# Patient Record
Sex: MALE | Race: WHITE | NOT HISPANIC OR LATINO | Employment: OTHER | URBAN - METROPOLITAN AREA
[De-identification: names, ages, dates, MRNs, and addresses within clinical notes are randomized per-mention and may not be internally consistent; named-entity substitution may affect disease eponyms.]

---

## 2018-05-21 ENCOUNTER — OFFICE VISIT (OUTPATIENT)
Dept: CARDIOLOGY | Facility: CLINIC | Age: 38
End: 2018-05-21

## 2018-05-21 VITALS
DIASTOLIC BLOOD PRESSURE: 79 MMHG | WEIGHT: 287 LBS | SYSTOLIC BLOOD PRESSURE: 111 MMHG | HEART RATE: 90 BPM | HEIGHT: 68 IN | BODY MASS INDEX: 43.5 KG/M2

## 2018-05-21 DIAGNOSIS — R94.31 ABNORMAL EKG: ICD-10-CM

## 2018-05-21 DIAGNOSIS — I42.9 CARDIOMYOPATHY, UNSPECIFIED TYPE (HCC): Primary | ICD-10-CM

## 2018-05-21 PROCEDURE — 99204 OFFICE O/P NEW MOD 45 MIN: CPT | Performed by: INTERNAL MEDICINE

## 2018-05-21 PROCEDURE — 93000 ELECTROCARDIOGRAM COMPLETE: CPT | Performed by: INTERNAL MEDICINE

## 2018-05-21 RX ORDER — VORTIOXETINE 10 MG/1
1 TABLET, FILM COATED ORAL DAILY
Refills: 0 | COMMUNITY
Start: 2018-05-08 | End: 2018-06-19 | Stop reason: ALTCHOICE

## 2018-05-21 RX ORDER — PANTOPRAZOLE SODIUM 40 MG/1
40 TABLET, DELAYED RELEASE ORAL DAILY
Refills: 0 | COMMUNITY
Start: 2018-05-15 | End: 2019-03-21

## 2018-05-21 RX ORDER — TRAZODONE HYDROCHLORIDE 50 MG/1
50 TABLET ORAL NIGHTLY PRN
Refills: 0 | COMMUNITY
Start: 2018-05-08 | End: 2018-08-29 | Stop reason: ALTCHOICE

## 2018-05-21 RX ORDER — FUROSEMIDE 40 MG/1
40 TABLET ORAL 2 TIMES DAILY
Refills: 0 | COMMUNITY
Start: 2018-05-15 | End: 2020-08-12

## 2018-05-21 RX ORDER — METRONIDAZOLE 500 MG/1
500 TABLET ORAL 3 TIMES DAILY
Refills: 0 | COMMUNITY
Start: 2018-05-15 | End: 2019-03-21

## 2018-05-21 RX ORDER — LEVOFLOXACIN 750 MG/1
750 TABLET ORAL DAILY
Refills: 0 | COMMUNITY
Start: 2018-05-15 | End: 2019-03-21

## 2018-05-21 RX ORDER — GABAPENTIN 300 MG/1
300 CAPSULE ORAL 3 TIMES DAILY
Refills: 0 | COMMUNITY
Start: 2018-05-08 | End: 2019-03-21

## 2018-05-21 RX ORDER — CARVEDILOL 12.5 MG/1
12.5 TABLET ORAL 2 TIMES DAILY
Refills: 0 | COMMUNITY
Start: 2018-05-15 | End: 2019-03-07 | Stop reason: ALTCHOICE

## 2018-05-21 RX ORDER — LISINOPRIL 20 MG/1
20 TABLET ORAL DAILY
Refills: 0 | COMMUNITY
Start: 2018-05-15 | End: 2020-05-22 | Stop reason: ALTCHOICE

## 2018-05-21 RX ORDER — PRAVASTATIN SODIUM 20 MG
20 TABLET ORAL
Refills: 0 | COMMUNITY
Start: 2018-05-15 | End: 2021-08-11 | Stop reason: SDUPTHER

## 2018-05-21 NOTE — PROGRESS NOTES
" Subjective:        CC  H/o cardiomyopathy,      Brian Stewart is a 37 y.o. male who  Is here for the cardiac evaluation with a history of the cardiomyopathy diagnoses years ago still complains of shortness of breath on exertion    Brian Stewart has been complaining of the shortness of breath mild-to-moderate in intensity with mild-to-moderate usually relieved with rest associated with anxiety and fatigue  .    Past Medical History:   Diagnosis Date   • Arrhythmia    • Cardiomyopathy    • Diverticulitis    • Hyperlipidemia     reports that he has quit smoking. He does not have any smokeless tobacco history on file. He reports that he does not drink alcohol.  Family History   Problem Relation Age of Onset   • Heart disease Father         Review of Systems  Constitutional: No wt loss, fever   Gastrointestinal: No nausea , abdominal pain  Behavioral/Psych: No insomnia or anxiety   Cardiovascular ----positive for Shortness of breath. All other systems reviewed and are negative    Objective:       Physical Exam           Physical Exam  /79 (BP Location: Left arm, Patient Position: Sitting)   Pulse 90   Ht 172.7 cm (68\")   Wt 130 kg (287 lb)   BMI 43.64 kg/m²     General appearance: NAD, conversant   Eyes: anicteric sclerae, moist conjunctivae; no lid-lag; PERRLA   HENT: Atraumatic; oropharynx clear with moist mucous membranes and no mucosal ulcerations;  normal hard and soft palate   Neck: Trachea midline; FROM, supple, no thyromegaly or lymphadenopathy   Lungs: CTA, with normal respiratory effort and no intercostal retractions   CV: S1-S2 regular, no murmurs, no rub, no gallop   Abdomen: Soft, non-tender; no masses or HSM   Extremities: No peripheral edema or extremity lymphadenopathy  Skin: Normal temperature, turgor and texture; no rash, ulcers or subcutaneous nodules   Psych: Appropriate affect, alert and oriented to person, place and time           Cardiographics  @  ECG 12 Lead  Date/Time: 5/21/2018 " 12:49 PM  Performed by: BELLE NICHOLS  Authorized by: BELLE NICHOLS   Comparison: not compared with previous ECG   Previous ECG: no previous ECG available  Rhythm: sinus rhythm  ST Flattening: all  Clinical impression: non-specific ECG            Echocardiogram:     Imaging  Chest x-ray: not done     Lab Review   not applicable       Current Outpatient Prescriptions:   •  carvedilol (COREG) 12.5 MG tablet, Take 12.5 mg by mouth 2 (Two) Times a Day., Disp: , Rfl: 0  •  furosemide (LASIX) 40 MG tablet, Take 40 mg by mouth 2 (Two) Times a Day., Disp: , Rfl: 0  •  gabapentin (NEURONTIN) 300 MG capsule, Take 300 mg by mouth 3 (Three) Times a Day., Disp: , Rfl: 0  •  levoFLOXacin (LEVAQUIN) 750 MG tablet, Take 750 mg by mouth Daily., Disp: , Rfl: 0  •  lisinopril (PRINIVIL,ZESTRIL) 20 MG tablet, Take 20 mg by mouth Daily., Disp: , Rfl: 0  •  metroNIDAZOLE (FLAGYL) 500 MG tablet, Take 500 mg by mouth 3 (Three) Times a Day., Disp: , Rfl: 0  •  pantoprazole (PROTONIX) 40 MG EC tablet, Take 40 mg by mouth Daily., Disp: , Rfl: 0  •  pravastatin (PRAVACHOL) 20 MG tablet, Take 20 mg by mouth every night at bedtime., Disp: , Rfl: 0  •  traZODone (DESYREL) 50 MG tablet, Take 50 mg by mouth At Night As Needed., Disp: , Rfl: 0  •  TRINTELLIX 10 MG tablet, Take 1 tablet by mouth Daily., Disp: , Rfl: 0        Assessment:      1. Cardiomyopathy, unspecified type    2. Abnormal EKG        There is no problem list on file for this patient.        Plan:          1. Cardiomyopathy, unspecified type  Considering patient's medical condition as well as the risk factors, patient will require echocardiogram for further evaluation for the LV function, four-chamber evaluation, including the pressures, valvular function and  pericardial disease and pericardial effusion    - ECG 12 Lead  - Stress Test With Myocardial Perfusion One Day  - Adult Transthoracic Echo Complete W/ Cont if Necessary Per Protocol    2. Abnormal  EKG  Considering the patient's symptoms as well as clinical situation and  EKG findings, along with cardiac risk factors, ischemic workup is necessary to rule out ischemic cardiomyopathy, stress induced arrhythmias, and functional capacity for diagnosis as well as prognostic consideration    - Stress Test With Myocardial Perfusion One Day  - Adult Transthoracic Echo Complete W/ Cont if Necessary Per Protocol      Echo, stress cardiolyte    See us 2-4 wks    Counseling was given to Brian Stewart for the following topics:  risk factor reductions, prognosis and risks and benefits of treatment options .       SMOKING COUNSELING:    Counseling given: Not Answered      .  EMR Dragon/Transcription disclaimer:   Much of this encounter note is an electronic transcription/translation of spoken language to printed text. The electronic translation of spoken language may permit erroneous, or at times, nonsensical words or phrases to be inadvertently transcribed; Although I have reviewed the note for such errors, some may still exist.

## 2018-06-11 ENCOUNTER — HOSPITAL ENCOUNTER (OUTPATIENT)
Dept: CARDIOLOGY | Facility: HOSPITAL | Age: 38
Discharge: HOME | End: 2018-06-11
Attending: INTERNAL MEDICINE | Admitting: INTERNAL MEDICINE

## 2018-06-11 PROCEDURE — 93306 TTE W/DOPPLER COMPLETE: CPT | Performed by: INTERNAL MEDICINE

## 2018-06-11 PROCEDURE — 93306 TTE W/DOPPLER COMPLETE: CPT

## 2018-06-12 LAB
AORTIC DIMENSIONLESS INDEX: 0.8 (DI)
BH CV ECHO MEAS - ACS: 2.3 CM
BH CV ECHO MEAS - AO MAX PG (FULL): 1.4 MMHG
BH CV ECHO MEAS - AO MAX PG: 4.2 MMHG
BH CV ECHO MEAS - AO MEAN PG (FULL): 1 MMHG
BH CV ECHO MEAS - AO MEAN PG: 3 MMHG
BH CV ECHO MEAS - AO ROOT AREA (BSA CORRECTED): 1.3
BH CV ECHO MEAS - AO ROOT AREA: 7.5 CM^2
BH CV ECHO MEAS - AO ROOT DIAM: 3.1 CM
BH CV ECHO MEAS - AO V2 MAX: 102 CM/SEC
BH CV ECHO MEAS - AO V2 MEAN: 76.4 CM/SEC
BH CV ECHO MEAS - AO V2 VTI: 18.9 CM
BH CV ECHO MEAS - ASC AORTA: 2.9 CM
BH CV ECHO MEAS - AVA(I,A): 4.2 CM^2
BH CV ECHO MEAS - AVA(I,D): 4.2 CM^2
BH CV ECHO MEAS - AVA(V,A): 4.3 CM^2
BH CV ECHO MEAS - AVA(V,D): 4.3 CM^2
BH CV ECHO MEAS - BSA(HAYCOCK): 2.6 M^2
BH CV ECHO MEAS - BSA: 2.4 M^2
BH CV ECHO MEAS - BZI_BMI: 43.6 KILOGRAMS/M^2
BH CV ECHO MEAS - BZI_METRIC_HEIGHT: 172.7 CM
BH CV ECHO MEAS - BZI_METRIC_WEIGHT: 130.2 KG
BH CV ECHO MEAS - CONTRAST EF (2CH): 24.7 ML/M^2
BH CV ECHO MEAS - CONTRAST EF 4CH: 29 ML/M^2
BH CV ECHO MEAS - EDV(CUBED): 195.1 ML
BH CV ECHO MEAS - EDV(MOD-SP2): 73 ML
BH CV ECHO MEAS - EDV(MOD-SP4): 69 ML
BH CV ECHO MEAS - EDV(TEICH): 166.6 ML
BH CV ECHO MEAS - EF(CUBED): 39.7 %
BH CV ECHO MEAS - EF(MOD-BP): 45 %
BH CV ECHO MEAS - EF(MOD-SP2): 24.7 %
BH CV ECHO MEAS - EF(MOD-SP4): 29 %
BH CV ECHO MEAS - EF(TEICH): 32.3 %
BH CV ECHO MEAS - ESV(CUBED): 117.6 ML
BH CV ECHO MEAS - ESV(MOD-SP2): 55 ML
BH CV ECHO MEAS - ESV(MOD-SP4): 49 ML
BH CV ECHO MEAS - ESV(TEICH): 112.8 ML
BH CV ECHO MEAS - FS: 15.5 %
BH CV ECHO MEAS - IVS/LVPW: 1.1
BH CV ECHO MEAS - IVSD: 1.1 CM
BH CV ECHO MEAS - LAT PEAK E' VEL: 10 CM/SEC
BH CV ECHO MEAS - LV DIASTOLIC VOL/BSA (35-75): 29 ML/M^2
BH CV ECHO MEAS - LV MASS(C)D: 248.5 GRAMS
BH CV ECHO MEAS - LV MASS(C)DI: 104.3 GRAMS/M^2
BH CV ECHO MEAS - LV MAX PG: 2.7 MMHG
BH CV ECHO MEAS - LV MEAN PG: 2 MMHG
BH CV ECHO MEAS - LV SYSTOLIC VOL/BSA (12-30): 20.6 ML/M^2
BH CV ECHO MEAS - LV V1 MAX: 82.9 CM/SEC
BH CV ECHO MEAS - LV V1 MEAN: 61.1 CM/SEC
BH CV ECHO MEAS - LV V1 VTI: 15.1 CM
BH CV ECHO MEAS - LVIDD: 5.8 CM
BH CV ECHO MEAS - LVIDS: 4.9 CM
BH CV ECHO MEAS - LVLD AP2: 7.9 CM
BH CV ECHO MEAS - LVLD AP4: 7.1 CM
BH CV ECHO MEAS - LVLS AP2: 7.2 CM
BH CV ECHO MEAS - LVLS AP4: 6.1 CM
BH CV ECHO MEAS - LVOT AREA (M): 5.3 CM^2
BH CV ECHO MEAS - LVOT AREA: 5.3 CM^2
BH CV ECHO MEAS - LVOT DIAM: 2.6 CM
BH CV ECHO MEAS - LVPWD: 1 CM
BH CV ECHO MEAS - MED PEAK E' VEL: 5 CM/SEC
BH CV ECHO MEAS - MV A DUR: 0.14 SEC
BH CV ECHO MEAS - MV A MAX VEL: 61.6 CM/SEC
BH CV ECHO MEAS - MV DEC SLOPE: 283 CM/SEC^2
BH CV ECHO MEAS - MV DEC TIME: 0.15 SEC
BH CV ECHO MEAS - MV E MAX VEL: 43.7 CM/SEC
BH CV ECHO MEAS - MV E/A: 0.71
BH CV ECHO MEAS - MV MAX PG: 2.1 MMHG
BH CV ECHO MEAS - MV MEAN PG: 1 MMHG
BH CV ECHO MEAS - MV P1/2T MAX VEL: 58.4 CM/SEC
BH CV ECHO MEAS - MV P1/2T: 60.4 MSEC
BH CV ECHO MEAS - MV V2 MAX: 71.7 CM/SEC
BH CV ECHO MEAS - MV V2 MEAN: 40.7 CM/SEC
BH CV ECHO MEAS - MV V2 VTI: 14.6 CM
BH CV ECHO MEAS - MVA P1/2T LCG: 3.8 CM^2
BH CV ECHO MEAS - MVA(P1/2T): 3.6 CM^2
BH CV ECHO MEAS - MVA(VTI): 5.5 CM^2
BH CV ECHO MEAS - PA ACC TIME: 0.12 SEC
BH CV ECHO MEAS - PA MAX PG (FULL): 2.2 MMHG
BH CV ECHO MEAS - PA MAX PG: 4.2 MMHG
BH CV ECHO MEAS - PA PR(ACCEL): 25.5 MMHG
BH CV ECHO MEAS - PA V2 MAX: 102 CM/SEC
BH CV ECHO MEAS - PVA(V,A): 2.8 CM^2
BH CV ECHO MEAS - PVA(V,D): 2.8 CM^2
BH CV ECHO MEAS - QP/QS: 0.63
BH CV ECHO MEAS - RAP SYSTOLE: 8 MMHG
BH CV ECHO MEAS - RV MAX PG: 1.9 MMHG
BH CV ECHO MEAS - RV MEAN PG: 1 MMHG
BH CV ECHO MEAS - RV V1 MAX: 69.4 CM/SEC
BH CV ECHO MEAS - RV V1 MEAN: 44.6 CM/SEC
BH CV ECHO MEAS - RV V1 VTI: 12.1 CM
BH CV ECHO MEAS - RVOT AREA: 4.2 CM^2
BH CV ECHO MEAS - RVOT DIAM: 2.3 CM
BH CV ECHO MEAS - RVSP: 27.5 MMHG
BH CV ECHO MEAS - SI(AO): 59.9 ML/M^2
BH CV ECHO MEAS - SI(CUBED): 32.5 ML/M^2
BH CV ECHO MEAS - SI(LVOT): 33.6 ML/M^2
BH CV ECHO MEAS - SI(MOD-SP2): 7.6 ML/M^2
BH CV ECHO MEAS - SI(MOD-SP4): 8.4 ML/M^2
BH CV ECHO MEAS - SI(TEICH): 22.6 ML/M^2
BH CV ECHO MEAS - SV(AO): 142.7 ML
BH CV ECHO MEAS - SV(CUBED): 77.5 ML
BH CV ECHO MEAS - SV(LVOT): 80.2 ML
BH CV ECHO MEAS - SV(MOD-SP2): 18 ML
BH CV ECHO MEAS - SV(MOD-SP4): 20 ML
BH CV ECHO MEAS - SV(RVOT): 50.3 ML
BH CV ECHO MEAS - SV(TEICH): 53.7 ML
BH CV ECHO MEAS - TAPSE (>1.6): 1.8 CM2
BH CV ECHO MEAS - TR MAX VEL: 221 CM/SEC
BH CV ECHO MEASUREMENTS AVERAGE E/E' RATIO: 5.83
BH CV VAS BP RIGHT ARM: NORMAL MMHG
BH CV XLRA - RV BASE: 3.8 CM
BH CV XLRA - TDI S': 11 CM/SEC
LEFT ATRIUM VOLUME INDEX: 18.9 ML/M2
MAXIMAL PREDICTED HEART RATE: 183 BPM
STRESS TARGET HR: 156 BPM

## 2018-06-18 ENCOUNTER — APPOINTMENT (OUTPATIENT)
Dept: CARDIOLOGY | Facility: HOSPITAL | Age: 38
End: 2018-06-18
Attending: INTERNAL MEDICINE

## 2018-06-18 ENCOUNTER — HOSPITAL ENCOUNTER (OUTPATIENT)
Dept: CARDIOLOGY | Facility: HOSPITAL | Age: 38
End: 2018-06-18
Attending: INTERNAL MEDICINE

## 2018-06-19 ENCOUNTER — HOSPITAL ENCOUNTER (OUTPATIENT)
Dept: CARDIOLOGY | Facility: HOSPITAL | Age: 38
Discharge: HOME | End: 2018-06-19
Attending: INTERNAL MEDICINE

## 2018-06-19 VITALS
SYSTOLIC BLOOD PRESSURE: 100 MMHG | HEART RATE: 78 BPM | HEIGHT: 68 IN | DIASTOLIC BLOOD PRESSURE: 68 MMHG | BODY MASS INDEX: 40.47 KG/M2 | WEIGHT: 267 LBS | RESPIRATION RATE: 20 BRPM | OXYGEN SATURATION: 95 %

## 2018-06-19 PROCEDURE — 78452 HT MUSCLE IMAGE SPECT MULT: CPT | Performed by: INTERNAL MEDICINE

## 2018-06-19 PROCEDURE — 93016 CV STRESS TEST SUPVJ ONLY: CPT | Performed by: INTERNAL MEDICINE

## 2018-06-19 PROCEDURE — 25010000002 REGADENOSON 0.4 MG/5ML SOLUTION: Performed by: INTERNAL MEDICINE

## 2018-06-19 PROCEDURE — 93018 CV STRESS TEST I&R ONLY: CPT | Performed by: INTERNAL MEDICINE

## 2018-06-19 PROCEDURE — 78452 HT MUSCLE IMAGE SPECT MULT: CPT

## 2018-06-19 PROCEDURE — 0 TECHNETIUM SESTAMIBI: Performed by: INTERNAL MEDICINE

## 2018-06-19 PROCEDURE — A9500 TC99M SESTAMIBI: HCPCS | Performed by: INTERNAL MEDICINE

## 2018-06-19 PROCEDURE — 93017 CV STRESS TEST TRACING ONLY: CPT

## 2018-06-19 RX ORDER — DULOXETIN HYDROCHLORIDE 30 MG/1
30 CAPSULE, DELAYED RELEASE ORAL DAILY
COMMUNITY
End: 2019-03-21

## 2018-06-19 RX ADMIN — TECHNETIUM TC 99M SESTAMIBI 1 DOSE: 1 INJECTION INTRAVENOUS at 07:37

## 2018-06-19 RX ADMIN — REGADENOSON 0.4 MG: 0.08 INJECTION, SOLUTION INTRAVENOUS at 10:03

## 2018-06-19 RX ADMIN — TECHNETIUM TC 99M SESTAMIBI 1 DOSE: 1 INJECTION INTRAVENOUS at 10:03

## 2018-06-20 LAB
BH CV STRESS BP STAGE 1: NORMAL
BH CV STRESS BP STAGE 2: NORMAL
BH CV STRESS DURATION MIN STAGE 1: 3
BH CV STRESS DURATION MIN STAGE 2: 2
BH CV STRESS DURATION SEC STAGE 1: 0
BH CV STRESS DURATION SEC STAGE 2: 33
BH CV STRESS GRADE STAGE 1: 10
BH CV STRESS GRADE STAGE 2: 12
BH CV STRESS HR STAGE 1: 110
BH CV STRESS HR STAGE 2: 123
BH CV STRESS METS STAGE 1: 4.6
BH CV STRESS METS STAGE 2: 7
BH CV STRESS PROTOCOL 1: NORMAL
BH CV STRESS PROTOCOL 2 BP STAGE 1: NORMAL
BH CV STRESS PROTOCOL 2 COMMENTS STAGE 1: NORMAL
BH CV STRESS PROTOCOL 2 DOSE REGADENOSON STAGE 1: 0.4
BH CV STRESS PROTOCOL 2 DURATION MIN STAGE 1: 1
BH CV STRESS PROTOCOL 2 DURATION SEC STAGE 1: 32
BH CV STRESS PROTOCOL 2 HR STAGE 1: 123
BH CV STRESS PROTOCOL 2 STAGE 1: 1
BH CV STRESS PROTOCOL 2: NORMAL
BH CV STRESS RECOVERY BP: NORMAL MMHG
BH CV STRESS RECOVERY HR: 102 BPM
BH CV STRESS RECOVERY O2: 97 %
BH CV STRESS SPEED STAGE 1: 1.7
BH CV STRESS SPEED STAGE 2: 2.5
BH CV STRESS STAGE 1: 1
BH CV STRESS STAGE 2: 2
LV EF NUC BP: 50 %
MAXIMAL PREDICTED HEART RATE: 183 BPM
PERCENT MAX PREDICTED HR: 67.21 %
STRESS BASELINE BP: NORMAL MMHG
STRESS BASELINE HR: 85 BPM
STRESS O2 SAT REST: 95 %
STRESS PERCENT HR: 79 %
STRESS POST ESTIMATED WORKLOAD: 7 METS
STRESS POST EXERCISE DUR MIN: 7 MIN
STRESS POST EXERCISE DUR SEC: 22 SEC
STRESS POST PEAK BP: NORMAL MMHG
STRESS POST PEAK HR: 123 BPM
STRESS TARGET HR: 156 BPM

## 2018-08-29 ENCOUNTER — OFFICE VISIT (OUTPATIENT)
Dept: CARDIOLOGY | Facility: CLINIC | Age: 38
End: 2018-08-29

## 2018-08-29 VITALS
SYSTOLIC BLOOD PRESSURE: 118 MMHG | WEIGHT: 289 LBS | HEART RATE: 89 BPM | BODY MASS INDEX: 43.8 KG/M2 | HEIGHT: 68 IN | DIASTOLIC BLOOD PRESSURE: 83 MMHG

## 2018-08-29 DIAGNOSIS — E78.5 HYPERLIPIDEMIA, UNSPECIFIED HYPERLIPIDEMIA TYPE: Primary | ICD-10-CM

## 2018-08-29 DIAGNOSIS — I10 ESSENTIAL HYPERTENSION: ICD-10-CM

## 2018-08-29 DIAGNOSIS — I42.9 CARDIOMYOPATHY, UNSPECIFIED TYPE (HCC): ICD-10-CM

## 2018-08-29 PROCEDURE — 99212 OFFICE O/P EST SF 10 MIN: CPT | Performed by: INTERNAL MEDICINE

## 2018-08-29 RX ORDER — MELOXICAM 15 MG/1
15 TABLET ORAL DAILY
Refills: 3 | COMMUNITY
Start: 2018-07-30 | End: 2020-05-22

## 2018-09-11 PROBLEM — E78.5 HYPERLIPIDEMIA: Status: ACTIVE | Noted: 2018-09-11

## 2018-09-11 PROBLEM — I10 ESSENTIAL HYPERTENSION: Status: ACTIVE | Noted: 2018-09-11

## 2018-09-14 ENCOUNTER — OFFICE (OUTPATIENT)
Dept: URBAN - METROPOLITAN AREA CLINIC 64 | Facility: CLINIC | Age: 38
End: 2018-09-14

## 2018-09-14 VITALS
HEIGHT: 68 IN | HEART RATE: 100 BPM | WEIGHT: 276 LBS | DIASTOLIC BLOOD PRESSURE: 83 MMHG | SYSTOLIC BLOOD PRESSURE: 109 MMHG

## 2018-09-14 DIAGNOSIS — K62.89 OTHER SPECIFIED DISEASES OF ANUS AND RECTUM: ICD-10-CM

## 2018-09-14 DIAGNOSIS — K21.9 GASTRO-ESOPHAGEAL REFLUX DISEASE WITHOUT ESOPHAGITIS: ICD-10-CM

## 2018-09-14 DIAGNOSIS — K59.00 CONSTIPATION, UNSPECIFIED: ICD-10-CM

## 2018-09-14 PROCEDURE — 99202 OFFICE O/P NEW SF 15 MIN: CPT | Performed by: NURSE PRACTITIONER

## 2018-09-14 RX ORDER — DOCUSATE SODIUM 100 MG/1
CAPSULE ORAL
Qty: 30 | Refills: 11 | Status: ACTIVE
Start: 2018-09-14

## 2018-09-14 RX ORDER — PANTOPRAZOLE SODIUM 20 MG/1
20 TABLET, DELAYED RELEASE ORAL
Qty: 0 | Refills: 0 | Status: COMPLETED
End: 2018-09-14

## 2018-09-14 RX ORDER — POLYETHYLENE GLYCOL 3350 17 G/17G
17 POWDER, FOR SOLUTION ORAL
Qty: 1 | Refills: 11 | Status: ACTIVE
Start: 2018-09-14

## 2019-02-08 ENCOUNTER — TELEPHONE (OUTPATIENT)
Dept: CARDIOLOGY | Facility: CLINIC | Age: 39
End: 2019-02-08

## 2019-02-08 NOTE — TELEPHONE ENCOUNTER
Received message from front office staff.   Patient at chiropractor whom is requesting to use TENS unit on low back for low back injury. Patient stating he has [employer] defib wanting to know if it is ok to use.  No Epic record of patient with defibrillator. LVM for patient to return call

## 2019-03-07 ENCOUNTER — OFFICE VISIT (OUTPATIENT)
Dept: CARDIOLOGY | Facility: CLINIC | Age: 39
End: 2019-03-07

## 2019-03-07 ENCOUNTER — CLINICAL SUPPORT NO REQUIREMENTS (OUTPATIENT)
Dept: CARDIOLOGY | Facility: CLINIC | Age: 39
End: 2019-03-07

## 2019-03-07 VITALS
BODY MASS INDEX: 44.25 KG/M2 | HEART RATE: 77 BPM | DIASTOLIC BLOOD PRESSURE: 84 MMHG | WEIGHT: 292 LBS | HEIGHT: 68 IN | SYSTOLIC BLOOD PRESSURE: 124 MMHG

## 2019-03-07 DIAGNOSIS — E78.5 HYPERLIPIDEMIA, UNSPECIFIED HYPERLIPIDEMIA TYPE: ICD-10-CM

## 2019-03-07 DIAGNOSIS — R00.2 PALPITATIONS: ICD-10-CM

## 2019-03-07 DIAGNOSIS — I10 ESSENTIAL HYPERTENSION: ICD-10-CM

## 2019-03-07 DIAGNOSIS — I42.9 CARDIOMYOPATHY, UNSPECIFIED TYPE (HCC): Primary | ICD-10-CM

## 2019-03-07 DIAGNOSIS — Z95.810 AICD (AUTOMATIC CARDIOVERTER/DEFIBRILLATOR) PRESENT: Primary | ICD-10-CM

## 2019-03-07 PROCEDURE — 93289 INTERROG DEVICE EVAL HEART: CPT | Performed by: INTERNAL MEDICINE

## 2019-03-07 PROCEDURE — 99214 OFFICE O/P EST MOD 30 MIN: CPT | Performed by: INTERNAL MEDICINE

## 2019-03-07 RX ORDER — BISOPROLOL FUMARATE AND HYDROCHLOROTHIAZIDE 10; 6.25 MG/1; MG/1
1 TABLET ORAL DAILY
Qty: 30 TABLET | Refills: 6 | Status: SHIPPED | OUTPATIENT
Start: 2019-03-07 | End: 2019-08-29 | Stop reason: SINTOL

## 2019-03-07 RX ORDER — OXYCODONE HYDROCHLORIDE AND ACETAMINOPHEN 5; 325 MG/1; MG/1
1 TABLET ORAL
COMMUNITY
Start: 2019-02-18 | End: 2019-03-07

## 2019-03-07 NOTE — PROGRESS NOTES
" Subjective:        Brian Stewart is a 38 y.o. male who here for follow up    CC  Palpitation, lasting 10-30 min, went to er, sob  HPI  38-year-old male known history of the hyperlipidemia, benign essential arterial hypertension along with a history of the cardiomyopathy has been complaining of palpitations mild to moderate in intensity lasting 10-30 minutes went to the emergency room recently, palpitations associated with shortness of breath     Problem List Items Addressed This Visit        Cardiovascular and Mediastinum    Hyperlipidemia    Essential hypertension    Relevant Medications    bisoprolol-hydrochlorothiazide (ZIAC) 10-6.25 MG per tablet    Cardiomyopathy (CMS/HCC) - Primary    Relevant Orders    Ambulatory Referral to Cardiology    Palpitations        .    The following portions of the patient's history were reviewed and updated as appropriate: allergies, current medications, past family history, past medical history, past social history, past surgical history and problem list.    Past Medical History:   Diagnosis Date   • Arrhythmia    • Cardiomyopathy (CMS/HCC)    • Diverticulitis    • Hyperlipidemia      reports that he has quit smoking. His smokeless tobacco use includes snuff. He reports that he does not drink alcohol.   Family History   Problem Relation Age of Onset   • Heart disease Father    • Hypertension Mother        Review of Systems  Constitutional: No wt loss, fever, fatigue  Gastrointestinal: No nausea, abdominal pain  Behavioral/Psych: No insomnia or anxiety   Cardiovascular palpitations and shortness of breath  Objective:       Physical Exam  /84 (BP Location: Left arm, Patient Position: Sitting)   Pulse 77   Ht 172.7 cm (68\")   Wt 132 kg (292 lb)   BMI 44.40 kg/m²   General appearance: No acute changes   Neck: Trachea midline; NECK, supple, no thyromegaly or lymphadenopathy   Lungs: Normal size and shape, normal breath sounds, equal distribution of air, no rales and " rhonchi   CV: S1-S2 regular, no murmurs, no rub, no gallop   Abdomen: Soft, non-tender; no masses , no abnormal abdominal sounds   Extremities: No deformity , normal color , no peripheral edema   Skin: Normal temperature, turgor and texture; no rash, ulcers          Procedures      Echocardiogram:        Current Outpatient Medications:   •  carvedilol (COREG) 12.5 MG tablet, Take 12.5 mg by mouth 2 (Two) Times a Day., Disp: , Rfl: 0  •  DULoxetine (CYMBALTA) 30 MG capsule, Take 30 mg by mouth Daily., Disp: , Rfl:   •  furosemide (LASIX) 40 MG tablet, Take 40 mg by mouth 2 (Two) Times a Day., Disp: , Rfl: 0  •  gabapentin (NEURONTIN) 300 MG capsule, Take 300 mg by mouth 3 (Three) Times a Day., Disp: , Rfl: 0  •  levoFLOXacin (LEVAQUIN) 750 MG tablet, Take 750 mg by mouth Daily., Disp: , Rfl: 0  •  lisinopril (PRINIVIL,ZESTRIL) 20 MG tablet, Take 20 mg by mouth Daily., Disp: , Rfl: 0  •  LYRICA 100 MG capsule, Take 100 mg by mouth 2 (Two) Times a Day., Disp: , Rfl: 2  •  meloxicam (MOBIC) 15 MG tablet, Take 15 mg by mouth Daily., Disp: , Rfl: 3  •  metroNIDAZOLE (FLAGYL) 500 MG tablet, Take 500 mg by mouth 3 (Three) Times a Day., Disp: , Rfl: 0  •  pantoprazole (PROTONIX) 40 MG EC tablet, Take 40 mg by mouth Daily., Disp: , Rfl: 0  •  pravastatin (PRAVACHOL) 20 MG tablet, Take 20 mg by mouth every night at bedtime., Disp: , Rfl: 0   Assessment:        Patient Active Problem List   Diagnosis   • Hyperlipidemia   • Essential hypertension               Plan:            ICD-10-CM ICD-9-CM   1. Cardiomyopathy, unspecified type (CMS/HCC) I42.9 425.4   2. Essential hypertension I10 401.9   3. Hyperlipidemia, unspecified hyperlipidemia type E78.5 272.4   4. Palpitations R00.2 785.1     1. Cardiomyopathy, unspecified type (CMS/HCC)    - Ambulatory Referral to Cardiology    2. Essential hypertension  Blood pressure controlled    3. Hyperlipidemia, unspecified hyperlipidemia type  Counseling done    4. Palpitations  Change the  medications       Pacer shows non sustained svt    Refer to dr. granda to pull leads    Dc coreg    Start ziac 5/6.25 mg po daily    Pros and cons of this new medication / change medication has been explained to  the patient    Possible side effects has been explained    Associated need of the blood  Work has been explained    Need for the compliance of the medication has been explained      See in 2 month  COUNSELING:    Brian Chairez was given to patient for the following topics: diagnostic results, risk factor reductions, impressions, risks and benefits of treatment options and importance of treatment compliance .       SMOKING COUNSELING:    Ready to quit: Not Answered  Counseling given: Yes      Dictated using Dragon dictation

## 2019-03-20 PROBLEM — R00.2 PALPITATIONS: Status: ACTIVE | Noted: 2019-03-20

## 2019-03-20 PROBLEM — I42.9 CARDIOMYOPATHY (HCC): Status: ACTIVE | Noted: 2019-03-20

## 2019-03-21 ENCOUNTER — OFFICE VISIT (OUTPATIENT)
Dept: CARDIOLOGY | Facility: CLINIC | Age: 39
End: 2019-03-21

## 2019-03-21 VITALS
SYSTOLIC BLOOD PRESSURE: 122 MMHG | HEIGHT: 68 IN | BODY MASS INDEX: 43.74 KG/M2 | WEIGHT: 288.6 LBS | OXYGEN SATURATION: 98 % | DIASTOLIC BLOOD PRESSURE: 86 MMHG | HEART RATE: 76 BPM

## 2019-03-21 DIAGNOSIS — I42.9 CARDIOMYOPATHY, UNSPECIFIED TYPE (HCC): Primary | ICD-10-CM

## 2019-03-21 PROCEDURE — 99213 OFFICE O/P EST LOW 20 MIN: CPT | Performed by: INTERNAL MEDICINE

## 2019-03-21 PROCEDURE — 93000 ELECTROCARDIOGRAM COMPLETE: CPT | Performed by: INTERNAL MEDICINE

## 2019-03-21 NOTE — PROGRESS NOTES
Date of Office Visit: 2019  Encounter Provider: Rusty Mitchell MD  Place of Service: [facility]  Patient Name: Brian Stewart  :1980  Santana Pillai MD    Chief Complaint   Patient presents with   • Establish Care     icd lead problem      History of Present Illness  The patient is a 38-year-old white male with a history of a cardiomyopathy.  He is referred by Dr. Tobar for evaluation of his chronic lead problem.    The patient received an ICD back in  or .  Shortly afterward the patient began to receive inappropriate shocks from the device.  Eventually a new Riata lead was implanted with the old one capped and placed in the pocket.  He recently was seen in the [facility] system.  He was found to have a nonfunctioning ICD which was replaced.  A comment was made that the old Riata lead is present and capped.  There appears to be evidence of lead separation based on the evaluation.  No attempt to extract the old lead was made.  The patient had a new device implanted and he was discharged.    He is here today to discuss his old Riata lead and possible extraction.    Past Medical History:   Diagnosis Date   • Arrhythmia    • Cardiomyopathy (CMS/HCC)    • Diverticulitis    • Hyperlipidemia    • Palpitations          Past Surgical History:   Procedure Laterality Date   • CARDIAC CATHETERIZATION     • CARDIAC DEFIBRILLATOR PLACEMENT     • INSERT / REPLACE / REMOVE PACEMAKER      ICD   • KNEE ACL RECONSTRUCTION             Current Outpatient Medications:   •  bisoprolol-hydrochlorothiazide (ZIAC) 10-6.25 MG per tablet, Take 1 tablet by mouth Daily., Disp: 30 tablet, Rfl: 6  •  furosemide (LASIX) 40 MG tablet, Take 40 mg by mouth 2 (Two) Times a Day., Disp: , Rfl: 0  •  lisinopril (PRINIVIL,ZESTRIL) 20 MG tablet, Take 20 mg by mouth Daily., Disp: , Rfl: 0  •  LYRICA 100 MG capsule, Take 100 mg by mouth 2 (Two) Times a Day., Disp: , Rfl: 2  •   "meloxicam (MOBIC) 15 MG tablet, Take 15 mg by mouth Daily., Disp: , Rfl: 3  •  pravastatin (PRAVACHOL) 20 MG tablet, Take 20 mg by mouth every night at bedtime., Disp: , Rfl: 0      Social History     Socioeconomic History   • Marital status: Single     Spouse name: Not on file   • Number of children: Not on file   • Years of education: Not on file   • Highest education level: Not on file   Tobacco Use   • Smoking status: Former Smoker   • Smokeless tobacco: Current User     Types: Snuff   • Tobacco comment: no caffeine    Substance and Sexual Activity   • Alcohol use: No   • Sexual activity: Yes     Partners: Female         Review of Systems   Constitution: Negative.   HENT: Negative.    Eyes: Negative.    Cardiovascular: Negative.    Respiratory: Negative.    Endocrine: Negative.    Skin: Negative.    Musculoskeletal: Negative.    Gastrointestinal: Negative.    Neurological: Negative.    Psychiatric/Behavioral: Negative.        Procedures      ECG 12 Lead  Date/Time: 3/21/2019 1:10 PM  Performed by: Rusty Mitchell MD  Authorized by: Rusty Mitchell MD   Comparison: not compared with previous ECG   Rhythm: sinus rhythm  Rate: normal  Conduction: conduction normal  QRS axis: normal  Other findings: non-specific ST-T wave changes                Objective:    /86 (BP Location: Right arm, Patient Position: Sitting, Cuff Size: Large Adult)   Pulse 76   Ht 172.7 cm (68\")   Wt 131 kg (288 lb 9.6 oz)   SpO2 98%   BMI 43.88 kg/m²         Physical Exam   Constitutional: He is oriented to person, place, and time. He appears well-developed and well-nourished.   Obese   HENT:   Head: Normocephalic.   Eyes: Pupils are equal, round, and reactive to light.   Neck: Normal range of motion. No JVD present. Carotid bruit is not present. No thyromegaly present.   Cardiovascular: Normal rate, regular rhythm, S1 normal, S2 normal, normal heart sounds and intact distal pulses. Exam reveals no gallop and no friction " rub.   No murmur heard.  Pulmonary/Chest: Effort normal and breath sounds normal.   Abdominal: Soft. Bowel sounds are normal.   Musculoskeletal: He exhibits no edema.   Neurological: He is alert and oriented to person, place, and time.   Skin: Skin is warm, dry and intact. No erythema.   Psychiatric: He has a normal mood and affect.   Vitals reviewed.          Assessment:       Diagnosis Plan   1. Cardiomyopathy, unspecified type (CMS/HCC)       1.  Cardiomyopathy: Status post AICD implant.  Original Riata lead capped and placed in the pocket.  New ICD recently implanted.  Second Riata lead continues to function properly.    There does not appear to be any interference of the old Riata lead with a new Riata lead.  There is no clinical indication at this time to extract this lead.  The Riata leads in particular are problematic with extraction.  The lead already shows evidence of failure and extraction attempt might lead to further difficulties and incomplete removal.    Since the patient has not had any therapies delivered recently, has a normal functioning device and lead, that the only it is not interfering with his ICD function no attempt to extract this lead should be made at this time.  If and when a situation arises that would prompt extraction both Riata leads are to be removed and replaced.  I do not think that extreme measure is indicated at this time.       Plan:   I appreciate the opportunity to evaluate this patient in consultation.

## 2019-05-23 ENCOUNTER — OFFICE VISIT (OUTPATIENT)
Dept: CARDIOLOGY | Facility: CLINIC | Age: 39
End: 2019-05-23

## 2019-05-23 VITALS
HEIGHT: 68 IN | BODY MASS INDEX: 42.74 KG/M2 | SYSTOLIC BLOOD PRESSURE: 113 MMHG | DIASTOLIC BLOOD PRESSURE: 74 MMHG | WEIGHT: 282 LBS | HEART RATE: 82 BPM

## 2019-05-23 DIAGNOSIS — R00.2 PALPITATIONS: ICD-10-CM

## 2019-05-23 DIAGNOSIS — I42.8 OTHER CARDIOMYOPATHIES (HCC): ICD-10-CM

## 2019-05-23 DIAGNOSIS — I10 ESSENTIAL HYPERTENSION: ICD-10-CM

## 2019-05-23 DIAGNOSIS — E78.5 HYPERLIPIDEMIA, UNSPECIFIED HYPERLIPIDEMIA TYPE: ICD-10-CM

## 2019-05-23 DIAGNOSIS — I42.9 CARDIOMYOPATHY, UNSPECIFIED TYPE (HCC): Primary | ICD-10-CM

## 2019-05-23 PROCEDURE — 99213 OFFICE O/P EST LOW 20 MIN: CPT | Performed by: INTERNAL MEDICINE

## 2019-05-23 RX ORDER — HYDROCODONE BITARTRATE AND ACETAMINOPHEN 7.5; 325 MG/1; MG/1
1 TABLET ORAL
Refills: 0 | COMMUNITY
Start: 2019-04-30 | End: 2021-09-06

## 2019-05-24 ENCOUNTER — CLINICAL SUPPORT NO REQUIREMENTS (OUTPATIENT)
Dept: CARDIOLOGY | Facility: CLINIC | Age: 39
End: 2019-05-24

## 2019-05-24 DIAGNOSIS — Z95.810 AICD (AUTOMATIC CARDIOVERTER/DEFIBRILLATOR) PRESENT: Primary | ICD-10-CM

## 2019-05-24 PROCEDURE — 93296 REM INTERROG EVL PM/IDS: CPT | Performed by: INTERNAL MEDICINE

## 2019-05-24 PROCEDURE — 93295 DEV INTERROG REMOTE 1/2/MLT: CPT | Performed by: INTERNAL MEDICINE

## 2019-08-28 ENCOUNTER — HOSPITAL ENCOUNTER (OUTPATIENT)
Dept: CARDIOLOGY | Facility: HOSPITAL | Age: 39
Discharge: HOME | End: 2019-08-28
Admitting: INTERNAL MEDICINE

## 2019-08-28 VITALS — WEIGHT: 282 LBS | BODY MASS INDEX: 42.74 KG/M2 | HEIGHT: 68 IN

## 2019-08-28 DIAGNOSIS — I10 ESSENTIAL HYPERTENSION: ICD-10-CM

## 2019-08-28 DIAGNOSIS — I42.8 OTHER CARDIOMYOPATHIES (HCC): ICD-10-CM

## 2019-08-28 DIAGNOSIS — I42.9 CARDIOMYOPATHY, UNSPECIFIED TYPE (HCC): ICD-10-CM

## 2019-08-28 LAB
BH CV ECHO MEAS - ACS: 2 CM
BH CV ECHO MEAS - AO MAX PG (FULL): 3 MMHG
BH CV ECHO MEAS - AO MAX PG: 4.2 MMHG
BH CV ECHO MEAS - AO MEAN PG (FULL): 1 MMHG
BH CV ECHO MEAS - AO MEAN PG: 2 MMHG
BH CV ECHO MEAS - AO ROOT AREA (BSA CORRECTED): 1.5
BH CV ECHO MEAS - AO ROOT AREA: 9.6 CM^2
BH CV ECHO MEAS - AO ROOT DIAM: 3.5 CM
BH CV ECHO MEAS - AO V2 MAX: 102 CM/SEC
BH CV ECHO MEAS - AO V2 MEAN: 70.3 CM/SEC
BH CV ECHO MEAS - AO V2 VTI: 15.5 CM
BH CV ECHO MEAS - AVA(I,A): 2.6 CM^2
BH CV ECHO MEAS - AVA(I,D): 2.6 CM^2
BH CV ECHO MEAS - AVA(V,A): 2.2 CM^2
BH CV ECHO MEAS - AVA(V,D): 2.2 CM^2
BH CV ECHO MEAS - BSA(HAYCOCK): 2.5 M^2
BH CV ECHO MEAS - BSA: 2.4 M^2
BH CV ECHO MEAS - BZI_BMI: 42.9 KILOGRAMS/M^2
BH CV ECHO MEAS - BZI_METRIC_HEIGHT: 172.7 CM
BH CV ECHO MEAS - BZI_METRIC_WEIGHT: 127.9 KG
BH CV ECHO MEAS - EDV(CUBED): 85.2 ML
BH CV ECHO MEAS - EDV(MOD-SP2): 152 ML
BH CV ECHO MEAS - EDV(MOD-SP4): 187 ML
BH CV ECHO MEAS - EDV(TEICH): 87.7 ML
BH CV ECHO MEAS - EF(CUBED): 49.7 %
BH CV ECHO MEAS - EF(MOD-BP): 40 %
BH CV ECHO MEAS - EF(MOD-SP2): 39.5 %
BH CV ECHO MEAS - EF(MOD-SP4): 46 %
BH CV ECHO MEAS - EF(TEICH): 42 %
BH CV ECHO MEAS - ESV(CUBED): 42.9 ML
BH CV ECHO MEAS - ESV(MOD-SP2): 92 ML
BH CV ECHO MEAS - ESV(MOD-SP4): 101 ML
BH CV ECHO MEAS - ESV(TEICH): 50.9 ML
BH CV ECHO MEAS - FS: 20.5 %
BH CV ECHO MEAS - IVS/LVPW: 1.1
BH CV ECHO MEAS - IVSD: 1.3 CM
BH CV ECHO MEAS - LA DIMENSION: 3.8 CM
BH CV ECHO MEAS - LA/AO: 1.1
BH CV ECHO MEAS - LAT PEAK E' VEL: 11.9 CM/SEC
BH CV ECHO MEAS - LV DIASTOLIC VOL/BSA (35-75): 79.1 ML/M^2
BH CV ECHO MEAS - LV MASS(C)D: 203 GRAMS
BH CV ECHO MEAS - LV MASS(C)DI: 85.8 GRAMS/M^2
BH CV ECHO MEAS - LV MAX PG: 1.1 MMHG
BH CV ECHO MEAS - LV MEAN PG: 1 MMHG
BH CV ECHO MEAS - LV SYSTOLIC VOL/BSA (12-30): 42.7 ML/M^2
BH CV ECHO MEAS - LV V1 MAX: 53.2 CM/SEC
BH CV ECHO MEAS - LV V1 MEAN: 37.8 CM/SEC
BH CV ECHO MEAS - LV V1 VTI: 9.6 CM
BH CV ECHO MEAS - LVIDD: 4.4 CM
BH CV ECHO MEAS - LVIDS: 3.5 CM
BH CV ECHO MEAS - LVLD AP2: 8.7 CM
BH CV ECHO MEAS - LVLD AP4: 8.9 CM
BH CV ECHO MEAS - LVLS AP2: 7.4 CM
BH CV ECHO MEAS - LVLS AP4: 7.4 CM
BH CV ECHO MEAS - LVOT AREA (M): 4.2 CM^2
BH CV ECHO MEAS - LVOT AREA: 4.2 CM^2
BH CV ECHO MEAS - LVOT DIAM: 2.3 CM
BH CV ECHO MEAS - LVPWD: 1.2 CM
BH CV ECHO MEAS - MED PEAK E' VEL: 7.5 CM/SEC
BH CV ECHO MEAS - MV A DUR: 0.14 SEC
BH CV ECHO MEAS - MV A MAX VEL: 70.8 CM/SEC
BH CV ECHO MEAS - MV DEC SLOPE: 225 CM/SEC^2
BH CV ECHO MEAS - MV DEC TIME: 0.11 SEC
BH CV ECHO MEAS - MV E MAX VEL: 30.6 CM/SEC
BH CV ECHO MEAS - MV E/A: 0.43
BH CV ECHO MEAS - MV MAX PG: 2 MMHG
BH CV ECHO MEAS - MV MEAN PG: 1 MMHG
BH CV ECHO MEAS - MV P1/2T MAX VEL: 42.9 CM/SEC
BH CV ECHO MEAS - MV P1/2T: 55.8 MSEC
BH CV ECHO MEAS - MV V2 MAX: 70.6 CM/SEC
BH CV ECHO MEAS - MV V2 MEAN: 47.3 CM/SEC
BH CV ECHO MEAS - MV V2 VTI: 12.1 CM
BH CV ECHO MEAS - MVA P1/2T LCG: 5.1 CM^2
BH CV ECHO MEAS - MVA(P1/2T): 3.9 CM^2
BH CV ECHO MEAS - MVA(VTI): 3.3 CM^2
BH CV ECHO MEAS - PA ACC TIME: 0.11 SEC
BH CV ECHO MEAS - PA MAX PG (FULL): 0.67 MMHG
BH CV ECHO MEAS - PA MAX PG: 2.3 MMHG
BH CV ECHO MEAS - PA PR(ACCEL): 27.7 MMHG
BH CV ECHO MEAS - PA V2 MAX: 75.6 CM/SEC
BH CV ECHO MEAS - PULM A REVS DUR: 0.15 SEC
BH CV ECHO MEAS - PULM A REVS VEL: 25.3 CM/SEC
BH CV ECHO MEAS - PULM DIAS VEL: 31 CM/SEC
BH CV ECHO MEAS - PULM S/D: 1.6
BH CV ECHO MEAS - PULM SYS VEL: 49.1 CM/SEC
BH CV ECHO MEAS - PVA(V,A): 3.2 CM^2
BH CV ECHO MEAS - PVA(V,D): 3.2 CM^2
BH CV ECHO MEAS - QP/QS: 0.93
BH CV ECHO MEAS - RAP SYSTOLE: 3 MMHG
BH CV ECHO MEAS - RV MAX PG: 1.6 MMHG
BH CV ECHO MEAS - RV MEAN PG: 1 MMHG
BH CV ECHO MEAS - RV V1 MAX: 63.5 CM/SEC
BH CV ECHO MEAS - RV V1 MEAN: 43.6 CM/SEC
BH CV ECHO MEAS - RV V1 VTI: 9.7 CM
BH CV ECHO MEAS - RVOT AREA: 3.8 CM^2
BH CV ECHO MEAS - RVOT DIAM: 2.2 CM
BH CV ECHO MEAS - RVSP: 20.3 MMHG
BH CV ECHO MEAS - SI(AO): 63 ML/M^2
BH CV ECHO MEAS - SI(CUBED): 17.9 ML/M^2
BH CV ECHO MEAS - SI(LVOT): 16.8 ML/M^2
BH CV ECHO MEAS - SI(MOD-SP2): 25.4 ML/M^2
BH CV ECHO MEAS - SI(MOD-SP4): 36.4 ML/M^2
BH CV ECHO MEAS - SI(TEICH): 15.6 ML/M^2
BH CV ECHO MEAS - SV(AO): 149.1 ML
BH CV ECHO MEAS - SV(CUBED): 42.3 ML
BH CV ECHO MEAS - SV(LVOT): 39.8 ML
BH CV ECHO MEAS - SV(MOD-SP2): 60 ML
BH CV ECHO MEAS - SV(MOD-SP4): 86 ML
BH CV ECHO MEAS - SV(RVOT): 36.9 ML
BH CV ECHO MEAS - SV(TEICH): 36.8 ML
BH CV ECHO MEAS - TAPSE (>1.6): 1.9 CM
BH CV ECHO MEAS - TR MAX VEL: 208 CM/SEC
BH CV ECHO MEASUREMENTS AVERAGE E/E' RATIO: 3.15
BH CV XLRA - RV BASE: 3.6 CM
BH CV XLRA - RV LENGTH: 7.5 CM
BH CV XLRA - RV MID: 2.3 CM
BH CV XLRA - TDI S': 10.3 CM/SEC
LEFT ATRIUM VOLUME INDEX: 19 ML/M2
MAXIMAL PREDICTED HEART RATE: 182 BPM
STRESS TARGET HR: 155 BPM

## 2019-08-28 PROCEDURE — 93306 TTE W/DOPPLER COMPLETE: CPT | Performed by: INTERNAL MEDICINE

## 2019-08-28 PROCEDURE — 25010000002 PERFLUTREN (DEFINITY) 8.476 MG IN SODIUM CHLORIDE 0.9 % 10 ML INJECTION: Performed by: INTERNAL MEDICINE

## 2019-08-28 PROCEDURE — 93306 TTE W/DOPPLER COMPLETE: CPT

## 2019-08-28 RX ADMIN — SODIUM CHLORIDE 2 ML: 9 INJECTION INTRAMUSCULAR; INTRAVENOUS; SUBCUTANEOUS at 15:15

## 2019-08-29 ENCOUNTER — CLINICAL SUPPORT NO REQUIREMENTS (OUTPATIENT)
Dept: CARDIOLOGY | Facility: CLINIC | Age: 39
End: 2019-08-29

## 2019-08-29 ENCOUNTER — OFFICE VISIT (OUTPATIENT)
Dept: CARDIOLOGY | Facility: CLINIC | Age: 39
End: 2019-08-29

## 2019-08-29 VITALS
HEIGHT: 68 IN | DIASTOLIC BLOOD PRESSURE: 77 MMHG | BODY MASS INDEX: 41.52 KG/M2 | SYSTOLIC BLOOD PRESSURE: 112 MMHG | HEART RATE: 96 BPM | WEIGHT: 274 LBS

## 2019-08-29 DIAGNOSIS — R06.02 SHORTNESS OF BREATH: ICD-10-CM

## 2019-08-29 DIAGNOSIS — R00.2 PALPITATIONS: ICD-10-CM

## 2019-08-29 DIAGNOSIS — I42.9 CARDIOMYOPATHY, UNSPECIFIED TYPE (HCC): Primary | ICD-10-CM

## 2019-08-29 DIAGNOSIS — I10 ESSENTIAL HYPERTENSION: ICD-10-CM

## 2019-08-29 DIAGNOSIS — Z45.02 IMPLANTABLE DEFIBRILLATOR REPROGRAMMING/CHECK: Primary | ICD-10-CM

## 2019-08-29 DIAGNOSIS — E78.5 HYPERLIPIDEMIA, UNSPECIFIED HYPERLIPIDEMIA TYPE: ICD-10-CM

## 2019-08-29 PROCEDURE — 93000 ELECTROCARDIOGRAM COMPLETE: CPT | Performed by: INTERNAL MEDICINE

## 2019-08-29 PROCEDURE — 99213 OFFICE O/P EST LOW 20 MIN: CPT | Performed by: INTERNAL MEDICINE

## 2019-08-29 PROCEDURE — 93282 PRGRMG EVAL IMPLANTABLE DFB: CPT | Performed by: INTERNAL MEDICINE

## 2019-08-29 RX ORDER — OXYCODONE HYDROCHLORIDE 5 MG/1
TABLET ORAL
Refills: 0 | COMMUNITY
Start: 2019-08-22 | End: 2020-05-22

## 2019-08-29 RX ORDER — GABAPENTIN 300 MG/1
300 CAPSULE ORAL 3 TIMES DAILY
Refills: 0 | COMMUNITY
Start: 2019-08-22 | End: 2022-06-17

## 2019-09-11 PROBLEM — R06.02 SHORTNESS OF BREATH: Status: ACTIVE | Noted: 2019-09-11

## 2019-11-22 ENCOUNTER — CLINICAL SUPPORT NO REQUIREMENTS (OUTPATIENT)
Dept: CARDIOLOGY | Facility: CLINIC | Age: 39
End: 2019-11-22

## 2019-11-22 DIAGNOSIS — Z95.810 AICD (AUTOMATIC CARDIOVERTER/DEFIBRILLATOR) PRESENT: Primary | ICD-10-CM

## 2019-11-22 PROCEDURE — 93296 REM INTERROG EVL PM/IDS: CPT | Performed by: INTERNAL MEDICINE

## 2019-11-22 PROCEDURE — 93295 DEV INTERROG REMOTE 1/2/MLT: CPT | Performed by: INTERNAL MEDICINE

## 2019-11-25 PROBLEM — Z95.810 AICD (AUTOMATIC CARDIOVERTER/DEFIBRILLATOR) PRESENT: Status: ACTIVE | Noted: 2019-11-25

## 2019-12-17 ENCOUNTER — TELEPHONE (OUTPATIENT)
Dept: CARDIOLOGY | Facility: CLINIC | Age: 39
End: 2019-12-17

## 2019-12-17 NOTE — TELEPHONE ENCOUNTER
[facility]  [phone] Fax    Needing clearance for carpal tunnel    LOV 9/11/19   Stress 6/20/18  Echo 8/28/19    Pt will need testing prior         Pt has appt 1/3/20

## 2020-05-19 NOTE — PROGRESS NOTES
Subjective:        Brian Stewart is a 39 y.o. male who here for follow up    No chief complaint on file.    He is here today for chest pain    HPI     Brian Stewart is an 39 year old male, who is new to this provider.  He has a history of palpitations, pacemaker, hyperlipidemia, diverticulitis, cardiomyopathy with [employer] AICD, and arrhythmia.  His previous echo on 8/28/2019 revealed EF 40%, LV C is mildly dilated, LV diastolic dysfunction grade 1, and the pericardium is normal.  No evidence of pericardial effusion.  Stress test on 6/20/2018 indicated a normal myocardial perfusion study with no evidence of ischemia.     He states he has been having chest pain and shortness of breath. He denies palpitations, syncope, and near syncope.    The following portions of the patient's history were reviewed and updated as appropriate: allergies, current medications, past family history, past medical history, past social history, past surgical history and problem list.    Past Medical History:   Diagnosis Date   • Arrhythmia    • Cardiomyopathy (CMS/HCC)    • Diverticulitis    • Hyperlipidemia    • Palpitations          reports that he has quit smoking. His smokeless tobacco use includes snuff. He reports that he has current or past drug history. Drug: Marijuana. He reports that he does not drink alcohol.     Family History   Problem Relation Age of Onset   • Heart disease Father    • Hypertension Mother        ROS     Review of Systems  Constitutional: Negative for wt loss, fever, +fatigue  Gastrointestinal: Negative for nausea, abdominal pain  Behavioral/Psych: Negative for insomnia or anxiety  Cardiovascular: Denies palpitations, syncope, and near syncope. + chest pain and shortness of breath .      Objective:           Physical Exam   Constitutional: He is oriented to person, place, and time. He appears well-developed and well-nourished.   HENT:   Head: Normocephalic.   Right Ear: External ear normal.   Left  Ear: External ear normal.   Eyes: EOM are normal.   Neck: Normal range of motion. No JVD present.   Cardiovascular: Normal rate, regular rhythm, normal heart sounds and intact distal pulses. Exam reveals no gallop and no friction rub.   No murmur heard.  Pulmonary/Chest: Effort normal and breath sounds normal. No stridor. No respiratory distress. He has no rales.   Abdominal: Soft. Bowel sounds are normal. He exhibits no distension. There is no tenderness. There is no guarding.   Musculoskeletal: Normal range of motion. He exhibits no edema or tenderness.   Neurological: He is alert and oriented to person, place, and time. He has normal reflexes.   Skin: Skin is warm.   Psychiatric: He has a normal mood and affect. Judgment normal.   Nursing note and vitals reviewed.        ECG 12 Lead  Date/Time: 5/22/2020 1:08 PM  Performed by: Felicitas Gallegos APRN  Authorized by: Felicitas Gallegos APRN   Comparison: compared with previous ECG from 8/30/2019  Similar to previous ECG  Comparison to previous ECG: Nonspecific T waves    Rhythm: sinus rhythm           Interpretation Summary        · Findings consistent with an equivocal ECG stress test.  · Breast attenuation and diaphragmatic attenuation artifacts are present.  · Left ventricular ejection fraction is normal (Calculated EF = 50%).  · Myocardial perfusion imaging indicates a normal myocardial perfusion study with no evidence of ischemia.  · Impressions are consistent with a low risk study.  · Suboptimal study         Interpretation Summary     · The left ventricular cavity is mildly dilated.  · Left ventricular diastolic dysfunction (grade I) consistent with impaired relaxation.  · Calculated EF = 40%  · The pericardium is normal. There is no evidence of pericardial effusion.         Current Outpatient Medications:   •  furosemide (LASIX) 40 MG tablet, Take 40 mg by mouth 2 (Two) Times a Day., Disp: , Rfl: 0  •  gabapentin (NEURONTIN) 300 MG capsule, Take 300 mg  by mouth 2 (Two) Times a Day., Disp: , Rfl: 0  •  HYDROcodone-acetaminophen (NORCO) 7.5-325 MG per tablet, TAKE ONE TABLET BY MOUTH TWICE DAILY FOR 30 DAYS, Disp: , Rfl: 0  •  lisinopril (PRINIVIL,ZESTRIL) 20 MG tablet, Take 20 mg by mouth Daily., Disp: , Rfl: 0  •  LYRICA 100 MG capsule, Take 100 mg by mouth 2 (Two) Times a Day., Disp: , Rfl: 2  •  meloxicam (MOBIC) 15 MG tablet, Take 15 mg by mouth Daily., Disp: , Rfl: 3  •  oxyCODONE (ROXICODONE) 5 MG immediate release tablet, TAKE ONE TABLET BY MOUTH EVERY 6 HOURS FOR 30 DAYS AS needed, Disp: , Rfl: 0  •  pravastatin (PRAVACHOL) 20 MG tablet, Take 20 mg by mouth every night at bedtime., Disp: , Rfl: 0     Assessment:        Patient Active Problem List   Diagnosis   • Hyperlipidemia   • Essential hypertension   • Cardiomyopathy (CMS/HCC)   • Palpitations   • Shortness of breath    • AICD (automatic cardioverter/defibrillator) present        Plan:   1.  New problem of chest pain: Ischemic work-up as above.  He states he has chest pain anterior of his chest which comes and goes and sometimes radiates to his arm she had it was shortness of breath and fatigue.  ECG in office today showed normal sinus rhythm with nonspecific T wave wave.  We will do a stress test and echo.  He has been instructed to return to ER per EMS for any recurrent symptoms including, but not limited to: chest pain/pressure/tightness, weakness, Shortness of breath, dizziness, palpitations, near syncope or syncope.     It was explained to the patient that stress testing carries 85% specificity/sensitivity, and does not rule out future cardiac event.  Risks of the procedure were explained to the patient including shortness of breath, induction of myocardial infarction, and dizziness.  Patient is agreeable to proceeding with stress testing.     2. New problem of shortness of breath: States it comes and goes.  We will do an echo.    3. Cardiomyopathy: With [employer] AICD.  Last checked on  11/22/2019 WNL.  Previous EF 40%, see full echo as above.  Will restart his Coreg.  He will have an echo.    4.  Hypertension: He states his blood pressures been controlled on current medications.  He states about he had taken him off of Coreg, and then Ziac, and now lisinopril.  He is ready to be restarted on Coreg at this time.  Will need to monitor his blood pressure and let us know if he has any decreased blood pressures.     Educated patient on exercising for at least 30 minutes a day for 2 to 3 days a week. Importance of controlling hypertension and blood pressure checkup on the regular basis has been explained. Hypertension as a silent killer has been discussed. Risk reduction of the weight and regular exercises to control the hypertension has been explained.    5.  Hyperlipidemia: No noted lipid panel.  We will do CMP and lipid profile    Risk of the hyperlipidemia, importance of the treatment has been explained. Pros and cons of the statins has been explained. Regular blood workup as well as side effects including the liver failure, myelopathy death has been explained.    6.  STEPHON ?:  He has never been diagnosed with sleep apnea however he feels he has difficulty sleeping.  He is going to call his primary care physician to see if he can get a sleep apnea test.        No diagnosis found.    There are no diagnoses linked to this encounter.    COUNSELING:    Brian Chairez was given to patient for the following topics: diagnostic results, risk factor reductions, impressions, risks and benefits of treatment options and importance of treatment compliance .       SMOKING COUNSELING: Denies, he states he quit over a week ago.    Add Coreg 6.2, cmp, lipid profile, stress test, and echo. He will follow up for results.    Sincerely,   ANNIA Harmon  [facility]  05/22/20   1207    EMR Dragon/Transcription disclaimer:   Much of this encounter note is an electronic  transcription/translation of spoken language to printed text. The electronic translation of spoken language may permit erroneous, or at times, nonsensical words or phrases to be inadvertently transcribed; Although I have reviewed the note for such errors, some may still exist.

## 2020-05-22 ENCOUNTER — OFFICE VISIT (OUTPATIENT)
Dept: CARDIOLOGY | Facility: CLINIC | Age: 40
End: 2020-05-22

## 2020-05-22 VITALS
BODY MASS INDEX: 42.59 KG/M2 | DIASTOLIC BLOOD PRESSURE: 81 MMHG | HEART RATE: 96 BPM | WEIGHT: 281 LBS | SYSTOLIC BLOOD PRESSURE: 116 MMHG | HEIGHT: 68 IN

## 2020-05-22 DIAGNOSIS — R94.31 ABNORMAL EKG: ICD-10-CM

## 2020-05-22 DIAGNOSIS — E78.5 HYPERLIPIDEMIA, UNSPECIFIED HYPERLIPIDEMIA TYPE: ICD-10-CM

## 2020-05-22 DIAGNOSIS — R07.89 CHEST PAIN, ATYPICAL: Primary | ICD-10-CM

## 2020-05-22 DIAGNOSIS — R06.02 SOB (SHORTNESS OF BREATH): ICD-10-CM

## 2020-05-22 DIAGNOSIS — R06.02 SHORTNESS OF BREATH: ICD-10-CM

## 2020-05-22 DIAGNOSIS — I10 ESSENTIAL HYPERTENSION: ICD-10-CM

## 2020-05-22 DIAGNOSIS — I42.9 CARDIOMYOPATHY, UNSPECIFIED TYPE (HCC): ICD-10-CM

## 2020-05-22 PROCEDURE — 99214 OFFICE O/P EST MOD 30 MIN: CPT | Performed by: NURSE PRACTITIONER

## 2020-05-22 PROCEDURE — 93000 ELECTROCARDIOGRAM COMPLETE: CPT | Performed by: NURSE PRACTITIONER

## 2020-05-22 RX ORDER — CARVEDILOL 6.25 MG/1
TABLET ORAL
Qty: 180 TABLET | Refills: 1 | Status: SHIPPED | OUTPATIENT
Start: 2020-05-22 | End: 2020-06-09

## 2020-05-22 RX ORDER — NITROGLYCERIN 0.4 MG/1
TABLET SUBLINGUAL
Qty: 25 TABLET | Refills: 3 | Status: SHIPPED | OUTPATIENT
Start: 2020-05-22 | End: 2020-06-09

## 2020-05-22 RX ORDER — CARVEDILOL 6.25 MG/1
6.25 TABLET ORAL 2 TIMES DAILY
Qty: 60 TABLET | Refills: 3 | Status: SHIPPED | OUTPATIENT
Start: 2020-05-22 | End: 2020-05-22

## 2020-05-26 ENCOUNTER — APPOINTMENT (OUTPATIENT)
Dept: GENERAL RADIOLOGY | Facility: HOSPITAL | Age: 40
End: 2020-05-26

## 2020-05-26 ENCOUNTER — HOSPITAL ENCOUNTER (EMERGENCY)
Facility: HOSPITAL | Age: 40
Discharge: HOME | End: 2020-05-26
Attending: EMERGENCY MEDICINE | Admitting: EMERGENCY MEDICINE

## 2020-05-26 VITALS
HEIGHT: 68 IN | SYSTOLIC BLOOD PRESSURE: 117 MMHG | BODY MASS INDEX: 42.9 KG/M2 | OXYGEN SATURATION: 96 % | RESPIRATION RATE: 17 BRPM | TEMPERATURE: 97.9 F | HEART RATE: 93 BPM | WEIGHT: 283.07 LBS | DIASTOLIC BLOOD PRESSURE: 104 MMHG

## 2020-05-26 DIAGNOSIS — R06.00 DYSPNEA, UNSPECIFIED TYPE: Primary | ICD-10-CM

## 2020-05-26 DIAGNOSIS — I50.9 ACUTE ON CHRONIC CONGESTIVE HEART FAILURE, UNSPECIFIED HEART FAILURE TYPE (HCC): ICD-10-CM

## 2020-05-26 LAB
ANION GAP SERPL CALCULATED.3IONS-SCNC: 16 MMOL/L (ref 5–15)
BASOPHILS # BLD AUTO: 0.1 10*3/MM3 (ref 0–0.2)
BASOPHILS NFR BLD AUTO: 0.8 % (ref 0–1.5)
BUN BLD-MCNC: 14 MG/DL (ref 6–20)
BUN/CREAT SERPL: 12 (ref 7–25)
CALCIUM SPEC-SCNC: 9.2 MG/DL (ref 8.6–10.5)
CHLORIDE SERPL-SCNC: 103 MMOL/L (ref 98–107)
CO2 SERPL-SCNC: 25 MMOL/L (ref 22–29)
CREAT BLD-MCNC: 1.17 MG/DL (ref 0.76–1.27)
DEPRECATED RDW RBC AUTO: 41.1 FL (ref 37–54)
EOSINOPHIL # BLD AUTO: 0.2 10*3/MM3 (ref 0–0.4)
EOSINOPHIL NFR BLD AUTO: 2 % (ref 0.3–6.2)
ERYTHROCYTE [DISTWIDTH] IN BLOOD BY AUTOMATED COUNT: 13.1 % (ref 12.3–15.4)
GFR SERPL CREATININE-BSD FRML MDRD: 69 ML/MIN/1.73
GLUCOSE BLD-MCNC: 103 MG/DL (ref 65–99)
HCT VFR BLD AUTO: 46.9 % (ref 37.5–51)
HGB BLD-MCNC: 16.6 G/DL (ref 13–17.7)
LYMPHOCYTES # BLD AUTO: 2.1 10*3/MM3 (ref 0.7–3.1)
LYMPHOCYTES NFR BLD AUTO: 20.6 % (ref 19.6–45.3)
MCH RBC QN AUTO: 32.1 PG (ref 26.6–33)
MCHC RBC AUTO-ENTMCNC: 35.4 G/DL (ref 31.5–35.7)
MCV RBC AUTO: 90.8 FL (ref 79–97)
MONOCYTES # BLD AUTO: 0.9 10*3/MM3 (ref 0.1–0.9)
MONOCYTES NFR BLD AUTO: 8.5 % (ref 5–12)
NEUTROPHILS # BLD AUTO: 7.1 10*3/MM3 (ref 1.7–7)
NEUTROPHILS NFR BLD AUTO: 68.1 % (ref 42.7–76)
NRBC BLD AUTO-RTO: 0.1 /100 WBC (ref 0–0.2)
NT-PROBNP SERPL-MCNC: 2021 PG/ML (ref 5–450)
PLATELET # BLD AUTO: 247 10*3/MM3 (ref 140–450)
PMV BLD AUTO: 9 FL (ref 6–12)
POTASSIUM BLD-SCNC: 3.4 MMOL/L (ref 3.5–5.2)
RBC # BLD AUTO: 5.16 10*6/MM3 (ref 4.14–5.8)
SODIUM BLD-SCNC: 144 MMOL/L (ref 136–145)
TROPONIN T SERPL-MCNC: <0.01 NG/ML (ref 0–0.03)
WBC NRBC COR # BLD: 10.4 10*3/MM3 (ref 3.4–10.8)

## 2020-05-26 PROCEDURE — 80048 BASIC METABOLIC PNL TOTAL CA: CPT | Performed by: EMERGENCY MEDICINE

## 2020-05-26 PROCEDURE — 84484 ASSAY OF TROPONIN QUANT: CPT | Performed by: EMERGENCY MEDICINE

## 2020-05-26 PROCEDURE — 99283 EMERGENCY DEPT VISIT LOW MDM: CPT

## 2020-05-26 PROCEDURE — 93005 ELECTROCARDIOGRAM TRACING: CPT | Performed by: EMERGENCY MEDICINE

## 2020-05-26 PROCEDURE — 71045 X-RAY EXAM CHEST 1 VIEW: CPT

## 2020-05-26 PROCEDURE — 85025 COMPLETE CBC W/AUTO DIFF WBC: CPT | Performed by: EMERGENCY MEDICINE

## 2020-05-26 PROCEDURE — 83880 ASSAY OF NATRIURETIC PEPTIDE: CPT | Performed by: EMERGENCY MEDICINE

## 2020-05-26 RX ORDER — SODIUM CHLORIDE 0.9 % (FLUSH) 0.9 %
10 SYRINGE (ML) INJECTION AS NEEDED
Status: DISCONTINUED | OUTPATIENT
Start: 2020-05-26 | End: 2020-05-26 | Stop reason: HOSPADM

## 2020-06-01 ENCOUNTER — APPOINTMENT (OUTPATIENT)
Dept: CARDIOLOGY | Facility: HOSPITAL | Age: 40
End: 2020-06-01

## 2020-06-01 ENCOUNTER — APPOINTMENT (OUTPATIENT)
Dept: NUCLEAR MEDICINE | Facility: HOSPITAL | Age: 40
End: 2020-06-01

## 2020-06-03 ENCOUNTER — APPOINTMENT (OUTPATIENT)
Dept: CARDIOLOGY | Facility: HOSPITAL | Age: 40
End: 2020-06-03

## 2020-06-03 ENCOUNTER — HOSPITAL ENCOUNTER (OUTPATIENT)
Dept: CARDIOLOGY | Facility: HOSPITAL | Age: 40
Discharge: HOME | End: 2020-06-03
Admitting: NURSE PRACTITIONER

## 2020-06-03 VITALS
BODY MASS INDEX: 42.89 KG/M2 | WEIGHT: 283 LBS | HEART RATE: 85 BPM | SYSTOLIC BLOOD PRESSURE: 110 MMHG | HEIGHT: 68 IN | DIASTOLIC BLOOD PRESSURE: 83 MMHG

## 2020-06-03 PROCEDURE — 93306 TTE W/DOPPLER COMPLETE: CPT

## 2020-06-04 LAB
ASCENDING AORTA: 2.9 CM
BH CV ECHO MEAS - ACS: 2.1 CM
BH CV ECHO MEAS - AO MAX PG (FULL): 1.6 MMHG
BH CV ECHO MEAS - AO MAX PG: 3.8 MMHG
BH CV ECHO MEAS - AO MEAN PG (FULL): 1 MMHG
BH CV ECHO MEAS - AO MEAN PG: 2.4 MMHG
BH CV ECHO MEAS - AO ROOT AREA (BSA CORRECTED): 1.3
BH CV ECHO MEAS - AO ROOT AREA: 7 CM^2
BH CV ECHO MEAS - AO ROOT DIAM: 3 CM
BH CV ECHO MEAS - AO V2 MAX: 97.4 CM/SEC
BH CV ECHO MEAS - AO V2 MEAN: 74.3 CM/SEC
BH CV ECHO MEAS - AO V2 VTI: 15.8 CM
BH CV ECHO MEAS - ASC AORTA: 2.9 CM
BH CV ECHO MEAS - AVA(I,A): 3.4 CM^2
BH CV ECHO MEAS - AVA(I,D): 3.4 CM^2
BH CV ECHO MEAS - AVA(V,A): 3.1 CM^2
BH CV ECHO MEAS - AVA(V,D): 3.1 CM^2
BH CV ECHO MEAS - BSA(HAYCOCK): 2.5 M^2
BH CV ECHO MEAS - BSA: 2.4 M^2
BH CV ECHO MEAS - BZI_BMI: 43 KILOGRAMS/M^2
BH CV ECHO MEAS - BZI_METRIC_HEIGHT: 172.7 CM
BH CV ECHO MEAS - BZI_METRIC_WEIGHT: 128.4 KG
BH CV ECHO MEAS - CI(CUBED): 2.7 L/MIN/M^2
BH CV ECHO MEAS - CI(MOD-SP2): 1.1 L/MIN/M^2
BH CV ECHO MEAS - CI(MOD-SP4): 1.8 L/MIN/M^2
BH CV ECHO MEAS - CI(TEICH): 1.6 L/MIN/M^2
BH CV ECHO MEAS - CO(CUBED): 6.4 L/MIN
BH CV ECHO MEAS - CO(MOD-SP2): 2.5 L/MIN
BH CV ECHO MEAS - CO(MOD-SP4): 4.2 L/MIN
BH CV ECHO MEAS - CO(TEICH): 3.7 L/MIN
BH CV ECHO MEAS - EDV(CUBED): 352 ML
BH CV ECHO MEAS - EDV(MOD-SP2): 202.2 ML
BH CV ECHO MEAS - EDV(MOD-SP4): 247.3 ML
BH CV ECHO MEAS - EDV(TEICH): 260.4 ML
BH CV ECHO MEAS - EF(CUBED): 23.2 %
BH CV ECHO MEAS - EF(MOD-BP): 17 %
BH CV ECHO MEAS - EF(MOD-SP2): 16 %
BH CV ECHO MEAS - EF(MOD-SP4): 21.6 %
BH CV ECHO MEAS - EF(TEICH): 18.1 %
BH CV ECHO MEAS - ESV(CUBED): 270.2 ML
BH CV ECHO MEAS - ESV(MOD-SP2): 169.9 ML
BH CV ECHO MEAS - ESV(MOD-SP4): 194 ML
BH CV ECHO MEAS - ESV(TEICH): 213.3 ML
BH CV ECHO MEAS - FS: 8.4 %
BH CV ECHO MEAS - IVS/LVPW: 0.99
BH CV ECHO MEAS - IVSD: 1 CM
BH CV ECHO MEAS - LA DIMENSION(2D): 5.3 CM
BH CV ECHO MEAS - LA DIMENSION: 5.1 CM
BH CV ECHO MEAS - LA/AO: 1.7
BH CV ECHO MEAS - LAT PEAK E' VEL: 8 CM/SEC
BH CV ECHO MEAS - LV DIASTOLIC VOL/BSA (35-75): 104.4 ML/M^2
BH CV ECHO MEAS - LV IVRT: 0.08 SEC
BH CV ECHO MEAS - LV MASS(C)D: 339.8 GRAMS
BH CV ECHO MEAS - LV MASS(C)DI: 143.5 GRAMS/M^2
BH CV ECHO MEAS - LV MAX PG: 2.2 MMHG
BH CV ECHO MEAS - LV MEAN PG: 1.3 MMHG
BH CV ECHO MEAS - LV SYSTOLIC VOL/BSA (12-30): 81.9 ML/M^2
BH CV ECHO MEAS - LV V1 MAX: 74 CM/SEC
BH CV ECHO MEAS - LV V1 MEAN: 54.9 CM/SEC
BH CV ECHO MEAS - LV V1 VTI: 13.2 CM
BH CV ECHO MEAS - LVIDD: 7.1 CM
BH CV ECHO MEAS - LVIDS: 6.5 CM
BH CV ECHO MEAS - LVOT AREA: 4.1 CM^2
BH CV ECHO MEAS - LVOT DIAM: 2.3 CM
BH CV ECHO MEAS - LVPWD: 1 CM
BH CV ECHO MEAS - MED PEAK E' VEL: 6 CM/SEC
BH CV ECHO MEAS - MM HR: 78.5 BPM
BH CV ECHO MEAS - MM R-R INT: 0.76 SEC
BH CV ECHO MEAS - MR MAX PG: 93.2 MMHG
BH CV ECHO MEAS - MR MAX VEL: 482.8 CM/SEC
BH CV ECHO MEAS - MR MEAN PG: 61.2 MMHG
BH CV ECHO MEAS - MR MEAN VEL: 373 CM/SEC
BH CV ECHO MEAS - MR VTI: 148.9 CM
BH CV ECHO MEAS - MV A MAX VEL: 33.4 CM/SEC
BH CV ECHO MEAS - MV DEC SLOPE: 795.1 CM/SEC^2
BH CV ECHO MEAS - MV DEC TIME: 0.13 SEC
BH CV ECHO MEAS - MV E MAX VEL: 105.9 CM/SEC
BH CV ECHO MEAS - MV E/A: 3.2
BH CV ECHO MEAS - MV MAX PG: 1.8 MMHG
BH CV ECHO MEAS - MV MEAN PG: 0.81 MMHG
BH CV ECHO MEAS - MV P1/2T: 42 MSEC
BH CV ECHO MEAS - MV V2 MAX: 67.7 CM/SEC
BH CV ECHO MEAS - MV V2 MEAN: 43 CM/SEC
BH CV ECHO MEAS - MV V2 VTI: 12.3 CM
BH CV ECHO MEAS - MVA(P1/2T): 5.2 CM2
BH CV ECHO MEAS - MVA(VTI): 4.4 CM^2
BH CV ECHO MEAS - PA ACC SLOPE: 548 CM/SEC2
BH CV ECHO MEAS - PA ACC TIME: 0.1 SEC
BH CV ECHO MEAS - PA MAX PG (FULL): 0.13 MMHG
BH CV ECHO MEAS - PA MAX PG: 1.6 MMHG
BH CV ECHO MEAS - PA MEAN PG (FULL): 0.12 MMHG
BH CV ECHO MEAS - PA MEAN PG: 0.95 MMHG
BH CV ECHO MEAS - PA PR(ACCEL): 32.1 MMHG
BH CV ECHO MEAS - PA V2 MAX: 63.8 CM/SEC
BH CV ECHO MEAS - PA V2 MEAN: 46.5 CM/SEC
BH CV ECHO MEAS - PA V2 VTI: 8.9 CM
BH CV ECHO MEAS - PULM A REVS DUR: 0.06 SEC
BH CV ECHO MEAS - PULM A REVS VEL: 23.5 CM/SEC
BH CV ECHO MEAS - PULM DIAS VEL: 42.9 CM/SEC
BH CV ECHO MEAS - PULM S/D: 0.49
BH CV ECHO MEAS - PULM SYS VEL: 21.1 CM/SEC
BH CV ECHO MEAS - PVA(I,A): 5 CM^2
BH CV ECHO MEAS - PVA(I,D): 5 CM^2
BH CV ECHO MEAS - PVA(V,A): 4.3 CM^2
BH CV ECHO MEAS - PVA(V,D): 4.3 CM^2
BH CV ECHO MEAS - QP/QS: 0.83
BH CV ECHO MEAS - RAP SYSTOLE: 3 MMHG
BH CV ECHO MEAS - RV MAX PG: 1.5 MMHG
BH CV ECHO MEAS - RV MEAN PG: 0.82 MMHG
BH CV ECHO MEAS - RV V1 MAX: 61.3 CM/SEC
BH CV ECHO MEAS - RV V1 MEAN: 42.8 CM/SEC
BH CV ECHO MEAS - RV V1 VTI: 10.1 CM
BH CV ECHO MEAS - RVOT AREA: 4.4 CM^2
BH CV ECHO MEAS - RVOT DIAM: 2.4 CM
BH CV ECHO MEAS - RVSP: 34.6 MMHG
BH CV ECHO MEAS - SI(AO): 46.8 ML/M^2
BH CV ECHO MEAS - SI(CUBED): 34.5 ML/M^2
BH CV ECHO MEAS - SI(LVOT): 22.9 ML/M^2
BH CV ECHO MEAS - SI(MOD-SP2): 13.6 ML/M^2
BH CV ECHO MEAS - SI(MOD-SP4): 22.5 ML/M^2
BH CV ECHO MEAS - SI(TEICH): 19.9 ML/M^2
BH CV ECHO MEAS - SUP REN AO DIAM: 2.2 CM
BH CV ECHO MEAS - SV(AO): 110.8 ML
BH CV ECHO MEAS - SV(CUBED): 81.8 ML
BH CV ECHO MEAS - SV(LVOT): 54.2 ML
BH CV ECHO MEAS - SV(MOD-SP2): 32.3 ML
BH CV ECHO MEAS - SV(MOD-SP4): 53.3 ML
BH CV ECHO MEAS - SV(RVOT): 44.7 ML
BH CV ECHO MEAS - SV(TEICH): 47.1 ML
BH CV ECHO MEAS - TAPSE (>1.6): 1.2 CM2
BH CV ECHO MEAS - TR MAX PG: 32 MMHG
BH CV ECHO MEAS - TR MAX VEL: 281.2 CM/SEC
BH CV ECHO MEASUREMENTS AVERAGE E/E' RATIO: 15.13
BH CV XLRA - RV BASE: 4.6 CM
BH CV XLRA - RV MID: 3.2 CM
BH CV XLRA - TDI S': 7 CM/SEC
IVRT: 85 MSEC
LEFT ATRIUM VOLUME INDEX: 42 ML/M2
LEFT ATRIUM VOLUME: 99 CM3
LV EF 2D ECHO EST: 15 %
MAXIMAL PREDICTED HEART RATE: 181 BPM
MR PISA EROA: 0.1 CM2
MV REGURGITANT VOLUME: 16 CC
PISA ALIASING VEL: 0.32 M/S
PISA RADIUS: 0.5 CM
PV VALVE AREA: 4.3 CM2
STRESS TARGET HR: 154 BPM

## 2020-06-04 PROCEDURE — 93306 TTE W/DOPPLER COMPLETE: CPT | Performed by: INTERNAL MEDICINE

## 2020-06-05 ENCOUNTER — OFFICE VISIT (OUTPATIENT)
Dept: CARDIOLOGY | Facility: CLINIC | Age: 40
End: 2020-06-05

## 2020-06-05 VITALS
BODY MASS INDEX: 41.98 KG/M2 | DIASTOLIC BLOOD PRESSURE: 80 MMHG | HEART RATE: 79 BPM | HEIGHT: 68 IN | OXYGEN SATURATION: 96 % | SYSTOLIC BLOOD PRESSURE: 116 MMHG | WEIGHT: 277 LBS | RESPIRATION RATE: 18 BRPM

## 2020-06-05 DIAGNOSIS — I42.0 DILATED CARDIOMYOPATHY (HCC): ICD-10-CM

## 2020-06-05 DIAGNOSIS — I50.23 ACUTE ON CHRONIC SYSTOLIC CONGESTIVE HEART FAILURE (HCC): Primary | ICD-10-CM

## 2020-06-05 DIAGNOSIS — I34.0 NONRHEUMATIC MITRAL VALVE REGURGITATION: ICD-10-CM

## 2020-06-05 DIAGNOSIS — Z95.810 AICD (AUTOMATIC CARDIOVERTER/DEFIBRILLATOR) PRESENT: ICD-10-CM

## 2020-06-05 DIAGNOSIS — E78.2 MIXED HYPERLIPIDEMIA: ICD-10-CM

## 2020-06-05 PROCEDURE — 99204 OFFICE O/P NEW MOD 45 MIN: CPT | Performed by: INTERNAL MEDICINE

## 2020-06-05 RX ORDER — LOSARTAN POTASSIUM 25 MG/1
25 TABLET ORAL DAILY
Qty: 90 TABLET | Refills: 1 | Status: SHIPPED | OUTPATIENT
Start: 2020-06-05 | End: 2021-02-08

## 2020-06-05 RX ORDER — SPIRONOLACTONE 25 MG/1
TABLET ORAL
Qty: 45 TABLET | Refills: 1 | Status: SHIPPED | OUTPATIENT
Start: 2020-06-05 | End: 2020-06-09

## 2020-06-05 RX ORDER — SPIRONOLACTONE 25 MG/1
12.5 TABLET ORAL DAILY
Qty: 30 TABLET | Refills: 2 | Status: SHIPPED | OUTPATIENT
Start: 2020-06-05 | End: 2020-06-05

## 2020-06-05 RX ORDER — LOSARTAN POTASSIUM 25 MG/1
25 TABLET ORAL DAILY
Qty: 30 TABLET | Refills: 2 | Status: SHIPPED | OUTPATIENT
Start: 2020-06-05 | End: 2020-06-05

## 2020-06-05 RX ORDER — METOLAZONE 2.5 MG/1
2.5 TABLET ORAL DAILY
COMMUNITY
End: 2020-06-05

## 2020-06-05 RX ORDER — DOXYCYCLINE HYCLATE 100 MG/1
CAPSULE ORAL
COMMUNITY
Start: 2020-05-29 | End: 2020-06-09

## 2020-06-05 NOTE — H&P (VIEW-ONLY)
Cardiology Office Consultation      Encounter Date:  06/05/2020    Patient ID:   Brian Stewart is a 39 y.o. male.    Reason For Consultation:  Congestive heart failure  Cardiomyopathy    History of Present Illness:  Dear Santana Garcia MD    I had the pleasure of seeing Brian Stewart today. As you are well aware, this is a 39 y.o. male with past medical history that is significant for nonischemic cardiomyopathy severe cardiomyopathy requiring AICD with some improvement of LV systolic function was stable until last year now he is having worsening symptoms of shortness of breath dyspnea on exertion weight gain    Recent hospitalization for congestive heart failure symptoms with improvement of the symptoms after diuresis  Complaining of shortness of breath and dyspnea on exertion  Remittent chest discomfort  No dizziness or syncope  No ICD shocks    Interpretation Summary     · The left ventricular cavity is severely dilated.  · Estimated EF = 15%.  · Left ventricular systolic function is severely decreased.  · Right ventricular cavity is mildly dilated.  · Severe mitral valve regurgitation is present  · Mildly reduced right ventricular systolic function noted.  · Mild tricuspid valve regurgitation is present.  · Left atrial cavity size is moderate-to-severely dilated.  · The following left ventricular wall segments are hypokinetic: mid anterior, apical anterior, basal anterolateral, mid anterolateral, apical lateral, basal inferolateral, mid inferolateral, apical inferior, mid inferior, apical septal, basal inferoseptal, mid inferoseptal, apex hypokinetic, mid anteroseptal, basal anterior, basal inferior and basal inferoseptal.           Assessment & Plan    Impressions:  Acute on chronic systolic heart failure  Congestive heart failure NYHA class III  Cardiomyopathy most likely nonischemic  Severe mitral regurgitation nonrheumatic  Hypertension  Obesity  Obstructive sleep apnea  Status post ICD with  "nonsustained ventricular tachycardia  Noncompliance with medical therapy  Very cardiomyopathy with LV ejection fraction of 15%    Recommendations:  Recent echocardiogram findings reviewed with the patient  Recent medical records from [facility] including labs findings on the stress test reviewed and discussed with the patient  ICD evaluation today with nonsustained episodes of ventricular tachycardia otherwise normal functioning of the device with no ICD shocks  Continue Coreg 6.25 mg p.o. twice daily  Continue metolazone  Continue Lasix 40 mg p.o. twice daily  Add spironolactone 12.5 mg p.o. once a day  Add losartan 25 mg p.o. once a day  Cough with ACE inhibitor therapy in the past  Advised patient to work with primary care physician to arrange for a sleep study  Need for regular exercise and weight loss discussed with patient  Schedule for cardiac catheterization rule out obstructive coronary artery disease contributing to worsening LV systolic dysfunction  Check a repeat BMP in 2 weeks  Low-salt diet  Follow-up in office in 2 months    Objective:    Vitals:  Vitals:    06/05/20 0818   BP: 116/80   BP Location: Left arm   Pulse: 79   Resp: 18   SpO2: 96%   Weight: 126 kg (277 lb)   Height: 172.7 cm (68\")       Physical Exam:    General: Alert, cooperative, no distress, appears stated age  Head:  Normocephalic, atraumatic, mucous membranes moist  Eyes:  Conjunctiva/corneas clear, EOM's intact     Neck:  Supple,  no adenopathy;      Lungs: Clear to auscultation bilaterally, no wheezes rhonchi rales are noted  Chest wall: No tenderness  Heart::  Regular rate and rhythm, S1 and S2 normal, displaced LV apical impulse 3 x 6 pansystolic murmur  Abdomen: Soft, non-tender, nondistended bowel sounds active  Extremities: No cyanosis, clubbing, or edema  Pulses: 2+ and symmetric all extremities  Skin:  No rashes or lesions  Neuro/psych: A&O x3. CN II through XII are grossly intact with appropriate " affect        Allergies:  No Known Allergies    Medication Review:     Current Outpatient Medications:   •  carvedilol (COREG) 6.25 MG tablet, TAKE 1 TABLET BY MOUTH TWICE DAILY, Disp: 180 tablet, Rfl: 1  •  doxycycline (VIBRAMYCIN) 100 MG capsule, , Disp: , Rfl:   •  furosemide (LASIX) 40 MG tablet, Take 40 mg by mouth 2 (Two) Times a Day., Disp: , Rfl: 0  •  gabapentin (NEURONTIN) 300 MG capsule, Take 300 mg by mouth 3 (Three) Times a Day., Disp: , Rfl: 0  •  HYDROcodone-acetaminophen (NORCO) 7.5-325 MG per tablet, TAKE ONE TABLET BY MOUTH TWICE DAILY FOR 30 DAYS, Disp: , Rfl: 0  •  metOLazone (ZAROXOLYN) 2.5 MG tablet, Take 2.5 mg by mouth Daily., Disp: , Rfl:   •  nitroglycerin (NITROSTAT) 0.4 MG SL tablet, 1 under the tongue as needed for angina, may repeat q5mins for up three doses, Disp: 25 tablet, Rfl: 3  •  pravastatin (PRAVACHOL) 20 MG tablet, Take 20 mg by mouth every night at bedtime., Disp: , Rfl: 0    Family History:  Family History   Problem Relation Age of Onset   • Heart disease Father    • Hypertension Mother        Past Medical History:  Past Medical History:   Diagnosis Date   • Arrhythmia    • Cardiomyopathy (CMS/HCC)    • Diverticulitis    • Hyperlipidemia    • Palpitations        Past surgical History:  Past Surgical History:   Procedure Laterality Date   • CARDIAC CATHETERIZATION     • CARDIAC DEFIBRILLATOR PLACEMENT     • COLON RESECTION     • INSERT / REPLACE / REMOVE PACEMAKER      ICD   • KNEE ACL RECONSTRUCTION         Social History:  Social History     Socioeconomic History   • Marital status: Single     Spouse name: Not on file   • Number of children: Not on file   • Years of education: Not on file   • Highest education level: Not on file   Tobacco Use   • Smoking status: Former Smoker   • Smokeless tobacco: Former User     Types: Snuff   • Tobacco comment: QUIT SMOKING 5/20   Substance and Sexual Activity   • Alcohol use: No   • Drug use: Yes     Types: Marijuana   • Sexual activity:  Yes     Partners: Female       Review of Systems:  The following systems were reviewed as they relate to the cardiovascular system: Constitutional, Eyes, ENT, Cardiovascular, Respiratory, Gastrointestinal, Integumentary, Neurological, Psychiatric, Hematologic, Endocrine, Musculoskeletal, and Genitourinary. The pertinent cardiovascular findings are reported above with all other cardiovascular points within those systems being negative.    Diagnostic Study Review:     Current Electrocardiogram:  Procedures        NOTE: The following portions of the patient's history were reviewed and updated this visit as appropriate: allergies, current medications, past family history, past medical history, past social history, past surgical history and problem list.

## 2020-06-08 PROBLEM — E78.2 MIXED HYPERLIPIDEMIA: Status: ACTIVE | Noted: 2020-06-08

## 2020-06-08 PROBLEM — I42.0 DILATED CARDIOMYOPATHY: Status: ACTIVE | Noted: 2020-06-08

## 2020-06-09 RX ORDER — ASPIRIN 81 MG/1
81 TABLET, CHEWABLE ORAL DAILY
COMMUNITY

## 2020-06-09 RX ORDER — SPIRONOLACTONE 25 MG/1
12.5 TABLET ORAL DAILY
COMMUNITY
End: 2020-08-12

## 2020-06-09 RX ORDER — NITROGLYCERIN 0.4 MG/1
0.4 TABLET SUBLINGUAL
COMMUNITY
End: 2021-02-23 | Stop reason: SDUPTHER

## 2020-06-09 RX ORDER — CARVEDILOL 6.25 MG/1
6.25 TABLET ORAL 2 TIMES DAILY WITH MEALS
COMMUNITY
End: 2020-11-17 | Stop reason: SDUPTHER

## 2020-06-10 ENCOUNTER — LAB (OUTPATIENT)
Dept: LAB | Facility: HOSPITAL | Age: 40
End: 2020-06-10

## 2020-06-10 DIAGNOSIS — I42.0 DILATED CARDIOMYOPATHY (HCC): ICD-10-CM

## 2020-06-10 DIAGNOSIS — I50.23 ACUTE ON CHRONIC SYSTOLIC CONGESTIVE HEART FAILURE (HCC): ICD-10-CM

## 2020-06-10 DIAGNOSIS — I34.0 NONRHEUMATIC MITRAL VALVE REGURGITATION: ICD-10-CM

## 2020-06-10 DIAGNOSIS — E78.2 MIXED HYPERLIPIDEMIA: ICD-10-CM

## 2020-06-10 DIAGNOSIS — Z95.810 AICD (AUTOMATIC CARDIOVERTER/DEFIBRILLATOR) PRESENT: ICD-10-CM

## 2020-06-10 LAB
ANION GAP SERPL CALCULATED.3IONS-SCNC: 9.8 MMOL/L (ref 5–15)
BASOPHILS # BLD AUTO: 0.05 10*3/MM3 (ref 0–0.2)
BASOPHILS NFR BLD AUTO: 0.5 % (ref 0–1.5)
BUN BLD-MCNC: 19 MG/DL (ref 6–20)
BUN/CREAT SERPL: 19.2 (ref 7–25)
CALCIUM SPEC-SCNC: 9.8 MG/DL (ref 8.6–10.5)
CHLORIDE SERPL-SCNC: 99 MMOL/L (ref 98–107)
CO2 SERPL-SCNC: 31.2 MMOL/L (ref 22–29)
CREAT BLD-MCNC: 0.99 MG/DL (ref 0.76–1.27)
DEPRECATED RDW RBC AUTO: 41.3 FL (ref 37–54)
EOSINOPHIL # BLD AUTO: 0.2 10*3/MM3 (ref 0–0.4)
EOSINOPHIL NFR BLD AUTO: 1.9 % (ref 0.3–6.2)
ERYTHROCYTE [DISTWIDTH] IN BLOOD BY AUTOMATED COUNT: 12.7 % (ref 12.3–15.4)
GFR SERPL CREATININE-BSD FRML MDRD: 84 ML/MIN/1.73
GLUCOSE BLD-MCNC: 112 MG/DL (ref 65–99)
HCT VFR BLD AUTO: 48.1 % (ref 37.5–51)
HGB BLD-MCNC: 17 G/DL (ref 13–17.7)
IMM GRANULOCYTES # BLD AUTO: 0.09 10*3/MM3 (ref 0–0.05)
IMM GRANULOCYTES NFR BLD AUTO: 0.9 % (ref 0–0.5)
INR PPP: 0.98 (ref 0.9–1.1)
LYMPHOCYTES # BLD AUTO: 1.84 10*3/MM3 (ref 0.7–3.1)
LYMPHOCYTES NFR BLD AUTO: 17.8 % (ref 19.6–45.3)
MCH RBC QN AUTO: 31.7 PG (ref 26.6–33)
MCHC RBC AUTO-ENTMCNC: 35.3 G/DL (ref 31.5–35.7)
MCV RBC AUTO: 89.7 FL (ref 79–97)
MONOCYTES # BLD AUTO: 0.89 10*3/MM3 (ref 0.1–0.9)
MONOCYTES NFR BLD AUTO: 8.6 % (ref 5–12)
NEUTROPHILS # BLD AUTO: 7.24 10*3/MM3 (ref 1.7–7)
NEUTROPHILS NFR BLD AUTO: 70.3 % (ref 42.7–76)
NRBC BLD AUTO-RTO: 0 /100 WBC (ref 0–0.2)
PLATELET # BLD AUTO: 214 10*3/MM3 (ref 140–450)
PMV BLD AUTO: 11.3 FL (ref 6–12)
POTASSIUM BLD-SCNC: 3.3 MMOL/L (ref 3.5–5.2)
PROTHROMBIN TIME: 10.3 SECONDS (ref 9.6–11.7)
RBC # BLD AUTO: 5.36 10*6/MM3 (ref 4.14–5.8)
SODIUM BLD-SCNC: 140 MMOL/L (ref 136–145)
WBC NRBC COR # BLD: 10.31 10*3/MM3 (ref 3.4–10.8)

## 2020-06-10 PROCEDURE — 36415 COLL VENOUS BLD VENIPUNCTURE: CPT

## 2020-06-10 PROCEDURE — 85610 PROTHROMBIN TIME: CPT

## 2020-06-10 PROCEDURE — 80048 BASIC METABOLIC PNL TOTAL CA: CPT

## 2020-06-10 PROCEDURE — U0004 COV-19 TEST NON-CDC HGH THRU: HCPCS | Performed by: INTERNAL MEDICINE

## 2020-06-10 PROCEDURE — 85025 COMPLETE CBC W/AUTO DIFF WBC: CPT

## 2020-06-11 LAB
REF LAB TEST METHOD: NORMAL
SARS-COV-2 RNA RESP QL NAA+PROBE: NOT DETECTED

## 2020-06-12 ENCOUNTER — HOSPITAL ENCOUNTER (OUTPATIENT)
Facility: HOSPITAL | Age: 40
Setting detail: HOSPITAL OUTPATIENT SURGERY
Discharge: HOME | End: 2020-06-12
Attending: INTERNAL MEDICINE | Admitting: INTERNAL MEDICINE

## 2020-06-12 VITALS
BODY MASS INDEX: 43.6 KG/M2 | DIASTOLIC BLOOD PRESSURE: 97 MMHG | HEIGHT: 67 IN | HEART RATE: 99 BPM | SYSTOLIC BLOOD PRESSURE: 141 MMHG | RESPIRATION RATE: 14 BRPM | TEMPERATURE: 97.7 F | WEIGHT: 277.78 LBS | OXYGEN SATURATION: 98 %

## 2020-06-12 DIAGNOSIS — I50.23 ACUTE ON CHRONIC SYSTOLIC CONGESTIVE HEART FAILURE (HCC): ICD-10-CM

## 2020-06-12 DIAGNOSIS — Z95.810 AICD (AUTOMATIC CARDIOVERTER/DEFIBRILLATOR) PRESENT: ICD-10-CM

## 2020-06-12 DIAGNOSIS — E78.2 MIXED HYPERLIPIDEMIA: ICD-10-CM

## 2020-06-12 DIAGNOSIS — I34.0 NONRHEUMATIC MITRAL VALVE REGURGITATION: ICD-10-CM

## 2020-06-12 DIAGNOSIS — I42.0 DILATED CARDIOMYOPATHY (HCC): ICD-10-CM

## 2020-06-12 PROCEDURE — 93458 L HRT ARTERY/VENTRICLE ANGIO: CPT | Performed by: INTERNAL MEDICINE

## 2020-06-12 PROCEDURE — 25010000002 MIDAZOLAM PER 1 MG: Performed by: INTERNAL MEDICINE

## 2020-06-12 PROCEDURE — 0 IOPAMIDOL PER 1 ML: Performed by: INTERNAL MEDICINE

## 2020-06-12 PROCEDURE — 25010000002 FENTANYL CITRATE (PF) 100 MCG/2ML SOLUTION: Performed by: INTERNAL MEDICINE

## 2020-06-12 PROCEDURE — C1894 INTRO/SHEATH, NON-LASER: HCPCS | Performed by: INTERNAL MEDICINE

## 2020-06-12 PROCEDURE — C1887 CATHETER, GUIDING: HCPCS | Performed by: INTERNAL MEDICINE

## 2020-06-12 PROCEDURE — 99153 MOD SED SAME PHYS/QHP EA: CPT | Performed by: INTERNAL MEDICINE

## 2020-06-12 PROCEDURE — 99152 MOD SED SAME PHYS/QHP 5/>YRS: CPT | Performed by: INTERNAL MEDICINE

## 2020-06-12 PROCEDURE — 25010000002 HEPARIN (PORCINE) PER 1000 UNITS: Performed by: INTERNAL MEDICINE

## 2020-06-12 PROCEDURE — C1769 GUIDE WIRE: HCPCS | Performed by: INTERNAL MEDICINE

## 2020-06-12 RX ORDER — ONDANSETRON 4 MG/1
4 TABLET, FILM COATED ORAL EVERY 6 HOURS PRN
Status: DISCONTINUED | OUTPATIENT
Start: 2020-06-12 | End: 2020-06-12 | Stop reason: HOSPADM

## 2020-06-12 RX ORDER — FENTANYL CITRATE 50 UG/ML
INJECTION, SOLUTION INTRAMUSCULAR; INTRAVENOUS AS NEEDED
Status: DISCONTINUED | OUTPATIENT
Start: 2020-06-12 | End: 2020-06-12 | Stop reason: HOSPADM

## 2020-06-12 RX ORDER — NICARDIPINE HYDROCHLORIDE 2.5 MG/ML
INJECTION INTRAVENOUS AS NEEDED
Status: DISCONTINUED | OUTPATIENT
Start: 2020-06-12 | End: 2020-06-12 | Stop reason: HOSPADM

## 2020-06-12 RX ORDER — ACETAMINOPHEN 325 MG/1
650 TABLET ORAL EVERY 4 HOURS PRN
Status: DISCONTINUED | OUTPATIENT
Start: 2020-06-12 | End: 2020-06-12 | Stop reason: HOSPADM

## 2020-06-12 RX ORDER — DIPHENHYDRAMINE HCL 25 MG
25 TABLET ORAL EVERY 6 HOURS PRN
Status: DISCONTINUED | OUTPATIENT
Start: 2020-06-12 | End: 2020-06-12 | Stop reason: HOSPADM

## 2020-06-12 RX ORDER — MIDAZOLAM HYDROCHLORIDE 1 MG/ML
INJECTION INTRAMUSCULAR; INTRAVENOUS AS NEEDED
Status: DISCONTINUED | OUTPATIENT
Start: 2020-06-12 | End: 2020-06-12 | Stop reason: HOSPADM

## 2020-06-12 RX ORDER — LIDOCAINE HYDROCHLORIDE 20 MG/ML
INJECTION, SOLUTION INFILTRATION; PERINEURAL AS NEEDED
Status: DISCONTINUED | OUTPATIENT
Start: 2020-06-12 | End: 2020-06-12 | Stop reason: HOSPADM

## 2020-06-12 RX ORDER — NITROGLYCERIN 5 MG/ML
INJECTION, SOLUTION INTRAVENOUS AS NEEDED
Status: DISCONTINUED | OUTPATIENT
Start: 2020-06-12 | End: 2020-06-12 | Stop reason: HOSPADM

## 2020-06-12 RX ORDER — POTASSIUM CHLORIDE 20 MEQ/1
40 TABLET, EXTENDED RELEASE ORAL ONCE
Status: COMPLETED | OUTPATIENT
Start: 2020-06-12 | End: 2020-06-12

## 2020-06-12 RX ORDER — SODIUM CHLORIDE 9 MG/ML
30 INJECTION, SOLUTION INTRAVENOUS CONTINUOUS
Status: DISCONTINUED | OUTPATIENT
Start: 2020-06-12 | End: 2020-06-12 | Stop reason: HOSPADM

## 2020-06-12 RX ORDER — ONDANSETRON 2 MG/ML
4 INJECTION INTRAMUSCULAR; INTRAVENOUS EVERY 6 HOURS PRN
Status: DISCONTINUED | OUTPATIENT
Start: 2020-06-12 | End: 2020-06-12 | Stop reason: HOSPADM

## 2020-06-12 RX ORDER — HEPARIN SODIUM 1000 [USP'U]/ML
INJECTION, SOLUTION INTRAVENOUS; SUBCUTANEOUS AS NEEDED
Status: DISCONTINUED | OUTPATIENT
Start: 2020-06-12 | End: 2020-06-12 | Stop reason: HOSPADM

## 2020-06-12 RX ADMIN — POTASSIUM CHLORIDE 40 MEQ: 1500 TABLET, EXTENDED RELEASE ORAL at 10:17

## 2020-06-12 RX ADMIN — SODIUM CHLORIDE 30 ML/HR: 900 INJECTION, SOLUTION INTRAVENOUS at 10:25

## 2020-06-12 NOTE — DISCHARGE INSTR - ACTIVITY
Post Cath Instructions    Call Dr. Larsen’s office to schedule a follow up appointment in 2 weeks.  Specific Physician Instructions: ***    1) Drink plenty of fluids for the next 24 hours.  This helps to eliminate the dye used in your procedure through urination.  You may resume a normal diet; however, try to avoid foods that would cause gas or constipation.    2) Sedative medication given to you during your catheterization may decrease your judgement and reaction time for up to 24-48 hours.  Therefore:  a. DO NOT drive or operate hazardous machinery (48 hours)  b. DO NOT consume alcoholic beverages  c. DO NOT make any important/legal decisions  d. Have someone stay with you for at least 24 hours    3) To allow proper healing and prevent bleeding, the following activities are to be strictly avoided for the next 24-48 hours:  a. Excessive bending at wound site  b. Straining (anything that would tense up muscles around the affected puncture site)  c. Lifting objects greater than 5 pounds, pushing, or pulling for 5 days  i. For Arm Cases:  1. No flexing at the puncture site, such as hammering, golfing, bowling, or swinging any objects    4) Keep the puncture site clean and dry.  You may remove the dressing tomorrow and replace it with a band-aid for at least one additional day.  Gently clean the site with mild soap and water.  No scrubbing/rubbing and lightly pat the area dry.  Showers are acceptable; however, avoid submerging in water (tub baths, hot tubs, swimming pools, dishwater, etc…) for at least one week.  The site should be completely healed before resuming these activities to reduce the risk of infection.  Check the site often.  Watch for signs and symptoms of infection and notify your physician if any of the following occur:  a. Bleeding or an increase in swelling at the puncture site  b. Fever  c. Increased soreness around puncture site  d. Foul odor or significant drainage from the puncture site  e. Swelling,  redness, or warmth at the puncture site    **A bruise or small “pea sized” lump under the skin at the puncture site is not unusual.  This should disappear within 3-4 weeks.**  5) CONTACT YOUR PHYSICIAN OR CALL 911 IF YOU EXPERIENCE ANY OF THE FOLLOWING:  a. Increased angina (chest pain) or frequent sensations of pressure, burning, pain, or other discomfort in the chest, arm, jaws, or stomach  b. Lightheadedness, dizziness, faint feeling, sweating, or difficulty breathing  c. Odd sensation changes like numbness, tingling, coldness, or pain in the arm or leg in which the catheter was inserted  d. Limb in which the catheter was inserted becomes pale/bluish in color    IMPORTANT:  Although this occurs very rarely, if you should develop bright red or excessive bleeding, feel a “pop” inside at the insertion site, or notice a sudden increase in swelling larger than a walnut, you should call 911.  Hold continuous firm pressure to the access site until emergency personnel arrive.  It is best if someone else can do this for you.

## 2020-08-12 ENCOUNTER — OFFICE VISIT (OUTPATIENT)
Dept: CARDIOLOGY | Facility: CLINIC | Age: 40
End: 2020-08-12

## 2020-08-12 VITALS
HEIGHT: 67 IN | SYSTOLIC BLOOD PRESSURE: 137 MMHG | RESPIRATION RATE: 18 BRPM | DIASTOLIC BLOOD PRESSURE: 84 MMHG | OXYGEN SATURATION: 97 % | BODY MASS INDEX: 44.57 KG/M2 | WEIGHT: 284 LBS | HEART RATE: 83 BPM

## 2020-08-12 DIAGNOSIS — E78.2 MIXED HYPERLIPIDEMIA: ICD-10-CM

## 2020-08-12 DIAGNOSIS — R06.02 SOB (SHORTNESS OF BREATH): ICD-10-CM

## 2020-08-12 DIAGNOSIS — Z95.810 AICD (AUTOMATIC CARDIOVERTER/DEFIBRILLATOR) PRESENT: ICD-10-CM

## 2020-08-12 DIAGNOSIS — I42.0 DILATED CARDIOMYOPATHY (HCC): Primary | ICD-10-CM

## 2020-08-12 DIAGNOSIS — I50.23 ACUTE ON CHRONIC SYSTOLIC CONGESTIVE HEART FAILURE (HCC): ICD-10-CM

## 2020-08-12 DIAGNOSIS — I10 ESSENTIAL HYPERTENSION: ICD-10-CM

## 2020-08-12 DIAGNOSIS — I34.0 NONRHEUMATIC MITRAL VALVE REGURGITATION: ICD-10-CM

## 2020-08-12 PROCEDURE — 99214 OFFICE O/P EST MOD 30 MIN: CPT | Performed by: INTERNAL MEDICINE

## 2020-08-12 RX ORDER — SPIRONOLACTONE 25 MG/1
25 TABLET ORAL DAILY
Qty: 30 TABLET | Refills: 4 | Status: SHIPPED | OUTPATIENT
Start: 2020-08-12 | End: 2020-08-12

## 2020-08-12 RX ORDER — SPIRONOLACTONE 25 MG/1
25 TABLET ORAL DAILY
Qty: 90 TABLET | Refills: 2 | Status: SHIPPED | OUTPATIENT
Start: 2020-08-12 | End: 2021-06-24

## 2020-08-12 RX ORDER — FUROSEMIDE 40 MG/1
40 TABLET ORAL DAILY PRN
Refills: 0 | Status: SHIPPED | COMMUNITY
Start: 2020-08-12

## 2020-08-12 NOTE — PROGRESS NOTES
Cardiology Office Visit      Encounter Date:  08/12/2020    Patient ID:   Brian Stewart is a 39 y.o. male.    Reason For Consultation:  Congestive heart failure  Cardiomyopathy    History of Present Illness:  Dear Santana Garcia MD    I had the pleasure of seeing Brian Stewart today. As you are well aware, this is a 39 y.o. male with past medical history that is significant for nonischemic cardiomyopathy severe cardiomyopathy requiring AICD with some improvement of LV systolic function was stable until last year now he is having worsening symptoms of shortness of breath dyspnea on exertion weight gain    Recent hospitalization for congestive heart failure symptoms with improvement of the symptoms after diuresis  Complaining of shortness of breath and dyspnea on exertion  Remittent chest discomfort  No dizziness or syncope  No ICD shocks    Interpretation Summary     · The left ventricular cavity is severely dilated.  · Estimated EF = 15%.  · Left ventricular systolic function is severely decreased.  · Right ventricular cavity is mildly dilated.  · Severe mitral valve regurgitation is present  · Mildly reduced right ventricular systolic function noted.  · Mild tricuspid valve regurgitation is present.  · Left atrial cavity size is moderate-to-severely dilated.  · The following left ventricular wall segments are hypokinetic: mid anterior, apical anterior, basal anterolateral, mid anterolateral, apical lateral, basal inferolateral, mid inferolateral, apical inferior, mid inferior, apical septal, basal inferoseptal, mid inferoseptal, apex hypokinetic, mid anteroseptal, basal anterior, basal inferior and basal inferoseptal.     Impressions:  Normal coronaries  Severe cardiomyopathy  Severe LV dysfunction  Elevated left-sided filling pressures  Estimated LV ejection fraction of 15%      Assessment & Plan    Impressions:  Acute on chronic systolic heart failure  Congestive heart failure NYHA class  "III  Cardiomyopathy most likely nonischemic  Severe mitral regurgitation nonrheumatic  Hypertension  Obesity  Obstructive sleep apnea  Status post ICD with nonsustained ventricular tachycardia  Noncompliance with medical therapy  Very cardiomyopathy with LV ejection fraction of 15%    Recommendations:  Recent echocardiogram findings reviewed with the patient  Recent medical records from [facility] including labs findings on the stress test reviewed and discussed with the patient  ICD evaluation today with nonsustained episodes of ventricular tachycardia otherwise normal functioning of the device with no ICD shocks  Continue Coreg 6.25 mg p.o. twice daily  Decrease the dose of Lasix to 40 mg p.o. once a day  Increase spironolactone to 25 mg p.o. once a day  Continue losartan 25 mg p.o. once a day  Cough with ACE inhibitor therapy in the past  Advised patient to work with primary care physician to arrange for a sleep study  Need for regular exercise and weight loss discussed with patient  Check CBC CMP  Repeat echocardiogram to reassess the LV systolic function and also severity of the mitral regurgitation  Low-salt diet  Follow-up in office in 3 months  Objective:    Vitals:  Vitals:    08/12/20 0832   BP: 137/84   BP Location: Left arm   Pulse: 83   Resp: 18   SpO2: 97%   Weight: 129 kg (284 lb)   Height: 170.2 cm (67\")       Physical Exam:    General: Alert, cooperative, no distress, appears stated age  Head:  Normocephalic, atraumatic, mucous membranes moist  Eyes:  Conjunctiva/corneas clear, EOM's intact     Neck:  Supple,  no adenopathy;      Lungs: Clear to auscultation bilaterally, no wheezes rhonchi rales are noted  Chest wall: No tenderness  Heart::  Regular rate and rhythm, S1 and S2 normal, displaced LV apical impulse 3 x 6 pansystolic murmur left sternal border  Abdomen: Soft, non-tender, nondistended bowel sounds active  Extremities: No cyanosis, clubbing, or edema  Pulses: 2+ and symmetric " all extremities  Skin:  No rashes or lesions  Neuro/psych: A&O x3. CN II through XII are grossly intact with appropriate affect      Allergies:  No Known Allergies    Medication Review:     Current Outpatient Medications:   •  aspirin 81 MG chewable tablet, Chew 81 mg Daily., Disp: , Rfl:   •  carvedilol (COREG) 6.25 MG tablet, Take 6.25 mg by mouth 2 (Two) Times a Day With Meals., Disp: , Rfl:   •  furosemide (LASIX) 40 MG tablet, Take 40 mg by mouth 2 (Two) Times a Day., Disp: , Rfl: 0  •  gabapentin (NEURONTIN) 300 MG capsule, Take 300 mg by mouth 3 (Three) Times a Day., Disp: , Rfl: 0  •  HYDROcodone-acetaminophen (NORCO) 7.5-325 MG per tablet, 1 tablet 5 (Five) Times a Day., Disp: , Rfl: 0  •  losartan (COZAAR) 25 MG tablet, TAKE 1 TABLET BY MOUTH DAILY, Disp: 90 tablet, Rfl: 1  •  nitroglycerin (NITROSTAT) 0.4 MG SL tablet, Place 0.4 mg under the tongue Every 5 (Five) Minutes As Needed for Chest Pain. Take no more than 3 doses in 15 minutes., Disp: , Rfl:   •  pravastatin (PRAVACHOL) 20 MG tablet, Take 20 mg by mouth every night at bedtime., Disp: , Rfl: 0  •  spironolactone (ALDACTONE) 25 MG tablet, Take 12.5 mg by mouth Daily., Disp: , Rfl:     Family History:  Family History   Problem Relation Age of Onset   • Heart disease Father    • Hypertension Mother        Past Medical History:  Past Medical History:   Diagnosis Date   • Arrhythmia    • Cardiomyopathy (CMS/HCC)    • Diverticulitis    • Hyperlipidemia    • Palpitations        Past surgical History:  Past Surgical History:   Procedure Laterality Date   • CARDIAC CATHETERIZATION     • CARDIAC CATHETERIZATION N/A 6/12/2020    Procedure: Left Heart Cath;  Surgeon: Fernanda Jackson MD;  Location: [facility] CATH INVASIVE LOCATION;  Service: Cardiovascular;  Laterality: N/A;   • CARDIAC CATHETERIZATION N/A 6/12/2020    Procedure: Coronary angiography;  Surgeon: Fernanda Jackson MD;  Location: [facility] CATH INVASIVE LOCATION;  Service: Cardiovascular;   Laterality: N/A;   • CARDIAC DEFIBRILLATOR PLACEMENT     • COLON RESECTION     • INSERT / REPLACE / REMOVE PACEMAKER      ICD   • KNEE ACL RECONSTRUCTION         Social History:  Social History     Socioeconomic History   • Marital status: Single     Spouse name: Not on file   • Number of children: Not on file   • Years of education: Not on file   • Highest education level: Not on file   Tobacco Use   • Smoking status: Former Smoker   • Smokeless tobacco: Former User     Types: Snuff   • Tobacco comment: QUIT SMOKING 5/20   Substance and Sexual Activity   • Alcohol use: No   • Drug use: Yes     Types: Marijuana   • Sexual activity: Yes     Partners: Female       Review of Systems:  The following systems were reviewed as they relate to the cardiovascular system: Constitutional, Eyes, ENT, Cardiovascular, Respiratory, Gastrointestinal, Integumentary, Neurological, Psychiatric, Hematologic, Endocrine, Musculoskeletal, and Genitourinary. The pertinent cardiovascular findings are reported above with all other cardiovascular points within those systems being negative.    Diagnostic Study Review:     Current Electrocardiogram:  Procedures      NOTE: The following portions of the patient's history were reviewed and updated this visit as appropriate: allergies, current medications, past family history, past medical history, past social history, past surgical history and problem list.

## 2020-08-28 ENCOUNTER — APPOINTMENT (OUTPATIENT)
Dept: CARDIOLOGY | Facility: HOSPITAL | Age: 40
End: 2020-08-28

## 2020-09-04 ENCOUNTER — HOSPITAL ENCOUNTER (OUTPATIENT)
Dept: CARDIOLOGY | Facility: HOSPITAL | Age: 40
Discharge: HOME | End: 2020-09-04
Admitting: INTERNAL MEDICINE

## 2020-09-04 VITALS
BODY MASS INDEX: 44.57 KG/M2 | DIASTOLIC BLOOD PRESSURE: 85 MMHG | SYSTOLIC BLOOD PRESSURE: 115 MMHG | HEART RATE: 87 BPM | HEIGHT: 67 IN | WEIGHT: 284 LBS

## 2020-09-04 DIAGNOSIS — I42.0 DILATED CARDIOMYOPATHY (HCC): ICD-10-CM

## 2020-09-04 DIAGNOSIS — R06.02 SOB (SHORTNESS OF BREATH): ICD-10-CM

## 2020-09-04 PROCEDURE — 25010000002 SULFUR HEXAFLUORIDE MICROSPH 60.7-25 MG RECONSTITUTED SUSPENSION: Performed by: INTERNAL MEDICINE

## 2020-09-04 PROCEDURE — 93306 TTE W/DOPPLER COMPLETE: CPT

## 2020-09-04 RX ADMIN — SULFUR HEXAFLUORIDE 5 ML: KIT at 11:10

## 2020-09-05 LAB
BH CV ECHO MEAS - BSA(HAYCOCK): 2.5 M^2
BH CV ECHO MEAS - BSA: 2.3 M^2
BH CV ECHO MEAS - BZI_BMI: 44.5 KILOGRAMS/M^2
BH CV ECHO MEAS - BZI_METRIC_HEIGHT: 170.2 CM
BH CV ECHO MEAS - BZI_METRIC_WEIGHT: 128.8 KG
BH CV ECHO MEAS - EDV(CUBED): 339.4 ML
BH CV ECHO MEAS - EDV(MOD-SP2): 248.2 ML
BH CV ECHO MEAS - EDV(MOD-SP4): 275.9 ML
BH CV ECHO MEAS - EDV(TEICH): 253.4 ML
BH CV ECHO MEAS - EF(CUBED): 18.3 %
BH CV ECHO MEAS - EF(MOD-BP): 24 %
BH CV ECHO MEAS - EF(MOD-SP2): 29.5 %
BH CV ECHO MEAS - EF(MOD-SP4): 21.5 %
BH CV ECHO MEAS - EF(TEICH): 14.1 %
BH CV ECHO MEAS - ESV(CUBED): 277.2 ML
BH CV ECHO MEAS - ESV(MOD-SP2): 174.9 ML
BH CV ECHO MEAS - ESV(MOD-SP4): 216.6 ML
BH CV ECHO MEAS - ESV(TEICH): 217.6 ML
BH CV ECHO MEAS - FS: 6.5 %
BH CV ECHO MEAS - IVS/LVPW: 0.96
BH CV ECHO MEAS - IVSD: 0.95 CM
BH CV ECHO MEAS - LAT PEAK E' VEL: 6 CM/SEC
BH CV ECHO MEAS - LV DIASTOLIC VOL/BSA (35-75): 117.6 ML/M^2
BH CV ECHO MEAS - LV MASS(C)D: 308 GRAMS
BH CV ECHO MEAS - LV MASS(C)DI: 131.2 GRAMS/M^2
BH CV ECHO MEAS - LV SYSTOLIC VOL/BSA (12-30): 92.3 ML/M^2
BH CV ECHO MEAS - LVIDD: 7 CM
BH CV ECHO MEAS - LVIDS: 6.5 CM
BH CV ECHO MEAS - LVPWD: 0.99 CM
BH CV ECHO MEAS - MED PEAK E' VEL: 7 CM/SEC
BH CV ECHO MEAS - MR MAX VEL: 447 CM/SEC
BH CV ECHO MEAS - MR VTI: 151 CM
BH CV ECHO MEAS - MV A MAX VEL: 39.6 CM/SEC
BH CV ECHO MEAS - MV DEC SLOPE: 617 CM/SEC^2
BH CV ECHO MEAS - MV DEC TIME: 0.15 SEC
BH CV ECHO MEAS - MV E MAX VEL: 92.8 CM/SEC
BH CV ECHO MEAS - MV E/A: 2.3
BH CV ECHO MEAS - PULM A REVS DUR: 0.1 SEC
BH CV ECHO MEAS - PULM A REVS VEL: 31 CM/SEC
BH CV ECHO MEAS - PULM DIAS VEL: 33.9 CM/SEC
BH CV ECHO MEAS - PULM S/D: 1.2
BH CV ECHO MEAS - PULM SYS VEL: 40.8 CM/SEC
BH CV ECHO MEAS - RAP SYSTOLE: 3 MMHG
BH CV ECHO MEAS - RVSP: 35.7 MMHG
BH CV ECHO MEAS - SI(CUBED): 26.5 ML/M^2
BH CV ECHO MEAS - SI(MOD-SP2): 31.2 ML/M^2
BH CV ECHO MEAS - SI(MOD-SP4): 25.3 ML/M^2
BH CV ECHO MEAS - SI(TEICH): 15.3 ML/M^2
BH CV ECHO MEAS - SV(CUBED): 62.1 ML
BH CV ECHO MEAS - SV(MOD-SP2): 73.3 ML
BH CV ECHO MEAS - SV(MOD-SP4): 59.4 ML
BH CV ECHO MEAS - SV(TEICH): 35.8 ML
BH CV ECHO MEAS - TAPSE (>1.6): 1.9 CM2
BH CV ECHO MEAS - TR MAX PG: 33 MMHG
BH CV ECHO MEAS - TR MAX VEL: 285.8 CM/SEC
BH CV ECHO MEASUREMENTS AVERAGE E/E' RATIO: 14.28
LV EF 2D ECHO EST: 25 %
MR PISA EROA: 0.1 CM2
MV REGURGITANT VOLUME: 20 CC
PISA ALIASING VEL: 0.32 M/S
PISA RADIUS: 0.5 CM

## 2020-09-05 PROCEDURE — 93306 TTE W/DOPPLER COMPLETE: CPT | Performed by: INTERNAL MEDICINE

## 2020-10-03 ENCOUNTER — HOSPITAL ENCOUNTER (EMERGENCY)
Facility: HOSPITAL | Age: 40
Discharge: HOME | End: 2020-10-03
Admitting: EMERGENCY MEDICINE

## 2020-10-03 VITALS
HEIGHT: 67 IN | BODY MASS INDEX: 45.33 KG/M2 | WEIGHT: 288.8 LBS | HEART RATE: 89 BPM | TEMPERATURE: 97.7 F | DIASTOLIC BLOOD PRESSURE: 70 MMHG | SYSTOLIC BLOOD PRESSURE: 111 MMHG | OXYGEN SATURATION: 100 % | RESPIRATION RATE: 16 BRPM

## 2020-10-03 DIAGNOSIS — T14.8XXA PUNCTURE WOUND: Primary | ICD-10-CM

## 2020-10-03 PROCEDURE — 90471 IMMUNIZATION ADMIN: CPT | Performed by: NURSE PRACTITIONER

## 2020-10-03 PROCEDURE — 25010000002 TDAP 5-2.5-18.5 LF-MCG/0.5 SUSPENSION: Performed by: NURSE PRACTITIONER

## 2020-10-03 PROCEDURE — 99283 EMERGENCY DEPT VISIT LOW MDM: CPT

## 2020-10-03 PROCEDURE — 90715 TDAP VACCINE 7 YRS/> IM: CPT | Performed by: NURSE PRACTITIONER

## 2020-10-03 RX ORDER — AMOXICILLIN AND CLAVULANATE POTASSIUM 875; 125 MG/1; MG/1
1 TABLET, FILM COATED ORAL 2 TIMES DAILY
Qty: 14 TABLET | Refills: 0 | Status: SHIPPED | OUTPATIENT
Start: 2020-10-03 | End: 2020-10-10

## 2020-10-03 RX ORDER — GUAIFENESIN AND CODEINE PHOSPHATE 100; 10 MG/5ML; MG/5ML
10 SOLUTION ORAL ONCE
Status: COMPLETED | OUTPATIENT
Start: 2020-10-03 | End: 2020-10-03

## 2020-10-03 RX ORDER — BENZONATATE 100 MG/1
100 CAPSULE ORAL 3 TIMES DAILY PRN
Qty: 21 CAPSULE | Refills: 0 | Status: SHIPPED | OUTPATIENT
Start: 2020-10-03 | End: 2020-10-10

## 2020-10-03 RX ADMIN — GUAIFENESIN AND CODEINE PHOSPHATE 10 ML: 10; 100 LIQUID ORAL at 21:42

## 2020-10-03 RX ADMIN — TETANUS TOXOID, REDUCED DIPHTHERIA TOXOID AND ACELLULAR PERTUSSIS VACCINE, ADSORBED 0.5 ML: 5; 2.5; 8; 8; 2.5 SUSPENSION INTRAMUSCULAR at 21:42

## 2020-10-04 NOTE — DISCHARGE INSTRUCTIONS
Keep right fooT wound clean dry and covered and may apply bacitracin to wound 2 times a day after cleaning.  Please call Dr. Santana's office on Monday and REschedule an appointment for further evaluation and care of your persistent cough.  Fill and take your prescription for Augmentin and take as directed.

## 2020-10-04 NOTE — ED PROVIDER NOTES
Subjective   40-year-old morbidly obese  male presents the emergency room with right foot wound after stepping on a nail earlier today.  Patient states he has had a sore throat and a cough for the past 2 weeks and was supposed to see his primary care physician last week but he missed his appointment.  Patient denies fever, nausea, vomiting, shortness of breath, chest pain or diarrhea.  Patient denies dizziness or loss of consciousness as of late.  Patient requests something for his cough.  Patient states that his tetanus shot is not been updated within the last 5 years.  Patient denies any loss of mobility in his right foot and appears in no acute distress.  Onset: Earlier today stepped on a nail  Location: Bottom of right foot  Duration: Today  Character: Denies pain  Aggravating/Alleviating Factors: Walking/not walking  Radiation: None  Severity: Very mild              Review of Systems   Constitutional: Negative for activity change, appetite change, fatigue and fever.   HENT: Positive for congestion and sore throat.    Eyes: Negative.    Respiratory: Positive for cough.    Cardiovascular: Negative.  Negative for chest pain.   Gastrointestinal: Negative.    Endocrine: Negative.    Genitourinary: Negative for difficulty urinating.   Skin: Positive for wound.   Allergic/Immunologic: Negative.    Neurological: Negative for dizziness.   Hematological: Negative.    Psychiatric/Behavioral: Negative.        Past Medical History:   Diagnosis Date   • Arrhythmia    • Cardiomyopathy (CMS/HCC)    • Diverticulitis    • Hyperlipidemia    • Palpitations        No Known Allergies    Past Surgical History:   Procedure Laterality Date   • CARDIAC CATHETERIZATION     • CARDIAC CATHETERIZATION N/A 6/12/2020    Procedure: Left Heart Cath;  Surgeon: Fernanda Jackson MD;  Location: [facility] CATH INVASIVE LOCATION;  Service: Cardiovascular;  Laterality: N/A;   • CARDIAC CATHETERIZATION N/A 6/12/2020    Procedure: Coronary  angiography;  Surgeon: Fernanda Jackson MD;  Location: [facility] INVASIVE LOCATION;  Service: Cardiovascular;  Laterality: N/A;   • CARDIAC DEFIBRILLATOR PLACEMENT     • COLON RESECTION     • INSERT / REPLACE / REMOVE PACEMAKER      ICD   • KNEE ACL RECONSTRUCTION         Family History   Problem Relation Age of Onset   • Heart disease Father    • Hypertension Mother        Social History     Socioeconomic History   • Marital status: Single     Spouse name: Not on file   • Number of children: Not on file   • Years of education: Not on file   • Highest education level: Not on file   Tobacco Use   • Smoking status: Former Smoker   • Smokeless tobacco: Former User     Types: Snuff   • Tobacco comment: QUIT SMOKING 5/20   Substance and Sexual Activity   • Alcohol use: No   • Drug use: Yes     Types: Marijuana   • Sexual activity: Yes     Partners: Female           Objective   Physical Exam  Constitutional:       General: He is not in acute distress.     Appearance: Normal appearance. He is obese. He is not ill-appearing, toxic-appearing or diaphoretic.   HENT:      Head: Normocephalic and atraumatic.      Right Ear: Tympanic membrane normal.      Left Ear: Tympanic membrane normal.      Nose: Nose normal.      Mouth/Throat:      Mouth: Mucous membranes are moist.      Pharynx: Oropharynx is clear.   Eyes:      Extraocular Movements: Extraocular movements intact.      Conjunctiva/sclera: Conjunctivae normal.      Pupils: Pupils are equal, round, and reactive to light.   Neck:      Musculoskeletal: Normal range of motion and neck supple.   Cardiovascular:      Rate and Rhythm: Normal rate.      Pulses: Normal pulses.   Pulmonary:      Effort: Pulmonary effort is normal.      Breath sounds: Normal breath sounds.   Musculoskeletal:        Feet:    Skin:     General: Skin is warm.      Capillary Refill: Capillary refill takes less than 2 seconds.      Findings: Lesion present.   Neurological:      General: No focal  deficit present.      Mental Status: He is alert and oriented to person, place, and time.         Procedures           ED Course      During ER visit tetanus was updated.  Right foot puncture wound thoroughly cleaned, Bactroban applied and dressed with a sterile clean dressing.  Discharged home with prescription for Tessalon Perles per patient request for a 2-week long cough and Augmentin for presumed puncture wound to the bottom of right foot.  Encourage patient to make another appointment with his primary care physician and keep that appointment for Monday or Tuesday of next week.                                     MDM      Final diagnoses:   Puncture wound            Sandra Johnston, APRN  10/03/20 213

## 2020-11-17 ENCOUNTER — OFFICE VISIT (OUTPATIENT)
Dept: CARDIOLOGY | Facility: CLINIC | Age: 40
End: 2020-11-17

## 2020-11-17 VITALS
HEART RATE: 85 BPM | SYSTOLIC BLOOD PRESSURE: 118 MMHG | DIASTOLIC BLOOD PRESSURE: 78 MMHG | BODY MASS INDEX: 42.35 KG/M2 | OXYGEN SATURATION: 96 % | RESPIRATION RATE: 18 BRPM | HEIGHT: 67 IN | WEIGHT: 269.8 LBS

## 2020-11-17 DIAGNOSIS — I42.0 DILATED CARDIOMYOPATHY (HCC): ICD-10-CM

## 2020-11-17 DIAGNOSIS — I34.0 NONRHEUMATIC MITRAL VALVE REGURGITATION: ICD-10-CM

## 2020-11-17 DIAGNOSIS — Z95.810 AICD (AUTOMATIC CARDIOVERTER/DEFIBRILLATOR) PRESENT: ICD-10-CM

## 2020-11-17 DIAGNOSIS — R53.83 FATIGUE, UNSPECIFIED TYPE: ICD-10-CM

## 2020-11-17 DIAGNOSIS — E78.2 MIXED HYPERLIPIDEMIA: Primary | ICD-10-CM

## 2020-11-17 DIAGNOSIS — R94.31 ABNORMAL ELECTROCARDIOGRAM (ECG) (EKG): ICD-10-CM

## 2020-11-17 DIAGNOSIS — I10 ESSENTIAL HYPERTENSION: ICD-10-CM

## 2020-11-17 PROCEDURE — 99214 OFFICE O/P EST MOD 30 MIN: CPT | Performed by: INTERNAL MEDICINE

## 2020-11-17 PROCEDURE — 93000 ELECTROCARDIOGRAM COMPLETE: CPT | Performed by: INTERNAL MEDICINE

## 2020-11-17 RX ORDER — CARVEDILOL 12.5 MG/1
12.5 TABLET ORAL 2 TIMES DAILY WITH MEALS
Qty: 180 TABLET | Refills: 2 | Status: SHIPPED | OUTPATIENT
Start: 2020-11-17 | End: 2021-08-11 | Stop reason: SDUPTHER

## 2020-11-17 RX ORDER — CARVEDILOL 12.5 MG/1
12.5 TABLET ORAL 2 TIMES DAILY WITH MEALS
Qty: 60 TABLET | Refills: 2 | Status: SHIPPED | OUTPATIENT
Start: 2020-11-17 | End: 2020-11-17 | Stop reason: SDUPTHER

## 2020-11-17 NOTE — PROGRESS NOTES
Cardiology Office Visit      Encounter Date:  11/17/2020    Patient ID:   Brian Stewart is a 40 y.o. male.    Reason For Consultation:  Congestive heart failure  Cardiomyopathy    History of Present Illness:  Dear Santana Garcia MD    I had the pleasure of seeing Brian Stewart today. As you are well aware, this is a 40 y.o. male with past medical history that is significant for nonischemic cardiomyopathy severe cardiomyopathy requiring AICD with some improvement of LV systolic function was stable until last year now he is having worsening symptoms of shortness of breath dyspnea on exertion weight gain    Recent hospitalization for congestive heart failure symptoms with improvement of the symptoms after diuresis  Complaining of shortness of breath and dyspnea on exertion  Remittent chest discomfort  No dizziness or syncope  No ICD shocks  Compliant with medical therapy  Patient has obstructive sleep apnea but does not like to use CPAP secondary to intolerance  Wants to start exercise program but worried about his cardiac stability at this time    Interpretation Summary     · The left ventricular cavity is severely dilated.  · Estimated EF = 15%.  · Left ventricular systolic function is severely decreased.  · Right ventricular cavity is mildly dilated.  · Severe mitral valve regurgitation is present  · Mildly reduced right ventricular systolic function noted.  · Mild tricuspid valve regurgitation is present.  · Left atrial cavity size is moderate-to-severely dilated.  · The following left ventricular wall segments are hypokinetic: mid anterior, apical anterior, basal anterolateral, mid anterolateral, apical lateral, basal inferolateral, mid inferolateral, apical inferior, mid inferior, apical septal, basal inferoseptal, mid inferoseptal, apex hypokinetic, mid anteroseptal, basal anterior, basal inferior and basal inferoseptal.     Impressions:  Normal coronaries  Severe cardiomyopathy  Severe LV  "dysfunction  Elevated left-sided filling pressures  Estimated LV ejection fraction of 15%      Assessment & Plan    Impressions:  Acute on chronic systolic heart failure  Congestive heart failure NYHA class III  Cardiomyopathy most likely nonischemic  Severe mitral regurgitation nonrheumatic  Hypertension  Obesity  Obstructive sleep apnea  Status post ICD with nonsustained ventricular tachycardia  Noncompliance with medical therapy  Nonischemic cardiomyopathy with LV ejection fraction of 25%    Recommendations:  Recent echocardiogram findings reviewed with the patient  Recent medical records from [facility] including labs findings on the stress test reviewed and discussed with the patient  ICD evaluation today with nonsustained episodes of ventricular tachycardia otherwise normal functioning of increase Coreg to 12.5 mg p.o. twice daily  Continue Lasix to 40 mg p.o. once a day  Continue spironolactone to 25 mg p.o. once a day  Continue losartan 25 mg p.o. once a day  Cough with ACE inhibitor therapy in the past  Schedule patient for a plain treadmill stress to evaluate the functional status and start patient on a exercise program  Repeat echocardiogram 1 week before next office visit to assess the LV function and also assess the severity of the mitral regurgitation  Need for regular exercise and weight loss discussed with patient  Check CBC CMP lipids and TSH  Need for weight loss reviewed and discussed with the patient  Low-salt diet  Follow-up in office in 3 months    Objective:    Vitals:  Vitals:    11/17/20 0843   BP: 118/78   BP Location: Left arm   Pulse: 85   Resp: 18   SpO2: 96%   Weight: 122 kg (269 lb 12.8 oz)   Height: 170.2 cm (67\")       Physical Exam:    General: Alert, cooperative, no distress, appears stated age  Head:  Normocephalic, atraumatic, mucous membranes moist  Eyes:  Conjunctiva/corneas clear, EOM's intact     Neck:  Supple,  no adenopathy;      Lungs: Clear to auscultation " bilaterally, no wheezes rhonchi rales are noted  Chest wall: No tenderness  Heart::  Regular rate and rhythm, S1 and S2 normal, displaced LV apical impulse 2 x 6 pansystolic murmur left sternal border  Abdomen: Soft, non-tender, nondistended bowel sounds active  Extremities: No cyanosis, clubbing, or edema  Pulses: 2+ and symmetric all extremities  Skin:  No rashes or lesions  Neuro/psych: A&O x3. CN II through XII are grossly intact with appropriate affect      Allergies:  No Known Allergies    Medication Review:     Current Outpatient Medications:   •  aspirin 81 MG chewable tablet, Chew 81 mg Daily., Disp: , Rfl:   •  carvedilol (COREG) 6.25 MG tablet, Take 6.25 mg by mouth 2 (Two) Times a Day With Meals., Disp: , Rfl:   •  furosemide (LASIX) 40 MG tablet, Take 1 tablet by mouth Daily., Disp: , Rfl: 0  •  gabapentin (NEURONTIN) 300 MG capsule, Take 300 mg by mouth 3 (Three) Times a Day., Disp: , Rfl: 0  •  HYDROcodone-acetaminophen (NORCO) 7.5-325 MG per tablet, 1 tablet 5 (Five) Times a Day., Disp: , Rfl: 0  •  losartan (COZAAR) 25 MG tablet, TAKE 1 TABLET BY MOUTH DAILY, Disp: 90 tablet, Rfl: 1  •  nitroglycerin (NITROSTAT) 0.4 MG SL tablet, Place 0.4 mg under the tongue Every 5 (Five) Minutes As Needed for Chest Pain. Take no more than 3 doses in 15 minutes., Disp: , Rfl:   •  pravastatin (PRAVACHOL) 20 MG tablet, Take 20 mg by mouth every night at bedtime., Disp: , Rfl: 0  •  spironolactone (ALDACTONE) 25 MG tablet, TAKE 1 TABLET BY MOUTH DAILY, Disp: 90 tablet, Rfl: 2    Family History:  Family History   Problem Relation Age of Onset   • Heart disease Father    • Hypertension Mother        Past Medical History:  Past Medical History:   Diagnosis Date   • Arrhythmia    • Cardiomyopathy (CMS/HCC)    • Diverticulitis    • Hyperlipidemia    • Palpitations        Past surgical History:  Past Surgical History:   Procedure Laterality Date   • CARDIAC CATHETERIZATION     • CARDIAC CATHETERIZATION N/A 6/12/2020     Procedure: Left Heart Cath;  Surgeon: Fernanda Jackson MD;  Location: [facility] CATH INVASIVE LOCATION;  Service: Cardiovascular;  Laterality: N/A;   • CARDIAC CATHETERIZATION N/A 6/12/2020    Procedure: Coronary angiography;  Surgeon: Fernanda Jackson MD;  Location: [facility] CATH INVASIVE LOCATION;  Service: Cardiovascular;  Laterality: N/A;   • CARDIAC DEFIBRILLATOR PLACEMENT     • COLON RESECTION     • INSERT / REPLACE / REMOVE PACEMAKER      ICD   • KNEE ACL RECONSTRUCTION         Social History:  Social History     Socioeconomic History   • Marital status: Single     Spouse name: Not on file   • Number of children: Not on file   • Years of education: Not on file   • Highest education level: Not on file   Tobacco Use   • Smoking status: Former Smoker   • Smokeless tobacco: Former User     Types: Snuff   • Tobacco comment: QUIT SMOKING 5/20   Substance and Sexual Activity   • Alcohol use: No   • Drug use: Yes     Types: Marijuana   • Sexual activity: Yes     Partners: Female       Review of Systems:  The following systems were reviewed as they relate to the cardiovascular system: Constitutional, Eyes, ENT, Cardiovascular, Respiratory, Gastrointestinal, Integumentary, Neurological, Psychiatric, Hematologic, Endocrine, Musculoskeletal, and Genitourinary. The pertinent cardiovascular findings are reported above with all other cardiovascular points within those systems being negative.    Diagnostic Study Review:     Current Electrocardiogram:    ECG 12 Lead    Date/Time: 11/17/2020 8:49 AM  Performed by: Fernanda Jackson MD  Authorized by: Fernanda Jackson MD   Comparison: compared with previous ECG   Similar to previous ECG  Rhythm: sinus rhythm  Rate: normal  BPM: 85  Conduction: non-specific intraventricular conduction delay  QRS axis: normal  Other findings: non-specific ST-T wave changes    Clinical impression: abnormal EKG              NOTE: The following portions of the patient's history  were reviewed and updated this visit as appropriate: allergies, current medications, past family history, past medical history, past social history, past surgical history and problem list.

## 2021-02-08 RX ORDER — LOSARTAN POTASSIUM 25 MG/1
25 TABLET ORAL DAILY
Qty: 90 TABLET | Refills: 1 | Status: SHIPPED | OUTPATIENT
Start: 2021-02-08 | End: 2021-08-11 | Stop reason: SDUPTHER

## 2021-02-23 ENCOUNTER — OFFICE VISIT (OUTPATIENT)
Dept: CARDIOLOGY | Facility: CLINIC | Age: 41
End: 2021-02-23

## 2021-02-23 VITALS
OXYGEN SATURATION: 95 % | HEIGHT: 67 IN | HEART RATE: 79 BPM | BODY MASS INDEX: 38.61 KG/M2 | RESPIRATION RATE: 18 BRPM | DIASTOLIC BLOOD PRESSURE: 74 MMHG | WEIGHT: 246 LBS | SYSTOLIC BLOOD PRESSURE: 106 MMHG

## 2021-02-23 DIAGNOSIS — Z95.810 AICD (AUTOMATIC CARDIOVERTER/DEFIBRILLATOR) PRESENT: ICD-10-CM

## 2021-02-23 DIAGNOSIS — R94.31 ABNORMAL ELECTROCARDIOGRAM (ECG) (EKG): ICD-10-CM

## 2021-02-23 DIAGNOSIS — I10 ESSENTIAL HYPERTENSION: ICD-10-CM

## 2021-02-23 DIAGNOSIS — I34.0 NONRHEUMATIC MITRAL VALVE REGURGITATION: ICD-10-CM

## 2021-02-23 DIAGNOSIS — I42.0 DILATED CARDIOMYOPATHY (HCC): ICD-10-CM

## 2021-02-23 DIAGNOSIS — E78.2 MIXED HYPERLIPIDEMIA: Primary | ICD-10-CM

## 2021-02-23 PROCEDURE — 99214 OFFICE O/P EST MOD 30 MIN: CPT | Performed by: INTERNAL MEDICINE

## 2021-02-23 RX ORDER — NITROGLYCERIN 0.4 MG/1
0.4 TABLET SUBLINGUAL
Qty: 30 TABLET | Refills: 2 | Status: SHIPPED | OUTPATIENT
Start: 2021-02-23

## 2021-02-23 NOTE — PROGRESS NOTES
Cardiology Office Visit      Encounter Date:  02/23/2021    Patient ID:   Brian Stewart is a 40 y.o. male.      Reason For Consultation:  Congestive heart failure  Cardiomyopathy    History of Present Illness:  Dear Santana Garcia MD    I had the pleasure of seeing Brian Stewart today. As you are well aware, this is a 40 y.o. male with past medical history that is significant for nonischemic cardiomyopathy severe cardiomyopathy requiring AICD with some improvement of LV systolic function was stable until last year now he is having worsening symptoms of shortness of breath dyspnea on exertion weight gain    Recent hospitalization for congestive heart failure symptoms with improvement of the symptoms after diuresis  Complaining of shortness of breath and dyspnea on exertion  Remittent chest discomfort  No dizziness or syncope  No ICD shocks  Compliant with medical therapy  Patient has obstructive sleep apnea but does not like to use CPAP secondary to intolerance  Wants to start exercise program but worried about his cardiac stability at this time    Interpretation Summary     · The left ventricular cavity is severely dilated.  · Estimated EF = 15%.  · Left ventricular systolic function is severely decreased.  · Right ventricular cavity is mildly dilated.  · Severe mitral valve regurgitation is present  · Mildly reduced right ventricular systolic function noted.  · Mild tricuspid valve regurgitation is present.  · Left atrial cavity size is moderate-to-severely dilated.  · The following left ventricular wall segments are hypokinetic: mid anterior, apical anterior, basal anterolateral, mid anterolateral, apical lateral, basal inferolateral, mid inferolateral, apical inferior, mid inferior, apical septal, basal inferoseptal, mid inferoseptal, apex hypokinetic, mid anteroseptal, basal anterior, basal inferior and basal inferoseptal.     Impressions:  Normal coronaries  Severe cardiomyopathy  Severe LV  "dysfunction  Elevated left-sided filling pressures  Estimated LV ejection fraction of 15%      Assessment & Plan    Impressions:  Acute on chronic systolic heart failure  Congestive heart failure NYHA class III  Cardiomyopathy most likely nonischemic  Severe mitral regurgitation nonrheumatic  Hypertension  Obesity  Obstructive sleep apnea  Status post ICD with nonsustained ventricular tachycardia  Noncompliance with medical therapy  Nonischemic cardiomyopathy with LV ejection fraction of 25%    Recommendations:  Recent echocardiogram findings reviewed with the patient  Recent medical records from [facility] including labs findings on the stress test reviewed and discussed with the patient  ICD evaluation today with nonsustained episodes of ventricular tachycardia otherwise normal functioning of increase Coreg to 12.5 mg p.o. twice daily  Continue Lasix to 40 mg p.o. once a day/try to wean off and use the Lasix as needed  Continue spironolactone to 25 mg p.o. once a day  Continue losartan 25 mg p.o. once a day  Cough with ACE inhibitor therapy in the past  Need for echocardiogram and labs reviewed and discussed with the patient  Need for regular exercise and weight loss discussed with patient  Check CBC CMP lipids   Continue current medical management  Low-salt diet  Follow-up in office in 6 months      Objective:    Vitals:  Vitals:    02/23/21 0932   BP: 106/74   BP Location: Left arm   Pulse: 79   Resp: 18   SpO2: 95%   Weight: 112 kg (246 lb)   Height: 170.2 cm (67\")       Physical Exam:    General: Alert, cooperative, no distress, appears stated age  Head:  Normocephalic, atraumatic, mucous membranes moist  Eyes:  Conjunctiva/corneas clear, EOM's intact     Neck:  Supple,  no adenopathy;      Lungs: Clear to auscultation bilaterally, no wheezes rhonchi rales are noted  Chest wall: No tenderness  Heart::  Regular rate and rhythm, S1 and S2 normal, displaced LV apical impulse  Abdomen: Soft, " non-tender, nondistended bowel sounds active  Extremities: No cyanosis, clubbing, or edema  Pulses: 2+ and symmetric all extremities  Skin:  No rashes or lesions  Neuro/psych: A&O x3. CN II through XII are grossly intact with appropriate affect      Allergies:  No Known Allergies    Medication Review:     Current Outpatient Medications:   •  aspirin 81 MG chewable tablet, Chew 81 mg Daily., Disp: , Rfl:   •  carvedilol (COREG) 12.5 MG tablet, Take 1 tablet by mouth 2 (Two) Times a Day With Meals., Disp: 180 tablet, Rfl: 2  •  furosemide (LASIX) 40 MG tablet, Take 1 tablet by mouth Daily., Disp: , Rfl: 0  •  gabapentin (NEURONTIN) 300 MG capsule, Take 300 mg by mouth 3 (Three) Times a Day., Disp: , Rfl: 0  •  HYDROcodone-acetaminophen (NORCO) 7.5-325 MG per tablet, 1 tablet 5 (Five) Times a Day., Disp: , Rfl: 0  •  losartan (COZAAR) 25 MG tablet, TAKE 1 TABLET BY MOUTH DAILY, Disp: 90 tablet, Rfl: 1  •  nitroglycerin (NITROSTAT) 0.4 MG SL tablet, Place 0.4 mg under the tongue Every 5 (Five) Minutes As Needed for Chest Pain. Take no more than 3 doses in 15 minutes., Disp: , Rfl:   •  pravastatin (PRAVACHOL) 20 MG tablet, Take 20 mg by mouth every night at bedtime., Disp: , Rfl: 0  •  spironolactone (ALDACTONE) 25 MG tablet, TAKE 1 TABLET BY MOUTH DAILY, Disp: 90 tablet, Rfl: 2    Family History:  Family History   Problem Relation Age of Onset   • Heart disease Father    • Hypertension Mother        Past Medical History:  Past Medical History:   Diagnosis Date   • Arrhythmia    • Cardiomyopathy (CMS/HCC)    • Diverticulitis    • Hyperlipidemia    • Palpitations        Past surgical History:  Past Surgical History:   Procedure Laterality Date   • CARDIAC CATHETERIZATION     • CARDIAC CATHETERIZATION N/A 6/12/2020    Procedure: Left Heart Cath;  Surgeon: Fernanda Jackson MD;  Location: [facility] INVASIVE LOCATION;  Service: Cardiovascular;  Laterality: N/A;   • CARDIAC CATHETERIZATION N/A 6/12/2020    Procedure:  Coronary angiography;  Surgeon: Fernanda Jackson MD;  Location: [facility] INVASIVE LOCATION;  Service: Cardiovascular;  Laterality: N/A;   • CARDIAC DEFIBRILLATOR PLACEMENT     • COLON RESECTION     • INSERT / REPLACE / REMOVE PACEMAKER      ICD   • KNEE ACL RECONSTRUCTION         Social History:  Social History     Socioeconomic History   • Marital status: Single     Spouse name: Not on file   • Number of children: Not on file   • Years of education: Not on file   • Highest education level: Not on file   Tobacco Use   • Smoking status: Current Some Day Smoker     Packs/day: 1.00     Types: Cigarettes   • Smokeless tobacco: Former User     Types: Snuff   • Tobacco comment: QUIT SMOKING 5/20   Substance and Sexual Activity   • Alcohol use: No   • Drug use: Yes     Types: Marijuana   • Sexual activity: Yes     Partners: Female       Review of Systems:  The following systems were reviewed as they relate to the cardiovascular system: Constitutional, Eyes, ENT, Cardiovascular, Respiratory, Gastrointestinal, Integumentary, Neurological, Psychiatric, Hematologic, Endocrine, Musculoskeletal, and Genitourinary. The pertinent cardiovascular findings are reported above with all other cardiovascular points within those systems being negative.    Diagnostic Study Review:     Current Electrocardiogram:  Procedures      NOTE: The following portions of the patient's history were reviewed and updated this visit as appropriate: allergies, current medications, past family history, past medical history, past social history, past surgical history and problem list.

## 2021-03-12 ENCOUNTER — APPOINTMENT (OUTPATIENT)
Dept: CARDIOLOGY | Facility: HOSPITAL | Age: 41
End: 2021-03-12

## 2021-03-16 ENCOUNTER — HOSPITAL ENCOUNTER (OUTPATIENT)
Dept: CARDIOLOGY | Facility: HOSPITAL | Age: 41
Discharge: HOME | End: 2021-03-16
Admitting: INTERNAL MEDICINE

## 2021-03-16 VITALS
SYSTOLIC BLOOD PRESSURE: 99 MMHG | DIASTOLIC BLOOD PRESSURE: 81 MMHG | HEART RATE: 92 BPM | WEIGHT: 230 LBS | HEIGHT: 67 IN | BODY MASS INDEX: 36.1 KG/M2

## 2021-03-16 DIAGNOSIS — Z95.810 AICD (AUTOMATIC CARDIOVERTER/DEFIBRILLATOR) PRESENT: ICD-10-CM

## 2021-03-16 DIAGNOSIS — I34.0 NONRHEUMATIC MITRAL VALVE REGURGITATION: ICD-10-CM

## 2021-03-16 DIAGNOSIS — I10 ESSENTIAL HYPERTENSION: ICD-10-CM

## 2021-03-16 DIAGNOSIS — R94.31 ABNORMAL ELECTROCARDIOGRAM (ECG) (EKG): ICD-10-CM

## 2021-03-16 DIAGNOSIS — E78.2 MIXED HYPERLIPIDEMIA: ICD-10-CM

## 2021-03-16 DIAGNOSIS — I42.0 DILATED CARDIOMYOPATHY (HCC): ICD-10-CM

## 2021-03-16 PROCEDURE — 93306 TTE W/DOPPLER COMPLETE: CPT | Performed by: INTERNAL MEDICINE

## 2021-03-16 PROCEDURE — 93306 TTE W/DOPPLER COMPLETE: CPT

## 2021-03-16 PROCEDURE — 25010000002 SULFUR HEXAFLUORIDE MICROSPH 60.7-25 MG RECONSTITUTED SUSPENSION: Performed by: INTERNAL MEDICINE

## 2021-03-16 RX ADMIN — SULFUR HEXAFLUORIDE 4 ML: KIT at 14:43

## 2021-03-19 ENCOUNTER — TELEPHONE (OUTPATIENT)
Dept: CARDIOLOGY | Facility: CLINIC | Age: 41
End: 2021-03-19

## 2021-03-19 LAB
ASCENDING AORTA: 3.1 CM
BH CV ECHO MEAS - ACS: 2.3 CM
BH CV ECHO MEAS - AO MAX PG (FULL): 0.88 MMHG
BH CV ECHO MEAS - AO MAX PG: 3.5 MMHG
BH CV ECHO MEAS - AO MEAN PG (FULL): 0.64 MMHG
BH CV ECHO MEAS - AO MEAN PG: 2.2 MMHG
BH CV ECHO MEAS - AO ROOT AREA (BSA CORRECTED): 1.5
BH CV ECHO MEAS - AO ROOT AREA: 8.2 CM^2
BH CV ECHO MEAS - AO ROOT DIAM: 3.2 CM
BH CV ECHO MEAS - AO V2 MAX: 93.3 CM/SEC
BH CV ECHO MEAS - AO V2 MEAN: 70.9 CM/SEC
BH CV ECHO MEAS - AO V2 VTI: 16.7 CM
BH CV ECHO MEAS - ASC AORTA: 3.1 CM
BH CV ECHO MEAS - AVA(I,A): 3.9 CM^2
BH CV ECHO MEAS - AVA(I,D): 3.9 CM^2
BH CV ECHO MEAS - AVA(V,A): 3.5 CM^2
BH CV ECHO MEAS - AVA(V,D): 3.5 CM^2
BH CV ECHO MEAS - BSA(HAYCOCK): 2.3 M^2
BH CV ECHO MEAS - BSA: 2.2 M^2
BH CV ECHO MEAS - BZI_BMI: 38.5 KILOGRAMS/M^2
BH CV ECHO MEAS - BZI_METRIC_HEIGHT: 170.2 CM
BH CV ECHO MEAS - BZI_METRIC_WEIGHT: 111.6 KG
BH CV ECHO MEAS - EDV(CUBED): 423.6 ML
BH CV ECHO MEAS - EDV(MOD-SP2): 275.1 ML
BH CV ECHO MEAS - EDV(MOD-SP4): 286.2 ML
BH CV ECHO MEAS - EDV(TEICH): 299.2 ML
BH CV ECHO MEAS - EF(CUBED): 18.1 %
BH CV ECHO MEAS - EF(MOD-BP): 22 %
BH CV ECHO MEAS - EF(MOD-SP2): 24.8 %
BH CV ECHO MEAS - EF(MOD-SP4): 20.7 %
BH CV ECHO MEAS - EF(TEICH): 13.9 %
BH CV ECHO MEAS - ESV(CUBED): 346.9 ML
BH CV ECHO MEAS - ESV(MOD-SP2): 207 ML
BH CV ECHO MEAS - ESV(MOD-SP4): 226.9 ML
BH CV ECHO MEAS - ESV(TEICH): 257.6 ML
BH CV ECHO MEAS - FS: 6.4 %
BH CV ECHO MEAS - IVS/LVPW: 0.96
BH CV ECHO MEAS - IVSD: 0.9 CM
BH CV ECHO MEAS - LA DIMENSION(2D): 4.3 CM
BH CV ECHO MEAS - LA DIMENSION: 4.3 CM
BH CV ECHO MEAS - LA/AO: 1.3
BH CV ECHO MEAS - LAT PEAK E' VEL: 3 CM/SEC
BH CV ECHO MEAS - LV DIASTOLIC VOL/BSA (35-75): 129.6 ML/M^2
BH CV ECHO MEAS - LV IVRT: 0.11 SEC
BH CV ECHO MEAS - LV MASS(C)D: 328.1 GRAMS
BH CV ECHO MEAS - LV MASS(C)DI: 148.6 GRAMS/M^2
BH CV ECHO MEAS - LV MAX PG: 2.6 MMHG
BH CV ECHO MEAS - LV MEAN PG: 1.5 MMHG
BH CV ECHO MEAS - LV SYSTOLIC VOL/BSA (12-30): 102.8 ML/M^2
BH CV ECHO MEAS - LV V1 MAX: 80.7 CM/SEC
BH CV ECHO MEAS - LV V1 MEAN: 57.8 CM/SEC
BH CV ECHO MEAS - LV V1 VTI: 16 CM
BH CV ECHO MEAS - LVIDD: 7.5 CM
BH CV ECHO MEAS - LVIDS: 7 CM
BH CV ECHO MEAS - LVOT AREA: 4 CM^2
BH CV ECHO MEAS - LVOT DIAM: 2.3 CM
BH CV ECHO MEAS - LVPWD: 0.94 CM
BH CV ECHO MEAS - MED PEAK E' VEL: 4 CM/SEC
BH CV ECHO MEAS - MR MAX VEL: 474 CM/SEC
BH CV ECHO MEAS - MR VTI: 170 CM
BH CV ECHO MEAS - MV A MAX VEL: 66.7 CM/SEC
BH CV ECHO MEAS - MV DEC SLOPE: 246.9 CM/SEC^2
BH CV ECHO MEAS - MV DEC TIME: 0.16 SEC
BH CV ECHO MEAS - MV E MAX VEL: 38.7 CM/SEC
BH CV ECHO MEAS - MV E/A: 0.58
BH CV ECHO MEAS - MV MAX PG: 1.9 MMHG
BH CV ECHO MEAS - MV MEAN PG: 0.76 MMHG
BH CV ECHO MEAS - MV P1/2T: 44 MSEC
BH CV ECHO MEAS - MV V2 MAX: 68.7 CM/SEC
BH CV ECHO MEAS - MV V2 MEAN: 40.8 CM/SEC
BH CV ECHO MEAS - MV V2 VTI: 11.5 CM
BH CV ECHO MEAS - MVA(P1/2T): 5.1 CM2
BH CV ECHO MEAS - MVA(VTI): 5.6 CM^2
BH CV ECHO MEAS - PA ACC SLOPE: 357 CM/SEC2
BH CV ECHO MEAS - PA ACC TIME: 0.14 SEC
BH CV ECHO MEAS - PA MAX PG (FULL): 1.2 MMHG
BH CV ECHO MEAS - PA MAX PG: 2.3 MMHG
BH CV ECHO MEAS - PA MEAN PG (FULL): 0.67 MMHG
BH CV ECHO MEAS - PA MEAN PG: 1.3 MMHG
BH CV ECHO MEAS - PA PR(ACCEL): 17.9 MMHG
BH CV ECHO MEAS - PA V2 MAX: 75.3 CM/SEC
BH CV ECHO MEAS - PA V2 MEAN: 54.5 CM/SEC
BH CV ECHO MEAS - PA V2 VTI: 13.8 CM
BH CV ECHO MEAS - PULM A REVS DUR: 0.06 SEC
BH CV ECHO MEAS - PULM A REVS VEL: 14.3 CM/SEC
BH CV ECHO MEAS - PULM DIAS VEL: 31.4 CM/SEC
BH CV ECHO MEAS - PULM S/D: 1.5
BH CV ECHO MEAS - PULM SYS VEL: 47.2 CM/SEC
BH CV ECHO MEAS - RAP SYSTOLE: 3 MMHG
BH CV ECHO MEAS - RV MAX PG: 1.1 MMHG
BH CV ECHO MEAS - RV MEAN PG: 0.64 MMHG
BH CV ECHO MEAS - RV V1 MAX: 52.8 CM/SEC
BH CV ECHO MEAS - RV V1 MEAN: 38.1 CM/SEC
BH CV ECHO MEAS - RV V1 VTI: 10 CM
BH CV ECHO MEAS - RVSP: 30.5 MMHG
BH CV ECHO MEAS - SI(AO): 61.9 ML/M^2
BH CV ECHO MEAS - SI(CUBED): 34.7 ML/M^2
BH CV ECHO MEAS - SI(LVOT): 29.2 ML/M^2
BH CV ECHO MEAS - SI(MOD-SP2): 30.9 ML/M^2
BH CV ECHO MEAS - SI(MOD-SP4): 26.8 ML/M^2
BH CV ECHO MEAS - SI(TEICH): 18.8 ML/M^2
BH CV ECHO MEAS - SV(AO): 136.6 ML
BH CV ECHO MEAS - SV(CUBED): 76.7 ML
BH CV ECHO MEAS - SV(LVOT): 64.6 ML
BH CV ECHO MEAS - SV(MOD-SP2): 68.2 ML
BH CV ECHO MEAS - SV(MOD-SP4): 59.2 ML
BH CV ECHO MEAS - SV(TEICH): 41.6 ML
BH CV ECHO MEAS - TAPSE (>1.6): 1.5 CM
BH CV ECHO MEAS - TR MAX PG: 27 MMHG
BH CV ECHO MEAS - TR MAX VEL: 262.1 CM/SEC
BH CV ECHO MEASUREMENTS AVERAGE E/E' RATIO: 11.06
BH CV VAS BP LEFT ARM: NORMAL MMHG
BH CV XLRA - RV BASE: 3.8 CM
BH CV XLRA - RV MID: 3.5 CM
BH CV XLRA - TDI S': 8 CM/SEC
IVRT: 110 MSEC
LEFT ATRIUM VOLUME INDEX: 39 ML/M2
LEFT ATRIUM VOLUME: 86 CM3
LV EF 2D ECHO EST: 25 %
MR PISA EROA: 0.1 CM2
MV REGURGITANT VOLUME: 18 CC
PISA ALIASING VEL: 0.35 M/S
PISA RADIUS: 0.5 CM

## 2021-03-19 NOTE — TELEPHONE ENCOUNTER
Called and informed the Pt per Dr. Brady that his echo showed his LV systolic function is a little bit better than before and that it improved from 15 to 25%. The Pt stated understanding. I also informed him that Dr. Brady would like to see him in the office to discuss further treatment options. The Pt was agreeable to this plan. Appt made for 3/24/21 at 2:15pm.   
Orthopedics

## 2021-06-02 ENCOUNTER — OFFICE VISIT (OUTPATIENT)
Dept: CARDIOLOGY | Facility: CLINIC | Age: 41
End: 2021-06-02

## 2021-06-02 VITALS
SYSTOLIC BLOOD PRESSURE: 115 MMHG | HEIGHT: 67 IN | OXYGEN SATURATION: 97 % | BODY MASS INDEX: 34.53 KG/M2 | HEART RATE: 72 BPM | RESPIRATION RATE: 18 BRPM | WEIGHT: 220 LBS | DIASTOLIC BLOOD PRESSURE: 70 MMHG

## 2021-06-02 DIAGNOSIS — I50.23 ACUTE ON CHRONIC SYSTOLIC CONGESTIVE HEART FAILURE (HCC): ICD-10-CM

## 2021-06-02 DIAGNOSIS — I34.0 NONRHEUMATIC MITRAL VALVE REGURGITATION: ICD-10-CM

## 2021-06-02 DIAGNOSIS — Z95.810 AICD (AUTOMATIC CARDIOVERTER/DEFIBRILLATOR) PRESENT: ICD-10-CM

## 2021-06-02 DIAGNOSIS — I42.0 DILATED CARDIOMYOPATHY (HCC): Primary | ICD-10-CM

## 2021-06-02 DIAGNOSIS — E78.2 MIXED HYPERLIPIDEMIA: ICD-10-CM

## 2021-06-02 PROBLEM — R79.9 ABNORMAL FINDING OF BLOOD CHEMISTRY, UNSPECIFIED: Status: ACTIVE | Noted: 2021-06-02

## 2021-06-02 PROBLEM — R53.83 FATIGUE: Status: ACTIVE | Noted: 2021-06-02

## 2021-06-02 NOTE — PROGRESS NOTES
Cardiology Office Visit      Encounter Date:  06/02/2021    Patient ID:   Brian Stewart is a 40 y.o. male.      Reason For Consultation:  Congestive heart failure  Cardiomyopathy    History of Present Illness:  Dear Santana Garcia MD    I had the pleasure of seeing Brian Stewart today. As you are well aware, this is a 40 y.o. male with past medical history that is significant for nonischemic cardiomyopathy severe cardiomyopathy requiring AICD with some improvement of LV systolic function was stable until last year now he is having worsening symptoms of shortness of breath dyspnea on exertion weight gain    Recent hospitalization for congestive heart failure symptoms with improvement of the symptoms after diuresis  Complaining of shortness of breath and dyspnea on exertion  Remittent chest discomfort  No dizziness or syncope  No ICD shocks  Compliant with medical therapy  Patient has obstructive sleep apnea but does not like to use CPAP secondary to intolerance  Wants to start exercise program but worried about his cardiac stability at this time      Impressions:  Normal coronaries  Severe cardiomyopathy  Severe LV dysfunction  Elevated left-sided filling pressures  Estimated LV ejection fraction of 15%      Assessment & Plan    Impressions:  chronic systolic heart failure  Congestive heart failure NYHA class III  Cardiomyopathy most likely nonischemic  Severe mitral regurgitation nonrheumatic/moderate mitral regurgitation  Hypertension  Obesity  Obstructive sleep apnea  Status post ICD with nonsustained ventricular tachycardia  Noncompliance with medical therapy  Nonischemic cardiomyopathy with LV ejection fraction of 25%    Recommendations:  Recent echocardiogram findings reviewed with the patient  Start Farxiga 10 mg p.o. once a day  Continue beta-blocker and Aldactone  Use the Lasix as needed  Continue losartan 25 mg p.o. once a day  Advised follow-up with the EP  Check CBC CMP lipids   Continue  "current medical management  Low-salt diet  For close monitoring and follow-up and compliance with medical therapy reviewed and discussed with patient  Follow-up in office in 3 months    Objective:    Vitals:  Vitals:    06/02/21 1134   BP: 115/70   BP Location: Left arm   Pulse: 72   Resp: 18   SpO2: 97%   Weight: 99.8 kg (220 lb)   Height: 170.2 cm (67\")       Physical Exam:    General: Alert, cooperative, no distress, appears stated age  Head:  Normocephalic, atraumatic, mucous membranes moist  Eyes:  Conjunctiva/corneas clear, EOM's intact     Neck:  Supple,  no adenopathy;      Lungs: Clear to auscultation bilaterally, no wheezes rhonchi rales are noted  Chest wall: No tenderness  Heart::  Regular rate and rhythm, S1 and S2 normal, no murmur, rub or gallop  Abdomen: Soft, non-tender, nondistended bowel sounds active  Extremities: No cyanosis, clubbing, or edema  Pulses: 2+ and symmetric all extremities  Skin:  No rashes or lesions  Neuro/psych: A&O x3. CN II through XII are grossly intact with appropriate affect      Allergies:  No Known Allergies    Medication Review:     Current Outpatient Medications:   •  aspirin 81 MG chewable tablet, Chew 81 mg Daily., Disp: , Rfl:   •  carvedilol (COREG) 12.5 MG tablet, Take 1 tablet by mouth 2 (Two) Times a Day With Meals., Disp: 180 tablet, Rfl: 2  •  furosemide (LASIX) 40 MG tablet, Take 1 tablet by mouth Daily., Disp: , Rfl: 0  •  gabapentin (NEURONTIN) 300 MG capsule, Take 300 mg by mouth 3 (Three) Times a Day., Disp: , Rfl: 0  •  HYDROcodone-acetaminophen (NORCO) 7.5-325 MG per tablet, 1 tablet 5 (Five) Times a Day., Disp: , Rfl: 0  •  losartan (COZAAR) 25 MG tablet, TAKE 1 TABLET BY MOUTH DAILY, Disp: 90 tablet, Rfl: 1  •  nitroglycerin (NITROSTAT) 0.4 MG SL tablet, Place 1 tablet under the tongue Every 5 (Five) Minutes As Needed for Chest Pain. Take no more than 3 doses in 15 minutes., Disp: 30 tablet, Rfl: 2  •  pravastatin (PRAVACHOL) 20 MG tablet, Take 20 mg by " mouth every night at bedtime., Disp: , Rfl: 0  •  spironolactone (ALDACTONE) 25 MG tablet, TAKE 1 TABLET BY MOUTH DAILY, Disp: 90 tablet, Rfl: 2    Family History:  Family History   Problem Relation Age of Onset   • Heart disease Father    • Hypertension Mother        Past Medical History:  Past Medical History:   Diagnosis Date   • Arrhythmia    • Cardiomyopathy (CMS/HCC)    • Diverticulitis    • Hyperlipidemia    • Palpitations        Past surgical History:  Past Surgical History:   Procedure Laterality Date   • CARDIAC CATHETERIZATION     • CARDIAC CATHETERIZATION N/A 6/12/2020    Procedure: Left Heart Cath;  Surgeon: Fernanda Jackson MD;  Location: [facility] CATH INVASIVE LOCATION;  Service: Cardiovascular;  Laterality: N/A;   • CARDIAC CATHETERIZATION N/A 6/12/2020    Procedure: Coronary angiography;  Surgeon: Fernanda Jackson MD;  Location: [facility] CATH INVASIVE LOCATION;  Service: Cardiovascular;  Laterality: N/A;   • CARDIAC DEFIBRILLATOR PLACEMENT     • COLON RESECTION     • INSERT / REPLACE / REMOVE PACEMAKER      ICD   • KNEE ACL RECONSTRUCTION         Social History:  Social History     Socioeconomic History   • Marital status: Single     Spouse name: Not on file   • Number of children: Not on file   • Years of education: Not on file   • Highest education level: Not on file   Tobacco Use   • Smoking status: Current Some Day Smoker     Packs/day: 1.50     Types: Cigarettes   • Smokeless tobacco: Former User     Types: Snuff   • Tobacco comment: QUIT SMOKING 5/20   Vaping Use   • Vaping Use: Never used   Substance and Sexual Activity   • Alcohol use: No   • Drug use: Yes     Types: Marijuana   • Sexual activity: Yes     Partners: Female       Review of Systems:  The following systems were reviewed as they relate to the cardiovascular system: Constitutional, Eyes, ENT, Cardiovascular, Respiratory, Gastrointestinal, Integumentary, Neurological, Psychiatric, Hematologic, Endocrine, Musculoskeletal,  and Genitourinary. The pertinent cardiovascular findings are reported above with all other cardiovascular points within those systems being negative.    Diagnostic Study Review:     Current Electrocardiogram:    ECG 12 Lead    Date/Time: 6/2/2021 12:26 PM  Performed by: Fernanda Jackson MD  Authorized by: Fernanda Jackson MD   Comparison: compared with previous ECG   Similar to previous ECG  Rhythm: sinus rhythm  Rate: normal  BPM: 72  Conduction: conduction normal  QRS axis: normal  Other findings: non-specific ST-T wave changes    Clinical impression: abnormal EKG              NOTE: The following portions of the patient's history were reviewed and updated this visit as appropriate: allergies, current medications, past family history, past medical history, past social history, past surgical history and problem list.

## 2021-06-23 RX ORDER — NITROGLYCERIN 0.4 MG/1
TABLET SUBLINGUAL
Qty: 25 TABLET | OUTPATIENT
Start: 2021-06-23

## 2021-06-24 RX ORDER — SPIRONOLACTONE 25 MG/1
25 TABLET ORAL DAILY
Qty: 90 TABLET | Refills: 2 | Status: SHIPPED | OUTPATIENT
Start: 2021-06-24 | End: 2021-08-11 | Stop reason: SDUPTHER

## 2021-08-11 ENCOUNTER — OFFICE VISIT (OUTPATIENT)
Dept: CARDIOLOGY | Facility: CLINIC | Age: 41
End: 2021-08-11

## 2021-08-11 VITALS
WEIGHT: 220 LBS | HEART RATE: 73 BPM | DIASTOLIC BLOOD PRESSURE: 84 MMHG | OXYGEN SATURATION: 98 % | SYSTOLIC BLOOD PRESSURE: 135 MMHG | BODY MASS INDEX: 34.46 KG/M2

## 2021-08-11 DIAGNOSIS — I34.0 NONRHEUMATIC MITRAL VALVE REGURGITATION: ICD-10-CM

## 2021-08-11 DIAGNOSIS — I50.23 ACUTE ON CHRONIC SYSTOLIC CONGESTIVE HEART FAILURE (HCC): ICD-10-CM

## 2021-08-11 DIAGNOSIS — R79.9 ABNORMAL FINDING OF BLOOD CHEMISTRY, UNSPECIFIED: ICD-10-CM

## 2021-08-11 DIAGNOSIS — I42.0 DILATED CARDIOMYOPATHY (HCC): Primary | ICD-10-CM

## 2021-08-11 PROCEDURE — 99214 OFFICE O/P EST MOD 30 MIN: CPT | Performed by: INTERNAL MEDICINE

## 2021-08-11 RX ORDER — SPIRONOLACTONE 25 MG/1
25 TABLET ORAL DAILY
Qty: 90 TABLET | Refills: 2 | Status: SHIPPED | OUTPATIENT
Start: 2021-08-11 | End: 2021-10-22

## 2021-08-11 RX ORDER — CARVEDILOL 12.5 MG/1
12.5 TABLET ORAL 2 TIMES DAILY WITH MEALS
Qty: 180 TABLET | Refills: 2 | Status: SHIPPED | OUTPATIENT
Start: 2021-08-11 | End: 2021-10-22

## 2021-08-11 RX ORDER — PRAVASTATIN SODIUM 20 MG
20 TABLET ORAL
Qty: 90 TABLET | Refills: 2 | Status: SHIPPED | OUTPATIENT
Start: 2021-08-11 | End: 2022-01-13 | Stop reason: SDUPTHER

## 2021-08-11 RX ORDER — LOSARTAN POTASSIUM 25 MG/1
25 TABLET ORAL DAILY
Qty: 90 TABLET | Refills: 2 | Status: SHIPPED | OUTPATIENT
Start: 2021-08-11 | End: 2022-01-13 | Stop reason: SDUPTHER

## 2021-08-11 RX ORDER — DAPAGLIFLOZIN 10 MG/1
10 TABLET, FILM COATED ORAL DAILY
Qty: 90 TABLET | Refills: 2 | Status: SHIPPED | OUTPATIENT
Start: 2021-08-11 | End: 2022-01-13 | Stop reason: SDUPTHER

## 2021-08-11 NOTE — PROGRESS NOTES
Cardiology Office Visit      Encounter Date:  08/11/2021    Patient ID:   Brian Stewart is a 40 y.o. male.    Reason For Consultation:  Congestive heart failure  Cardiomyopathy    History of Present Illness:  Dear Santana Garcia MD    I had the pleasure of seeing Brian Stewart today. As you are well aware, this is a 40 y.o. male with past medical history that is significant for nonischemic cardiomyopathy severe cardiomyopathy requiring AICD with some improvement of LV systolic function was stable until last year now he is having worsening symptoms of shortness of breath dyspnea on exertion weight gain    Denies any new cardiac symptoms  No dizziness or syncope  Compliant with medical therapy        Impressions:  Normal coronaries  Severe cardiomyopathy  Severe LV dysfunction  Elevated left-sided filling pressures  Estimated LV ejection fraction of 15%      Interpretation Summary    · The left ventricular cavity is severely dilated.  · Estimated left ventricular EF = 25% Estimated left ventricular EF was in agreement with the calculated left ventricular EF. Left ventricular systolic function is severely decreased.  · The following left ventricular wall segments are hypokinetic: mid anterior, apical anterior, basal anterolateral, basal inferolateral, basal inferoseptal, mid anteroseptal, basal anterior, basal inferior and basal inferoseptal. The following left ventricular wall segments are akinetic: mid anterolateral, apical lateral, mid inferolateral, apical inferior, mid inferior, apical septal, mid inferoseptal and apex.  · Moderate mitral valve regurgitation is present.  · Left ventricular diastolic function is consistent with (grade I) impaired relaxation.  · Estimated right ventricular systolic pressure from tricuspid regurgitation is normal (<35 mmHg).           Assessment & Plan    Impressions:  chronic systolic heart failure  Congestive heart failure NYHA class III  Cardiomyopathy most likely  nonischemic  Severe mitral regurgitation nonrheumatic/no moderate  Hypertension  Obesity  Obstructive sleep apnea  Status post ICD with nonsustained ventricular tachycardia  Noncompliance with medical therapy  Nonischemic cardiomyopathy with LV ejection fraction of 25%    Recommendations:  Continue current medical therapy  Farxiga 10 mg p.o. once a day  Use Lasix as needed  Continue spironolactone to 25 mg p.o. once a day  Continue losartan 25 mg p.o. once a day  Risk benefits and alternatives and need for close monitoring and follow-up reviewed and discussed with the patient  Check CBC CMP lipids   Continue current medical management  Low-salt diet  Recommended follow-up with the EP for the ICD check  Follow-up in office in 3 months    Objective:    Vitals:  Vitals:    08/11/21 0917   BP: 135/84   Pulse: 73   SpO2: 98%   Weight: 99.8 kg (220 lb)       Physical Exam:    General: Alert, cooperative, no distress, appears stated age  Head:  Normocephalic, atraumatic, mucous membranes moist  Eyes:  Conjunctiva/corneas clear, EOM's intact     Neck:  Supple,  no adenopathy;      Lungs: Clear to auscultation bilaterally, no wheezes rhonchi rales are noted  Chest wall: No tenderness  Heart::  Regular rate and rhythm, S1 and S2 normal, displaced LV apical impulse  Abdomen: Soft, non-tender, nondistended bowel sounds active  Extremities: No cyanosis, clubbing, or edema  Pulses: 2+ and symmetric all extremities  Skin:  No rashes or lesions  Neuro/psych: A&O x3. CN II through XII are grossly intact with appropriate affect      Allergies:  No Known Allergies    Medication Review:     Current Outpatient Medications:   •  aspirin 81 MG chewable tablet, Chew 81 mg Daily., Disp: , Rfl:   •  carvedilol (COREG) 12.5 MG tablet, Take 1 tablet by mouth 2 (Two) Times a Day With Meals., Disp: 180 tablet, Rfl: 2  •  Dapagliflozin Propanediol (Farxiga) 10 MG tablet, Take 10 mg by mouth Daily., Disp: 30 tablet, Rfl: 2  •  furosemide (LASIX)  40 MG tablet, Take 1 tablet by mouth Daily., Disp: , Rfl: 0  •  gabapentin (NEURONTIN) 300 MG capsule, Take 300 mg by mouth 3 (Three) Times a Day., Disp: , Rfl: 0  •  HYDROcodone-acetaminophen (NORCO) 7.5-325 MG per tablet, 1 tablet 5 (Five) Times a Day., Disp: , Rfl: 0  •  losartan (COZAAR) 25 MG tablet, TAKE 1 TABLET BY MOUTH DAILY, Disp: 90 tablet, Rfl: 1  •  nitroglycerin (NITROSTAT) 0.4 MG SL tablet, Place 1 tablet under the tongue Every 5 (Five) Minutes As Needed for Chest Pain. Take no more than 3 doses in 15 minutes., Disp: 30 tablet, Rfl: 2  •  pravastatin (PRAVACHOL) 20 MG tablet, Take 20 mg by mouth every night at bedtime., Disp: , Rfl: 0  •  spironolactone (ALDACTONE) 25 MG tablet, TAKE 1 TABLET BY MOUTH DAILY, Disp: 90 tablet, Rfl: 2    Family History:  Family History   Problem Relation Age of Onset   • Heart disease Father    • Hypertension Mother        Past Medical History:  Past Medical History:   Diagnosis Date   • Arrhythmia    • Cardiomyopathy (CMS/HCC)    • Diverticulitis    • Hyperlipidemia    • Palpitations        Past surgical History:  Past Surgical History:   Procedure Laterality Date   • CARDIAC CATHETERIZATION     • CARDIAC CATHETERIZATION N/A 6/12/2020    Procedure: Left Heart Cath;  Surgeon: Fernanda Jackson MD;  Location: [facility] CATH INVASIVE LOCATION;  Service: Cardiovascular;  Laterality: N/A;   • CARDIAC CATHETERIZATION N/A 6/12/2020    Procedure: Coronary angiography;  Surgeon: Fernanda Jackson MD;  Location: [facility] CATH INVASIVE LOCATION;  Service: Cardiovascular;  Laterality: N/A;   • CARDIAC DEFIBRILLATOR PLACEMENT     • COLON RESECTION     • INSERT / REPLACE / REMOVE PACEMAKER      ICD   • KNEE ACL RECONSTRUCTION         Social History:  Social History     Socioeconomic History   • Marital status: Single     Spouse name: Not on file   • Number of children: Not on file   • Years of education: Not on file   • Highest education level: Not on file   Tobacco Use   •  Smoking status: Current Some Day Smoker     Packs/day: 1.50     Types: Cigarettes   • Smokeless tobacco: Former User     Types: Snuff   • Tobacco comment: QUIT SMOKING 5/20   Vaping Use   • Vaping Use: Never used   Substance and Sexual Activity   • Alcohol use: No   • Drug use: Yes     Types: Marijuana   • Sexual activity: Yes     Partners: Female       Review of Systems:  The following systems were reviewed as they relate to the cardiovascular system: Constitutional, Eyes, ENT, Cardiovascular, Respiratory, Gastrointestinal, Integumentary, Neurological, Psychiatric, Hematologic, Endocrine, Musculoskeletal, and Genitourinary. The pertinent cardiovascular findings are reported above with all other cardiovascular points within those systems being negative.    Diagnostic Study Review:     Current Electrocardiogram:  Procedures      NOTE: The following portions of the patient's history were reviewed and updated this visit as appropriate: allergies, current medications, past family history, past medical history, past social history, past surgical history and problem list.

## 2021-08-24 ENCOUNTER — HOSPITAL ENCOUNTER (OUTPATIENT)
Dept: CARDIOLOGY | Facility: HOSPITAL | Age: 41
Discharge: HOME | End: 2021-08-24
Admitting: INTERNAL MEDICINE

## 2021-08-24 VITALS
HEIGHT: 67 IN | HEART RATE: 67 BPM | DIASTOLIC BLOOD PRESSURE: 80 MMHG | SYSTOLIC BLOOD PRESSURE: 130 MMHG | WEIGHT: 215 LBS | BODY MASS INDEX: 33.74 KG/M2

## 2021-08-24 DIAGNOSIS — R79.9 ABNORMAL FINDING OF BLOOD CHEMISTRY, UNSPECIFIED: ICD-10-CM

## 2021-08-24 DIAGNOSIS — I34.0 NONRHEUMATIC MITRAL VALVE REGURGITATION: ICD-10-CM

## 2021-08-24 DIAGNOSIS — I42.0 DILATED CARDIOMYOPATHY (HCC): ICD-10-CM

## 2021-08-24 DIAGNOSIS — I50.23 ACUTE ON CHRONIC SYSTOLIC CONGESTIVE HEART FAILURE (HCC): ICD-10-CM

## 2021-08-24 PROCEDURE — 93306 TTE W/DOPPLER COMPLETE: CPT | Performed by: INTERNAL MEDICINE

## 2021-08-24 PROCEDURE — 93306 TTE W/DOPPLER COMPLETE: CPT

## 2021-08-26 LAB
ASCENDING AORTA: 3.1 CM
BH CV ECHO MEAS - ACS: 2.3 CM
BH CV ECHO MEAS - AO MAX PG (FULL): 1.5 MMHG
BH CV ECHO MEAS - AO MAX PG: 5.3 MMHG
BH CV ECHO MEAS - AO MEAN PG (FULL): 0.91 MMHG
BH CV ECHO MEAS - AO MEAN PG: 3.2 MMHG
BH CV ECHO MEAS - AO ROOT AREA (BSA CORRECTED): 1.5
BH CV ECHO MEAS - AO ROOT AREA: 7.8 CM^2
BH CV ECHO MEAS - AO ROOT DIAM: 3.1 CM
BH CV ECHO MEAS - AO V2 MAX: 115.1 CM/SEC
BH CV ECHO MEAS - AO V2 MEAN: 85.3 CM/SEC
BH CV ECHO MEAS - AO V2 VTI: 22.2 CM
BH CV ECHO MEAS - ASC AORTA: 3.1 CM
BH CV ECHO MEAS - AVA(I,A): 3.6 CM^2
BH CV ECHO MEAS - AVA(I,D): 3.6 CM^2
BH CV ECHO MEAS - AVA(V,A): 3.4 CM^2
BH CV ECHO MEAS - AVA(V,D): 3.4 CM^2
BH CV ECHO MEAS - BSA(HAYCOCK): 2.2 M^2
BH CV ECHO MEAS - BSA: 2.1 M^2
BH CV ECHO MEAS - BZI_BMI: 33.7 KILOGRAMS/M^2
BH CV ECHO MEAS - BZI_METRIC_HEIGHT: 170.2 CM
BH CV ECHO MEAS - BZI_METRIC_WEIGHT: 97.5 KG
BH CV ECHO MEAS - EDV(CUBED): 410.6 ML
BH CV ECHO MEAS - EDV(MOD-SP2): 250.1 ML
BH CV ECHO MEAS - EDV(MOD-SP4): 269.9 ML
BH CV ECHO MEAS - EDV(TEICH): 292.3 ML
BH CV ECHO MEAS - EF(CUBED): 28.9 %
BH CV ECHO MEAS - EF(MOD-BP): 26 %
BH CV ECHO MEAS - EF(MOD-SP2): 23.3 %
BH CV ECHO MEAS - EF(MOD-SP4): 28.3 %
BH CV ECHO MEAS - EF(TEICH): 22.6 %
BH CV ECHO MEAS - ESV(CUBED): 291.9 ML
BH CV ECHO MEAS - ESV(MOD-SP2): 191.7 ML
BH CV ECHO MEAS - ESV(MOD-SP4): 193.6 ML
BH CV ECHO MEAS - ESV(TEICH): 226.2 ML
BH CV ECHO MEAS - FS: 10.8 %
BH CV ECHO MEAS - IVS/LVPW: 1.1
BH CV ECHO MEAS - IVSD: 0.94 CM
BH CV ECHO MEAS - LA DIMENSION(2D): 4.6 CM
BH CV ECHO MEAS - LA DIMENSION: 4.7 CM
BH CV ECHO MEAS - LA/AO: 1.5
BH CV ECHO MEAS - LAT PEAK E' VEL: 7 CM/SEC
BH CV ECHO MEAS - LV DIASTOLIC VOL/BSA (35-75): 129.4 ML/M^2
BH CV ECHO MEAS - LV IVRT: 0.1 SEC
BH CV ECHO MEAS - LV MASS(C)D: 315 GRAMS
BH CV ECHO MEAS - LV MASS(C)DI: 151.1 GRAMS/M^2
BH CV ECHO MEAS - LV MAX PG: 3.8 MMHG
BH CV ECHO MEAS - LV MEAN PG: 2.3 MMHG
BH CV ECHO MEAS - LV SYSTOLIC VOL/BSA (12-30): 92.9 ML/M^2
BH CV ECHO MEAS - LV V1 MAX: 97.4 CM/SEC
BH CV ECHO MEAS - LV V1 MEAN: 73.2 CM/SEC
BH CV ECHO MEAS - LV V1 VTI: 19.9 CM
BH CV ECHO MEAS - LVIDD: 7.4 CM
BH CV ECHO MEAS - LVIDS: 6.6 CM
BH CV ECHO MEAS - LVOT AREA: 4.1 CM^2
BH CV ECHO MEAS - LVOT DIAM: 2.3 CM
BH CV ECHO MEAS - LVPWD: 0.87 CM
BH CV ECHO MEAS - MED PEAK E' VEL: 5 CM/SEC
BH CV ECHO MEAS - MV A MAX VEL: 55.2 CM/SEC
BH CV ECHO MEAS - MV DEC SLOPE: 601.8 CM/SEC^2
BH CV ECHO MEAS - MV DEC TIME: 0.14 SEC
BH CV ECHO MEAS - MV E MAX VEL: 83.8 CM/SEC
BH CV ECHO MEAS - MV E/A: 1.5
BH CV ECHO MEAS - MV MAX PG: 3.3 MMHG
BH CV ECHO MEAS - MV MEAN PG: 1.5 MMHG
BH CV ECHO MEAS - MV P1/2T: 43 MSEC
BH CV ECHO MEAS - MV V2 MAX: 91.2 CM/SEC
BH CV ECHO MEAS - MV V2 MEAN: 57 CM/SEC
BH CV ECHO MEAS - MV V2 VTI: 19.5 CM
BH CV ECHO MEAS - MVA(P1/2T): 5.1 CM2
BH CV ECHO MEAS - MVA(VTI): 4.1 CM^2
BH CV ECHO MEAS - PA ACC SLOPE: 381 CM/SEC2
BH CV ECHO MEAS - PA ACC TIME: 0.13 SEC
BH CV ECHO MEAS - PA MAX PG (FULL): 0.69 MMHG
BH CV ECHO MEAS - PA MAX PG: 1.8 MMHG
BH CV ECHO MEAS - PA MEAN PG (FULL): 0.7 MMHG
BH CV ECHO MEAS - PA MEAN PG: 1.4 MMHG
BH CV ECHO MEAS - PA PR(ACCEL): 20.6 MMHG
BH CV ECHO MEAS - PA V2 MAX: 67.6 CM/SEC
BH CV ECHO MEAS - PA V2 MEAN: 59 CM/SEC
BH CV ECHO MEAS - PA V2 VTI: 15 CM
BH CV ECHO MEAS - PULM A REVS DUR: 0.09 SEC
BH CV ECHO MEAS - PULM A REVS VEL: 33.7 CM/SEC
BH CV ECHO MEAS - PULM DIAS VEL: 43.4 CM/SEC
BH CV ECHO MEAS - PULM S/D: 1.2
BH CV ECHO MEAS - PULM SYS VEL: 51.1 CM/SEC
BH CV ECHO MEAS - RAP SYSTOLE: 3 MMHG
BH CV ECHO MEAS - RV MAX PG: 1.1 MMHG
BH CV ECHO MEAS - RV MEAN PG: 0.73 MMHG
BH CV ECHO MEAS - RV V1 MAX: 53.3 CM/SEC
BH CV ECHO MEAS - RV V1 MEAN: 40.6 CM/SEC
BH CV ECHO MEAS - RV V1 VTI: 11.4 CM
BH CV ECHO MEAS - RVSP: 37.9 MMHG
BH CV ECHO MEAS - SI(AO): 82.7 ML/M^2
BH CV ECHO MEAS - SI(CUBED): 56.9 ML/M^2
BH CV ECHO MEAS - SI(LVOT): 38.7 ML/M^2
BH CV ECHO MEAS - SI(MOD-SP2): 28 ML/M^2
BH CV ECHO MEAS - SI(MOD-SP4): 36.6 ML/M^2
BH CV ECHO MEAS - SI(TEICH): 31.7 ML/M^2
BH CV ECHO MEAS - SUP REN AO DIAM: 2.3 CM
BH CV ECHO MEAS - SV(AO): 172.4 ML
BH CV ECHO MEAS - SV(CUBED): 118.7 ML
BH CV ECHO MEAS - SV(LVOT): 80.7 ML
BH CV ECHO MEAS - SV(MOD-SP2): 58.4 ML
BH CV ECHO MEAS - SV(MOD-SP4): 76.3 ML
BH CV ECHO MEAS - SV(TEICH): 66.1 ML
BH CV ECHO MEAS - TAPSE (>1.6): 2 CM
BH CV ECHO MEAS - TR MAX PG: 35 MMHG
BH CV ECHO MEAS - TR MAX VEL: 295.2 CM/SEC
BH CV ECHO MEASUREMENTS AVERAGE E/E' RATIO: 13.97
BH CV VAS BP LEFT ARM: NORMAL MMHG
BH CV XLRA - RV BASE: 3.8 CM
BH CV XLRA - RV MID: 2.9 CM
BH CV XLRA - TDI S': 11 CM/SEC
IVRT: 104 MSEC
LEFT ATRIUM VOLUME INDEX: 41.5 ML/M2
LEFT ATRIUM VOLUME: 87 CM3
LV EF 2D ECHO EST: 25 %

## 2021-09-01 ENCOUNTER — TELEPHONE (OUTPATIENT)
Dept: CARDIOLOGY | Facility: CLINIC | Age: 41
End: 2021-09-01

## 2021-09-01 NOTE — TELEPHONE ENCOUNTER
Notified pt, he voiced understanding. Transferred to  to schedule an appt with Dr. MARTINEZ     ----- Message from Fernanda Jackson MD sent at 8/26/2021 12:49 PM EDT -----  Heart function looks same as before  Still EF is 25%  If patient is agreeable make an appointment to see Dr. MARTINEZ for possible ICD

## 2021-09-06 ENCOUNTER — HOSPITAL ENCOUNTER (OUTPATIENT)
Facility: HOSPITAL | Age: 41
Setting detail: OBSERVATION
Discharge: LEFT BEFORE DISCHARGE | End: 2021-09-06
Attending: EMERGENCY MEDICINE | Admitting: EMERGENCY MEDICINE

## 2021-09-06 ENCOUNTER — APPOINTMENT (OUTPATIENT)
Dept: GENERAL RADIOLOGY | Facility: HOSPITAL | Age: 41
End: 2021-09-06

## 2021-09-06 VITALS
SYSTOLIC BLOOD PRESSURE: 116 MMHG | WEIGHT: 213 LBS | HEART RATE: 58 BPM | OXYGEN SATURATION: 97 % | RESPIRATION RATE: 16 BRPM | TEMPERATURE: 98.2 F | DIASTOLIC BLOOD PRESSURE: 76 MMHG | BODY MASS INDEX: 33.43 KG/M2 | HEIGHT: 67 IN

## 2021-09-06 DIAGNOSIS — R55 NEAR SYNCOPE: Primary | ICD-10-CM

## 2021-09-06 LAB
ALBUMIN SERPL-MCNC: 4.4 G/DL (ref 3.5–5.2)
ALBUMIN/GLOB SERPL: 2 G/DL
ALP SERPL-CCNC: 82 U/L (ref 39–117)
ALT SERPL W P-5'-P-CCNC: 12 U/L (ref 1–41)
ANION GAP SERPL CALCULATED.3IONS-SCNC: 11 MMOL/L (ref 5–15)
AST SERPL-CCNC: 20 U/L (ref 1–40)
BASOPHILS # BLD AUTO: 0 10*3/MM3 (ref 0–0.2)
BASOPHILS NFR BLD AUTO: 0.4 % (ref 0–1.5)
BILIRUB SERPL-MCNC: 0.5 MG/DL (ref 0–1.2)
BUN SERPL-MCNC: 16 MG/DL (ref 6–20)
BUN/CREAT SERPL: 19 (ref 7–25)
CALCIUM SPEC-SCNC: 9.1 MG/DL (ref 8.6–10.5)
CHLORIDE SERPL-SCNC: 102 MMOL/L (ref 98–107)
CO2 SERPL-SCNC: 26 MMOL/L (ref 22–29)
CREAT SERPL-MCNC: 0.84 MG/DL (ref 0.76–1.27)
DEPRECATED RDW RBC AUTO: 42.4 FL (ref 37–54)
EOSINOPHIL # BLD AUTO: 0.1 10*3/MM3 (ref 0–0.4)
EOSINOPHIL NFR BLD AUTO: 1.1 % (ref 0.3–6.2)
ERYTHROCYTE [DISTWIDTH] IN BLOOD BY AUTOMATED COUNT: 13 % (ref 12.3–15.4)
GFR SERPL CREATININE-BSD FRML MDRD: 101 ML/MIN/1.73
GLOBULIN UR ELPH-MCNC: 2.2 GM/DL
GLUCOSE SERPL-MCNC: 95 MG/DL (ref 65–99)
HCT VFR BLD AUTO: 47.8 % (ref 37.5–51)
HGB BLD-MCNC: 16.8 G/DL (ref 13–17.7)
HOLD SPECIMEN: NORMAL
LYMPHOCYTES # BLD AUTO: 1.6 10*3/MM3 (ref 0.7–3.1)
LYMPHOCYTES NFR BLD AUTO: 17.3 % (ref 19.6–45.3)
MCH RBC QN AUTO: 33.1 PG (ref 26.6–33)
MCHC RBC AUTO-ENTMCNC: 35.1 G/DL (ref 31.5–35.7)
MCV RBC AUTO: 94.4 FL (ref 79–97)
MONOCYTES # BLD AUTO: 0.7 10*3/MM3 (ref 0.1–0.9)
MONOCYTES NFR BLD AUTO: 7.8 % (ref 5–12)
NEUTROPHILS NFR BLD AUTO: 6.7 10*3/MM3 (ref 1.7–7)
NEUTROPHILS NFR BLD AUTO: 73.4 % (ref 42.7–76)
NRBC BLD AUTO-RTO: 0.1 /100 WBC (ref 0–0.2)
PLATELET # BLD AUTO: 170 10*3/MM3 (ref 140–450)
PMV BLD AUTO: 8.3 FL (ref 6–12)
POTASSIUM SERPL-SCNC: 4.2 MMOL/L (ref 3.5–5.2)
PROT SERPL-MCNC: 6.6 G/DL (ref 6–8.5)
RBC # BLD AUTO: 5.06 10*6/MM3 (ref 4.14–5.8)
SARS-COV-2 RNA PNL SPEC NAA+PROBE: NOT DETECTED
SODIUM SERPL-SCNC: 139 MMOL/L (ref 136–145)
TROPONIN T SERPL-MCNC: <0.01 NG/ML (ref 0–0.03)
WBC # BLD AUTO: 9.1 10*3/MM3 (ref 3.4–10.8)
WHOLE BLOOD HOLD SPECIMEN: NORMAL
WHOLE BLOOD HOLD SPECIMEN: NORMAL

## 2021-09-06 PROCEDURE — 25010000002 ENOXAPARIN PER 10 MG: Performed by: NURSE PRACTITIONER

## 2021-09-06 PROCEDURE — 71045 X-RAY EXAM CHEST 1 VIEW: CPT

## 2021-09-06 PROCEDURE — 87635 SARS-COV-2 COVID-19 AMP PRB: CPT | Performed by: EMERGENCY MEDICINE

## 2021-09-06 PROCEDURE — G0378 HOSPITAL OBSERVATION PER HR: HCPCS

## 2021-09-06 PROCEDURE — 84484 ASSAY OF TROPONIN QUANT: CPT | Performed by: EMERGENCY MEDICINE

## 2021-09-06 PROCEDURE — 85025 COMPLETE CBC W/AUTO DIFF WBC: CPT | Performed by: EMERGENCY MEDICINE

## 2021-09-06 PROCEDURE — 93005 ELECTROCARDIOGRAM TRACING: CPT | Performed by: EMERGENCY MEDICINE

## 2021-09-06 PROCEDURE — 80053 COMPREHEN METABOLIC PANEL: CPT | Performed by: EMERGENCY MEDICINE

## 2021-09-06 PROCEDURE — 96372 THER/PROPH/DIAG INJ SC/IM: CPT

## 2021-09-06 PROCEDURE — C9803 HOPD COVID-19 SPEC COLLECT: HCPCS

## 2021-09-06 PROCEDURE — 99284 EMERGENCY DEPT VISIT MOD MDM: CPT

## 2021-09-06 RX ORDER — SPIRONOLACTONE 25 MG/1
25 TABLET ORAL DAILY
Status: DISCONTINUED | OUTPATIENT
Start: 2021-09-07 | End: 2021-09-06 | Stop reason: HOSPADM

## 2021-09-06 RX ORDER — SODIUM CHLORIDE 0.9 % (FLUSH) 0.9 %
10 SYRINGE (ML) INJECTION AS NEEDED
Status: DISCONTINUED | OUTPATIENT
Start: 2021-09-06 | End: 2021-09-06 | Stop reason: HOSPADM

## 2021-09-06 RX ORDER — ONDANSETRON 2 MG/ML
4 INJECTION INTRAMUSCULAR; INTRAVENOUS EVERY 6 HOURS PRN
Status: DISCONTINUED | OUTPATIENT
Start: 2021-09-06 | End: 2021-09-06 | Stop reason: HOSPADM

## 2021-09-06 RX ORDER — ACETAMINOPHEN 325 MG/1
650 TABLET ORAL EVERY 4 HOURS PRN
Status: DISCONTINUED | OUTPATIENT
Start: 2021-09-06 | End: 2021-09-06 | Stop reason: HOSPADM

## 2021-09-06 RX ORDER — ACETAMINOPHEN 650 MG/1
650 SUPPOSITORY RECTAL EVERY 4 HOURS PRN
Status: DISCONTINUED | OUTPATIENT
Start: 2021-09-06 | End: 2021-09-06 | Stop reason: HOSPADM

## 2021-09-06 RX ORDER — ONDANSETRON 4 MG/1
4 TABLET, FILM COATED ORAL EVERY 6 HOURS PRN
Status: DISCONTINUED | OUTPATIENT
Start: 2021-09-06 | End: 2021-09-06 | Stop reason: HOSPADM

## 2021-09-06 RX ORDER — ASPIRIN 81 MG/1
81 TABLET, CHEWABLE ORAL DAILY
Status: DISCONTINUED | OUTPATIENT
Start: 2021-09-07 | End: 2021-09-06 | Stop reason: HOSPADM

## 2021-09-06 RX ORDER — FUROSEMIDE 40 MG/1
40 TABLET ORAL DAILY PRN
Status: DISCONTINUED | OUTPATIENT
Start: 2021-09-06 | End: 2021-09-06 | Stop reason: HOSPADM

## 2021-09-06 RX ORDER — CARVEDILOL 6.25 MG/1
12.5 TABLET ORAL 2 TIMES DAILY WITH MEALS
Status: DISCONTINUED | OUTPATIENT
Start: 2021-09-06 | End: 2021-09-06 | Stop reason: HOSPADM

## 2021-09-06 RX ORDER — HYDROCODONE BITARTRATE AND ACETAMINOPHEN 10; 325 MG/1; MG/1
1 TABLET ORAL 2 TIMES DAILY PRN
Status: DISCONTINUED | OUTPATIENT
Start: 2021-09-06 | End: 2021-09-06 | Stop reason: HOSPADM

## 2021-09-06 RX ORDER — HYDROCODONE BITARTRATE AND ACETAMINOPHEN 10; 325 MG/1; MG/1
1 TABLET ORAL 2 TIMES DAILY PRN
COMMUNITY

## 2021-09-06 RX ORDER — GABAPENTIN 300 MG/1
300 CAPSULE ORAL 3 TIMES DAILY
Status: DISCONTINUED | OUTPATIENT
Start: 2021-09-06 | End: 2021-09-06 | Stop reason: HOSPADM

## 2021-09-06 RX ORDER — ACETAMINOPHEN 160 MG/5ML
650 SOLUTION ORAL EVERY 4 HOURS PRN
Status: DISCONTINUED | OUTPATIENT
Start: 2021-09-06 | End: 2021-09-06 | Stop reason: HOSPADM

## 2021-09-06 RX ORDER — LOSARTAN POTASSIUM 25 MG/1
25 TABLET ORAL DAILY
Status: DISCONTINUED | OUTPATIENT
Start: 2021-09-07 | End: 2021-09-06 | Stop reason: HOSPADM

## 2021-09-06 RX ORDER — SODIUM CHLORIDE 0.9 % (FLUSH) 0.9 %
10 SYRINGE (ML) INJECTION EVERY 12 HOURS SCHEDULED
Status: DISCONTINUED | OUTPATIENT
Start: 2021-09-06 | End: 2021-09-06 | Stop reason: HOSPADM

## 2021-09-06 RX ORDER — ATORVASTATIN CALCIUM 10 MG/1
10 TABLET, FILM COATED ORAL NIGHTLY
Refills: 2 | Status: DISCONTINUED | OUTPATIENT
Start: 2021-09-06 | End: 2021-09-06 | Stop reason: HOSPADM

## 2021-09-06 RX ADMIN — ENOXAPARIN SODIUM 40 MG: 40 INJECTION SUBCUTANEOUS at 18:16

## 2021-09-06 RX ADMIN — CARVEDILOL 12.5 MG: 6.25 TABLET, FILM COATED ORAL at 18:17

## 2021-09-06 RX ADMIN — GABAPENTIN 300 MG: 300 CAPSULE ORAL at 18:17

## 2021-09-06 RX ADMIN — HYDROCODONE BITARTRATE AND ACETAMINOPHEN 1 TABLET: 10; 325 TABLET ORAL at 18:22

## 2021-09-06 NOTE — PLAN OF CARE
Goal Outcome Evaluation:  Plan of Care Reviewed With: patient           Outcome Summary: ADMISSION

## 2021-09-06 NOTE — H&P
[facility] Observation Unit H&P    Patient Name: Brian Stewart  : 1980  MRN: [id number]  Primary Care Physician: Santana Pillai MD  Date of admission: 2021     Patient Care Team:  Santana Pillai MD as PCP - General (Internal Medicine)          Subjective   History Present Illness     Chief Complaint:   Chief Complaint   Patient presents with   • Dizziness         Mr. Stewart is a 40 y.o.  presents to [facility] complaining of dizziness.       40-year-old male presents to the ER with a chief complaint of lightheadedness and headache which have been intermittent over the last 48 hours.  The patient states he feels like his head is in a cloud and his brain is in a fog.      Smoking x 2 months, decided to quit this morning and start exercising, , arguing     Review of records: Ejection fraction approximately 15% on cardiac cath ; recent echocardiogram shows ejection fraction approximately 25% with grade 1 diastolic dysfunction      Review of Systems   Neurological: Positive for dizziness, headaches and light-headedness.   All other systems reviewed and are negative.          Personal History     Past Medical History:   Past Medical History:   Diagnosis Date   • Arrhythmia    • Cardiomyopathy (CMS/HCC)    • Diverticulitis    • Hyperlipidemia    • Palpitations        Surgical History:      Past Surgical History:   Procedure Laterality Date   • CARDIAC CATHETERIZATION     • CARDIAC CATHETERIZATION N/A 2020    Procedure: Left Heart Cath;  Surgeon: Fernanda Jackson MD;  Location: [facility] CATH INVASIVE LOCATION;  Service: Cardiovascular;  Laterality: N/A;   • CARDIAC CATHETERIZATION N/A 2020    Procedure: Coronary angiography;  Surgeon: Fernanda Jackson MD;  Location: [facility] CATH INVASIVE LOCATION;  Service: Cardiovascular;  Laterality: N/A;   • CARDIAC DEFIBRILLATOR PLACEMENT     • COLON RESECTION     • INSERT / REPLACE / REMOVE PACEMAKER      ICD   • KNEE ACL  RECONSTRUCTION             Family History: family history includes Heart disease in his father; Hypertension in his mother. Otherwise pertinent FHx was reviewed and unremarkable.     Social History:  reports that he has been smoking cigarettes. He has been smoking about 1.50 packs per day. He has quit using smokeless tobacco.  His smokeless tobacco use included snuff. He reports current drug use. Drug: Marijuana. He reports that he does not drink alcohol.      Medications:  Prior to Admission medications    Medication Sig Start Date End Date Taking? Authorizing Provider   aspirin 81 MG chewable tablet Chew 81 mg Daily.   Yes Bella Dempsey MD   carvedilol (COREG) 12.5 MG tablet Take 1 tablet by mouth 2 (Two) Times a Day With Meals. 8/11/21  Yes Fernanda Jackson MD   Dapagliflozin Propanediol (Farxiga) 10 MG tablet Take 10 mg by mouth Daily. 8/11/21  Yes Fernanda Jackson MD   furosemide (LASIX) 40 MG tablet Take 40 mg by mouth Daily As Needed. 8/12/20  Yes Fernanda Jackson MD   gabapentin (NEURONTIN) 300 MG capsule Take 300 mg by mouth 3 (Three) Times a Day. 8/22/19  Yes Bella Dempsey MD   HYDROcodone-acetaminophen (NORCO)  MG per tablet Take 1 tablet by mouth 2 (Two) Times a Day As Needed for Moderate Pain .   Yes Bella Dempsey MD   losartan (COZAAR) 25 MG tablet Take 1 tablet by mouth Daily. 8/11/21  Yes Fernanda Jackson MD   nitroglycerin (NITROSTAT) 0.4 MG SL tablet Place 1 tablet under the tongue Every 5 (Five) Minutes As Needed for Chest Pain. Take no more than 3 doses in 15 minutes. 2/23/21  Yes Fernanda Jackson MD   pravastatin (PRAVACHOL) 20 MG tablet Take 1 tablet by mouth every night at bedtime. 8/11/21  Yes Fernanda Jackson MD   spironolactone (ALDACTONE) 25 MG tablet Take 1 tablet by mouth Daily. 8/11/21  Yes Fernanda Jackson MD   HYDROcodone-acetaminophen (NORCO) 7.5-325 MG per tablet 1 tablet 5 (Five) Times a Day. 4/30/19  9/6/21 Yes Provider, Historical, MD       Allergies:  No Known Allergies    Objective   Objective     Vital Signs  Temp:  [97.6 °F (36.4 °C)] 97.6 °F (36.4 °C)  Heart Rate:  [60-73] 68  Resp:  [21] 21  BP: (101-124)/(52-76) 102/52  SpO2:  [94 %-98 %] 94 %  on   ;   Device (Oxygen Therapy): room air  Body mass index is 33.77 kg/m².    Physical Exam  Vitals and nursing note reviewed.   Constitutional:       Appearance: Normal appearance. He is not ill-appearing.   HENT:      Head: Normocephalic and atraumatic.      Right Ear: External ear normal.      Left Ear: External ear normal.      Nose: Nose normal. No congestion or rhinorrhea.      Mouth/Throat:      Mouth: Mucous membranes are moist.   Eyes:      General: No scleral icterus.        Right eye: No discharge.         Left eye: No discharge.      Extraocular Movements: Extraocular movements intact.      Conjunctiva/sclera: Conjunctivae normal.      Pupils: Pupils are equal, round, and reactive to light.   Neck:      Vascular: No carotid bruit.   Cardiovascular:      Rate and Rhythm: Normal rate and regular rhythm.      Pulses: Normal pulses.      Heart sounds: Murmur heard.     Pulmonary:      Effort: Pulmonary effort is normal.      Breath sounds: Normal breath sounds.   Abdominal:      General: Bowel sounds are normal. There is no distension.      Palpations: Abdomen is soft.   Musculoskeletal:         General: Normal range of motion.      Cervical back: Normal range of motion and neck supple.      Right lower leg: No edema.      Left lower leg: No edema.   Skin:     General: Skin is warm and dry.      Capillary Refill: Capillary refill takes less than 2 seconds.   Neurological:      General: No focal deficit present.      Mental Status: He is alert and oriented to person, place, and time.   Psychiatric:         Mood and Affect: Mood normal.         Behavior: Behavior normal.         Thought Content: Thought content normal.         Judgment: Judgment normal.            Results Review:  I have personally reviewed most recent cardiac tracings, lab results and radiology images and interpretations and agree with findings.    Results from last 7 days   Lab Units 09/06/21  1410   WBC 10*3/mm3 9.10   HEMOGLOBIN g/dL 16.8   HEMATOCRIT % 47.8   PLATELETS 10*3/mm3 170     Results from last 7 days   Lab Units 09/06/21  1410   SODIUM mmol/L 139   POTASSIUM mmol/L 4.2   CHLORIDE mmol/L 102   CO2 mmol/L 26.0   BUN mg/dL 16   CREATININE mg/dL 0.84   GLUCOSE mg/dL 95   CALCIUM mg/dL 9.1   ALT (SGPT) U/L 12   AST (SGOT) U/L 20   TROPONIN T ng/mL <0.010     Estimated Creatinine Clearance: 130.3 mL/min (by C-G formula based on SCr of 0.84 mg/dL).  Brief Urine Lab Results     None          Microbiology Results (last 10 days)     Procedure Component Value - Date/Time    COVID PRE-OP / PRE-PROCEDURE SCREENING ORDER (NO ISOLATION) - Swab, Nasopharynx [[id number]]  (Normal) Collected: 09/06/21 1544    Lab Status: Final result Specimen: Swab from Nasopharynx Updated: 09/06/21 1642    Narrative:      The following orders were created for panel order COVID PRE-OP / PRE-PROCEDURE SCREENING ORDER (NO ISOLATION) - Swab, Nasopharynx.  Procedure                               Abnormality         Status                     ---------                               -----------         ------                     COVID-19,CEPHEID/LANCE/BD...[[id number]]  Normal              Final result                 Please view results for these tests on the individual orders.    COVID-19,CEPHEID/LANCE/BDMAX,COR/NATALIA/PAD/LAURA IN-HOUSE(OR EMERGENT/ADD-ON),NP SWAB IN TRANSPORT MEDIA 3-4 HR TAT, RT-PCR - Swab, Nasopharynx [[id number]]  (Normal) Collected: 09/06/21 1544    Lab Status: Final result Specimen: Swab from Nasopharynx Updated: 09/06/21 1642     COVID19 Not Detected    Narrative:      Fact sheet for providers: https://www.fda.gov/media/879355/download     Fact sheet for patients:  https://www.fda.gov/media/989776/download  Fact sheet for providers: https://www.fda.gov/media/152138/download    Fact sheet for patients: https://www.fda.gov/media/324351/download    Test performed by PCR.          ECG/EMG Results (most recent)     Procedure Component Value Units Date/Time    ECG 12 Lead [[id number]] Collected: 09/06/21 1310     Updated: 09/06/21 1408     QT Interval 404 ms     Narrative:      HEART RATE= 68  bpm  RR Interval= 884  ms  TN Interval= 183  ms  P Horizontal Axis=   deg  P Front Axis= 25  deg  QRSD Interval= 103  ms  QT Interval= 404  ms  QRS Axis= -8  deg  T Wave Axis= 146  deg  - ABNORMAL ECG -  Sinus rhythm  Low voltage, precordial leads  Abnormal T, consider ischemia, lateral leads  Electronically Signed By:   Date and Time of Study: 2021-09-06 13:10:51              Results for orders placed during the hospital encounter of 08/24/21    Adult Transthoracic Echo Complete W/ Cont if Necessary Per Protocol    Interpretation Summary  · The left ventricular cavity is severely dilated.  · Estimated left ventricular EF = 25% Estimated left ventricular EF was in agreement with the calculated left ventricular EF. Left ventricular systolic function is severely decreased.  · The right ventricular cavity is mildly dilated.  · Moderate to severe mitral valve regurgitation is present.  · Estimated right ventricular systolic pressure from tricuspid regurgitation is mildly elevated (35-45 mmHg).  · Mild pulmonary hypertension is present.  · The following left ventricular wall segments are hypokinetic: mid anterior, apical anterior, basal anterolateral, basal inferolateral, apical septal, basal inferoseptal, mid inferoseptal, mid anteroseptal, basal anterior, basal inferior and basal inferoseptal. The following left ventricular wall segments are akinetic: mid anterolateral, apical lateral, mid inferolateral, apical inferior, mid inferior and apex.      XR Chest 1 View    Result Date: 9/6/2021  Stable  exam, with no evidence of active disease.  Electronically Signed By-Jesi Young MD On:9/6/2021 2:26 PM This report was finalized on [id number] by  Jesi Young MD.        Estimated Creatinine Clearance: 130.3 mL/min (by C-G formula based on SCr of 0.84 mg/dL).    Assessment/Plan   Assessment/Plan       Active Hospital Problems    Diagnosis  POA   • Near syncope [R55]  Yes      Resolved Hospital Problems   No resolved problems to display.     Near syncope, uncertain etiology--consider orthostatic secondary to decrease cardia reserve; consider arrhythmia: check orthostatic VS; cariology consult; serial troponin      CAD, chronic: continue Aspirin; continue carvedilol     HFrEF, chronic: hold lasix; continue Aldactone     Chronic pain: continue Neurontin; continue hydrocodone     Hypertension, chronic: continue losartan; continue Coreg     HLD, chronic: continue statin       VTE Prophylaxis -   Mechanical Order History:     None      Pharmalogical Order History:     None          CODE STATUS:    There are no questions and answers to display.       This patient has been examined wearing personal protective equipment.     I discussed the patient's findings and my recommendations with patient, family and nursing staff.      Signature:Electronically signed by ANNIA Broussard, 09/08/21, 5:14 AM EDT.

## 2021-09-06 NOTE — ED PROVIDER NOTES
Subjective   History of Present Illness  Near syncope  40-year-old male describes vague sensation of near syncope over the last several days.  He states he feels like he is not getting oxygen to his brain he feels like his heart stops beating and he then gets a rush of blood back to his brain.  He states he feels near syncopal at that point he denies palpitations or chest pains or shortness of breath.  He reports no relieving or exacerbating factors.  He does have a history of cardiomyopathy  Review of Systems   Constitutional: Negative.    HENT: Negative.    Eyes: Negative.    Respiratory: Negative.    Cardiovascular: Negative.    Gastrointestinal: Negative.    Genitourinary: Negative.    Musculoskeletal: Negative.    Skin: Negative.    Neurological: Positive for light-headedness.   Psychiatric/Behavioral: Negative.        Past Medical History:   Diagnosis Date   • Arrhythmia    • Cardiomyopathy (CMS/HCC)    • Diverticulitis    • Hyperlipidemia    • Palpitations        No Known Allergies    Past Surgical History:   Procedure Laterality Date   • CARDIAC CATHETERIZATION     • CARDIAC CATHETERIZATION N/A 6/12/2020    Procedure: Left Heart Cath;  Surgeon: Fernanda Jackson MD;  Location: [facility] CATH INVASIVE LOCATION;  Service: Cardiovascular;  Laterality: N/A;   • CARDIAC CATHETERIZATION N/A 6/12/2020    Procedure: Coronary angiography;  Surgeon: Fernanda Jackson MD;  Location: [facility] CATH INVASIVE LOCATION;  Service: Cardiovascular;  Laterality: N/A;   • CARDIAC DEFIBRILLATOR PLACEMENT     • COLON RESECTION     • INSERT / REPLACE / REMOVE PACEMAKER      ICD   • KNEE ACL RECONSTRUCTION         Family History   Problem Relation Age of Onset   • Heart disease Father    • Hypertension Mother        Social History     Socioeconomic History   • Marital status: Single     Spouse name: Not on file   • Number of children: Not on file   • Years of education: Not on file   • Highest education level: Not on file  "  Tobacco Use   • Smoking status: Current Some Day Smoker     Packs/day: 1.50     Types: Cigarettes   • Smokeless tobacco: Former User     Types: Snuff   • Tobacco comment: QUIT SMOKING 5/20   Vaping Use   • Vaping Use: Never used   Substance and Sexual Activity   • Alcohol use: No   • Drug use: Yes     Types: Marijuana   • Sexual activity: Yes     Partners: Female     Prior to Admission medications    Medication Sig Start Date End Date Taking? Authorizing Provider   aspirin 81 MG chewable tablet Chew 81 mg Daily.    Bella Dempsey MD   carvedilol (COREG) 12.5 MG tablet Take 1 tablet by mouth 2 (Two) Times a Day With Meals. 8/11/21   Fernanda Jackson MD   Dapagliflozin Propanediol (Farxiga) 10 MG tablet Take 10 mg by mouth Daily. 8/11/21   Fernanda Jackson MD   furosemide (LASIX) 40 MG tablet Take 1 tablet by mouth Daily. 8/12/20   Fernanda Jackson MD   gabapentin (NEURONTIN) 300 MG capsule Take 300 mg by mouth 3 (Three) Times a Day. 8/22/19   Bella Dempsey MD   HYDROcodone-acetaminophen (NORCO) 7.5-325 MG per tablet 1 tablet 5 (Five) Times a Day. 4/30/19   Bella Dempsey MD   losartan (COZAAR) 25 MG tablet Take 1 tablet by mouth Daily. 8/11/21   Fernanda Jackson MD   nitroglycerin (NITROSTAT) 0.4 MG SL tablet Place 1 tablet under the tongue Every 5 (Five) Minutes As Needed for Chest Pain. Take no more than 3 doses in 15 minutes. 2/23/21   Fernanda Jackson MD   pravastatin (PRAVACHOL) 20 MG tablet Take 1 tablet by mouth every night at bedtime. 8/11/21   Fernanda Jackson MD   spironolactone (ALDACTONE) 25 MG tablet Take 1 tablet by mouth Daily. 8/11/21   Fernanda Jackson MD     /70 (BP Location: Right arm, Patient Position: Sitting)   Pulse 73   Temp 97.6 °F (36.4 °C) (Temporal)   Resp 21   Ht 170.2 cm (67\")   Wt 97.8 kg (215 lb 9.8 oz)   SpO2 97%   BMI 33.77 kg/m²   I examined the patient using the appropriate personal protective " equipment.            Objective   Physical Exam  General: Well-developed well-appearing, no acute distress, alert and appropriate  Eyes: Pupils round and reactive, sclera nonicteric  HEENT: Mucous membranes moist, no mucosal swelling  Neck: Supple, no nuchal rigidity, no soft tissue swelling  Respirations: Respirations nonlabored, equal breath sounds bilaterally, clear lungs  Heart regular rate and rhythm, no murmurs rubs or gallops,   Abdomen soft nontender nondistended, no hepatosplenomegaly, no hernia, no mass, normal bowel sounds, no CVA tenderness  Extremities no clubbing cyanosis or edema, calves are symmetric and nontender  Neuro cranial nerves grossly intact, no focal limb deficits  Psych oriented, pleasant affect  Skin no rash, brisk cap refill  Procedures           ED Course         My EKG interpretation sinus rhythm, nonspecific T wave abnormalities, rate of 68       Results for orders placed or performed during the hospital encounter of 09/06/21   Comprehensive Metabolic Panel    Specimen: Blood   Result Value Ref Range    Glucose 95 65 - 99 mg/dL    BUN 16 6 - 20 mg/dL    Creatinine 0.84 0.76 - 1.27 mg/dL    Sodium 139 136 - 145 mmol/L    Potassium 4.2 3.5 - 5.2 mmol/L    Chloride 102 98 - 107 mmol/L    CO2 26.0 22.0 - 29.0 mmol/L    Calcium 9.1 8.6 - 10.5 mg/dL    Total Protein 6.6 6.0 - 8.5 g/dL    Albumin 4.40 3.50 - 5.20 g/dL    ALT (SGPT) 12 1 - 41 U/L    AST (SGOT) 20 1 - 40 U/L    Alkaline Phosphatase 82 39 - 117 U/L    Total Bilirubin 0.5 0.0 - 1.2 mg/dL    eGFR Non African Amer 101 >60 mL/min/1.73    Globulin 2.2 gm/dL    A/G Ratio 2.0 g/dL    BUN/Creatinine Ratio 19.0 7.0 - 25.0    Anion Gap 11.0 5.0 - 15.0 mmol/L   Troponin    Specimen: Blood   Result Value Ref Range    Troponin T <0.010 0.000 - 0.030 ng/mL   CBC Auto Differential    Specimen: Blood   Result Value Ref Range    WBC 9.10 3.40 - 10.80 10*3/mm3    RBC 5.06 4.14 - 5.80 10*6/mm3    Hemoglobin 16.8 13.0 - 17.7 g/dL    Hematocrit  47.8 37.5 - 51.0 %    MCV 94.4 79.0 - 97.0 fL    MCH 33.1 (H) 26.6 - 33.0 pg    MCHC 35.1 31.5 - 35.7 g/dL    RDW 13.0 12.3 - 15.4 %    RDW-SD 42.4 37.0 - 54.0 fl    MPV 8.3 6.0 - 12.0 fL    Platelets 170 140 - 450 10*3/mm3    Neutrophil % 73.4 42.7 - 76.0 %    Lymphocyte % 17.3 (L) 19.6 - 45.3 %    Monocyte % 7.8 5.0 - 12.0 %    Eosinophil % 1.1 0.3 - 6.2 %    Basophil % 0.4 0.0 - 1.5 %    Neutrophils, Absolute 6.70 1.70 - 7.00 10*3/mm3    Lymphocytes, Absolute 1.60 0.70 - 3.10 10*3/mm3    Monocytes, Absolute 0.70 0.10 - 0.90 10*3/mm3    Eosinophils, Absolute 0.10 0.00 - 0.40 10*3/mm3    Basophils, Absolute 0.00 0.00 - 0.20 10*3/mm3    nRBC 0.1 0.0 - 0.2 /100 WBC   ECG 12 Lead   Result Value Ref Range    QT Interval 404 ms   Green Top (Gel)   Result Value Ref Range    Extra Tube Hold for add-ons.    Lavender Top   Result Value Ref Range    Extra Tube hold for add-on    Gold Top - SST   Result Value Ref Range    Extra Tube Hold for add-ons.    Light Blue Top   Result Value Ref Range    Extra Tube hold for add-on    Green Top (Gel)   Result Value Ref Range    Extra Tube Hold for add-ons.      XR Chest 1 View    Result Date: 9/6/2021  Stable exam, with no evidence of active disease.  Electronically Signed By-Jesi Young MD On:9/6/2021 2:26 PM This report was finalized on [id number] by  Jesi Young MD.                               MDM  Patient presents with some near syncopal type of events over the last few days with a history of cardiomyopathy.  He is in no distress during the emergency room course he has normal neurologic exam.  EKG shows sinus rhythm is stable on the monitor.  Initial troponin is normal.  Patient will be placed in hospital observation for further cardiac monitoring and cardiology consultation.  Final diagnoses:   Near syncope       ED Disposition  ED Disposition     ED Disposition Condition Comment    Decision to Admit            No follow-up provider specified.       Medication List       No changes were made to your prescriptions during this visit.          Ricky Celis MD  09/06/21 [id number]

## 2021-09-08 LAB — QT INTERVAL: 404 MS

## 2021-09-22 ENCOUNTER — OFFICE VISIT (OUTPATIENT)
Dept: CARDIOLOGY | Facility: CLINIC | Age: 41
End: 2021-09-22

## 2021-09-22 VITALS
WEIGHT: 215 LBS | OXYGEN SATURATION: 96 % | BODY MASS INDEX: 33.67 KG/M2 | SYSTOLIC BLOOD PRESSURE: 113 MMHG | DIASTOLIC BLOOD PRESSURE: 82 MMHG | HEART RATE: 76 BPM

## 2021-09-22 DIAGNOSIS — I42.0 DILATED CARDIOMYOPATHY (HCC): Primary | ICD-10-CM

## 2021-09-22 DIAGNOSIS — I42.0 DILATED CARDIOMYOPATHY (HCC): ICD-10-CM

## 2021-09-22 DIAGNOSIS — I34.0 NONRHEUMATIC MITRAL VALVE REGURGITATION: ICD-10-CM

## 2021-09-22 DIAGNOSIS — I10 ESSENTIAL HYPERTENSION: ICD-10-CM

## 2021-09-22 DIAGNOSIS — E78.2 MIXED HYPERLIPIDEMIA: ICD-10-CM

## 2021-09-22 DIAGNOSIS — R00.2 PALPITATIONS: ICD-10-CM

## 2021-09-22 PROCEDURE — 99214 OFFICE O/P EST MOD 30 MIN: CPT | Performed by: INTERNAL MEDICINE

## 2021-09-22 NOTE — PROGRESS NOTES
Cardiology Office Visit      Encounter Date:  09/22/2021    Patient ID:   Brian Stewart is a 40 y.o. male.    Reason For Consultation:  Congestive heart failure  Cardiomyopathy    History of Present Illness:  Dear Santana Garcia MD    I had the pleasure of seeing Brian Stewart today. As you are well aware, this is a 40 y.o. male with past medical history that is significant for nonischemic cardiomyopathy severe cardiomyopathy requiring AICD with some improvement of LV systolic function was stable until last year now he is having worsening symptoms of shortness of breath dyspnea on exertion weight gain    Denies any new cardiac symptoms  No dizziness or syncope  Compliant with medical therapy  Multiple recent ER visits for multiple nonspecific complaints      Impressions:  Normal coronaries  Severe cardiomyopathy  Severe LV dysfunction  Elevated left-sided filling pressures  Estimated LV ejection fraction of 15%      Interpretation Summary    · The left ventricular cavity is severely dilated.  · Estimated left ventricular EF = 25% Estimated left ventricular EF was in agreement with the calculated left ventricular EF. Left ventricular systolic function is severely decreased.  · The following left ventricular wall segments are hypokinetic: mid anterior, apical anterior, basal anterolateral, basal inferolateral, basal inferoseptal, mid anteroseptal, basal anterior, basal inferior and basal inferoseptal. The following left ventricular wall segments are akinetic: mid anterolateral, apical lateral, mid inferolateral, apical inferior, mid inferior, apical septal, mid inferoseptal and apex.  · Moderate mitral valve regurgitation is present.  · Left ventricular diastolic function is consistent with (grade I) impaired relaxation.  · Estimated right ventricular systolic pressure from tricuspid regurgitation is normal (<35 mmHg).           Assessment & Plan    Impressions:  chronic systolic heart  failure  Congestive heart failure NYHA class III  Cardiomyopathy most likely nonischemic  Severe mitral regurgitation nonrheumatic/no moderate  Hypertension  Obesity  Obstructive sleep apnea  Status post ICD with nonsustained ventricular tachycardia  Noncompliance with medical therapy  Nonischemic cardiomyopathy with LV ejection fraction of 25%    Recommendations:  Continue current medical therapy  Farxiga 10 mg p.o. once a day  Use Lasix as needed  Continue spironolactone to 25 mg p.o. once a day  Continue losartan 25 mg p.o. once a day  Risk benefits and alternatives and need for close monitoring and follow-up reviewed and discussed with the patient  Recent labs during the ER visits reviewed and discussed with the patient  Continue current medical management  Low-salt diet  Recommended follow-up with the EP for the ICD check  Follow-up in office in 3 months    Objective:    Vitals:  Vitals:    09/22/21 1015   BP: 113/82   Pulse: 76   SpO2: 96%   Weight: 97.5 kg (215 lb)       Physical Exam:    General: Alert, cooperative, no distress, appears stated age  Head:  Normocephalic, atraumatic, mucous membranes moist  Eyes:  Conjunctiva/corneas clear, EOM's intact     Neck:  Supple,  no adenopathy;      Lungs: Clear to auscultation bilaterally, no wheezes rhonchi rales are noted  Chest wall: No tenderness  Heart::  Regular rate and rhythm, S1 and S2 normal, displaced LV apical impulse  Abdomen: Soft, non-tender, nondistended bowel sounds active  Extremities: No cyanosis, clubbing, or edema  Pulses: 2+ and symmetric all extremities  Skin:  No rashes or lesions  Neuro/psych: A&O x3. CN II through XII are grossly intact with appropriate affect      Allergies:  No Known Allergies    Medication Review:     Current Outpatient Medications:   •  aspirin 81 MG chewable tablet, Chew 81 mg Daily., Disp: , Rfl:   •  carvedilol (COREG) 12.5 MG tablet, Take 1 tablet by mouth 2 (Two) Times a Day With Meals., Disp: 180 tablet, Rfl: 2  •   Dapagliflozin Propanediol (Farxiga) 10 MG tablet, Take 10 mg by mouth Daily., Disp: 90 tablet, Rfl: 2  •  furosemide (LASIX) 40 MG tablet, Take 40 mg by mouth Daily As Needed., Disp: , Rfl: 0  •  gabapentin (NEURONTIN) 300 MG capsule, Take 300 mg by mouth 3 (Three) Times a Day., Disp: , Rfl: 0  •  HYDROcodone-acetaminophen (NORCO)  MG per tablet, Take 1 tablet by mouth 2 (Two) Times a Day As Needed for Moderate Pain ., Disp: , Rfl:   •  losartan (COZAAR) 25 MG tablet, Take 1 tablet by mouth Daily., Disp: 90 tablet, Rfl: 2  •  nitroglycerin (NITROSTAT) 0.4 MG SL tablet, Place 1 tablet under the tongue Every 5 (Five) Minutes As Needed for Chest Pain. Take no more than 3 doses in 15 minutes., Disp: 30 tablet, Rfl: 2  •  pravastatin (PRAVACHOL) 20 MG tablet, Take 1 tablet by mouth every night at bedtime., Disp: 90 tablet, Rfl: 2  •  spironolactone (ALDACTONE) 25 MG tablet, Take 1 tablet by mouth Daily., Disp: 90 tablet, Rfl: 2    Family History:  Family History   Problem Relation Age of Onset   • Heart disease Father    • Hypertension Mother        Past Medical History:  Past Medical History:   Diagnosis Date   • Arrhythmia    • Cardiomyopathy (CMS/HCC)    • Diverticulitis    • Hyperlipidemia    • Palpitations        Past surgical History:  Past Surgical History:   Procedure Laterality Date   • CARDIAC CATHETERIZATION     • CARDIAC CATHETERIZATION N/A 6/12/2020    Procedure: Left Heart Cath;  Surgeon: Fernanda Jackson MD;  Location: [facility] CATH INVASIVE LOCATION;  Service: Cardiovascular;  Laterality: N/A;   • CARDIAC CATHETERIZATION N/A 6/12/2020    Procedure: Coronary angiography;  Surgeon: Fernanda Jackson MD;  Location: [facility] CATH INVASIVE LOCATION;  Service: Cardiovascular;  Laterality: N/A;   • CARDIAC DEFIBRILLATOR PLACEMENT     • COLON RESECTION     • INSERT / REPLACE / REMOVE PACEMAKER      ICD   • KNEE ACL RECONSTRUCTION         Social History:  Social History     Socioeconomic History    • Marital status: Single     Spouse name: Not on file   • Number of children: Not on file   • Years of education: Not on file   • Highest education level: Not on file   Tobacco Use   • Smoking status: Current Some Day Smoker     Packs/day: 1.50     Types: Cigarettes   • Smokeless tobacco: Former User     Types: Snuff   • Tobacco comment: QUIT SMOKING 5/20   Vaping Use   • Vaping Use: Never used   Substance and Sexual Activity   • Alcohol use: No   • Drug use: Yes     Types: Marijuana   • Sexual activity: Yes     Partners: Female       Review of Systems:  The following systems were reviewed as they relate to the cardiovascular system: Constitutional, Eyes, ENT, Cardiovascular, Respiratory, Gastrointestinal, Integumentary, Neurological, Psychiatric, Hematologic, Endocrine, Musculoskeletal, and Genitourinary. The pertinent cardiovascular findings are reported above with all other cardiovascular points within those systems being negative.    Diagnostic Study Review:     Current Electrocardiogram:  Procedures      NOTE: The following portions of the patient's history were reviewed and updated this visit as appropriate: allergies, current medications, past family history, past medical history, past social history, past surgical history and problem list.

## 2021-09-24 ENCOUNTER — OFFICE VISIT (OUTPATIENT)
Dept: CARDIOLOGY | Facility: CLINIC | Age: 41
End: 2021-09-24

## 2021-09-24 VITALS
SYSTOLIC BLOOD PRESSURE: 113 MMHG | WEIGHT: 214 LBS | HEART RATE: 75 BPM | BODY MASS INDEX: 33.59 KG/M2 | HEIGHT: 67 IN | DIASTOLIC BLOOD PRESSURE: 75 MMHG | OXYGEN SATURATION: 97 %

## 2021-09-24 DIAGNOSIS — I42.0 DILATED CARDIOMYOPATHY (HCC): Primary | ICD-10-CM

## 2021-09-24 DIAGNOSIS — Z95.810 AICD (AUTOMATIC CARDIOVERTER/DEFIBRILLATOR) PRESENT: ICD-10-CM

## 2021-09-24 DIAGNOSIS — I10 ESSENTIAL HYPERTENSION: ICD-10-CM

## 2021-09-24 DIAGNOSIS — I34.0 NONRHEUMATIC MITRAL VALVE REGURGITATION: ICD-10-CM

## 2021-09-24 DIAGNOSIS — E78.2 MIXED HYPERLIPIDEMIA: ICD-10-CM

## 2021-09-24 PROCEDURE — 93000 ELECTROCARDIOGRAM COMPLETE: CPT | Performed by: INTERNAL MEDICINE

## 2021-09-24 PROCEDURE — 99204 OFFICE O/P NEW MOD 45 MIN: CPT | Performed by: INTERNAL MEDICINE

## 2021-09-24 NOTE — PROGRESS NOTES
CC--- cardiomyopathy and ICD in situ      Sub--40-year-old male patient has nonischemic cardiomyopathy and has a single-chamber ICD which was placed several years ago.  He has a [employer] Riata lead which was later changed to a Durata.  Patient ejection fraction has been as low as 14% has improved to 50% and currently estimated around 25%.  He had a catheter cardiac catheterization showing normal coronaries with EF around 50% with elevated left-sided filling pressures.  He is on optimal medical management and has come to establish practice with our EP clinic.  Patient functional class is class II and denies any PND or orthopnea and denies any chest pain or syncope.  He has periodic symptoms of sudden numbness in his extremities and in his lower lip lasting for few seconds with resolution which has happened yesterday.  ICD interrogation during this time did not reveal any arrhythmias.  He had prior ER visits which were inconclusive  He does smoke a pack a day and does marijuana on a daily basis and denies any alcohol abuse  He has lost 90 pounds by following low-carb diet          Past Medical History:   Diagnosis Date   • Arrhythmia    • Cardiomyopathy (CMS/HCC)    • Diverticulitis    • Hyperlipidemia    • Palpitations      Past Surgical History:   Procedure Laterality Date   • CARDIAC CATHETERIZATION     • CARDIAC CATHETERIZATION N/A 6/12/2020    Procedure: Left Heart Cath;  Surgeon: Fernanda Jackson MD;  Location:  [facility] CATH INVASIVE LOCATION;  Service: Cardiovascular;  Laterality: N/A;   • CARDIAC CATHETERIZATION N/A 6/12/2020    Procedure: Coronary angiography;  Surgeon: Fernanda Jackson MD;  Location:  [facility] CATH INVASIVE LOCATION;  Service: Cardiovascular;  Laterality: N/A;   • CARDIAC DEFIBRILLATOR PLACEMENT     • COLON RESECTION     • INSERT / REPLACE / REMOVE PACEMAKER      ICD   • KNEE ACL RECONSTRUCTION       Family History   Problem Relation Age of Onset   • Heart disease Father    •  Hypertension Mother        Review of Systems   General:  positive for fatigue and tiredness  Eyes: No redness  Cardiovascular: No chest pain, no palpitations  Respiratory:   positive for class 2 shortness of breath  Gastrointestinal: No nausea or vomiting, bleeding  Genitourinary: no hematuria or dysuria  Musculoskeletal: No arthralgia or myalgia  Skin: No rash    Hematologic/Lymphatic: No abnormal bleeding      Physical Exam  VITALS REVIEWED    General:      well developed, well nourished, in no acute distress.    Head:      normocephalic and atraumatic.    Eyes:      PERRL/EOM intact, conjunctiva and sclera clear with out nystagmus.    Neck:      no masses, thyromegaly,  trachea central with normal respiratory effort and PMI displaced laterally  Lungs:      clear bilaterally to auscultation.    Heart:  Rhythm is sinus without any murmurs gallops or rubs        Assessment and plan      Nonischemic dilated cardiomyopathy with EF around 25%  Well compensated and functional class II  Chronic class II systolic heart failure  Moderate to severe mitral regurgitation  Single-chamber ICD in situ with normal function without any evidence of arrhythmias  Prior history of obesity with significant reduction of weight from low-carb diet and current BMI of 33.51  Atypical symptoms could be related to daily usage of marijuana  Patient educated about complete smoking cessation and cessation of marijuana  Reevaluate this patient in 3 months  Medications reviewed    If he continues to have the symptoms despite stopping smoking and stopping marijuana, ARDEN is reasonable to evaluate mitral regurgitation exclude PFO      ECG 12 Lead    Date/Time: 9/24/2021 9:22 AM  Performed by: Aneesh Rosenberg MD  Authorized by: Aneesh Rosenberg MD   Comparison: compared with previous ECG   Similar to previous ECG  Rhythm: sinus rhythm  Rate: normal  Other findings: non-specific ST-T wave changes and low voltage          Electronically signed by  Aneesh Rosenberg MD, 09/24/21, 9:22 AM EDT.

## 2021-10-11 RX ORDER — NITROGLYCERIN 0.4 MG/1
TABLET SUBLINGUAL
Qty: 25 TABLET | OUTPATIENT
Start: 2021-10-11

## 2021-10-22 RX ORDER — SPIRONOLACTONE 25 MG/1
25 TABLET ORAL DAILY
Qty: 90 TABLET | Refills: 2 | Status: SHIPPED | OUTPATIENT
Start: 2021-10-22 | End: 2022-01-13 | Stop reason: SDUPTHER

## 2021-10-22 RX ORDER — CARVEDILOL 12.5 MG/1
TABLET ORAL
Qty: 180 TABLET | Refills: 2 | Status: SHIPPED | OUTPATIENT
Start: 2021-10-22 | End: 2022-01-13 | Stop reason: SDUPTHER

## 2021-12-23 PROCEDURE — 93295 DEV INTERROG REMOTE 1/2/MLT: CPT | Performed by: INTERNAL MEDICINE

## 2021-12-23 PROCEDURE — 93296 REM INTERROG EVL PM/IDS: CPT | Performed by: INTERNAL MEDICINE

## 2022-01-03 ENCOUNTER — OFFICE VISIT (OUTPATIENT)
Dept: CARDIOLOGY | Facility: CLINIC | Age: 42
End: 2022-01-03

## 2022-01-03 VITALS
HEIGHT: 67 IN | BODY MASS INDEX: 36.26 KG/M2 | OXYGEN SATURATION: 99 % | WEIGHT: 231 LBS | SYSTOLIC BLOOD PRESSURE: 125 MMHG | HEART RATE: 59 BPM | DIASTOLIC BLOOD PRESSURE: 79 MMHG

## 2022-01-03 DIAGNOSIS — Z95.810 AICD (AUTOMATIC CARDIOVERTER/DEFIBRILLATOR) PRESENT: ICD-10-CM

## 2022-01-03 DIAGNOSIS — I10 ESSENTIAL HYPERTENSION: ICD-10-CM

## 2022-01-03 DIAGNOSIS — I34.0 NONRHEUMATIC MITRAL VALVE REGURGITATION: ICD-10-CM

## 2022-01-03 DIAGNOSIS — I42.0 DILATED CARDIOMYOPATHY: Primary | ICD-10-CM

## 2022-01-03 PROBLEM — I50.23 ACUTE ON CHRONIC SYSTOLIC CONGESTIVE HEART FAILURE: Status: RESOLVED | Noted: 2020-06-05 | Resolved: 2022-01-03

## 2022-01-03 PROCEDURE — 99214 OFFICE O/P EST MOD 30 MIN: CPT | Performed by: INTERNAL MEDICINE

## 2022-01-03 PROCEDURE — 93000 ELECTROCARDIOGRAM COMPLETE: CPT | Performed by: INTERNAL MEDICINE

## 2022-01-03 NOTE — PROGRESS NOTES
CC--- cardiomyopathy and ICD in situ      Sub--41-year-old male patient has nonischemic cardiomyopathy and has a single-chamber ICD which was placed several years ago.  He has a [employer] Riata lead which was later changed to a Durata.  Patient ejection fraction has been as low as 14% has improved to 50% and currently estimated around 25%.  He had a catheter cardiac catheterization showing normal coronaries with EF around 50% with elevated left-sided filling pressures.  He is on optimal medical management and has come to establish practice with our EP clinic.  Patient functional class is class II and denies any PND or orthopnea and denies any chest pain or syncope.  He has periodic symptoms of sudden numbness in his extremities and in his lower lip lasting for few seconds with resolution which has happened yesterday.  ICD interrogation during this time did not reveal any arrhythmias.  He had prior ER visits which were inconclusive  He  quit smoking  He has lost 90 pounds by following low-carb diet  feels well          Past Medical History:   Diagnosis Date   • Arrhythmia    • Cardiomyopathy (CMS/HCC)    • Diverticulitis    • Hyperlipidemia    • Palpitations      Past Surgical History:   Procedure Laterality Date   • CARDIAC CATHETERIZATION     • CARDIAC CATHETERIZATION N/A 6/12/2020    Procedure: Left Heart Cath;  Surgeon: Fernanda Jackson MD;  Location:  [facility] CATH INVASIVE LOCATION;  Service: Cardiovascular;  Laterality: N/A;   • CARDIAC CATHETERIZATION N/A 6/12/2020    Procedure: Coronary angiography;  Surgeon: Fernanda Jackson MD;  Location:  [facility] CATH INVASIVE LOCATION;  Service: Cardiovascular;  Laterality: N/A;   • CARDIAC DEFIBRILLATOR PLACEMENT     • COLON RESECTION     • INSERT / REPLACE / REMOVE PACEMAKER      ICD   • KNEE ACL RECONSTRUCTION       Family History   Problem Relation Age of Onset   • Heart disease Father    • Hypertension Mother        Review of Systems   General:  positive  for fatigue and tiredness  Eyes: No redness  Cardiovascular: No chest pain, no palpitations  Respiratory:   positive for class 2 shortness of breath      Physical Exam  VITALS REVIEWED    General:      well developed, well nourished, in no acute distress.    Head:      normocephalic and atraumatic.    Eyes:      PERRL/EOM intact, conjunctiva and sclera clear with out nystagmus.    Neck:      no masses, thyromegaly,  trachea central with normal respiratory effort and PMI displaced laterally  Lungs:      clear bilaterally to auscultation.    Heart:  Rhythm is sinus without any murmurs gallops or rubs        Assessment and plan      Nonischemic dilated cardiomyopathy with EF around 25%  Well compensated and functional class II  Chronic class II systolic heart failure  Moderate to severe mitral regurgitation  Single-chamber ICD in situ with normal function without any evidence of arrhythmias  Prior history of obesity with significant reduction of weight from low-carb diet and current BMI of 36.18  Reevaluate this patient in 6 months  Medications reviewed  Check cmp and cbc      ECG 12 Lead    Date/Time: 1/3/2022 9:13 AM  Performed by: Aneesh Rosenberg MD  Authorized by: Aneesh Rosenberg MD   Comparison: compared with previous ECG   Similar to previous ECG  Rhythm: sinus rhythm  Rate: normal  Conduction: conduction normal  QRS axis: normal  Other findings: non-specific ST-T wave changes          Electronically signed by Aneesh Rosenberg MD, 01/03/22, 9:13 AM EST.

## 2022-01-13 ENCOUNTER — OFFICE VISIT (OUTPATIENT)
Dept: CARDIOLOGY | Facility: CLINIC | Age: 42
End: 2022-01-13

## 2022-01-13 VITALS
OXYGEN SATURATION: 98 % | DIASTOLIC BLOOD PRESSURE: 90 MMHG | SYSTOLIC BLOOD PRESSURE: 128 MMHG | BODY MASS INDEX: 36.26 KG/M2 | HEIGHT: 67 IN | HEART RATE: 73 BPM | WEIGHT: 231 LBS | RESPIRATION RATE: 18 BRPM

## 2022-01-13 DIAGNOSIS — I34.0 NONRHEUMATIC MITRAL VALVE REGURGITATION: ICD-10-CM

## 2022-01-13 DIAGNOSIS — E78.2 MIXED HYPERLIPIDEMIA: ICD-10-CM

## 2022-01-13 DIAGNOSIS — I10 ESSENTIAL HYPERTENSION: ICD-10-CM

## 2022-01-13 DIAGNOSIS — I42.0 DILATED CARDIOMYOPATHY: Primary | ICD-10-CM

## 2022-01-13 DIAGNOSIS — Z95.810 AICD (AUTOMATIC CARDIOVERTER/DEFIBRILLATOR) PRESENT: ICD-10-CM

## 2022-01-13 PROCEDURE — 99214 OFFICE O/P EST MOD 30 MIN: CPT | Performed by: INTERNAL MEDICINE

## 2022-01-13 RX ORDER — DAPAGLIFLOZIN 10 MG/1
10 TABLET, FILM COATED ORAL DAILY
Qty: 90 TABLET | Refills: 2 | Status: SHIPPED | OUTPATIENT
Start: 2022-01-13 | End: 2022-11-04

## 2022-01-13 RX ORDER — CARVEDILOL 12.5 MG/1
12.5 TABLET ORAL 2 TIMES DAILY WITH MEALS
Qty: 180 TABLET | Refills: 2 | Status: SHIPPED | OUTPATIENT
Start: 2022-01-13 | End: 2022-12-12

## 2022-01-13 RX ORDER — LOSARTAN POTASSIUM 25 MG/1
25 TABLET ORAL DAILY
Qty: 90 TABLET | Refills: 2 | Status: SHIPPED | OUTPATIENT
Start: 2022-01-13 | End: 2022-12-19

## 2022-01-13 RX ORDER — SPIRONOLACTONE 25 MG/1
25 TABLET ORAL DAILY
Qty: 90 TABLET | Refills: 2 | Status: SHIPPED | OUTPATIENT
Start: 2022-01-13 | End: 2022-09-06

## 2022-01-13 RX ORDER — PRAVASTATIN SODIUM 20 MG
20 TABLET ORAL
Qty: 90 TABLET | Refills: 2 | Status: SHIPPED | OUTPATIENT
Start: 2022-01-13 | End: 2023-01-19

## 2022-01-13 NOTE — PROGRESS NOTES
Cardiology Office Visit      Encounter Date:  01/13/2022    Patient ID:   Brian Stewart is a 41 y.o. male.    Reason For Consultation:  Congestive heart failure  Cardiomyopathy    History of Present Illness:  Dear Santana Garcia MD    I had the pleasure of seeing Brian Stewart today. As you are well aware, this is a 41 y.o. male with past medical history that is significant for nonischemic cardiomyopathy severe cardiomyopathy requiring AICD with some improvement of LV systolic function was stable until last year now he is having worsening symptoms of shortness of breath dyspnea on exertion weight gain    Denies any new cardiac symptoms  No dizziness or syncope  Compliant with medical therapy  Multiple recent ER visits for multiple nonspecific complaints      Impressions:  Normal coronaries  Severe cardiomyopathy  Severe LV dysfunction  Elevated left-sided filling pressures  Estimated LV ejection fraction of 15%      Interpretation Summary    · The left ventricular cavity is severely dilated.  · Estimated left ventricular EF = 25% Estimated left ventricular EF was in agreement with the calculated left ventricular EF. Left ventricular systolic function is severely decreased.  · The following left ventricular wall segments are hypokinetic: mid anterior, apical anterior, basal anterolateral, basal inferolateral, basal inferoseptal, mid anteroseptal, basal anterior, basal inferior and basal inferoseptal. The following left ventricular wall segments are akinetic: mid anterolateral, apical lateral, mid inferolateral, apical inferior, mid inferior, apical septal, mid inferoseptal and apex.  · Moderate mitral valve regurgitation is present.  · Left ventricular diastolic function is consistent with (grade I) impaired relaxation.  · Estimated right ventricular systolic pressure from tricuspid regurgitation is normal (<35 mmHg).           Assessment & Plan    Impressions:  chronic systolic heart  "failure/stable  Congestive heart failure NYHA class II/no new complaints  Cardiomyopathy most likely nonischemic  Severe mitral regurgitation nonrheumatic/now moderate  Hypertension  Obesity  Obstructive sleep apnea  Status post ICD with nonsustained ventricular tachycardia  Noncompliance with medical therapy  Nonischemic cardiomyopathy with LV ejection fraction of 25%    Recommendations:  Continue current medical therapy  Farxiga 10 mg p.o. once a day  Use Lasix as needed  Continue spironolactone to 25 mg p.o. once a day  Continue losartan 25 mg p.o. once a day  Risk benefits and alternatives and need for close monitoring and follow-up reviewed and discussed with the patient  Continue current medical management  Low-salt diet  Recent device interrogation with normal function  Repeat echocardiogram to assess LV systolic function and also severity of the mitral regurgitation  Recent labs reviewed and discussed with patient  Medication refills  Follow-up in office in 3 months  Objective:    Vitals:  Vitals:    01/13/22 1054   BP: 128/90   BP Location: Left arm   Patient Position: Sitting   Cuff Size: Large Adult   Pulse: 73   Resp: 18   SpO2: 98%   Weight: 105 kg (231 lb)   Height: 170.2 cm (67\")       Physical Exam:    General: Alert, cooperative, no distress, appears stated age  Head:  Normocephalic, atraumatic, mucous membranes moist  Eyes:  Conjunctiva/corneas clear, EOM's intact     Neck:  Supple,  no adenopathy;      Lungs: Clear to auscultation bilaterally, no wheezes rhonchi rales are noted  Chest wall: No tenderness  Heart::  Regular rate and rhythm, S1 and S2 normal, displaced LV apical impulse/2 x 6 pansystolic murmur left sternal border  Abdomen: Soft, non-tender, nondistended bowel sounds active  Extremities: No cyanosis, clubbing, or edema  Pulses: 2+ and symmetric all extremities  Skin:  No rashes or lesions  Neuro/psych: A&O x3. CN II through XII are grossly intact with appropriate " affect      Allergies:  No Known Allergies    Medication Review:     Current Outpatient Medications:   •  aspirin 81 MG chewable tablet, Chew 81 mg Daily., Disp: , Rfl:   •  carvedilol (COREG) 12.5 MG tablet, Take 1 tablet by mouth 2 (Two) Times a Day With Meals., Disp: 180 tablet, Rfl: 2  •  Dapagliflozin Propanediol (Farxiga) 10 MG tablet, Take 10 mg by mouth Daily., Disp: 90 tablet, Rfl: 2  •  furosemide (LASIX) 40 MG tablet, Take 40 mg by mouth Daily As Needed., Disp: , Rfl: 0  •  gabapentin (NEURONTIN) 300 MG capsule, Take 300 mg by mouth 3 (Three) Times a Day., Disp: , Rfl: 0  •  HYDROcodone-acetaminophen (NORCO)  MG per tablet, Take 1 tablet by mouth 2 (Two) Times a Day As Needed for Moderate Pain ., Disp: , Rfl:   •  losartan (COZAAR) 25 MG tablet, Take 1 tablet by mouth Daily., Disp: 90 tablet, Rfl: 2  •  nitroglycerin (NITROSTAT) 0.4 MG SL tablet, Place 1 tablet under the tongue Every 5 (Five) Minutes As Needed for Chest Pain. Take no more than 3 doses in 15 minutes., Disp: 30 tablet, Rfl: 2  •  pravastatin (PRAVACHOL) 20 MG tablet, Take 1 tablet by mouth every night at bedtime., Disp: 90 tablet, Rfl: 2  •  spironolactone (ALDACTONE) 25 MG tablet, Take 1 tablet by mouth Daily., Disp: 90 tablet, Rfl: 2    Family History:  Family History   Problem Relation Age of Onset   • Heart disease Father    • Hypertension Mother        Past Medical History:  Past Medical History:   Diagnosis Date   • Arrhythmia    • Cardiomyopathy (HCC)    • Diverticulitis    • Hyperlipidemia    • Palpitations        Past surgical History:  Past Surgical History:   Procedure Laterality Date   • CARDIAC CATHETERIZATION     • CARDIAC CATHETERIZATION N/A 6/12/2020    Procedure: Left Heart Cath;  Surgeon: Fernanda Jackson MD;  Location: [facility] INVASIVE LOCATION;  Service: Cardiovascular;  Laterality: N/A;   • CARDIAC CATHETERIZATION N/A 6/12/2020    Procedure: Coronary angiography;  Surgeon: Fernanda Jackson MD;   Location: [facility] INVASIVE LOCATION;  Service: Cardiovascular;  Laterality: N/A;   • CARDIAC DEFIBRILLATOR PLACEMENT     • COLON RESECTION     • INSERT / REPLACE / REMOVE PACEMAKER      ICD   • KNEE ACL RECONSTRUCTION         Social History:  Social History     Socioeconomic History   • Marital status: Single   Tobacco Use   • Smoking status: Former Smoker     Packs/day: 1.50     Types: Cigarettes   • Smokeless tobacco: Former User     Types: Snuff   • Tobacco comment: QUIT SMOKING 5/20   Vaping Use   • Vaping Use: Never used   Substance and Sexual Activity   • Alcohol use: No   • Drug use: Yes     Types: Marijuana   • Sexual activity: Yes     Partners: Female       Review of Systems:  The following systems were reviewed as they relate to the cardiovascular system: Constitutional, Eyes, ENT, Cardiovascular, Respiratory, Gastrointestinal, Integumentary, Neurological, Psychiatric, Hematologic, Endocrine, Musculoskeletal, and Genitourinary. The pertinent cardiovascular findings are reported above with all other cardiovascular points within those systems being negative.    Diagnostic Study Review:     Current Electrocardiogram:  Procedures      NOTE: The following portions of the patient's history were reviewed and updated this visit as appropriate: allergies, current medications, past family history, past medical history, past social history, past surgical history and problem list.

## 2022-02-16 ENCOUNTER — HOSPITAL ENCOUNTER (OUTPATIENT)
Dept: CARDIOLOGY | Facility: HOSPITAL | Age: 42
Discharge: HOME | End: 2022-02-16
Admitting: INTERNAL MEDICINE

## 2022-02-16 VITALS
SYSTOLIC BLOOD PRESSURE: 118 MMHG | BODY MASS INDEX: 36.26 KG/M2 | HEART RATE: 84 BPM | HEIGHT: 67 IN | DIASTOLIC BLOOD PRESSURE: 76 MMHG | WEIGHT: 231 LBS

## 2022-02-16 DIAGNOSIS — I10 ESSENTIAL HYPERTENSION: ICD-10-CM

## 2022-02-16 DIAGNOSIS — I34.0 NONRHEUMATIC MITRAL VALVE REGURGITATION: ICD-10-CM

## 2022-02-16 DIAGNOSIS — I42.0 DILATED CARDIOMYOPATHY: ICD-10-CM

## 2022-02-16 DIAGNOSIS — Z95.810 AICD (AUTOMATIC CARDIOVERTER/DEFIBRILLATOR) PRESENT: ICD-10-CM

## 2022-02-16 PROCEDURE — 93306 TTE W/DOPPLER COMPLETE: CPT | Performed by: INTERNAL MEDICINE

## 2022-02-16 PROCEDURE — 93306 TTE W/DOPPLER COMPLETE: CPT

## 2022-02-17 LAB
ASCENDING AORTA: 3.1 CM
BH CV ECHO MEAS - ACS: 2.4 CM
BH CV ECHO MEAS - AO MAX PG (FULL): 2.7 MMHG
BH CV ECHO MEAS - AO MAX PG: 6.3 MMHG
BH CV ECHO MEAS - AO MEAN PG (FULL): 1.8 MMHG
BH CV ECHO MEAS - AO MEAN PG: 3.9 MMHG
BH CV ECHO MEAS - AO ROOT AREA (BSA CORRECTED): 1.5
BH CV ECHO MEAS - AO ROOT AREA: 8.4 CM^2
BH CV ECHO MEAS - AO ROOT DIAM: 3.3 CM
BH CV ECHO MEAS - AO V2 MAX: 125.5 CM/SEC
BH CV ECHO MEAS - AO V2 MEAN: 95.6 CM/SEC
BH CV ECHO MEAS - AO V2 VTI: 23.1 CM
BH CV ECHO MEAS - ASC AORTA: 3.1 CM
BH CV ECHO MEAS - AVA(I,A): 3.8 CM^2
BH CV ECHO MEAS - AVA(I,D): 3.8 CM^2
BH CV ECHO MEAS - AVA(V,A): 3.5 CM^2
BH CV ECHO MEAS - AVA(V,D): 3.5 CM^2
BH CV ECHO MEAS - BSA(HAYCOCK): 2.3 M^2
BH CV ECHO MEAS - BSA: 2.1 M^2
BH CV ECHO MEAS - BZI_BMI: 36.2 KILOGRAMS/M^2
BH CV ECHO MEAS - BZI_METRIC_HEIGHT: 170.2 CM
BH CV ECHO MEAS - BZI_METRIC_WEIGHT: 104.8 KG
BH CV ECHO MEAS - CI(CUBED): 2.4 L/MIN/M^2
BH CV ECHO MEAS - CI(MOD-SP2): 1.4 L/MIN/M^2
BH CV ECHO MEAS - CI(MOD-SP4): 1.7 L/MIN/M^2
BH CV ECHO MEAS - CI(TEICH): 1.3 L/MIN/M^2
BH CV ECHO MEAS - CO(CUBED): 5.1 L/MIN
BH CV ECHO MEAS - CO(MOD-SP2): 3 L/MIN
BH CV ECHO MEAS - CO(MOD-SP4): 3.7 L/MIN
BH CV ECHO MEAS - CO(TEICH): 2.9 L/MIN
BH CV ECHO MEAS - EDV(CUBED): 360.3 ML
BH CV ECHO MEAS - EDV(MOD-SP2): 190 ML
BH CV ECHO MEAS - EDV(MOD-SP4): 208.2 ML
BH CV ECHO MEAS - EDV(TEICH): 265 ML
BH CV ECHO MEAS - EF(CUBED): 19.6 %
BH CV ECHO MEAS - EF(MOD-BP): 24 %
BH CV ECHO MEAS - EF(MOD-SP2): 22.2 %
BH CV ECHO MEAS - EF(MOD-SP4): 24.8 %
BH CV ECHO MEAS - EF(TEICH): 15.2 %
BH CV ECHO MEAS - ESV(CUBED): 289.6 ML
BH CV ECHO MEAS - ESV(MOD-SP2): 147.8 ML
BH CV ECHO MEAS - ESV(MOD-SP4): 156.6 ML
BH CV ECHO MEAS - ESV(TEICH): 224.9 ML
BH CV ECHO MEAS - FS: 7 %
BH CV ECHO MEAS - IVS/LVPW: 1
BH CV ECHO MEAS - IVSD: 0.93 CM
BH CV ECHO MEAS - LA DIMENSION(2D): 4.2 CM
BH CV ECHO MEAS - LA/AO: 1.2
BH CV ECHO MEAS - LAT PEAK E' VEL: 4 CM/SEC
BH CV ECHO MEAS - LV DIASTOLIC VOL/BSA (35-75): 96.8 ML/M^2
BH CV ECHO MEAS - LV IVRT: 0.13 SEC
BH CV ECHO MEAS - LV MASS(C)D: 299.1 GRAMS
BH CV ECHO MEAS - LV MASS(C)DI: 139.1 GRAMS/M^2
BH CV ECHO MEAS - LV MAX PG: 3.6 MMHG
BH CV ECHO MEAS - LV MEAN PG: 2.2 MMHG
BH CV ECHO MEAS - LV SYSTOLIC VOL/BSA (12-30): 72.8 ML/M^2
BH CV ECHO MEAS - LV V1 MAX: 94.8 CM/SEC
BH CV ECHO MEAS - LV V1 MEAN: 69.5 CM/SEC
BH CV ECHO MEAS - LV V1 VTI: 19 CM
BH CV ECHO MEAS - LVIDD: 7.1 CM
BH CV ECHO MEAS - LVIDS: 6.6 CM
BH CV ECHO MEAS - LVOT AREA: 4.6 CM^2
BH CV ECHO MEAS - LVOT DIAM: 2.4 CM
BH CV ECHO MEAS - LVPWD: 0.91 CM
BH CV ECHO MEAS - MED PEAK E' VEL: 5 CM/SEC
BH CV ECHO MEAS - MM HR: 71.9 BPM
BH CV ECHO MEAS - MM R-R INT: 0.83 SEC
BH CV ECHO MEAS - MR MAX VEL: 440 CM/SEC
BH CV ECHO MEAS - MR VTI: 162 CM
BH CV ECHO MEAS - MV A MAX VEL: 67.7 CM/SEC
BH CV ECHO MEAS - MV DEC SLOPE: 165.8 CM/SEC^2
BH CV ECHO MEAS - MV DEC TIME: 0.29 SEC
BH CV ECHO MEAS - MV E MAX VEL: 47.5 CM/SEC
BH CV ECHO MEAS - MV E/A: 0.7
BH CV ECHO MEAS - MV MAX PG: 2.8 MMHG
BH CV ECHO MEAS - MV MEAN PG: 0.99 MMHG
BH CV ECHO MEAS - MV P1/2T: 43 MSEC
BH CV ECHO MEAS - MV V2 MAX: 82.9 CM/SEC
BH CV ECHO MEAS - MV V2 MEAN: 46.5 CM/SEC
BH CV ECHO MEAS - MV V2 VTI: 14.2 CM
BH CV ECHO MEAS - MVA(P1/2T): 5.2 CM2
BH CV ECHO MEAS - MVA(VTI): 6.2 CM^2
BH CV ECHO MEAS - PA ACC SLOPE: 743 CM/SEC2
BH CV ECHO MEAS - PA ACC TIME: 0.09 SEC
BH CV ECHO MEAS - PA MAX PG (FULL): 1.8 MMHG
BH CV ECHO MEAS - PA MAX PG: 4.1 MMHG
BH CV ECHO MEAS - PA MEAN PG (FULL): 1.1 MMHG
BH CV ECHO MEAS - PA MEAN PG: 2.2 MMHG
BH CV ECHO MEAS - PA PR(ACCEL): 38.3 MMHG
BH CV ECHO MEAS - PA V2 MAX: 101.2 CM/SEC
BH CV ECHO MEAS - PA V2 MEAN: 70.7 CM/SEC
BH CV ECHO MEAS - PA V2 VTI: 20 CM
BH CV ECHO MEAS - PULM A REVS DUR: 0.11 SEC
BH CV ECHO MEAS - PULM A REVS VEL: 27.6 CM/SEC
BH CV ECHO MEAS - PULM DIAS VEL: 38.1 CM/SEC
BH CV ECHO MEAS - PULM S/D: 1.5
BH CV ECHO MEAS - PULM SYS VEL: 55.7 CM/SEC
BH CV ECHO MEAS - RAP SYSTOLE: 3 MMHG
BH CV ECHO MEAS - RV MAX PG: 2.3 MMHG
BH CV ECHO MEAS - RV MEAN PG: 1.1 MMHG
BH CV ECHO MEAS - RV V1 MAX: 75.5 CM/SEC
BH CV ECHO MEAS - RV V1 MEAN: 50.3 CM/SEC
BH CV ECHO MEAS - RV V1 VTI: 13 CM
BH CV ECHO MEAS - RVSP: 33.8 MMHG
BH CV ECHO MEAS - SI(AO): 90.2 ML/M^2
BH CV ECHO MEAS - SI(CUBED): 32.9 ML/M^2
BH CV ECHO MEAS - SI(LVOT): 41.1 ML/M^2
BH CV ECHO MEAS - SI(MOD-SP2): 19.6 ML/M^2
BH CV ECHO MEAS - SI(MOD-SP4): 24 ML/M^2
BH CV ECHO MEAS - SI(TEICH): 18.7 ML/M^2
BH CV ECHO MEAS - SUP REN AO DIAM: 2.2 CM
BH CV ECHO MEAS - SV(AO): 193.9 ML
BH CV ECHO MEAS - SV(CUBED): 70.6 ML
BH CV ECHO MEAS - SV(LVOT): 88.3 ML
BH CV ECHO MEAS - SV(MOD-SP2): 42.2 ML
BH CV ECHO MEAS - SV(MOD-SP4): 51.6 ML
BH CV ECHO MEAS - SV(TEICH): 40.2 ML
BH CV ECHO MEAS - TAPSE (>1.6): 1.8 CM
BH CV ECHO MEAS - TR MAX PG: 31 MMHG
BH CV ECHO MEAS - TR MAX VEL: 277.4 CM/SEC
BH CV ECHO MEASUREMENTS AVERAGE E/E' RATIO: 10.56
BH CV VAS BP LEFT ARM: NORMAL MMHG
BH CV XLRA - RV BASE: 3.7 CM
BH CV XLRA - RV MID: 2.9 CM
BH CV XLRA - TDI S': 11 CM/SEC
IVRT: 128 MSEC
LEFT ATRIUM VOLUME INDEX: 27 ML/M2
LEFT ATRIUM VOLUME: 57 CM3
LV EF 2D ECHO EST: 25 %
MR PISA EROA: 0.1 CM2
MV REGURGITANT VOLUME: 10 CC
PISA ALIASING VEL: 0.31 M/S
PISA RADIUS: 0.4 CM

## 2022-02-18 ENCOUNTER — TELEPHONE (OUTPATIENT)
Dept: CARDIOLOGY | Facility: CLINIC | Age: 42
End: 2022-02-18

## 2022-02-18 NOTE — TELEPHONE ENCOUNTER
Called and phone just rings, no answer or voicemail    ----------------------------    Fernanda Jackson MD Trent, Melissa, MA    LV function is unchanged from before   Continue the same therapy and follow-up as scheduled

## 2022-03-24 PROCEDURE — 93296 REM INTERROG EVL PM/IDS: CPT | Performed by: INTERNAL MEDICINE

## 2022-03-24 PROCEDURE — 93295 DEV INTERROG REMOTE 1/2/MLT: CPT | Performed by: INTERNAL MEDICINE

## 2022-06-17 ENCOUNTER — OFFICE VISIT (OUTPATIENT)
Dept: CARDIOLOGY | Facility: CLINIC | Age: 42
End: 2022-06-17

## 2022-06-17 VITALS
HEART RATE: 62 BPM | BODY MASS INDEX: 36.57 KG/M2 | HEIGHT: 67 IN | RESPIRATION RATE: 18 BRPM | DIASTOLIC BLOOD PRESSURE: 79 MMHG | SYSTOLIC BLOOD PRESSURE: 118 MMHG | OXYGEN SATURATION: 95 % | WEIGHT: 233 LBS

## 2022-06-17 DIAGNOSIS — I42.0 DILATED CARDIOMYOPATHY: ICD-10-CM

## 2022-06-17 DIAGNOSIS — I34.0 NONRHEUMATIC MITRAL VALVE REGURGITATION: Primary | ICD-10-CM

## 2022-06-17 DIAGNOSIS — I50.23 ACUTE ON CHRONIC SYSTOLIC CONGESTIVE HEART FAILURE: ICD-10-CM

## 2022-06-17 DIAGNOSIS — E78.2 MIXED HYPERLIPIDEMIA: ICD-10-CM

## 2022-06-17 DIAGNOSIS — I10 ESSENTIAL HYPERTENSION: ICD-10-CM

## 2022-06-17 DIAGNOSIS — R93.3 ABNORMAL FINDINGS ON DIAGNOSTIC IMAGING OF OTHER PARTS OF DIGESTIVE TRACT: ICD-10-CM

## 2022-06-17 DIAGNOSIS — Z95.810 AICD (AUTOMATIC CARDIOVERTER/DEFIBRILLATOR) PRESENT: ICD-10-CM

## 2022-06-17 PROCEDURE — 93000 ELECTROCARDIOGRAM COMPLETE: CPT | Performed by: INTERNAL MEDICINE

## 2022-06-17 PROCEDURE — 99214 OFFICE O/P EST MOD 30 MIN: CPT | Performed by: INTERNAL MEDICINE

## 2022-06-17 RX ORDER — GABAPENTIN 600 MG/1
TABLET ORAL
COMMUNITY
Start: 2022-06-10

## 2022-06-17 RX ORDER — POLYETHYLENE GLYCOL 3350, SODIUM SULFATE ANHYDROUS, SODIUM BICARBONATE, SODIUM CHLORIDE, POTASSIUM CHLORIDE 236; 22.74; 6.74; 5.86; 2.97 G/4L; G/4L; G/4L; G/4L; G/4L
POWDER, FOR SOLUTION ORAL
COMMUNITY
Start: 2022-04-11 | End: 2022-10-03

## 2022-06-17 NOTE — PROGRESS NOTES
Cardiology Office Visit      Encounter Date:  06/17/2022    Patient ID:   Brian Stewart is a 41 y.o. male.    Reason For Consultation:  Congestive heart failure  Cardiomyopathy    History of Present Illness:  Dear Santana Garcia MD    I had the pleasure of seeing Brian Stewart today. As you are well aware, this is a 41 y.o. male with past medical history that is significant for nonischemic cardiomyopathy severe cardiomyopathy requiring AICD with some improvement of LV systolic function was stable until last year now he is having worsening symptoms of shortness of breath dyspnea on exertion weight gain    Denies any new cardiac symptoms  No dizziness or syncope  Compliant with medical therapy  Denies any new cardiac symptoms      Impressions:  Normal coronaries  Severe cardiomyopathy  Severe LV dysfunction  Elevated left-sided filling pressures  Estimated LV ejection fraction of 15%      Interpretation Summary    · The left ventricular cavity is severely dilated.  · Estimated left ventricular EF = 25% Estimated left ventricular EF was in agreement with the calculated left ventricular EF. Left ventricular systolic function is severely decreased.  · The following left ventricular wall segments are hypokinetic: mid anterior, apical anterior, basal anterolateral, basal inferolateral, basal inferoseptal, mid anteroseptal, basal anterior, basal inferior and basal inferoseptal. The following left ventricular wall segments are akinetic: mid anterolateral, apical lateral, mid inferolateral, apical inferior, mid inferior, apical septal, mid inferoseptal and apex.  · Moderate mitral valve regurgitation is present.  · Left ventricular diastolic function is consistent with (grade I) impaired relaxation.  · Estimated right ventricular systolic pressure from tricuspid regurgitation is normal (<35 mmHg).           Assessment & Plan    Impressions:  chronic systolic heart failure/stable  Congestive heart failure NYHA  "class II/no new complaints  Cardiomyopathy most likely nonischemic  Severe mitral regurgitation nonrheumatic/now moderate  Hypertension  Obesity  Obstructive sleep apnea  Status post ICD with nonsustained ventricular tachycardia  Noncompliance with medical therapy  Nonischemic cardiomyopathy with LV ejection fraction of 25%    Recommendations:  Continue current medical therapy  Farxiga 10 mg p.o. once a day  Use Lasix as needed  Continue spironolactone to 25 mg p.o. once a day  Continue losartan 25 mg p.o. once a day  Risk benefits and alternatives and need for close monitoring and follow-up reviewed and discussed with the patient  Continue current medical management  Low-salt diet  Recent device interrogation with normal function  Repeat echocardiogram to assess LV systolic function and also severity of the mitral regurgitation  Recent labs reviewed and discussed with patient  Medication refills  Labs from March reviewed and discussed with patient  Continue follow-up with EP for ICD  Continue current medical therapy with aspirin 81 mg p.o. once a day Coreg 12.5 mg p.o. twice daily Farxiga 10 mg p.o. once a day losartan 25 mg p.o. once a day Pravachol 20 mg p.o. once a day spironolactone 25 mg p.o. once a day  Repeat echocardiogram before next office visit to assess the LV systolic function  Check labs including CBC CMP and lipids  Follow-up in office in 6 months    Objective:    Vitals:  Vitals:    06/17/22 0855   BP: 118/79   BP Location: Left arm   Patient Position: Sitting   Cuff Size: Large Adult   Pulse: 62   Resp: 18   SpO2: 95%   Weight: 106 kg (233 lb)   Height: 170.2 cm (67\")       Physical Exam:    General: Alert, cooperative, no distress, appears stated age  Head:  Normocephalic, atraumatic, mucous membranes moist  Eyes:  Conjunctiva/corneas clear, EOM's intact     Neck:  Supple,  no adenopathy;      Lungs: Clear to auscultation bilaterally, no wheezes rhonchi rales are noted  Chest wall: No " tenderness  Heart::  Regular rate and rhythm, S1 and S2 normal, no murmur, rub or gallop  Abdomen: Soft, non-tender, nondistended bowel sounds active  Extremities: No cyanosis, clubbing, or edema  Pulses: 2+ and symmetric all extremities  Skin:  No rashes or lesions  Neuro/psych: A&O x3. CN II through XII are grossly intact with appropriate affect      Allergies:  No Known Allergies    Medication Review:     Current Outpatient Medications:   •  aspirin 81 MG chewable tablet, Chew 81 mg Daily., Disp: , Rfl:   •  carvedilol (COREG) 12.5 MG tablet, Take 1 tablet by mouth 2 (Two) Times a Day With Meals., Disp: 180 tablet, Rfl: 2  •  Dapagliflozin Propanediol (Farxiga) 10 MG tablet, Take 10 mg by mouth Daily., Disp: 90 tablet, Rfl: 2  •  furosemide (LASIX) 40 MG tablet, Take 40 mg by mouth Daily As Needed., Disp: , Rfl: 0  •  gabapentin (NEURONTIN) 600 MG tablet, , Disp: , Rfl:   •  HYDROcodone-acetaminophen (NORCO)  MG per tablet, Take 1 tablet by mouth 2 (Two) Times a Day As Needed for Moderate Pain ., Disp: , Rfl:   •  losartan (COZAAR) 25 MG tablet, Take 1 tablet by mouth Daily., Disp: 90 tablet, Rfl: 2  •  nitroglycerin (NITROSTAT) 0.4 MG SL tablet, Place 1 tablet under the tongue Every 5 (Five) Minutes As Needed for Chest Pain. Take no more than 3 doses in 15 minutes., Disp: 30 tablet, Rfl: 2  •  PEG 3350-KCl-NaBcb-NaCl-NaSulf (PEG-3350/Electrolytes) 236 g reconstituted solution, , Disp: , Rfl:   •  pravastatin (PRAVACHOL) 20 MG tablet, Take 1 tablet by mouth every night at bedtime., Disp: 90 tablet, Rfl: 2  •  spironolactone (ALDACTONE) 25 MG tablet, Take 1 tablet by mouth Daily., Disp: 90 tablet, Rfl: 2    Family History:  Family History   Problem Relation Age of Onset   • Heart disease Father    • Hypertension Mother        Past Medical History:  Past Medical History:   Diagnosis Date   • Arrhythmia    • Cardiomyopathy (HCC)    • Diverticulitis    • Hyperlipidemia    • Palpitations        Past surgical  History:  Past Surgical History:   Procedure Laterality Date   • CARDIAC CATHETERIZATION     • CARDIAC CATHETERIZATION N/A 6/12/2020    Procedure: Left Heart Cath;  Surgeon: Fernanda Jackson MD;  Location: [facility] CATH INVASIVE LOCATION;  Service: Cardiovascular;  Laterality: N/A;   • CARDIAC CATHETERIZATION N/A 6/12/2020    Procedure: Coronary angiography;  Surgeon: Fernanda Jackson MD;  Location: [facility] CATH INVASIVE LOCATION;  Service: Cardiovascular;  Laterality: N/A;   • CARDIAC DEFIBRILLATOR PLACEMENT     • COLON RESECTION     • INSERT / REPLACE / REMOVE PACEMAKER      ICD   • KNEE ACL RECONSTRUCTION         Social History:  Social History     Socioeconomic History   • Marital status: Single   Tobacco Use   • Smoking status: Former Smoker     Packs/day: 1.50     Types: Cigarettes   • Smokeless tobacco: Former User     Types: Snuff   • Tobacco comment: QUIT SMOKING 5/20   Vaping Use   • Vaping Use: Never used   Substance and Sexual Activity   • Alcohol use: No   • Drug use: Yes     Types: Marijuana   • Sexual activity: Yes     Partners: Female       Review of Systems:  The following systems were reviewed as they relate to the cardiovascular system: Constitutional, Eyes, ENT, Cardiovascular, Respiratory, Gastrointestinal, Integumentary, Neurological, Psychiatric, Hematologic, Endocrine, Musculoskeletal, and Genitourinary. The pertinent cardiovascular findings are reported above with all other cardiovascular points within those systems being negative.    Diagnostic Study Review:     Current Electrocardiogram:    ECG 12 Lead    Date/Time: 6/17/2022 9:14 AM  Performed by: Fernanda Jackson MD  Authorized by: Fernanda Jackson MD   Comparison: compared with previous ECG   Similar to previous ECG  Rhythm: sinus rhythm  Rate: normal  BPM: 67  Conduction: conduction normal  QRS axis: normal  Other findings: non-specific ST-T wave changes    Clinical impression: abnormal EKG              NOTE:  The following portions of the patient's history were reviewed and updated this visit as appropriate: allergies, current medications, past family history, past medical history, past social history, past surgical history and problem list.

## 2022-07-21 ENCOUNTER — HOSPITAL ENCOUNTER (OUTPATIENT)
Dept: CARDIOLOGY | Facility: HOSPITAL | Age: 42
Discharge: HOME | End: 2022-07-21
Admitting: INTERNAL MEDICINE

## 2022-07-21 VITALS
WEIGHT: 233 LBS | HEIGHT: 67 IN | HEART RATE: 76 BPM | BODY MASS INDEX: 36.57 KG/M2 | DIASTOLIC BLOOD PRESSURE: 77 MMHG | SYSTOLIC BLOOD PRESSURE: 109 MMHG

## 2022-07-21 DIAGNOSIS — I42.0 DILATED CARDIOMYOPATHY: ICD-10-CM

## 2022-07-21 DIAGNOSIS — I34.0 NONRHEUMATIC MITRAL VALVE REGURGITATION: ICD-10-CM

## 2022-07-21 PROCEDURE — 93306 TTE W/DOPPLER COMPLETE: CPT

## 2022-07-21 PROCEDURE — 93306 TTE W/DOPPLER COMPLETE: CPT | Performed by: INTERNAL MEDICINE

## 2022-07-30 LAB
ASCENDING AORTA: 3.1 CM
BH CV ECHO MEAS - ACS: 2.29 CM
BH CV ECHO MEAS - AO MAX PG: 3.7 MMHG
BH CV ECHO MEAS - AO MEAN PG: 2.37 MMHG
BH CV ECHO MEAS - AO ROOT DIAM: 3.3 CM
BH CV ECHO MEAS - AO V2 MAX: 95.5 CM/SEC
BH CV ECHO MEAS - AO V2 VTI: 17.2 CM
BH CV ECHO MEAS - AVA(I,D): 4.5 CM2
BH CV ECHO MEAS - EDV(CUBED): 354.1 ML
BH CV ECHO MEAS - EDV(MOD-SP2): 226.6 ML
BH CV ECHO MEAS - EDV(MOD-SP4): 234.2 ML
BH CV ECHO MEAS - EF(MOD-BP): 24 %
BH CV ECHO MEAS - EF(MOD-SP2): 24.2 %
BH CV ECHO MEAS - EF(MOD-SP4): 23.8 %
BH CV ECHO MEAS - ESV(CUBED): 249.8 ML
BH CV ECHO MEAS - ESV(MOD-SP2): 171.7 ML
BH CV ECHO MEAS - ESV(MOD-SP4): 178.4 ML
BH CV ECHO MEAS - FS: 11 %
BH CV ECHO MEAS - IVS/LVPW: 0.97 CM
BH CV ECHO MEAS - IVSD: 0.95 CM
BH CV ECHO MEAS - LA A2CS (ATRIAL LENGTH): 4.9 CM
BH CV ECHO MEAS - LA A4C LENGTH: 5 CM
BH CV ECHO MEAS - LA DIMENSION: 4.2 CM
BH CV ECHO MEAS - LAT PEAK E' VEL: 3 CM/SEC
BH CV ECHO MEAS - LV DIASTOLIC VOL/BSA (35-75): 108.6 CM2
BH CV ECHO MEAS - LV MASS(C)D: 314.1 GRAMS
BH CV ECHO MEAS - LV MAX PG: 3 MMHG
BH CV ECHO MEAS - LV MEAN PG: 1.69 MMHG
BH CV ECHO MEAS - LV SYSTOLIC VOL/BSA (12-30): 82.7 CM2
BH CV ECHO MEAS - LV V1 MAX: 86 CM/SEC
BH CV ECHO MEAS - LV V1 VTI: 16.5 CM
BH CV ECHO MEAS - LVIDD: 7.1 CM
BH CV ECHO MEAS - LVIDS: 6.3 CM
BH CV ECHO MEAS - LVOT AREA: 4.6 CM2
BH CV ECHO MEAS - LVOT DIAM: 2.43 CM
BH CV ECHO MEAS - LVPWD: 0.98 CM
BH CV ECHO MEAS - MED PEAK E' VEL: 3 CM/SEC
BH CV ECHO MEAS - MV A MAX VEL: 68.9 CM/SEC
BH CV ECHO MEAS - MV DEC SLOPE: 152.6 CM/SEC2
BH CV ECHO MEAS - MV DEC TIME: 0.21 MSEC
BH CV ECHO MEAS - MV E MAX VEL: 29.5 CM/SEC
BH CV ECHO MEAS - MV E/A: 0.43
BH CV ECHO MEAS - MV MAX PG: 1.83 MMHG
BH CV ECHO MEAS - MV MEAN PG: 0.62 MMHG
BH CV ECHO MEAS - MV P1/2T: 49 MSEC
BH CV ECHO MEAS - MV V2 VTI: 13 CM
BH CV ECHO MEAS - MVA(P1/2T): 4.5 CM2
BH CV ECHO MEAS - MVA(VTI): 5.9 CM2
BH CV ECHO MEAS - PA ACC SLOPE: 546 CM/SEC2
BH CV ECHO MEAS - PA ACC TIME: 0.11 SEC
BH CV ECHO MEAS - PA PR(ACCEL): 28.6 MMHG
BH CV ECHO MEAS - PA V2 MAX: 69.1 CM/SEC
BH CV ECHO MEAS - PAPD(PI EDV): 5 MMHG
BH CV ECHO MEAS - PI END-D VEL: 73.1 CM/SEC
BH CV ECHO MEAS - PULM A REVS DUR: 0.1 SEC
BH CV ECHO MEAS - PULM A REVS VEL: 22.1 CM/SEC
BH CV ECHO MEAS - PULM DIAS VEL: 17.8 CM/SEC
BH CV ECHO MEAS - PULM S/D: 2.8
BH CV ECHO MEAS - PULM SYS VEL: 49.4 CM/SEC
BH CV ECHO MEAS - RAP SYSTOLE: 3 MMHG
BH CV ECHO MEAS - RV MAX PG: 1.63 MMHG
BH CV ECHO MEAS - RV V1 MAX: 63.9 CM/SEC
BH CV ECHO MEAS - RV V1 VTI: 12.9 CM
BH CV ECHO MEAS - RVSP: 20.8 MMHG
BH CV ECHO MEAS - SI(MOD-SP2): 25.4 ML/M2
BH CV ECHO MEAS - SI(MOD-SP4): 25.9 ML/M2
BH CV ECHO MEAS - SUP REN AO DIAM: 2.3 CM
BH CV ECHO MEAS - SV(LVOT): 76.6 ML
BH CV ECHO MEAS - SV(MOD-SP2): 54.8 ML
BH CV ECHO MEAS - SV(MOD-SP4): 55.8 ML
BH CV ECHO MEAS - TAPSE (>1.6): 1.7 CM
BH CV ECHO MEAS - TR MAX PG: 17.8 MMHG
BH CV ECHO MEAS - TR MAX VEL: 210.8 CM/SEC
BH CV ECHO MEASUREMENTS AVERAGE E/E' RATIO: 9.83
BH CV VAS BP LEFT ARM: NORMAL MMHG
BH CV XLRA - RV BASE: 3.6 CM
BH CV XLRA - RV MID: 3 CM
BH CV XLRA - TDI S': 10 CM/SEC
IVRT: 80 MSEC
LEFT ATRIUM VOLUME INDEX: 25.5 ML/M2
LEFT ATRIUM VOLUME: 55 ML
LV EF 2D ECHO EST: 25 %
MAXIMAL PREDICTED HEART RATE: 179 BPM
STRESS TARGET HR: 152 BPM

## 2022-08-01 ENCOUNTER — TELEPHONE (OUTPATIENT)
Dept: CARDIOLOGY | Facility: CLINIC | Age: 42
End: 2022-08-01

## 2022-08-01 NOTE — TELEPHONE ENCOUNTER
Called and notified and verbalized understanding    -------------------------------    Fernanda Jackson MD Melissa, MA    LV function unchanged from before

## 2022-09-06 RX ORDER — SPIRONOLACTONE 25 MG/1
25 TABLET ORAL DAILY
Qty: 90 TABLET | Refills: 2 | Status: SHIPPED | OUTPATIENT
Start: 2022-09-06

## 2022-09-22 PROCEDURE — 93295 DEV INTERROG REMOTE 1/2/MLT: CPT | Performed by: INTERNAL MEDICINE

## 2022-09-22 PROCEDURE — 93296 REM INTERROG EVL PM/IDS: CPT | Performed by: INTERNAL MEDICINE

## 2022-10-10 ENCOUNTER — HOSPITAL ENCOUNTER (OUTPATIENT)
Dept: CT IMAGING | Facility: HOSPITAL | Age: 42
Discharge: HOME | End: 2022-10-10

## 2022-10-10 ENCOUNTER — HOSPITAL ENCOUNTER (OUTPATIENT)
Dept: GENERAL RADIOLOGY | Facility: HOSPITAL | Age: 42
Discharge: HOME | End: 2022-10-10

## 2022-10-10 VITALS
RESPIRATION RATE: 16 BRPM | HEART RATE: 63 BPM | OXYGEN SATURATION: 98 % | DIASTOLIC BLOOD PRESSURE: 96 MMHG | SYSTOLIC BLOOD PRESSURE: 145 MMHG

## 2022-10-10 DIAGNOSIS — M43.10 SPONDYLOLISTHESIS, UNSPECIFIED SPINAL REGION: ICD-10-CM

## 2022-10-10 LAB
APTT PPP: 29.9 SECONDS (ref 24–31)
BASOPHILS # BLD AUTO: 0.1 10*3/MM3 (ref 0–0.2)
BASOPHILS NFR BLD AUTO: 0.6 % (ref 0–1.5)
DEPRECATED RDW RBC AUTO: 39.4 FL (ref 37–54)
EOSINOPHIL # BLD AUTO: 0.2 10*3/MM3 (ref 0–0.4)
EOSINOPHIL NFR BLD AUTO: 1.9 % (ref 0.3–6.2)
ERYTHROCYTE [DISTWIDTH] IN BLOOD BY AUTOMATED COUNT: 11.9 % (ref 12.3–15.4)
HCT VFR BLD AUTO: 47.5 % (ref 37.5–51)
HGB BLD-MCNC: 16.3 G/DL (ref 13–17.7)
INR PPP: 0.99 (ref 0.93–1.1)
LYMPHOCYTES # BLD AUTO: 1.5 10*3/MM3 (ref 0.7–3.1)
LYMPHOCYTES NFR BLD AUTO: 17.1 % (ref 19.6–45.3)
MCH RBC QN AUTO: 32.1 PG (ref 26.6–33)
MCHC RBC AUTO-ENTMCNC: 34.3 G/DL (ref 31.5–35.7)
MCV RBC AUTO: 93.5 FL (ref 79–97)
MONOCYTES # BLD AUTO: 0.7 10*3/MM3 (ref 0.1–0.9)
MONOCYTES NFR BLD AUTO: 7.8 % (ref 5–12)
NEUTROPHILS NFR BLD AUTO: 6.3 10*3/MM3 (ref 1.7–7)
NEUTROPHILS NFR BLD AUTO: 72.6 % (ref 42.7–76)
NRBC BLD AUTO-RTO: 0.1 /100 WBC (ref 0–0.2)
PLATELET # BLD AUTO: 205 10*3/MM3 (ref 140–450)
PMV BLD AUTO: 7.7 FL (ref 6–12)
PROTHROMBIN TIME: 10.2 SECONDS (ref 9.6–11.7)
RBC # BLD AUTO: 5.08 10*6/MM3 (ref 4.14–5.8)
WBC NRBC COR # BLD: 8.6 10*3/MM3 (ref 3.4–10.8)

## 2022-10-10 PROCEDURE — 62304 MYELOGRAPHY LUMBAR INJECTION: CPT

## 2022-10-10 PROCEDURE — A9270 NON-COVERED ITEM OR SERVICE: HCPCS

## 2022-10-10 PROCEDURE — 72240 MYELOGRAPHY NECK SPINE: CPT

## 2022-10-10 PROCEDURE — 0 IOPAMIDOL 41 % SOLUTION: Performed by: ORTHOPAEDIC SURGERY

## 2022-10-10 PROCEDURE — 85025 COMPLETE CBC W/AUTO DIFF WBC: CPT | Performed by: RADIOLOGY

## 2022-10-10 PROCEDURE — 85730 THROMBOPLASTIN TIME PARTIAL: CPT | Performed by: RADIOLOGY

## 2022-10-10 PROCEDURE — 72132 CT LUMBAR SPINE W/DYE: CPT

## 2022-10-10 PROCEDURE — 63710000001 HYDROCODONE-ACETAMINOPHEN 5-325 MG TABLET

## 2022-10-10 PROCEDURE — 85610 PROTHROMBIN TIME: CPT | Performed by: RADIOLOGY

## 2022-10-10 RX ORDER — SODIUM CHLORIDE 0.9 % (FLUSH) 0.9 %
10 SYRINGE (ML) INJECTION AS NEEDED
Status: DISCONTINUED | OUTPATIENT
Start: 2022-10-10 | End: 2022-10-11 | Stop reason: HOSPADM

## 2022-10-10 RX ORDER — HYDROCODONE BITARTRATE AND ACETAMINOPHEN 5; 325 MG/1; MG/1
2 TABLET ORAL ONCE AS NEEDED
Status: COMPLETED | OUTPATIENT
Start: 2022-10-10 | End: 2022-10-10

## 2022-10-10 RX ORDER — HYDROCODONE BITARTRATE AND ACETAMINOPHEN 5; 325 MG/1; MG/1
TABLET ORAL
Status: COMPLETED
Start: 2022-10-10 | End: 2022-10-10

## 2022-10-10 RX ORDER — SODIUM CHLORIDE 0.9 % (FLUSH) 0.9 %
10 SYRINGE (ML) INJECTION EVERY 8 HOURS
Status: DISCONTINUED | OUTPATIENT
Start: 2022-10-10 | End: 2022-10-11 | Stop reason: HOSPADM

## 2022-10-10 RX ADMIN — IOPAMIDOL 12 ML: 408 INJECTION, SOLUTION INTRATHECAL at 11:46

## 2022-10-10 RX ADMIN — HYDROCODONE BITARTRATE AND ACETAMINOPHEN 2 TABLET: 5; 325 TABLET ORAL at 12:03

## 2022-11-04 RX ORDER — DAPAGLIFLOZIN 10 MG/1
TABLET, FILM COATED ORAL
Qty: 90 TABLET | Refills: 2 | Status: SHIPPED | OUTPATIENT
Start: 2022-11-04 | End: 2022-12-30 | Stop reason: SINTOL

## 2022-11-09 ENCOUNTER — HOSPITAL ENCOUNTER (EMERGENCY)
Facility: HOSPITAL | Age: 42
Discharge: HOME | End: 2022-11-09
Attending: EMERGENCY MEDICINE | Admitting: EMERGENCY MEDICINE

## 2022-11-09 ENCOUNTER — APPOINTMENT (OUTPATIENT)
Dept: RESPIRATORY THERAPY | Facility: HOSPITAL | Age: 42
End: 2022-11-09

## 2022-11-09 ENCOUNTER — APPOINTMENT (OUTPATIENT)
Dept: GENERAL RADIOLOGY | Facility: HOSPITAL | Age: 42
End: 2022-11-09

## 2022-11-09 VITALS
HEART RATE: 58 BPM | OXYGEN SATURATION: 95 % | DIASTOLIC BLOOD PRESSURE: 68 MMHG | WEIGHT: 244.93 LBS | HEIGHT: 67 IN | BODY MASS INDEX: 38.44 KG/M2 | SYSTOLIC BLOOD PRESSURE: 110 MMHG | TEMPERATURE: 97.9 F | RESPIRATION RATE: 16 BRPM

## 2022-11-09 DIAGNOSIS — R00.2 PALPITATIONS: Primary | ICD-10-CM

## 2022-11-09 LAB
ALBUMIN SERPL-MCNC: 4.2 G/DL (ref 3.5–5.2)
ALBUMIN/GLOB SERPL: 1.9 G/DL
ALP SERPL-CCNC: 80 U/L (ref 39–117)
ALT SERPL W P-5'-P-CCNC: 15 U/L (ref 1–41)
ANION GAP SERPL CALCULATED.3IONS-SCNC: 12 MMOL/L (ref 5–15)
AST SERPL-CCNC: 24 U/L (ref 1–40)
BASOPHILS # BLD AUTO: 0.1 10*3/MM3 (ref 0–0.2)
BASOPHILS NFR BLD AUTO: 0.7 % (ref 0–1.5)
BILIRUB SERPL-MCNC: 0.2 MG/DL (ref 0–1.2)
BUN SERPL-MCNC: 17 MG/DL (ref 6–20)
BUN/CREAT SERPL: 19.5 (ref 7–25)
CALCIUM SPEC-SCNC: 9.1 MG/DL (ref 8.6–10.5)
CHLORIDE SERPL-SCNC: 103 MMOL/L (ref 98–107)
CO2 SERPL-SCNC: 24 MMOL/L (ref 22–29)
CREAT SERPL-MCNC: 0.87 MG/DL (ref 0.76–1.27)
D DIMER PPP FEU-MCNC: <0.19 MG/L (FEU) (ref 0–0.59)
DEPRECATED RDW RBC AUTO: 38.1 FL (ref 37–54)
EGFRCR SERPLBLD CKD-EPI 2021: 110.5 ML/MIN/1.73
EOSINOPHIL # BLD AUTO: 0.2 10*3/MM3 (ref 0–0.4)
EOSINOPHIL NFR BLD AUTO: 2.8 % (ref 0.3–6.2)
ERYTHROCYTE [DISTWIDTH] IN BLOOD BY AUTOMATED COUNT: 11.7 % (ref 12.3–15.4)
GLOBULIN UR ELPH-MCNC: 2.2 GM/DL
GLUCOSE SERPL-MCNC: 115 MG/DL (ref 65–99)
HCT VFR BLD AUTO: 48.7 % (ref 37.5–51)
HGB BLD-MCNC: 16.5 G/DL (ref 13–17.7)
HOLD SPECIMEN: NORMAL
LYMPHOCYTES # BLD AUTO: 2.5 10*3/MM3 (ref 0.7–3.1)
LYMPHOCYTES NFR BLD AUTO: 28.7 % (ref 19.6–45.3)
MAGNESIUM SERPL-MCNC: 1.7 MG/DL (ref 1.6–2.6)
MCH RBC QN AUTO: 31.8 PG (ref 26.6–33)
MCHC RBC AUTO-ENTMCNC: 33.8 G/DL (ref 31.5–35.7)
MCV RBC AUTO: 94 FL (ref 79–97)
MONOCYTES # BLD AUTO: 0.7 10*3/MM3 (ref 0.1–0.9)
MONOCYTES NFR BLD AUTO: 8.1 % (ref 5–12)
NEUTROPHILS NFR BLD AUTO: 5.3 10*3/MM3 (ref 1.7–7)
NEUTROPHILS NFR BLD AUTO: 59.7 % (ref 42.7–76)
NRBC BLD AUTO-RTO: 0 /100 WBC (ref 0–0.2)
NT-PROBNP SERPL-MCNC: 885.8 PG/ML (ref 0–450)
PLATELET # BLD AUTO: 159 10*3/MM3 (ref 140–450)
PMV BLD AUTO: 8.3 FL (ref 6–12)
POTASSIUM SERPL-SCNC: 3.8 MMOL/L (ref 3.5–5.2)
PROT SERPL-MCNC: 6.4 G/DL (ref 6–8.5)
RBC # BLD AUTO: 5.18 10*6/MM3 (ref 4.14–5.8)
SODIUM SERPL-SCNC: 139 MMOL/L (ref 136–145)
TROPONIN T SERPL-MCNC: <0.01 NG/ML (ref 0–0.03)
WBC NRBC COR # BLD: 8.8 10*3/MM3 (ref 3.4–10.8)

## 2022-11-09 PROCEDURE — 93005 ELECTROCARDIOGRAM TRACING: CPT

## 2022-11-09 PROCEDURE — 99284 EMERGENCY DEPT VISIT MOD MDM: CPT

## 2022-11-09 PROCEDURE — 85379 FIBRIN DEGRADATION QUANT: CPT | Performed by: EMERGENCY MEDICINE

## 2022-11-09 PROCEDURE — 83880 ASSAY OF NATRIURETIC PEPTIDE: CPT | Performed by: EMERGENCY MEDICINE

## 2022-11-09 PROCEDURE — 84484 ASSAY OF TROPONIN QUANT: CPT | Performed by: EMERGENCY MEDICINE

## 2022-11-09 PROCEDURE — 71045 X-RAY EXAM CHEST 1 VIEW: CPT

## 2022-11-09 PROCEDURE — 80053 COMPREHEN METABOLIC PANEL: CPT | Performed by: EMERGENCY MEDICINE

## 2022-11-09 PROCEDURE — 83735 ASSAY OF MAGNESIUM: CPT | Performed by: EMERGENCY MEDICINE

## 2022-11-09 PROCEDURE — 93005 ELECTROCARDIOGRAM TRACING: CPT | Performed by: EMERGENCY MEDICINE

## 2022-11-09 PROCEDURE — 85025 COMPLETE CBC W/AUTO DIFF WBC: CPT | Performed by: EMERGENCY MEDICINE

## 2022-11-09 RX ORDER — CALCIUM CARBONATE/VITAMIN D3 500-10/5ML
800 LIQUID (ML) ORAL DAILY
Qty: 30 EACH | Refills: 0 | Status: SHIPPED | OUTPATIENT
Start: 2022-11-09

## 2022-11-09 RX ORDER — SODIUM CHLORIDE 0.9 % (FLUSH) 0.9 %
10 SYRINGE (ML) INJECTION AS NEEDED
Status: DISCONTINUED | OUTPATIENT
Start: 2022-11-09 | End: 2022-11-09 | Stop reason: HOSPADM

## 2022-11-09 RX ORDER — POTASSIUM CHLORIDE 20 MEQ/1
40 TABLET, EXTENDED RELEASE ORAL ONCE
Status: COMPLETED | OUTPATIENT
Start: 2022-11-09 | End: 2022-11-09

## 2022-11-09 RX ADMIN — POTASSIUM CHLORIDE 40 MEQ: 1500 TABLET, EXTENDED RELEASE ORAL at 04:25

## 2022-11-09 NOTE — ED PROVIDER NOTES
Subjective   History of Present Illness  42-year-old male presents with history of shortness of breath.  He states he has had this for few days.  He feels like he has trouble getting his urine out.  He has had no chest pain.  No fever or cough.  He has been having palpitations with his heart jumping.  He does have AICD.  He has not had discharge.  He states symptoms seem to be worse tonight.  Review of Systems   Constitutional: Negative for fever and unexpected weight change.   HENT: Negative for congestion and sore throat.    Eyes: Positive for visual disturbance. Negative for pain.        Reports occasional blurred vision   Respiratory: Positive for shortness of breath. Negative for cough.    Cardiovascular: Positive for palpitations. Negative for chest pain and leg swelling.   Gastrointestinal: Negative for abdominal pain, diarrhea, nausea and vomiting.   Genitourinary: Negative for dysuria and urgency.   Musculoskeletal:        Reports chronic pain   Skin: Negative for rash.   Neurological: Positive for headaches. Negative for dizziness and weakness.        Mild headache   Psychiatric/Behavioral: Negative for confusion.       Past Medical History:   Diagnosis Date   • Arrhythmia    • Cardiomyopathy (HCC)    • Diverticulitis    • Hyperlipidemia    • Palpitations        No Known Allergies    Past Surgical History:   Procedure Laterality Date   • CARDIAC CATHETERIZATION     • CARDIAC CATHETERIZATION N/A 6/12/2020    Procedure: Left Heart Cath;  Surgeon: Fernanda Jackson MD;  Location: [facility] CATH INVASIVE LOCATION;  Service: Cardiovascular;  Laterality: N/A;   • CARDIAC CATHETERIZATION N/A 6/12/2020    Procedure: Coronary angiography;  Surgeon: Fernanda Jackson MD;  Location: [facility] CATH INVASIVE LOCATION;  Service: Cardiovascular;  Laterality: N/A;   • CARDIAC DEFIBRILLATOR PLACEMENT     • COLON RESECTION     • INSERT / REPLACE / REMOVE PACEMAKER      ICD   • KNEE ACL RECONSTRUCTION         Family  History   Problem Relation Age of Onset   • Heart disease Father    • Hypertension Mother        Social History     Socioeconomic History   • Marital status: Single   Tobacco Use   • Smoking status: Former     Packs/day: 1.50     Types: Cigarettes   • Smokeless tobacco: Former     Types: Snuff   • Tobacco comments:     QUIT SMOKING 5/20   Vaping Use   • Vaping Use: Never used   Substance and Sexual Activity   • Alcohol use: No   • Drug use: Yes     Types: Marijuana   • Sexual activity: Yes     Partners: Female     Prior to Admission medications    Medication Sig Start Date End Date Taking? Authorizing Provider   aspirin 81 MG chewable tablet Chew 81 mg Daily.    ProviderBella MD   carvedilol (COREG) 12.5 MG tablet Take 1 tablet by mouth 2 (Two) Times a Day With Meals. 1/13/22   Fernanda Jackson MD   Farxiga 10 MG tablet TAKE 1 TABLET BY MOUTH DAILY 11/4/22   Fernanda Jackson MD   furosemide (LASIX) 40 MG tablet Take 40 mg by mouth Daily As Needed. 8/12/20   Fernanda Jackson MD   gabapentin (NEURONTIN) 600 MG tablet  6/10/22   ProviderBella MD   HYDROcodone-acetaminophen (NORCO)  MG per tablet Take 1 tablet by mouth 2 (Two) Times a Day As Needed for Moderate Pain .    ProviderBella MD   losartan (COZAAR) 25 MG tablet Take 1 tablet by mouth Daily. 1/13/22   Fernanda Jackson MD   nitroglycerin (NITROSTAT) 0.4 MG SL tablet Place 1 tablet under the tongue Every 5 (Five) Minutes As Needed for Chest Pain. Take no more than 3 doses in 15 minutes. 2/23/21   Fernanda Jackson MD   pravastatin (PRAVACHOL) 20 MG tablet Take 1 tablet by mouth every night at bedtime. 1/13/22   Fernanda Jackson MD   spironolactone (ALDACTONE) 25 MG tablet TAKE 1 TABLET BY MOUTH DAILY 9/6/22   Fernanda Jackson MD             Objective   Physical Exam  42-year-old male awake alert.  Generally well-developed well-nourished.  Pupils equal round react light.  Oropharynx clear  neck supple chest clear equal breath sounds.  Cardiovascular regular rate and rhythm without murmur gallop rub appreciated.  Abdomen soft nontender.  Extremities without tenderness or edema.  Neurologic exam without focal findings noted.  Skin without rash noted.  Procedures           ED Course      Results for orders placed or performed during the hospital encounter of 11/09/22   Comprehensive Metabolic Panel    Specimen: Blood   Result Value Ref Range    Glucose 115 (H) 65 - 99 mg/dL    BUN 17 6 - 20 mg/dL    Creatinine 0.87 0.76 - 1.27 mg/dL    Sodium 139 136 - 145 mmol/L    Potassium 3.8 3.5 - 5.2 mmol/L    Chloride 103 98 - 107 mmol/L    CO2 24.0 22.0 - 29.0 mmol/L    Calcium 9.1 8.6 - 10.5 mg/dL    Total Protein 6.4 6.0 - 8.5 g/dL    Albumin 4.20 3.50 - 5.20 g/dL    ALT (SGPT) 15 1 - 41 U/L    AST (SGOT) 24 1 - 40 U/L    Alkaline Phosphatase 80 39 - 117 U/L    Total Bilirubin 0.2 0.0 - 1.2 mg/dL    Globulin 2.2 gm/dL    A/G Ratio 1.9 g/dL    BUN/Creatinine Ratio 19.5 7.0 - 25.0    Anion Gap 12.0 5.0 - 15.0 mmol/L    eGFR 110.5 >60.0 mL/min/1.73   Troponin    Specimen: Blood   Result Value Ref Range    Troponin T <0.010 0.000 - 0.030 ng/mL   D-dimer, Quantitative    Specimen: Blood   Result Value Ref Range    D-Dimer, Quantitative <0.19 0.00 - 0.59 mg/L (FEU)   Magnesium    Specimen: Blood   Result Value Ref Range    Magnesium 1.7 1.6 - 2.6 mg/dL   CBC Auto Differential    Specimen: Blood   Result Value Ref Range    WBC 8.80 3.40 - 10.80 10*3/mm3    RBC 5.18 4.14 - 5.80 10*6/mm3    Hemoglobin 16.5 13.0 - 17.7 g/dL    Hematocrit 48.7 37.5 - 51.0 %    MCV 94.0 79.0 - 97.0 fL    MCH 31.8 26.6 - 33.0 pg    MCHC 33.8 31.5 - 35.7 g/dL    RDW 11.7 (L) 12.3 - 15.4 %    RDW-SD 38.1 37.0 - 54.0 fl    MPV 8.3 6.0 - 12.0 fL    Platelets 159 140 - 450 10*3/mm3    Neutrophil % 59.7 42.7 - 76.0 %    Lymphocyte % 28.7 19.6 - 45.3 %    Monocyte % 8.1 5.0 - 12.0 %    Eosinophil % 2.8 0.3 - 6.2 %    Basophil % 0.7 0.0 - 1.5 %     "Neutrophils, Absolute 5.30 1.70 - 7.00 10*3/mm3    Lymphocytes, Absolute 2.50 0.70 - 3.10 10*3/mm3    Monocytes, Absolute 0.70 0.10 - 0.90 10*3/mm3    Eosinophils, Absolute 0.20 0.00 - 0.40 10*3/mm3    Basophils, Absolute 0.10 0.00 - 0.20 10*3/mm3    nRBC 0.0 0.0 - 0.2 /100 WBC   BNP    Specimen: Blood   Result Value Ref Range    proBNP 885.8 (H) 0.0 - 450.0 pg/mL   ECG 12 Lead Rhythm Change   Result Value Ref Range    QT Interval 397 ms   Gold Top - SST   Result Value Ref Range    Extra Tube Hold for add-ons.      No radiology results for the last day  Medications   sodium chloride 0.9 % flush 10 mL (has no administration in time range)   potassium chloride (K-DUR,KLOR-CON) CR tablet 40 mEq (has no administration in time range)     /70   Pulse 67   Temp 97.9 °F (36.6 °C) (Oral)   Resp 16   Ht 170.2 cm (67\")   Wt 111 kg (244 lb 14.9 oz)   SpO2 96%   BMI 38.36 kg/m²                                        MDM  Chart review: Patient had echocardiogram September this year that showed ejection fraction of 25% with left ventricular diastolic function consistent with grade 1 impaired relaxation.  Multiple left ventricular wall segments were noted to be hypokinetic.  He was noted to have cardiac catheterization that showed normal coronary arteries.  In 2020  Comorbidity: As per past history  Differential: Electrolyte abnormality, palpitations, PACs, PVCs, arrhythmia,  My EKG interpretation: Sinus rhythm with nonspecific intraventricular conduction delay left bundle branch block type.  Compared to previous no significant change noted.  Rate of 74  Lab: Comprehensive metabolic panel normal with glucose of 115 CBC normal troponin negative magnesium low normal at 1.7 D-dimer normal at less than 0.19  Radiology: I reviewed chest x-ray.  There is mild cardiomegaly.  Lung fields appear clear  Discussion/treatment: Patient's findings were discussed with him.  Disposition was discussed he was offered observation versus " going home with a monitor.  He does have an AICD known normal coronary arteries.  His magnesium is borderline low we will place him on magnesium supplement.  He is to follow-up with his cardiologist call in a.m., return new or worsening symptoms.  Patient is noted to have occasional PVCs on monitor.  His potassium was 3.8 was also given potassium 40 mEq orally.  Patient was evaluated using appropriate PPE      Final diagnoses:   Palpitations       ED Disposition  ED Disposition     ED Disposition   Discharge    Condition   Stable    Comment   --             Fernanda Jackson MD  [address] [facility] [place] [address] [phone]      call in amm         Medication List      New Prescriptions    Magnesium Oxide 400 MG capsule  Take 800 mg by mouth Daily.           Where to Get Your Medications      These medications were sent to [id number] - [place], [place] - [address] - [phone] PH - [phone] FX  [address] [place] [address]    Phone: [phone]   · Magnesium Oxide 400 MG capsule          Yemi Holloway MD  11/09/22 0254

## 2022-11-11 LAB — QT INTERVAL: 397 MS

## 2022-11-22 RX ORDER — CELECOXIB 100 MG/1
CAPSULE ORAL
COMMUNITY
Start: 2022-11-04

## 2022-11-30 ENCOUNTER — OFFICE VISIT (OUTPATIENT)
Dept: CARDIOLOGY | Facility: CLINIC | Age: 42
End: 2022-11-30

## 2022-11-30 VITALS
WEIGHT: 244 LBS | HEART RATE: 70 BPM | SYSTOLIC BLOOD PRESSURE: 114 MMHG | RESPIRATION RATE: 18 BRPM | OXYGEN SATURATION: 97 % | BODY MASS INDEX: 38.3 KG/M2 | DIASTOLIC BLOOD PRESSURE: 80 MMHG | HEIGHT: 67 IN

## 2022-11-30 DIAGNOSIS — I34.0 NONRHEUMATIC MITRAL VALVE REGURGITATION: ICD-10-CM

## 2022-11-30 DIAGNOSIS — I10 ESSENTIAL HYPERTENSION: ICD-10-CM

## 2022-11-30 DIAGNOSIS — Z95.810 AICD (AUTOMATIC CARDIOVERTER/DEFIBRILLATOR) PRESENT: ICD-10-CM

## 2022-11-30 DIAGNOSIS — R00.2 PALPITATIONS: ICD-10-CM

## 2022-11-30 DIAGNOSIS — I42.0 DILATED CARDIOMYOPATHY: Primary | ICD-10-CM

## 2022-11-30 DIAGNOSIS — E78.2 MIXED HYPERLIPIDEMIA: ICD-10-CM

## 2022-11-30 PROCEDURE — 99214 OFFICE O/P EST MOD 30 MIN: CPT | Performed by: INTERNAL MEDICINE

## 2022-11-30 NOTE — PROGRESS NOTES
Cardiology Office Visit      Encounter Date:  11/30/2022    Patient ID:   Brian Stewart is a 42 y.o. male.    Reason For Consultation:  Congestive heart failure  Cardiomyopathy  Palpitations    History of Present Illness:  Dear Santana Garcia MD    I had the pleasure of seeing Brian Stewart today. As you are well aware, this is a 41 y.o. male with past medical history that is significant for nonischemic cardiomyopathy severe cardiomyopathy requiring AICD with some improvement of LV systolic function was stable until last year now he is having worsening symptoms of shortness of breath dyspnea on exertion weight gain    Denies any new cardiac symptoms  No dizziness or syncope  Compliant with medical therapy  Denies any new cardiac symptoms  Complaining of some intermittent palpitations recent emergency room visit    Impressions:  Normal coronaries  Severe cardiomyopathy  Severe LV dysfunction  Elevated left-sided filling pressures  Estimated LV ejection fraction of 15%      Interpretation Summary    · The left ventricular cavity is severely dilated.  · Estimated left ventricular EF = 25% Estimated left ventricular EF was in agreement with the calculated left ventricular EF. Left ventricular systolic function is severely decreased.  · The following left ventricular wall segments are hypokinetic: mid anterior, apical anterior, basal anterolateral, basal inferolateral, basal inferoseptal, mid anteroseptal, basal anterior, basal inferior and basal inferoseptal. The following left ventricular wall segments are akinetic: mid anterolateral, apical lateral, mid inferolateral, apical inferior, mid inferior, apical septal, mid inferoseptal and apex.  · Moderate mitral valve regurgitation is present.  · Left ventricular diastolic function is consistent with (grade I) impaired relaxation.  · Estimated right ventricular systolic pressure from tricuspid regurgitation is normal (<35 mmHg).           Assessment &  "Plan    Impressions:  chronic systolic heart failure/stable  Congestive heart failure NYHA class II/no new complaints  Cardiomyopathy most likely nonischemic  Severe mitral regurgitation nonrheumatic/now moderate  Hypertension  Obesity  Obstructive sleep apnea  Status post ICD with nonsustained ventricular tachycardia  Noncompliance with medical therapy  Nonischemic cardiomyopathy with LV ejection fraction of 25%    Recommendations:  Continue current medical therapy  Farxiga 10 mg p.o. once a day  Use Lasix as needed  Continue spironolactone to 25 mg p.o. once a day  Continue losartan 25 mg p.o. once a day  Risk benefits and alternatives and need for close monitoring and follow-up reviewed and discussed with the patient  Continue current medical management  Low-salt diet  Recent device interrogation with normal function  Repeat echocardiogram to assess LV systolic function and also severity of the mitral regurgitation  Recent labs reviewed and discussed with patient  Medication refills  Labs from March reviewed and discussed with patient  Continue follow-up with EP for ICD  Continue current medical therapy with aspirin 81 mg p.o. once a day Coreg 12.5 mg p.o. twice daily Farxiga 10 mg p.o. once a day losartan 25 mg p.o. once a day Pravachol 20 mg p.o. once a day spironolactone 25 mg p.o. once a day  Labs from the ER visit reviewed and discussed with patient  Arrange for a device interrogation for symptoms of palpitations  Prior work-up reviewed and discussed with patient  Follow-up in office in 6 months    Objective:    Vitals:  Vitals:    11/30/22 1012   BP: 114/80   BP Location: Left arm   Patient Position: Sitting   Cuff Size: Large Adult   Pulse: 70   Resp: 18   SpO2: 97%   Weight: 111 kg (244 lb)   Height: 170.2 cm (67\")       Physical Exam:    General: Alert, cooperative, no distress, appears stated age  Head:  Normocephalic, atraumatic, mucous membranes moist  Eyes:  Conjunctiva/corneas clear, EOM's intact   "   Neck:  Supple,  no adenopathy;      Lungs: Clear to auscultation bilaterally, no wheezes rhonchi rales are noted  Chest wall: No tenderness  Heart::  Regular rate and rhythm, S1 and S2 normal, displaced LV apical impulse 2 x 6 menstrual murmur left sternal border  Abdomen: Soft, non-tender, nondistended bowel sounds active  Extremities: No cyanosis, clubbing, or edema  Pulses: 2+ and symmetric all extremities  Skin:  No rashes or lesions  Neuro/psych: A&O x3. CN II through XII are grossly intact with appropriate affect      Allergies:  No Known Allergies    Medication Review:     Current Outpatient Medications:   •  aspirin 81 MG chewable tablet, Chew 81 mg Daily., Disp: , Rfl:   •  carvedilol (COREG) 12.5 MG tablet, Take 1 tablet by mouth 2 (Two) Times a Day With Meals., Disp: 180 tablet, Rfl: 2  •  celecoxib (CeleBREX) 100 MG capsule, , Disp: , Rfl:   •  Farxiga 10 MG tablet, TAKE 1 TABLET BY MOUTH DAILY, Disp: 90 tablet, Rfl: 2  •  furosemide (LASIX) 40 MG tablet, Take 40 mg by mouth Daily As Needed., Disp: , Rfl: 0  •  gabapentin (NEURONTIN) 600 MG tablet, , Disp: , Rfl:   •  HYDROcodone-acetaminophen (NORCO)  MG per tablet, Take 1 tablet by mouth 2 (Two) Times a Day As Needed for Moderate Pain ., Disp: , Rfl:   •  losartan (COZAAR) 25 MG tablet, Take 1 tablet by mouth Daily., Disp: 90 tablet, Rfl: 2  •  Magnesium Oxide 400 MG capsule, Take 800 mg by mouth Daily., Disp: 30 each, Rfl: 0  •  nitroglycerin (NITROSTAT) 0.4 MG SL tablet, Place 1 tablet under the tongue Every 5 (Five) Minutes As Needed for Chest Pain. Take no more than 3 doses in 15 minutes., Disp: 30 tablet, Rfl: 2  •  pravastatin (PRAVACHOL) 20 MG tablet, Take 1 tablet by mouth every night at bedtime., Disp: 90 tablet, Rfl: 2  •  spironolactone (ALDACTONE) 25 MG tablet, TAKE 1 TABLET BY MOUTH DAILY, Disp: 90 tablet, Rfl: 2    Family History:  Family History   Problem Relation Age of Onset   • Heart disease Father    • Hypertension Mother         Past Medical History:  Past Medical History:   Diagnosis Date   • Arrhythmia    • Cardiomyopathy (HCC)    • Diverticulitis    • Hyperlipidemia    • Palpitations        Past surgical History:  Past Surgical History:   Procedure Laterality Date   • CARDIAC CATHETERIZATION     • CARDIAC CATHETERIZATION N/A 6/12/2020    Procedure: Left Heart Cath;  Surgeon: Fernanda Jackson MD;  Location: [facility] CATH INVASIVE LOCATION;  Service: Cardiovascular;  Laterality: N/A;   • CARDIAC CATHETERIZATION N/A 6/12/2020    Procedure: Coronary angiography;  Surgeon: Fernanda Jackson MD;  Location: [facility] CATH INVASIVE LOCATION;  Service: Cardiovascular;  Laterality: N/A;   • CARDIAC DEFIBRILLATOR PLACEMENT     • COLON RESECTION     • INSERT / REPLACE / REMOVE PACEMAKER      ICD   • KNEE ACL RECONSTRUCTION         Social History:  Social History     Socioeconomic History   • Marital status: Single   Tobacco Use   • Smoking status: Former     Packs/day: 1.50     Types: Cigarettes   • Smokeless tobacco: Former     Types: Snuff   • Tobacco comments:     QUIT SMOKING 5/20   Vaping Use   • Vaping Use: Never used   Substance and Sexual Activity   • Alcohol use: No   • Drug use: Yes     Types: Marijuana   • Sexual activity: Yes     Partners: Female       Review of Systems:  The following systems were reviewed as they relate to the cardiovascular system: Constitutional, Eyes, ENT, Cardiovascular, Respiratory, Gastrointestinal, Integumentary, Neurological, Psychiatric, Hematologic, Endocrine, Musculoskeletal, and Genitourinary. The pertinent cardiovascular findings are reported above with all other cardiovascular points within those systems being negative.    Diagnostic Study Review:     Current Electrocardiogram:  Procedures      NOTE: The following portions of the patient's history were reviewed and updated this visit as appropriate: allergies, current medications, past family history, past medical history, past social  history, past surgical history and problem list.

## 2022-12-12 RX ORDER — CARVEDILOL 12.5 MG/1
TABLET ORAL
Qty: 180 TABLET | Refills: 2 | Status: SHIPPED | OUTPATIENT
Start: 2022-12-12

## 2022-12-14 PROCEDURE — 93228 REMOTE 30 DAY ECG REV/REPORT: CPT | Performed by: INTERNAL MEDICINE

## 2022-12-19 RX ORDER — LOSARTAN POTASSIUM 25 MG/1
25 TABLET ORAL DAILY
Qty: 90 TABLET | Refills: 2 | Status: SHIPPED | OUTPATIENT
Start: 2022-12-19

## 2022-12-21 ENCOUNTER — TELEPHONE (OUTPATIENT)
Dept: CARDIOLOGY | Facility: CLINIC | Age: 42
End: 2022-12-21

## 2022-12-21 NOTE — TELEPHONE ENCOUNTER
Called and notified patient, verbalized understanding    -------------------------------    Beulah Corea APRN Melissa, MA    If he isn't tolerating the 5mg, just tell him to just discontinue the Farxiga and he can talk to Jose Antonio about it more at his follow-up.           Previous Messages     ----- Message -----   From:  DIANA Kohler   Sent: 12/21/2022   1:02 PM EST   To: ANNIA Zambrano   Subject: Farxiga                                           Patient called states the Farxiga lowers his blood sugar. Well he stopped taking it and just started back on the Farxiga, He started with a half pill and states his blood sugar is dropping like crazy. So He is scared to bump up to the full tablet daily.     Please advise

## 2022-12-22 PROCEDURE — 93295 DEV INTERROG REMOTE 1/2/MLT: CPT | Performed by: INTERNAL MEDICINE

## 2022-12-22 PROCEDURE — 93296 REM INTERROG EVL PM/IDS: CPT | Performed by: INTERNAL MEDICINE

## 2022-12-30 ENCOUNTER — OFFICE VISIT (OUTPATIENT)
Dept: CARDIOLOGY | Facility: CLINIC | Age: 42
End: 2022-12-30

## 2022-12-30 VITALS
SYSTOLIC BLOOD PRESSURE: 101 MMHG | WEIGHT: 245 LBS | HEART RATE: 80 BPM | HEIGHT: 67 IN | BODY MASS INDEX: 38.45 KG/M2 | DIASTOLIC BLOOD PRESSURE: 69 MMHG

## 2022-12-30 DIAGNOSIS — E78.2 MIXED HYPERLIPIDEMIA: ICD-10-CM

## 2022-12-30 DIAGNOSIS — R00.2 PALPITATIONS: ICD-10-CM

## 2022-12-30 DIAGNOSIS — I34.0 NONRHEUMATIC MITRAL VALVE REGURGITATION: ICD-10-CM

## 2022-12-30 DIAGNOSIS — Z95.810 AICD (AUTOMATIC CARDIOVERTER/DEFIBRILLATOR) PRESENT: ICD-10-CM

## 2022-12-30 DIAGNOSIS — I10 ESSENTIAL HYPERTENSION: ICD-10-CM

## 2022-12-30 DIAGNOSIS — I42.0 DILATED CARDIOMYOPATHY: Primary | ICD-10-CM

## 2022-12-30 PROCEDURE — 93282 PRGRMG EVAL IMPLANTABLE DFB: CPT | Performed by: INTERNAL MEDICINE

## 2022-12-30 PROCEDURE — 99214 OFFICE O/P EST MOD 30 MIN: CPT | Performed by: INTERNAL MEDICINE

## 2022-12-30 NOTE — PROGRESS NOTES
CC--- cardiomyopathy and ICD in situ      Sub--42-year-old male patient has nonischemic cardiomyopathy and a single-chamber ICD with a [employer] Ri[employer] lead which was later changed to Durata.  Patient EF was below 20% and the latest EF is around 25% on echocardiography done few months ago without significant coronary artery stenosis.  He had a cardiac monitor showing PACs and PVCs and complains of palpitations and comes in for follow-up.  Patient is awaiting back surgery and denies any syncope or unusual palpitations.  Unable to tolerate Farxiga.        My previous history is attached below for reference only which has been reviewed      41-year-old male patient has nonischemic cardiomyopathy and has a single-chamber ICD which was placed several years ago.  He has a [employer] Riata lead which was later changed to a Durata.  Patient ejection fraction has been as low as 14% has improved to 50% and currently estimated around 25%.  He had a catheter cardiac catheterization showing normal coronaries with EF around 50% with elevated left-sided filling pressures.  He is on optimal medical management and has come to establish practice with our EP clinic.  Patient functional class is class II and denies any PND or orthopnea and denies any chest pain or syncope.  He has periodic symptoms of sudden numbness in his extremities and in his lower lip lasting for few seconds with resolution which has happened yesterday.  ICD interrogation during this time did not reveal any arrhythmias.  He had prior ER visits which were inconclusive  He  quit smoking  He has lost 90 pounds by following low-carb diet  feels well          Past Medical History:   Diagnosis Date   • Arrhythmia    • Cardiomyopathy (CMS/HCC)    • Diverticulitis    • Hyperlipidemia    • Palpitations      Past Surgical History:   Procedure Laterality Date   • CARDIAC CATHETERIZATION     • CARDIAC CATHETERIZATION N/A 6/12/2020    Procedure: Left Heart Cath;  Surgeon:  Fernanda Jackson MD;  Location: [facility] CATH INVASIVE LOCATION;  Service: Cardiovascular;  Laterality: N/A;   • CARDIAC CATHETERIZATION N/A 6/12/2020    Procedure: Coronary angiography;  Surgeon: Fernanda Jackson MD;  Location: [facility] CATH INVASIVE LOCATION;  Service: Cardiovascular;  Laterality: N/A;   • CARDIAC DEFIBRILLATOR PLACEMENT     • COLON RESECTION     • INSERT / REPLACE / REMOVE PACEMAKER      ICD   • KNEE ACL RECONSTRUCTION           Physical Exam    General:      well developed, well nourished, in no acute distress.    Head:      normocephalic and atraumatic.    Eyes:      PERRL/EOM intact, conjunctivae and sclerae clear without nystagmus.    Neck:      no  thyromegaly, trachea central with normal respiratory effort  Lungs:      clear bilaterally to auscultation.    Heart:       regular rate and rhythm, S1, S2 without murmurs, rubs, or gallops  Skin:      intact without lesions or rashes.    Psych:      alert and cooperative; normal mood and affect; normal attention span and concentration.          Assessment and plan    Cardiac monitor revealed 4 beat run of PVCs and 7 to 8% of PACs and PVCs without sustained arrhythmias which were reviewed with the patient and family  Nonischemic dilated cardiomyopathy with EF of 25%  Chronic class II systolic heart failure symptoms  Prior history of moderate to severe MR and latest echo shows mild MR  Single-chamber ICD in situ and interrogation attached to chart  Hypertension well controlled  Hyperlipidemia  treated with statins    Normal creatinine with potassium of 3.8 and BNP is 885    Nonsustained VT lasting 14 seconds noted in August without recurrence and patient educated      Patient stable from cardiac standpoint of view and cleared for noncardiac surgery    Follow-up in 6 months and medications reviewed          Electronically signed by Aneesh Rosenberg MD, 12/30/22, 1:31 PM EST.

## 2023-01-19 RX ORDER — PRAVASTATIN SODIUM 20 MG
20 TABLET ORAL
Qty: 90 TABLET | Refills: 2 | Status: SHIPPED | OUTPATIENT
Start: 2023-01-19

## 2023-01-31 PROBLEM — T82.118A ICD (IMPLANTABLE CARDIOVERTER-DEFIBRILLATOR) MALFUNCTION: Status: ACTIVE | Noted: 2019-02-18

## 2023-03-10 ENCOUNTER — OFFICE VISIT (OUTPATIENT)
Dept: CARDIOLOGY | Facility: CLINIC | Age: 43
End: 2023-03-10
Payer: MEDICARE

## 2023-03-10 VITALS
HEIGHT: 67 IN | WEIGHT: 248 LBS | HEART RATE: 78 BPM | DIASTOLIC BLOOD PRESSURE: 71 MMHG | BODY MASS INDEX: 38.92 KG/M2 | RESPIRATION RATE: 18 BRPM | OXYGEN SATURATION: 97 % | SYSTOLIC BLOOD PRESSURE: 110 MMHG

## 2023-03-10 DIAGNOSIS — E78.2 MIXED HYPERLIPIDEMIA: ICD-10-CM

## 2023-03-10 DIAGNOSIS — I10 ESSENTIAL HYPERTENSION: ICD-10-CM

## 2023-03-10 DIAGNOSIS — I34.0 NONRHEUMATIC MITRAL VALVE REGURGITATION: Primary | ICD-10-CM

## 2023-03-10 DIAGNOSIS — I42.0 DILATED CARDIOMYOPATHY: ICD-10-CM

## 2023-03-10 PROCEDURE — 1160F RVW MEDS BY RX/DR IN RCRD: CPT | Performed by: INTERNAL MEDICINE

## 2023-03-10 PROCEDURE — 3078F DIAST BP <80 MM HG: CPT | Performed by: INTERNAL MEDICINE

## 2023-03-10 PROCEDURE — 1159F MED LIST DOCD IN RCRD: CPT | Performed by: INTERNAL MEDICINE

## 2023-03-10 PROCEDURE — 3074F SYST BP LT 130 MM HG: CPT | Performed by: INTERNAL MEDICINE

## 2023-03-10 PROCEDURE — 99214 OFFICE O/P EST MOD 30 MIN: CPT | Performed by: INTERNAL MEDICINE

## 2023-03-10 RX ORDER — MULTIPLE VITAMINS W/ MINERALS TAB 9MG-400MCG
1 TAB ORAL DAILY
COMMUNITY

## 2023-03-10 RX ORDER — OXYCODONE AND ACETAMINOPHEN 10; 325 MG/1; MG/1
TABLET ORAL
COMMUNITY
Start: 2023-02-24 | End: 2023-03-10

## 2023-03-10 RX ORDER — TIZANIDINE 4 MG/1
TABLET ORAL
COMMUNITY
Start: 2023-03-07

## 2023-03-10 RX ORDER — METHOCARBAMOL 500 MG/1
TABLET, FILM COATED ORAL
COMMUNITY
Start: 2023-02-22 | End: 2023-03-10

## 2023-03-10 RX ORDER — CHLORAL HYDRATE 500 MG
CAPSULE ORAL
COMMUNITY

## 2023-03-10 NOTE — PROGRESS NOTES
Cardiology Office Visit      Encounter Date:  03/10/2023    Patient ID:   Brian Stewart is a 42 y.o. male.    Reason For Consultation:  Congestive heart failure  Cardiomyopathy    History of Present Illness:  Dear Santana Garcia MD    I had the pleasure of seeing Brian Stewart today. As you are well aware, this is a 42 y.o. male with past medical history that is significant for nonischemic cardiomyopathy severe cardiomyopathy requiring AICD with some improvement of LV systolic function was stable until last year now he is having worsening symptoms of shortness of breath dyspnea on exertion weight gain    Denies any new cardiac symptoms  No dizziness or syncope  Compliant with medical therapy  Denies any new cardiac symptoms  Complaining of some intermittent palpitations recent emergency room visit    Impressions:  Normal coronaries  Severe cardiomyopathy  Severe LV dysfunction  Elevated left-sided filling pressures  Estimated LV ejection fraction of 15%      Interpretation Summary    · The left ventricular cavity is severely dilated.  · Estimated left ventricular EF = 25% Estimated left ventricular EF was in agreement with the calculated left ventricular EF. Left ventricular systolic function is severely decreased.  · The following left ventricular wall segments are hypokinetic: mid anterior, apical anterior, basal anterolateral, basal inferolateral, basal inferoseptal, mid anteroseptal, basal anterior, basal inferior and basal inferoseptal. The following left ventricular wall segments are akinetic: mid anterolateral, apical lateral, mid inferolateral, apical inferior, mid inferior, apical septal, mid inferoseptal and apex.  · Moderate mitral valve regurgitation is present.  · Left ventricular diastolic function is consistent with (grade I) impaired relaxation.  · Estimated right ventricular systolic pressure from tricuspid regurgitation is normal (<35 mmHg).           Assessment &  "Plan    Impressions:  chronic systolic heart failure/stable  Congestive heart failure NYHA class II/no new complaints  Cardiomyopathy most likely nonischemic  Severe mitral regurgitation nonrheumatic/now moderate  Hypertension  Obesity  Obstructive sleep apnea  Status post ICD with nonsustained ventricular tachycardia  Noncompliance with medical therapy  Nonischemic cardiomyopathy with LV ejection fraction of 25%    Recommendations:  Continue current medical therapy  Patient is stopped taking SGLT2 inhibitors for concerns for hypoglycemia/does not want to take them for now  Use Lasix as needed  Continue spironolactone to 25 mg p.o. once a day  Continue losartan 25 mg p.o. once a day  Risk benefits and alternatives and need for close monitoring and follow-up reviewed and discussed with the patient  Continue current medical management  Low-salt diet  Recent device interrogation with normal function  Repeat echocardiogram to assess LV systolic function and also severity of the mitral regurgitation  Recent labs reviewed and discussed with patient  Medication refills  Labs with the primary care physician office  Continue follow-up with EP for ICD  Continue current medical therapy with aspirin 81 mg p.o. once a day Coreg 12.5 mg p.o. twice daily Farxiga 10 mg p.o. once a day losartan 25 mg p.o. once a day Pravachol 20 mg p.o. once a day spironolactone 25 mg p.o. once a day  Recent evaluation with normal liver  Prior work-up reviewed and discussed with patient  Follow-up in office in 6 months    Objective:    Vitals:  Vitals:    03/10/23 1057   BP: 110/71   BP Location: Left arm   Patient Position: Sitting   Cuff Size: Large Adult   Pulse: 78   Resp: 18   SpO2: 97%   Weight: 112 kg (248 lb)   Height: 170.2 cm (67\")       Physical Exam:    General: Alert, cooperative, no distress, appears stated age  Head:  Normocephalic, atraumatic, mucous membranes moist  Eyes:  Conjunctiva/corneas clear, EOM's intact     Neck:  Supple,  " no adenopathy;      Lungs: Clear to auscultation bilaterally, no wheezes rhonchi rales are noted  Chest wall: No tenderness  Heart::  Regular rate and rhythm, S1 and S2 normal, displaced LV apical impulse device expansile murmur left sternal border  Abdomen: Soft, non-tender, nondistended bowel sounds active  Extremities: No cyanosis, clubbing, or edema  Pulses: 2+ and symmetric all extremities  Skin:  No rashes or lesions  Neuro/psych: A&O x3. CN II through XII are grossly intact with appropriate affect      Allergies:  No Known Allergies    Medication Review:     Current Outpatient Medications:   •  aspirin 81 MG chewable tablet, Chew 1 tablet Daily., Disp: , Rfl:   •  carvedilol (COREG) 12.5 MG tablet, TAKE 1 TABLET BY MOUTH TWICE DAILY WITH MEALS, Disp: 180 tablet, Rfl: 2  •  celecoxib (CeleBREX) 100 MG capsule, , Disp: , Rfl:   •  furosemide (LASIX) 40 MG tablet, Take 1 tablet by mouth Daily As Needed., Disp: , Rfl: 0  •  gabapentin (NEURONTIN) 600 MG tablet, , Disp: , Rfl:   •  HYDROcodone-acetaminophen (NORCO)  MG per tablet, Take 1 tablet by mouth 2 (Two) Times a Day As Needed for Moderate Pain., Disp: , Rfl:   •  losartan (COZAAR) 25 MG tablet, TAKE 1 TABLET BY MOUTH DAILY, Disp: 90 tablet, Rfl: 2  •  Magnesium Oxide 400 MG capsule, Take 800 mg by mouth Daily., Disp: 30 each, Rfl: 0  •  multivitamin with minerals tablet tablet, Take 1 tablet by mouth Daily., Disp: , Rfl:   •  nitroglycerin (NITROSTAT) 0.4 MG SL tablet, Place 1 tablet under the tongue Every 5 (Five) Minutes As Needed for Chest Pain. Take no more than 3 doses in 15 minutes., Disp: 30 tablet, Rfl: 2  •  Omega-3 Fatty Acids (fish oil) 1000 MG capsule capsule, Take  by mouth Daily With Breakfast., Disp: , Rfl:   •  pravastatin (PRAVACHOL) 20 MG tablet, TAKE 1 TABLET BY MOUTH EVERY NIGHT AT BEDTIME, Disp: 90 tablet, Rfl: 2  •  spironolactone (ALDACTONE) 25 MG tablet, TAKE 1 TABLET BY MOUTH DAILY, Disp: 90 tablet, Rfl: 2  •  tiZANidine  (ZANAFLEX) 4 MG tablet, , Disp: , Rfl:     Family History:  Family History   Problem Relation Age of Onset   • Heart disease Father    • Hypertension Mother        Past Medical History:  Past Medical History:   Diagnosis Date   • Arrhythmia    • Cardiomyopathy (HCC)    • Diverticulitis    • Hyperlipidemia    • Palpitations        Past surgical History:  Past Surgical History:   Procedure Laterality Date   • BACK SURGERY     • CARDIAC CATHETERIZATION     • CARDIAC CATHETERIZATION N/A 06/12/2020    Procedure: Left Heart Cath;  Surgeon: Fernanda Jackson MD;  Location:  [facility];  Service: Cardiovascular;  Laterality: N/A;   • CARDIAC CATHETERIZATION N/A 06/12/2020    Procedure: Coronary angiography;  Surgeon: Fernanda Jackson MD;  Location:  [facility];  Service: Cardiovascular;  Laterality: N/A;   • CARDIAC DEFIBRILLATOR PLACEMENT     • COLON RESECTION     • INSERT / REPLACE / REMOVE PACEMAKER      ICD   • KNEE ACL RECONSTRUCTION         Social History:  Social History     Socioeconomic History   • Marital status: Single   Tobacco Use   • Smoking status: Former     Packs/day: 1.50     Types: Cigarettes   • Smokeless tobacco: Former     Types: Snuff   • Tobacco comments:     QUIT SMOKING 5/20   Vaping Use   • Vaping Use: Never used   Substance and Sexual Activity   • Alcohol use: No   • Drug use: Yes     Types: Marijuana   • Sexual activity: Yes     Partners: Female       Review of Systems:  The following systems were reviewed as they relate to the cardiovascular system: Constitutional, Eyes, ENT, Cardiovascular, Respiratory, Gastrointestinal, Integumentary, Neurological, Psychiatric, Hematologic, Endocrine, Musculoskeletal, and Genitourinary. The pertinent cardiovascular findings are reported above with all other cardiovascular points within those systems being negative.    Diagnostic Study Review:     Current Electrocardiogram:  Procedures      NOTE: The following  portions of the patient's history were reviewed and updated this visit as appropriate: allergies, current medications, past family history, past medical history, past social history, past surgical history and problem list.

## 2023-03-20 ENCOUNTER — HOSPITAL ENCOUNTER (OUTPATIENT)
Dept: CARDIOLOGY | Facility: HOSPITAL | Age: 43
Discharge: HOME | End: 2023-03-20
Admitting: INTERNAL MEDICINE
Payer: MEDICARE

## 2023-03-20 VITALS — HEIGHT: 67 IN | WEIGHT: 244 LBS | BODY MASS INDEX: 38.3 KG/M2

## 2023-03-20 DIAGNOSIS — I34.0 NONRHEUMATIC MITRAL VALVE REGURGITATION: ICD-10-CM

## 2023-03-20 DIAGNOSIS — I42.0 DILATED CARDIOMYOPATHY: ICD-10-CM

## 2023-03-20 PROCEDURE — 93306 TTE W/DOPPLER COMPLETE: CPT | Performed by: INTERNAL MEDICINE

## 2023-03-20 PROCEDURE — 93306 TTE W/DOPPLER COMPLETE: CPT

## 2023-03-22 LAB
BH CV ECHO MEAS - ACS: 2.3 CM
BH CV ECHO MEAS - AO MAX PG: 4.3 MMHG
BH CV ECHO MEAS - AO MEAN PG: 2.44 MMHG
BH CV ECHO MEAS - AO ROOT DIAM: 2.9 CM
BH CV ECHO MEAS - AO V2 MAX: 103.2 CM/SEC
BH CV ECHO MEAS - AO V2 VTI: 18 CM
BH CV ECHO MEAS - AVA(I,D): 4.4 CM2
BH CV ECHO MEAS - EDV(CUBED): 319.1 ML
BH CV ECHO MEAS - EDV(MOD-SP4): 218.5 ML
BH CV ECHO MEAS - EF(MOD-BP): 26 %
BH CV ECHO MEAS - EF(MOD-SP4): 25.5 %
BH CV ECHO MEAS - ESV(CUBED): 212.1 ML
BH CV ECHO MEAS - ESV(MOD-SP4): 162.8 ML
BH CV ECHO MEAS - FS: 12.7 %
BH CV ECHO MEAS - IVS/LVPW: 0.86 CM
BH CV ECHO MEAS - IVSD: 1.05 CM
BH CV ECHO MEAS - LA DIMENSION: 4 CM
BH CV ECHO MEAS - LV MASS(C)D: 365.6 GRAMS
BH CV ECHO MEAS - LV MAX PG: 3.7 MMHG
BH CV ECHO MEAS - LV MEAN PG: 2.16 MMHG
BH CV ECHO MEAS - LV V1 MAX: 96 CM/SEC
BH CV ECHO MEAS - LV V1 VTI: 17.3 CM
BH CV ECHO MEAS - LVIDD: 6.8 CM
BH CV ECHO MEAS - LVIDS: 6 CM
BH CV ECHO MEAS - LVOT AREA: 4.6 CM2
BH CV ECHO MEAS - LVOT DIAM: 2.41 CM
BH CV ECHO MEAS - LVPWD: 1.23 CM
BH CV ECHO MEAS - MR MAX PG: 90.8 MMHG
BH CV ECHO MEAS - MR MAX VEL: 476.4 CM/SEC
BH CV ECHO MEAS - MV A MAX VEL: 69.5 CM/SEC
BH CV ECHO MEAS - MV DEC SLOPE: 206.7 CM/SEC2
BH CV ECHO MEAS - MV DEC TIME: 0.24 MSEC
BH CV ECHO MEAS - MV E MAX VEL: 50.1 CM/SEC
BH CV ECHO MEAS - MV E/A: 0.72
BH CV ECHO MEAS - MV MAX PG: 3 MMHG
BH CV ECHO MEAS - MV MEAN PG: 1.32 MMHG
BH CV ECHO MEAS - MV V2 VTI: 15.5 CM
BH CV ECHO MEAS - MVA(VTI): 5.1 CM2
BH CV ECHO MEAS - PA V2 MAX: 103.5 CM/SEC
BH CV ECHO MEAS - PULM A REVS DUR: 0.12 SEC
BH CV ECHO MEAS - PULM A REVS VEL: 30.7 CM/SEC
BH CV ECHO MEAS - PULM DIAS VEL: 34.4 CM/SEC
BH CV ECHO MEAS - PULM S/D: 1.16
BH CV ECHO MEAS - PULM SYS VEL: 39.7 CM/SEC
BH CV ECHO MEAS - RV MAX PG: 2.39 MMHG
BH CV ECHO MEAS - RV V1 MAX: 77.2 CM/SEC
BH CV ECHO MEAS - RV V1 VTI: 13.6 CM
BH CV ECHO MEAS - RVDD: 2.43 CM
BH CV ECHO MEAS - SV(LVOT): 79 ML
BH CV ECHO MEAS - SV(MOD-SP4): 55.7 ML
BH CV ECHO MEAS - TR MAX PG: 19.7 MMHG
BH CV ECHO MEAS - TR MAX VEL: 221.6 CM/SEC
MAXIMAL PREDICTED HEART RATE: 178 BPM
STRESS TARGET HR: 151 BPM

## 2023-03-23 PROCEDURE — 93296 REM INTERROG EVL PM/IDS: CPT | Performed by: INTERNAL MEDICINE

## 2023-03-23 PROCEDURE — 93295 DEV INTERROG REMOTE 1/2/MLT: CPT | Performed by: INTERNAL MEDICINE

## 2023-03-24 ENCOUNTER — TELEPHONE (OUTPATIENT)
Dept: CARDIOLOGY | Facility: CLINIC | Age: 43
End: 2023-03-24
Payer: MEDICARE

## 2023-03-24 NOTE — TELEPHONE ENCOUNTER
OK FOR HUB TO RELEASE    Echocardiogram results    Fernanda Jackson MD        LV function still looks same as before 25 to 30%   Continue same therapy and follow-up as scheduled

## 2023-08-05 ENCOUNTER — HOSPITAL ENCOUNTER (OUTPATIENT)
Facility: HOSPITAL | Age: 43
Setting detail: OBSERVATION
Discharge: HOME | End: 2023-08-07
Attending: EMERGENCY MEDICINE | Admitting: EMERGENCY MEDICINE
Payer: MEDICARE

## 2023-08-05 ENCOUNTER — APPOINTMENT (OUTPATIENT)
Dept: GENERAL RADIOLOGY | Facility: HOSPITAL | Age: 43
End: 2023-08-05
Payer: MEDICARE

## 2023-08-05 ENCOUNTER — APPOINTMENT (OUTPATIENT)
Dept: CT IMAGING | Facility: HOSPITAL | Age: 43
End: 2023-08-05
Payer: MEDICARE

## 2023-08-05 DIAGNOSIS — R10.9 ABDOMINAL WALL PAIN: ICD-10-CM

## 2023-08-05 DIAGNOSIS — R07.9 CHEST PAIN, UNSPECIFIED TYPE: ICD-10-CM

## 2023-08-05 DIAGNOSIS — I50.41 ACUTE COMBINED SYSTOLIC AND DIASTOLIC CONGESTIVE HEART FAILURE: Primary | ICD-10-CM

## 2023-08-05 DIAGNOSIS — R06.02 SHORTNESS OF BREATH: ICD-10-CM

## 2023-08-05 DIAGNOSIS — R05.1 ACUTE COUGH: ICD-10-CM

## 2023-08-05 PROBLEM — I50.9 ACUTE CHF (CONGESTIVE HEART FAILURE): Status: ACTIVE | Noted: 2023-08-05

## 2023-08-05 LAB
ALBUMIN SERPL-MCNC: 4.1 G/DL (ref 3.5–5.2)
ALBUMIN/GLOB SERPL: 1.9 G/DL
ALP SERPL-CCNC: 75 U/L (ref 39–117)
ALT SERPL W P-5'-P-CCNC: 25 U/L (ref 1–41)
ANION GAP SERPL CALCULATED.3IONS-SCNC: 10 MMOL/L (ref 5–15)
AST SERPL-CCNC: 40 U/L (ref 1–40)
B PARAPERT DNA SPEC QL NAA+PROBE: NOT DETECTED
B PERT DNA SPEC QL NAA+PROBE: NOT DETECTED
BASOPHILS # BLD AUTO: 0.1 10*3/MM3 (ref 0–0.2)
BASOPHILS NFR BLD AUTO: 0.7 % (ref 0–1.5)
BILIRUB SERPL-MCNC: 0.3 MG/DL (ref 0–1.2)
BUN SERPL-MCNC: 19 MG/DL (ref 6–20)
BUN/CREAT SERPL: 20.2 (ref 7–25)
C PNEUM DNA NPH QL NAA+NON-PROBE: NOT DETECTED
CALCIUM SPEC-SCNC: 9.1 MG/DL (ref 8.6–10.5)
CHLORIDE SERPL-SCNC: 104 MMOL/L (ref 98–107)
CO2 SERPL-SCNC: 25 MMOL/L (ref 22–29)
CREAT SERPL-MCNC: 0.94 MG/DL (ref 0.76–1.27)
D DIMER PPP FEU-MCNC: 0.23 MG/L (FEU) (ref 0–0.5)
DEPRECATED RDW RBC AUTO: 42.4 FL (ref 37–54)
EGFRCR SERPLBLD CKD-EPI 2021: 103.8 ML/MIN/1.73
EOSINOPHIL # BLD AUTO: 0.1 10*3/MM3 (ref 0–0.4)
EOSINOPHIL NFR BLD AUTO: 1.6 % (ref 0.3–6.2)
ERYTHROCYTE [DISTWIDTH] IN BLOOD BY AUTOMATED COUNT: 12.8 % (ref 12.3–15.4)
FLUAV SUBTYP SPEC NAA+PROBE: NOT DETECTED
FLUBV RNA ISLT QL NAA+PROBE: NOT DETECTED
GLOBULIN UR ELPH-MCNC: 2.2 GM/DL
GLUCOSE SERPL-MCNC: 115 MG/DL (ref 65–99)
HADV DNA SPEC NAA+PROBE: NOT DETECTED
HCOV 229E RNA SPEC QL NAA+PROBE: NOT DETECTED
HCOV HKU1 RNA SPEC QL NAA+PROBE: NOT DETECTED
HCOV NL63 RNA SPEC QL NAA+PROBE: NOT DETECTED
HCOV OC43 RNA SPEC QL NAA+PROBE: NOT DETECTED
HCT VFR BLD AUTO: 45.5 % (ref 37.5–51)
HGB BLD-MCNC: 15.3 G/DL (ref 13–17.7)
HMPV RNA NPH QL NAA+NON-PROBE: NOT DETECTED
HPIV1 RNA ISLT QL NAA+PROBE: NOT DETECTED
HPIV2 RNA SPEC QL NAA+PROBE: NOT DETECTED
HPIV3 RNA NPH QL NAA+PROBE: NOT DETECTED
HPIV4 P GENE NPH QL NAA+PROBE: NOT DETECTED
LYMPHOCYTES # BLD AUTO: 2.1 10*3/MM3 (ref 0.7–3.1)
LYMPHOCYTES NFR BLD AUTO: 23.3 % (ref 19.6–45.3)
M PNEUMO IGG SER IA-ACNC: NOT DETECTED
MCH RBC QN AUTO: 32.1 PG (ref 26.6–33)
MCHC RBC AUTO-ENTMCNC: 33.6 G/DL (ref 31.5–35.7)
MCV RBC AUTO: 95.6 FL (ref 79–97)
MONOCYTES # BLD AUTO: 0.7 10*3/MM3 (ref 0.1–0.9)
MONOCYTES NFR BLD AUTO: 7.2 % (ref 5–12)
NEUTROPHILS NFR BLD AUTO: 6.1 10*3/MM3 (ref 1.7–7)
NEUTROPHILS NFR BLD AUTO: 67.2 % (ref 42.7–76)
NRBC BLD AUTO-RTO: 0.1 /100 WBC (ref 0–0.2)
NT-PROBNP SERPL-MCNC: 979.4 PG/ML (ref 0–450)
PLATELET # BLD AUTO: 187 10*3/MM3 (ref 140–450)
PMV BLD AUTO: 8.3 FL (ref 6–12)
POTASSIUM SERPL-SCNC: 4.2 MMOL/L (ref 3.5–5.2)
PROT SERPL-MCNC: 6.3 G/DL (ref 6–8.5)
RBC # BLD AUTO: 4.76 10*6/MM3 (ref 4.14–5.8)
RHINOVIRUS RNA SPEC NAA+PROBE: NOT DETECTED
RSV RNA NPH QL NAA+NON-PROBE: NOT DETECTED
SARS-COV-2 RNA NPH QL NAA+NON-PROBE: NOT DETECTED
SODIUM SERPL-SCNC: 139 MMOL/L (ref 136–145)
TROPONIN T SERPL HS-MCNC: 9 NG/L
TSH SERPL DL<=0.05 MIU/L-ACNC: 0.79 UIU/ML (ref 0.27–4.2)
WBC NRBC COR # BLD: 9.1 10*3/MM3 (ref 3.4–10.8)

## 2023-08-05 PROCEDURE — 83880 ASSAY OF NATRIURETIC PEPTIDE: CPT | Performed by: EMERGENCY MEDICINE

## 2023-08-05 PROCEDURE — 96374 THER/PROPH/DIAG INJ IV PUSH: CPT

## 2023-08-05 PROCEDURE — 85025 COMPLETE CBC W/AUTO DIFF WBC: CPT | Performed by: EMERGENCY MEDICINE

## 2023-08-05 PROCEDURE — 93005 ELECTROCARDIOGRAM TRACING: CPT | Performed by: EMERGENCY MEDICINE

## 2023-08-05 PROCEDURE — G0378 HOSPITAL OBSERVATION PER HR: HCPCS

## 2023-08-05 PROCEDURE — 85379 FIBRIN DEGRADATION QUANT: CPT | Performed by: EMERGENCY MEDICINE

## 2023-08-05 PROCEDURE — 84443 ASSAY THYROID STIM HORMONE: CPT | Performed by: EMERGENCY MEDICINE

## 2023-08-05 PROCEDURE — 74176 CT ABD & PELVIS W/O CONTRAST: CPT

## 2023-08-05 PROCEDURE — 80053 COMPREHEN METABOLIC PANEL: CPT | Performed by: EMERGENCY MEDICINE

## 2023-08-05 PROCEDURE — 93005 ELECTROCARDIOGRAM TRACING: CPT

## 2023-08-05 PROCEDURE — 99284 EMERGENCY DEPT VISIT MOD MDM: CPT

## 2023-08-05 PROCEDURE — 71045 X-RAY EXAM CHEST 1 VIEW: CPT

## 2023-08-05 PROCEDURE — 84484 ASSAY OF TROPONIN QUANT: CPT | Performed by: EMERGENCY MEDICINE

## 2023-08-05 PROCEDURE — 0202U NFCT DS 22 TRGT SARS-COV-2: CPT | Performed by: EMERGENCY MEDICINE

## 2023-08-05 RX ORDER — SODIUM CHLORIDE 0.9 % (FLUSH) 0.9 %
10 SYRINGE (ML) INJECTION EVERY 12 HOURS SCHEDULED
Status: DISCONTINUED | OUTPATIENT
Start: 2023-08-05 | End: 2023-08-07 | Stop reason: HOSPADM

## 2023-08-05 RX ORDER — BISACODYL 5 MG/1
5 TABLET, DELAYED RELEASE ORAL DAILY PRN
Status: DISCONTINUED | OUTPATIENT
Start: 2023-08-05 | End: 2023-08-07 | Stop reason: HOSPADM

## 2023-08-05 RX ORDER — SODIUM CHLORIDE 0.9 % (FLUSH) 0.9 %
10 SYRINGE (ML) INJECTION AS NEEDED
Status: DISCONTINUED | OUTPATIENT
Start: 2023-08-05 | End: 2023-08-07 | Stop reason: HOSPADM

## 2023-08-05 RX ORDER — ONDANSETRON 2 MG/ML
4 INJECTION INTRAMUSCULAR; INTRAVENOUS EVERY 6 HOURS PRN
Status: DISCONTINUED | OUTPATIENT
Start: 2023-08-05 | End: 2023-08-06

## 2023-08-05 RX ORDER — BUMETANIDE 0.25 MG/ML
2 INJECTION INTRAMUSCULAR; INTRAVENOUS ONCE
Status: COMPLETED | OUTPATIENT
Start: 2023-08-05 | End: 2023-08-05

## 2023-08-05 RX ORDER — CHOLECALCIFEROL (VITAMIN D3) 125 MCG
5 CAPSULE ORAL NIGHTLY PRN
Status: DISCONTINUED | OUTPATIENT
Start: 2023-08-05 | End: 2023-08-07 | Stop reason: HOSPADM

## 2023-08-05 RX ORDER — POLYETHYLENE GLYCOL 3350 17 G/17G
17 POWDER, FOR SOLUTION ORAL DAILY PRN
Status: DISCONTINUED | OUTPATIENT
Start: 2023-08-05 | End: 2023-08-07 | Stop reason: HOSPADM

## 2023-08-05 RX ORDER — HYDROCODONE BITARTRATE AND ACETAMINOPHEN 10; 325 MG/1; MG/1
1 TABLET ORAL EVERY 6 HOURS PRN
Status: DISCONTINUED | OUTPATIENT
Start: 2023-08-05 | End: 2023-08-06

## 2023-08-05 RX ORDER — SODIUM CHLORIDE 9 MG/ML
40 INJECTION, SOLUTION INTRAVENOUS AS NEEDED
Status: DISCONTINUED | OUTPATIENT
Start: 2023-08-05 | End: 2023-08-07 | Stop reason: HOSPADM

## 2023-08-05 RX ORDER — BISACODYL 10 MG
10 SUPPOSITORY, RECTAL RECTAL DAILY PRN
Status: DISCONTINUED | OUTPATIENT
Start: 2023-08-05 | End: 2023-08-07 | Stop reason: HOSPADM

## 2023-08-05 RX ORDER — AMOXICILLIN 250 MG
2 CAPSULE ORAL 2 TIMES DAILY
Status: DISCONTINUED | OUTPATIENT
Start: 2023-08-05 | End: 2023-08-07 | Stop reason: HOSPADM

## 2023-08-05 RX ORDER — ACETAMINOPHEN 325 MG/1
650 TABLET ORAL EVERY 4 HOURS PRN
Status: DISCONTINUED | OUTPATIENT
Start: 2023-08-05 | End: 2023-08-06

## 2023-08-05 RX ADMIN — NITROGLYCERIN 1 INCH: 20 OINTMENT TOPICAL at 20:30

## 2023-08-05 RX ADMIN — HYDROCODONE BITARTRATE AND ACETAMINOPHEN 1 TABLET: 10; 325 TABLET ORAL at 20:40

## 2023-08-05 RX ADMIN — Medication 10 ML: at 21:33

## 2023-08-05 RX ADMIN — SENNOSIDES AND DOCUSATE SODIUM 2 TABLET: 50; 8.6 TABLET ORAL at 21:33

## 2023-08-05 RX ADMIN — BUMETANIDE 2 MG: 0.25 INJECTION INTRAMUSCULAR; INTRAVENOUS at 20:25

## 2023-08-05 NOTE — ED PROVIDER NOTES
Subjective   History of Present Illness  42-year-old male complaining of increasing shortness of breath and cough.  He states the cough has been productive only of white clear sputum he states he is a marked recent decline in exercise tolerance and reports that he has had some chest tightness with exertion.  He is followed by Dr. Jose Antonio Acosta.  He reports no fever or chills.  He states that he has been mildly diaphoretic with exertion.  He reports he has had some palpitations.  He states he has not had much lower extremity swelling but states that he feels like he has swelling in his abdominal area.  He states he is taken some leftover diuretics this week because he felt like he was retaining fluid he does not report a lot of benefit  Review of Systems    Past Medical History:   Diagnosis Date    Arrhythmia     Cardiomyopathy     Diverticulitis     Hyperlipidemia     Palpitations      Patient has an extensive history of impaired ejection fraction and reports has been as low as 11 he has had a AICD placed.  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  The patient states that he was hospitalized at [facility] about a year ago and his diuretics were discontinued.  He reports his last EF he thought was in the 20-30 range  No Known Allergies    Past Surgical History:   Procedure Laterality Date    BACK SURGERY      CARDIAC CATHETERIZATION      CARDIAC CATHETERIZATION N/A 06/12/2020    Procedure: Left Heart Cath;  Surgeon: Fernanda Jackson MD;  Location: [facility] CATH INVASIVE LOCATION;  Service: Cardiovascular;  Laterality: N/A;    CARDIAC CATHETERIZATION N/A 06/12/2020    Procedure: Coronary angiography;  Surgeon: Fernanda Jackson MD;  Location: [facility] CATH INVASIVE LOCATION;  Service: Cardiovascular;  Laterality: N/A;    CARDIAC DEFIBRILLATOR PLACEMENT      COLON RESECTION      INSERT / REPLACE / REMOVE PACEMAKER      ICD    KNEE ACL RECONSTRUCTION         Family History   Problem  Relation Age of Onset    Heart disease Father     Hypertension Mother        Social History     Socioeconomic History    Marital status: Single   Tobacco Use    Smoking status: Former     Packs/day: 1.50     Types: Cigarettes    Smokeless tobacco: Former     Types: Snuff    Tobacco comments:     QUIT SMOKING 5/20   Vaping Use    Vaping Use: Never used   Substance and Sexual Activity    Alcohol use: No    Drug use: Yes     Types: Marijuana    Sexual activity: Yes     Partners: Female   States that he smokes marijuana and has been a non-smoker for about 3 years.  He has an approximate 30-pack-year history        Objective   Physical Exam  Alert Nasir Coma Scale 15   HEENT: Pupils equal and reactive to light. Conjunctivae are not injected. Normal tympanic membranes. Oropharynx and nares are normal.   Neck: Supple. Midline trachea. No JVD. No goiter.   Chest: Bibasilar rales are identified and equal breath sounds bilaterally, regular rate and rhythm without murmur or rub.  There is an S3   Abdomen: Positive bowel sounds, nontender, nondistended. No rebound or peritoneal signs. No CVA tenderness.  No asterixis or fluid wave  Extremities no clubbing. cyanosis or edema. Motor sensory exam is normal. The full range of motion is intact   Skin: Warm and dry, no rashes or petechia.   Lymphatic: No regional lymphadenopathy. No calf pain, swelling or Homans sign    Procedures           ED Course      Labs Reviewed   COMPREHENSIVE METABOLIC PANEL - Abnormal; Notable for the following components:       Result Value    Glucose 115 (*)     All other components within normal limits    Narrative:     GFR Normal >60  Chronic Kidney Disease <60  Kidney Failure <15     BNP (IN-HOUSE) - Abnormal; Notable for the following components:    proBNP 979.4 (*)     All other components within normal limits    Narrative:     Among patients with dyspnea, NT-proBNP is highly sensitive for the detection of acute congestive heart failure. In  addition NT-proBNP of <300 pg/ml effectively rules out acute congestive heart failure with 99% negative predictive value.     RESPIRATORY PANEL PCR W/ COVID-19 (SARS-COV-2) [facility] IN-HOUSE, NP [facility] IN [facility], 3-4 HR TAT - Normal    Narrative:     In the setting of a positive respiratory panel with a viral infection PLUS a negative procalcitonin without other underlying concern for bacterial infection, consider observing off antibiotics or discontinuation of antibiotics and continue supportive care. If the respiratory panel is positive for atypical bacterial infection (Bordetella pertussis, Chlamydophila pneumoniae, or Mycoplasma pneumoniae), consider antibiotic de-escalation to target atypical bacterial infection.   SINGLE HSTROPONIN T - Normal    Narrative:     High Sensitive Troponin T Reference Range:  <10.0 ng/L- Negative Female for AMI  <15.0 ng/L- Negative Male for AMI  >=10 - Abnormal Female indicating possible myocardial injury.  >=15 - Abnormal Male indicating possible myocardial injury.   Clinicians would have to utilize clinical acumen, EKG, Troponin, and serial changes to determine if it is an Acute Myocardial Infarction or myocardial injury due to an underlying chronic condition.        D-DIMER, QUANTITATIVE - Normal    Narrative:     According to the assay 's published package insert, a normal (<0.50 mg/L (FEU)) D-dimer result in conjunction with a non-high clinical probability assessment, excludes deep vein thrombosis (DVT) and pulmonary embolism (PE) with high sensitivity.    D-dimer values increase with age and this can make VTE exclusion of an older population difficult. To address this, the [employer], based on best available evidence and recent guidelines, recommends that clinicians use age-adjusted D-dimer thresholds in patients greater than 50 years of age with: a) a low probability of PE who do not meet all Pulmonary Embolism Rule Out  "Criteria, or b) in those with intermediate probability of PE.   The formula for an age-adjusted D-dimer cut-off is \"age/100\".  For example, a 60 year old patient would have an age-adjusted cut-off of 0.60 mg/L (FEU) and an 80 year old 0.80 mg/L (FEU).   TSH - Normal   CBC WITH AUTO DIFFERENTIAL - Normal   CBC AND DIFFERENTIAL    Narrative:     The following orders were created for panel order CBC & Differential.  Procedure                               Abnormality         Status                     ---------                               -----------         ------                     CBC Auto Differential[[id number]]        Normal              Final result                 Please view results for these tests on the individual orders.     Medications   nitroglycerin (NITROSTAT) ointment 1 inch (has no administration in time range)   bumetanide (BUMEX) injection 2 mg (has no administration in time range)     CT Abdomen Pelvis Without Contrast    Result Date: 8/5/2023  1. No evidence of ascites 2. No acute intra-abdominal or intrapelvic abnormality appreciated 3. Other findings as above Electronically Signed: Cruz Hidalgo  8/5/2023 7:05 PM EDT  Workstation ID: [id number]    XR Chest 1 View    Result Date: 8/5/2023  Impression: 1. Stable cardiomegaly accentuated by portable technique 2. Lungs are clear Electronically Signed: Cruz Hidalgo  8/5/2023 6:18 PM EDT  Workstation ID: [id number]                                        Medical Decision Making  The patient had Nitropaste applied and was given Bumex.  He will be admitted to observation to facilitate evaluation and cardiology consultation the patient will have a echocardiogram.  The patient was agreeable to this plan of treatment    Problems Addressed:  Abdominal wall pain: complicated acute illness or injury  Acute combined systolic and diastolic congestive heart failure: complicated acute illness or injury  Acute cough: complicated acute illness or injury  Chest " pain, unspecified type: complicated acute illness or injury    Amount and/or Complexity of Data Reviewed  Labs: ordered. Decision-making details documented in ED Course.  Radiology: ordered and independent interpretation performed.  ECG/medicine tests: ordered and independent interpretation performed.     Details: Left bundle branch block pattern with first-degree AV block.  Comparison from November 2022 showed an incomplete left bundle branch block and normal [place] interval    Risk  OTC drugs.  Prescription drug management.  Decision regarding hospitalization.  Risk Details: Risk of progression of congestive heart failure        Final diagnoses:   Acute combined systolic and diastolic congestive heart failure   Chest pain, unspecified type   Acute cough   Abdominal wall pain       ED Disposition  ED Disposition       ED Disposition   Decision to Admit    Condition   --    Comment   --               No follow-up provider specified.       Medication List      No changes were made to your prescriptions during this visit.            Preston Chaudhry MD  08/05/23 1957

## 2023-08-06 ENCOUNTER — APPOINTMENT (OUTPATIENT)
Dept: CARDIOLOGY | Facility: HOSPITAL | Age: 43
End: 2023-08-06
Payer: MEDICARE

## 2023-08-06 LAB
ANION GAP SERPL CALCULATED.3IONS-SCNC: 13 MMOL/L (ref 5–15)
BASOPHILS # BLD AUTO: 0 10*3/MM3 (ref 0–0.2)
BASOPHILS NFR BLD AUTO: 0.4 % (ref 0–1.5)
BH CV ECHO MEAS - ACS: 2.3 CM
BH CV ECHO MEAS - AO MAX PG: 3.6 MMHG
BH CV ECHO MEAS - AO MEAN PG: 2 MMHG
BH CV ECHO MEAS - AO ROOT DIAM: 3.2 CM
BH CV ECHO MEAS - AO V2 MAX: 94.7 CM/SEC
BH CV ECHO MEAS - AO V2 VTI: 21 CM
BH CV ECHO MEAS - AVA(I,D): 2.19 CM2
BH CV ECHO MEAS - EDV(CUBED): 195.1 ML
BH CV ECHO MEAS - EDV(MOD-SP4): 170 ML
BH CV ECHO MEAS - EF(MOD-BP): 29 %
BH CV ECHO MEAS - EF(MOD-SP4): 29.4 %
BH CV ECHO MEAS - ESV(CUBED): 166.4 ML
BH CV ECHO MEAS - ESV(MOD-SP4): 120 ML
BH CV ECHO MEAS - FS: 5.2 %
BH CV ECHO MEAS - IVS/LVPW: 1.22 CM
BH CV ECHO MEAS - IVSD: 1.1 CM
BH CV ECHO MEAS - LA DIMENSION: 4.2 CM
BH CV ECHO MEAS - LAT PEAK E' VEL: 4.9 CM/SEC
BH CV ECHO MEAS - LV DIASTOLIC VOL/BSA (35-75): 76 CM2
BH CV ECHO MEAS - LV MASS(C)D: 233.1 GRAMS
BH CV ECHO MEAS - LV MAX PG: 0.76 MMHG
BH CV ECHO MEAS - LV MEAN PG: 0 MMHG
BH CV ECHO MEAS - LV SYSTOLIC VOL/BSA (12-30): 53.7 CM2
BH CV ECHO MEAS - LV V1 MAX: 43.7 CM/SEC
BH CV ECHO MEAS - LV V1 VTI: 9.4 CM
BH CV ECHO MEAS - LVIDD: 5.8 CM
BH CV ECHO MEAS - LVIDS: 5.5 CM
BH CV ECHO MEAS - LVOT AREA: 4.9 CM2
BH CV ECHO MEAS - LVOT DIAM: 2.5 CM
BH CV ECHO MEAS - LVPWD: 0.9 CM
BH CV ECHO MEAS - MED PEAK E' VEL: 6.1 CM/SEC
BH CV ECHO MEAS - MR MAX PG: 74.3 MMHG
BH CV ECHO MEAS - MR MAX VEL: 431 CM/SEC
BH CV ECHO MEAS - MV A MAX VEL: 68.8 CM/SEC
BH CV ECHO MEAS - MV DEC SLOPE: 368 CM/SEC2
BH CV ECHO MEAS - MV DEC TIME: 0.19 MSEC
BH CV ECHO MEAS - MV E MAX VEL: 60.4 CM/SEC
BH CV ECHO MEAS - MV E/A: 0.88
BH CV ECHO MEAS - MV MAX PG: 2.04 MMHG
BH CV ECHO MEAS - MV MEAN PG: 1 MMHG
BH CV ECHO MEAS - MV P1/2T: 52.5 MSEC
BH CV ECHO MEAS - MV V2 VTI: 20.7 CM
BH CV ECHO MEAS - MVA(P1/2T): 4.2 CM2
BH CV ECHO MEAS - MVA(VTI): 2.22 CM2
BH CV ECHO MEAS - PA V2 MAX: 82.2 CM/SEC
BH CV ECHO MEAS - RAP SYSTOLE: 8 MMHG
BH CV ECHO MEAS - RV MAX PG: 0.8 MMHG
BH CV ECHO MEAS - RV V1 MAX: 44.7 CM/SEC
BH CV ECHO MEAS - RV V1 VTI: 10.8 CM
BH CV ECHO MEAS - RVSP: 24.2 MMHG
BH CV ECHO MEAS - SI(MOD-SP4): 22.4 ML/M2
BH CV ECHO MEAS - SV(LVOT): 46 ML
BH CV ECHO MEAS - SV(MOD-SP4): 50 ML
BH CV ECHO MEAS - TAPSE (>1.6): 2.38 CM
BH CV ECHO MEAS - TR MAX PG: 16.2 MMHG
BH CV ECHO MEAS - TR MAX VEL: 201 CM/SEC
BH CV ECHO MEASUREMENTS AVERAGE E/E' RATIO: 10.98
BH CV XLRA - TDI S': 8.1 CM/SEC
BUN SERPL-MCNC: 18 MG/DL (ref 6–20)
BUN/CREAT SERPL: 20.5 (ref 7–25)
CALCIUM SPEC-SCNC: 8.9 MG/DL (ref 8.6–10.5)
CHLORIDE SERPL-SCNC: 103 MMOL/L (ref 98–107)
CHOLEST SERPL-MCNC: 172 MG/DL (ref 0–200)
CO2 SERPL-SCNC: 23 MMOL/L (ref 22–29)
CREAT SERPL-MCNC: 0.88 MG/DL (ref 0.76–1.27)
DEPRECATED RDW RBC AUTO: 42.9 FL (ref 37–54)
EGFRCR SERPLBLD CKD-EPI 2021: 110.1 ML/MIN/1.73
EOSINOPHIL # BLD AUTO: 0.2 10*3/MM3 (ref 0–0.4)
EOSINOPHIL NFR BLD AUTO: 2.2 % (ref 0.3–6.2)
ERYTHROCYTE [DISTWIDTH] IN BLOOD BY AUTOMATED COUNT: 12.5 % (ref 12.3–15.4)
GLUCOSE SERPL-MCNC: 117 MG/DL (ref 65–99)
HCT VFR BLD AUTO: 46.9 % (ref 37.5–51)
HDLC SERPL-MCNC: 47 MG/DL (ref 40–60)
HGB BLD-MCNC: 15.9 G/DL (ref 13–17.7)
LDLC SERPL CALC-MCNC: 101 MG/DL (ref 0–100)
LDLC/HDLC SERPL: 2.09 {RATIO}
LEFT ATRIUM VOLUME INDEX: 22.8 ML/M2
LYMPHOCYTES # BLD AUTO: 2.7 10*3/MM3 (ref 0.7–3.1)
LYMPHOCYTES NFR BLD AUTO: 28 % (ref 19.6–45.3)
MCH RBC QN AUTO: 31.6 PG (ref 26.6–33)
MCHC RBC AUTO-ENTMCNC: 33.9 G/DL (ref 31.5–35.7)
MCV RBC AUTO: 93.3 FL (ref 79–97)
MONOCYTES # BLD AUTO: 0.8 10*3/MM3 (ref 0.1–0.9)
MONOCYTES NFR BLD AUTO: 7.9 % (ref 5–12)
NEUTROPHILS NFR BLD AUTO: 5.9 10*3/MM3 (ref 1.7–7)
NEUTROPHILS NFR BLD AUTO: 61.5 % (ref 42.7–76)
NRBC BLD AUTO-RTO: 0 /100 WBC (ref 0–0.2)
PLATELET # BLD AUTO: 184 10*3/MM3 (ref 140–450)
PMV BLD AUTO: 8.3 FL (ref 6–12)
POTASSIUM SERPL-SCNC: 3.8 MMOL/L (ref 3.5–5.2)
QT INTERVAL: 438 MS
RBC # BLD AUTO: 5.03 10*6/MM3 (ref 4.14–5.8)
SINUS: 2.9 CM
SODIUM SERPL-SCNC: 139 MMOL/L (ref 136–145)
TRIGL SERPL-MCNC: 133 MG/DL (ref 0–150)
TROPONIN T SERPL HS-MCNC: 12 NG/L
VLDLC SERPL-MCNC: 24 MG/DL (ref 5–40)
WBC NRBC COR # BLD: 9.6 10*3/MM3 (ref 3.4–10.8)

## 2023-08-06 PROCEDURE — 93306 TTE W/DOPPLER COMPLETE: CPT | Performed by: INTERNAL MEDICINE

## 2023-08-06 PROCEDURE — 93306 TTE W/DOPPLER COMPLETE: CPT

## 2023-08-06 PROCEDURE — 80061 LIPID PANEL: CPT | Performed by: EMERGENCY MEDICINE

## 2023-08-06 PROCEDURE — G0378 HOSPITAL OBSERVATION PER HR: HCPCS

## 2023-08-06 PROCEDURE — 85025 COMPLETE CBC W/AUTO DIFF WBC: CPT | Performed by: EMERGENCY MEDICINE

## 2023-08-06 PROCEDURE — 84484 ASSAY OF TROPONIN QUANT: CPT | Performed by: EMERGENCY MEDICINE

## 2023-08-06 PROCEDURE — 99204 OFFICE O/P NEW MOD 45 MIN: CPT | Performed by: INTERNAL MEDICINE

## 2023-08-06 PROCEDURE — 80048 BASIC METABOLIC PNL TOTAL CA: CPT | Performed by: EMERGENCY MEDICINE

## 2023-08-06 RX ORDER — ONDANSETRON 2 MG/ML
4 INJECTION INTRAMUSCULAR; INTRAVENOUS EVERY 6 HOURS PRN
Status: DISCONTINUED | OUTPATIENT
Start: 2023-08-06 | End: 2023-08-07 | Stop reason: HOSPADM

## 2023-08-06 RX ORDER — SODIUM CHLORIDE 9 MG/ML
40 INJECTION, SOLUTION INTRAVENOUS AS NEEDED
Status: DISCONTINUED | OUTPATIENT
Start: 2023-08-06 | End: 2023-08-07 | Stop reason: HOSPADM

## 2023-08-06 RX ORDER — NITROGLYCERIN 0.4 MG/1
0.4 TABLET SUBLINGUAL
Status: DISCONTINUED | OUTPATIENT
Start: 2023-08-06 | End: 2023-08-07 | Stop reason: HOSPADM

## 2023-08-06 RX ORDER — FUROSEMIDE 40 MG/1
40 TABLET ORAL DAILY
Status: DISCONTINUED | OUTPATIENT
Start: 2023-08-06 | End: 2023-08-07 | Stop reason: HOSPADM

## 2023-08-06 RX ORDER — HYDROCODONE BITARTRATE AND ACETAMINOPHEN 10; 325 MG/1; MG/1
1 TABLET ORAL 3 TIMES DAILY PRN
Status: DISCONTINUED | OUTPATIENT
Start: 2023-08-06 | End: 2023-08-07 | Stop reason: HOSPADM

## 2023-08-06 RX ORDER — ASPIRIN 81 MG/1
81 TABLET, CHEWABLE ORAL DAILY
Status: DISCONTINUED | OUTPATIENT
Start: 2023-08-06 | End: 2023-08-07 | Stop reason: HOSPADM

## 2023-08-06 RX ORDER — SPIRONOLACTONE 25 MG/1
25 TABLET ORAL DAILY
Status: DISCONTINUED | OUTPATIENT
Start: 2023-08-06 | End: 2023-08-07 | Stop reason: HOSPADM

## 2023-08-06 RX ORDER — GABAPENTIN 600 MG/1
600 TABLET ORAL EVERY 8 HOURS SCHEDULED
Status: DISCONTINUED | OUTPATIENT
Start: 2023-08-06 | End: 2023-08-07 | Stop reason: HOSPADM

## 2023-08-06 RX ORDER — ATORVASTATIN CALCIUM 10 MG/1
10 TABLET, FILM COATED ORAL DAILY
Status: DISCONTINUED | OUTPATIENT
Start: 2023-08-06 | End: 2023-08-07 | Stop reason: HOSPADM

## 2023-08-06 RX ORDER — ACETAMINOPHEN 325 MG/1
650 TABLET ORAL EVERY 4 HOURS PRN
Status: DISCONTINUED | OUTPATIENT
Start: 2023-08-06 | End: 2023-08-07 | Stop reason: HOSPADM

## 2023-08-06 RX ORDER — SODIUM CHLORIDE 0.9 % (FLUSH) 0.9 %
10 SYRINGE (ML) INJECTION EVERY 12 HOURS SCHEDULED
Status: DISCONTINUED | OUTPATIENT
Start: 2023-08-06 | End: 2023-08-07 | Stop reason: HOSPADM

## 2023-08-06 RX ORDER — LOSARTAN POTASSIUM 25 MG/1
25 TABLET ORAL DAILY
Status: DISCONTINUED | OUTPATIENT
Start: 2023-08-06 | End: 2023-08-07 | Stop reason: HOSPADM

## 2023-08-06 RX ORDER — ACETAMINOPHEN 160 MG/5ML
650 SOLUTION ORAL EVERY 4 HOURS PRN
Status: DISCONTINUED | OUTPATIENT
Start: 2023-08-06 | End: 2023-08-07 | Stop reason: HOSPADM

## 2023-08-06 RX ORDER — ACETAMINOPHEN 650 MG/1
650 SUPPOSITORY RECTAL EVERY 4 HOURS PRN
Status: DISCONTINUED | OUTPATIENT
Start: 2023-08-06 | End: 2023-08-07 | Stop reason: HOSPADM

## 2023-08-06 RX ORDER — ONDANSETRON 4 MG/1
4 TABLET, FILM COATED ORAL EVERY 6 HOURS PRN
Status: DISCONTINUED | OUTPATIENT
Start: 2023-08-06 | End: 2023-08-07 | Stop reason: HOSPADM

## 2023-08-06 RX ORDER — HYDROCODONE BITARTRATE AND ACETAMINOPHEN 10; 325 MG/1; MG/1
1 TABLET ORAL 2 TIMES DAILY PRN
Status: DISCONTINUED | OUTPATIENT
Start: 2023-08-06 | End: 2023-08-06

## 2023-08-06 RX ORDER — SODIUM CHLORIDE 0.9 % (FLUSH) 0.9 %
10 SYRINGE (ML) INJECTION AS NEEDED
Status: DISCONTINUED | OUTPATIENT
Start: 2023-08-06 | End: 2023-08-07 | Stop reason: HOSPADM

## 2023-08-06 RX ORDER — CARVEDILOL 6.25 MG/1
12.5 TABLET ORAL 2 TIMES DAILY WITH MEALS
Status: DISCONTINUED | OUTPATIENT
Start: 2023-08-06 | End: 2023-08-07 | Stop reason: HOSPADM

## 2023-08-06 RX ADMIN — HYDROCODONE BITARTRATE AND ACETAMINOPHEN 1 TABLET: 10; 325 TABLET ORAL at 21:15

## 2023-08-06 RX ADMIN — Medication 10 ML: at 21:10

## 2023-08-06 RX ADMIN — CARVEDILOL 12.5 MG: 6.25 TABLET, FILM COATED ORAL at 09:00

## 2023-08-06 RX ADMIN — LOSARTAN POTASSIUM 25 MG: 25 TABLET, FILM COATED ORAL at 09:00

## 2023-08-06 RX ADMIN — CARVEDILOL 12.5 MG: 6.25 TABLET, FILM COATED ORAL at 21:22

## 2023-08-06 RX ADMIN — GABAPENTIN 600 MG: 600 TABLET, FILM COATED ORAL at 15:31

## 2023-08-06 RX ADMIN — ASPIRIN 81 MG: 81 TABLET, CHEWABLE ORAL at 09:00

## 2023-08-06 RX ADMIN — SENNOSIDES AND DOCUSATE SODIUM 2 TABLET: 50; 8.6 TABLET ORAL at 09:00

## 2023-08-06 RX ADMIN — HYDROCODONE BITARTRATE AND ACETAMINOPHEN 1 TABLET: 10; 325 TABLET ORAL at 09:00

## 2023-08-06 RX ADMIN — HYDROCODONE BITARTRATE AND ACETAMINOPHEN 1 TABLET: 10; 325 TABLET ORAL at 15:47

## 2023-08-06 RX ADMIN — FUROSEMIDE 40 MG: 40 TABLET ORAL at 09:00

## 2023-08-06 RX ADMIN — Medication 10 ML: at 09:00

## 2023-08-06 RX ADMIN — GABAPENTIN 600 MG: 600 TABLET, FILM COATED ORAL at 09:00

## 2023-08-06 RX ADMIN — GABAPENTIN 600 MG: 600 TABLET, FILM COATED ORAL at 21:10

## 2023-08-06 RX ADMIN — SPIRONOLACTONE 25 MG: 25 TABLET ORAL at 09:00

## 2023-08-06 RX ADMIN — ATORVASTATIN CALCIUM 10 MG: 10 TABLET, FILM COATED ORAL at 09:00

## 2023-08-06 NOTE — H&P
[facility] H&P    Patient Name: Brian Stewart  : 1980  MRN: [id number]  Primary Care Physician: Santana Pillai MD  Date of admission: 2023     Patient Care Team:  Santana Pillai MD as PCP - General (Internal Medicine)          Subjective   History Present Illness     Chief Complaint:   Chief Complaint   Patient presents with    Shortness of Breath     Patient has a cardiac history and states that he had an episode today where he became lightheaded, SOA, productive cough. Patient has had a cough for a while. Patient also feels he is retaining fluids in his abdomen.      Shortness of breath     Shortness of Breath      Shortness of Breath  Pertinent negatives include no chest pain or leg swelling.   ED 2023  42-year-old male complaining of increasing shortness of breath and cough.  He states the cough has been productive only of white clear sputum he states he is a marked recent decline in exercise tolerance and reports that he has had some chest tightness with exertion.  He is followed by Dr. Jose Antonio Acosta.  He reports no fever or chills.  He states that he has been mildly diaphoretic with exertion.  He reports he has had some palpitations.  He states he has not had much lower extremity swelling but states that he feels like he has swelling in his abdominal area.  He states he is taken some leftover diuretics this week because he felt like he was retaining fluid he does not report a lot of benefit     Observation   Patient agrees with HPI noted above including shortness of breath with history of congestive heart failure.  He states shortness of breath got worse yesterday and was associated with lightheadedness and diaphoresis.  Patient reports a cough for the past 2 weeks with white sputum.  Denies any history of lung disease.  Patient is states he is feeling better now since getting Bumex in the ED.  He reports that he has not been taking Lasix at home since its ordered  as needed.     Review of Systems   Respiratory:  Positive for shortness of breath.    Review of Systems   Constitutional: Positive for diaphoresis and weight gain.   HENT:  Positive for congestion.    Cardiovascular:  Positive for dyspnea on exertion. Negative for chest pain, leg swelling, near-syncope and palpitations.   Respiratory:  Positive for shortness of breath.    Gastrointestinal:  Positive for bloating.   All other systems reviewed and are negative.         Personal History     Past Medical History:   Past Medical History:   Diagnosis Date    Arrhythmia     Cardiomyopathy     Diverticulitis     Hyperlipidemia     Palpitations        Surgical History:      Past Surgical History:   Procedure Laterality Date    BACK SURGERY      CARDIAC CATHETERIZATION      CARDIAC CATHETERIZATION N/A 06/12/2020    Procedure: Left Heart Cath;  Surgeon: Fernanda Jackson MD;  Location: [facility] CATH INVASIVE LOCATION;  Service: Cardiovascular;  Laterality: N/A;    CARDIAC CATHETERIZATION N/A 06/12/2020    Procedure: Coronary angiography;  Surgeon: Fernanda Jackson MD;  Location: [facility] CATH INVASIVE LOCATION;  Service: Cardiovascular;  Laterality: N/A;    CARDIAC DEFIBRILLATOR PLACEMENT      COLON RESECTION      HERNIA REPAIR      INSERT / REPLACE / REMOVE PACEMAKER      ICD    KNEE ACL RECONSTRUCTION             Family History: family history includes Heart disease in his father; Hypertension in his mother. Otherwise pertinent FHx was reviewed and unremarkable.     Social History:  reports that he has quit smoking. His smoking use included cigarettes. He smoked an average of 1.5 packs per day. He has quit using smokeless tobacco.  His smokeless tobacco use included snuff. He reports current drug use. Drug: Marijuana. He reports that he does not drink alcohol.      Medications:  Prior to Admission medications    Medication Sig Start Date End Date Taking? Authorizing Provider   aspirin 81 MG chewable tablet Chew 1  tablet Daily.   Yes Bella Dempsey MD   carvedilol (COREG) 12.5 MG tablet TAKE 1 TABLET BY MOUTH TWICE DAILY WITH MEALS 12/12/22  Yes Fernanda Jackson MD   furosemide (LASIX) 40 MG tablet Take 1 tablet by mouth Daily As Needed. 8/12/20  Yes Fernanda Jackson MD   gabapentin (NEURONTIN) 600 MG tablet  6/10/22  Yes ProviderBella MD   HYDROcodone-acetaminophen (NORCO)  MG per tablet Take 1 tablet by mouth 2 (Two) Times a Day As Needed for Moderate Pain.   Yes Bella Dempsey MD   losartan (COZAAR) 25 MG tablet TAKE 1 TABLET BY MOUTH DAILY 12/19/22  Yes Fernanda Jackson MD   Magnesium Oxide 400 MG capsule Take 800 mg by mouth Daily. 11/9/22  Yes Yemi Holloway MD   multivitamin with minerals tablet tablet Take 1 tablet by mouth Daily.   Yes ProviderBella MD   Omega-3 Fatty Acids (fish oil) 1000 MG capsule capsule Take  by mouth Daily With Breakfast.   Yes Bella Dempsey MD   pravastatin (PRAVACHOL) 20 MG tablet TAKE 1 TABLET BY MOUTH EVERY NIGHT AT BEDTIME 1/19/23  Yes Fernanda Jackson MD   spironolactone (ALDACTONE) 25 MG tablet TAKE 1 TABLET BY MOUTH DAILY 9/6/22  Yes Fernanda Jackson MD   nitroglycerin (NITROSTAT) 0.4 MG SL tablet Place 1 tablet under the tongue Every 5 (Five) Minutes As Needed for Chest Pain. Take no more than 3 doses in 15 minutes. 2/23/21   Fernanda Jackson MD       Allergies:  No Known Allergies    Objective   Objective     Vital Signs  Temp:  [97.8 øF (36.6 øC)-98.8 øF (37.1 øC)] 98.3 øF (36.8 øC)  Heart Rate:  [60-78] 73  Resp:  [15-18] 16  BP: ()/(58-85) 112/75  SpO2:  [92 %-97 %] 94 %  on   ;   Device (Oxygen Therapy): room air  Body mass index is 37.56 kg/mý.    Physical Exam  Vitals and nursing note reviewed.   Constitutional:       Appearance: Normal appearance.   HENT:      Head: Normocephalic and atraumatic.      Right Ear: External ear normal.      Left Ear: External ear normal.      Nose: Nose  normal.      Mouth/Throat:      Mouth: Mucous membranes are moist.   Eyes:      Extraocular Movements: Extraocular movements intact.   Cardiovascular:      Rate and Rhythm: Normal rate and regular rhythm.      Pulses: Normal pulses.      Heart sounds: Normal heart sounds.   Pulmonary:      Effort: Pulmonary effort is normal.      Breath sounds: Normal breath sounds.   Abdominal:      General: Abdomen is flat. Bowel sounds are normal.      Palpations: Abdomen is soft.   Musculoskeletal:         General: Normal range of motion.      Cervical back: Normal range of motion.   Skin:     General: Skin is warm.   Neurological:      Mental Status: He is alert and oriented to person, place, and time.   Psychiatric:         Behavior: Behavior normal.         Thought Content: Thought content normal.         Judgment: Judgment normal.       Results Review:  I have personally reviewed most recent cardiac tracings, lab results, and radiology images and interpretations and agree with findings, most notably: Troponin, proBNP, Lipid, CBC, respiratory panel chest x-ray, EKG and CT abdomen pelvis.    Results from last 7 days   Lab Units 08/06/23  0407   WBC 10*3/mm3 9.60   HEMOGLOBIN g/dL 15.9   HEMATOCRIT % 46.9   PLATELETS 10*3/mm3 184     Results from last 7 days   Lab Units 08/06/23  0407 08/05/23  1731   SODIUM mmol/L 139 139   POTASSIUM mmol/L 3.8 4.2   CHLORIDE mmol/L 103 104   CO2 mmol/L 23.0 25.0   BUN mg/dL 18 19   CREATININE mg/dL 0.88 0.94   GLUCOSE mg/dL 117* 115*   CALCIUM mg/dL 8.9 9.1   ALT (SGPT) U/L  --  25   AST (SGOT) U/L  --  40   HSTROP T ng/L 12 9   PROBNP pg/mL  --  979.4*     Estimated Creatinine Clearance: 132.7 mL/min (by C-G formula based on SCr of 0.88 mg/dL).  Brief Urine Lab Results       None            Microbiology Results (last 10 days)       Procedure Component Value - Date/Time    Respiratory Panel PCR w/COVID-19(SARS-CoV-2) [facility]AL[facility] In-House, NP Swab in UTM/VTM, 3-4 HR TAT - Swab,  Nasopharynx [[id number]]  (Normal) Collected: 08/05/23 1731    Lab Status: Final result Specimen: Swab from Nasopharynx Updated: 08/05/23 1822     ADENOVIRUS, PCR Not Detected     Coronavirus 229E Not Detected     Coronavirus HKU1 Not Detected     Coronavirus NL63 Not Detected     Coronavirus OC43 Not Detected     COVID19 Not Detected     Human Metapneumovirus Not Detected     Human Rhinovirus/Enterovirus Not Detected     Influenza A PCR Not Detected     Influenza B PCR Not Detected     Parainfluenza Virus 1 Not Detected     Parainfluenza Virus 2 Not Detected     Parainfluenza Virus 3 Not Detected     Parainfluenza Virus 4 Not Detected     RSV, PCR Not Detected     Bordetella pertussis pcr Not Detected     Bordetella parapertussis PCR Not Detected     Chlamydophila pneumoniae PCR Not Detected     Mycoplasma pneumo by PCR Not Detected    Narrative:      In the setting of a positive respiratory panel with a viral infection PLUS a negative procalcitonin without other underlying concern for bacterial infection, consider observing off antibiotics or discontinuation of antibiotics and continue supportive care. If the respiratory panel is positive for atypical bacterial infection (Bordetella pertussis, Chlamydophila pneumoniae, or Mycoplasma pneumoniae), consider antibiotic de-escalation to target atypical bacterial infection.            ECG/EMG Results (most recent)       Procedure Component Value Units Date/Time    ECG 12 Lead Dyspnea [[id number]] Collected: 08/05/23 1637     Updated: 08/06/23 0658     QT Interval 438 ms     Narrative:      HEART RATE= 63  bpm  RR Interval= 948  ms  OR Interval= 205  ms  P Horizontal Axis= 10  deg  P Front Axis= -11  deg  QRSD Interval= 122  ms  QT Interval= 438  ms  QRS Axis= -25  deg  T Wave Axis= 134  deg  - ABNORMAL ECG -  Sinus rhythm  Borderline prolonged OR interval  Left bundle branch block  When compared with ECG of 09-Nov-2022 0:42:48,  Significant axis, voltage or hypertrophy  change  Electronically Signed By: Preston Chaudhry (Esa) 06-Aug-2023 06:57:53  Date and Time of Study: 2023-08-05 16:37:24    Adult Transthoracic Echo Complete w/ Color, Spectral and Contrast if Necessary Per Protocol [[id number]] Resulted: 08/06/23 0920     Updated: 08/06/23 0923     LVIDd 5.8 cm      LVIDs 5.5 cm      IVSd 1.10 cm      LVPWd 0.90 cm      FS 5.2 %      IVS/LVPW 1.22 cm      ESV(cubed) 166.4 ml      LV Sys Vol (BSA corrected) 53.7 cm2      EDV(cubed) 195.1 ml      LV Stevens Vol (BSA corrected) 76.0 cm2      LVOT area 4.9 cm2      LV mass(C)d 233.1 grams      LVOT diam 2.5 cm      EDV(MOD-sp4) 170.0 ml      ESV(MOD-sp4) 120.0 ml      SV(MOD-sp4) 50.0 ml      SI(MOD-sp4) 22.4 ml/m2      EF(MOD-sp4) 29.4 %      MV E max chandler 60.4 cm/sec      MV A max chandler 68.8 cm/sec      MV dec time 0.19 msec      MV E/A 0.88     LA ESV Index (BP) 22.8 ml/m2      Med Peak E' Chandler 6.1 cm/sec      Lat Peak E' Chandler 4.9 cm/sec      Avg E/e' ratio 10.98     SV(LVOT) 46.0 ml      TAPSE (>1.6) 2.38 cm      RV S' 8.1 cm/sec      LA dimension (2D)  4.2 cm      LV V1 max 43.7 cm/sec      LV V1 max PG 0.76 mmHg      LV V1 mean PG 0.00 mmHg      LV V1 VTI 9.4 cm      Ao pk chandler 94.7 cm/sec      Ao max PG 3.6 mmHg      Ao mean PG 2.00 mmHg      Ao V2 VTI 21.0 cm      RENATO(I,D) 2.19 cm2      MV max PG 2.04 mmHg      MV mean PG 1.00 mmHg      MV V2 VTI 20.7 cm      MV P1/2t 52.5 msec      MVA(P1/2t) 4.2 cm2      MVA(VTI) 2.22 cm2      MV dec slope 368.0 cm/sec2      MR max chandler 431.0 cm/sec      MR max PG 74.3 mmHg      TR max chandler 201.0 cm/sec      TR max PG 16.2 mmHg      RVSP(TR) 24.2 mmHg      RAP systole 8.0 mmHg      RV V1 max PG 0.80 mmHg      RV V1 max 44.7 cm/sec      RV V1 VTI 10.8 cm      PA V2 max 82.2 cm/sec      Ao root diam 3.2 cm      ACS 2.30 cm      Sinus 2.9 cm      EF(MOD-bp) 29.0 %     Narrative:        Estimated right ventricular systolic pressure from tricuspid   regurgitation is normal (<35 mmHg).      Impression:                       Results for orders placed during the hospital encounter of 03/20/23    Adult Transthoracic Echo Complete W/ Cont if Necessary Per Protocol    Interpretation Summary    Left ventricular systolic function is moderately decreased. Calculated left ventricular EF = 26% Left ventricular ejection fraction appears to be 26 - 30%.    The left ventricular cavity is moderate to severely dilated.    Left ventricular wall thickness is consistent with mild concentric hypertrophy.    The following left ventricular wall segments are hypokinetic: mid anterior, apical anterior, basal anterolateral, mid anterolateral, apical lateral, basal inferolateral, mid inferolateral, apical inferior, mid inferior, apical septal, basal inferoseptal, mid inferoseptal, apex hypokinetic, mid anteroseptal, basal anterior, basal inferior and basal inferoseptal.    Left ventricular diastolic function is consistent with (grade I) impaired relaxation.    Mildly reduced right ventricular systolic function noted.    The right ventricular cavity is mildly dilated.    Estimated right ventricular systolic pressure from tricuspid regurgitation is normal (<35 mmHg).      CT Abdomen Pelvis Without Contrast    Result Date: 8/5/2023  1. No evidence of ascites 2. No acute intra-abdominal or intrapelvic abnormality appreciated 3. Other findings as above Electronically Signed: Cruz Hidalgo  8/5/2023 7:05 PM EDT  Workstation ID: [id number]    XR Chest 1 View    Result Date: 8/5/2023  Impression: 1. Stable cardiomegaly accentuated by portable technique 2. Lungs are clear Electronically Signed: Cruz Hidalgo  8/5/2023 6:18 PM EDT  Workstation ID: [id number]       Estimated Creatinine Clearance: 132.7 mL/min (by C-G formula based on SCr of 0.88 mg/dL).    Assessment & Plan   Assessment/Plan       Active Hospital Problems    Diagnosis  POA    **Acute CHF (congestive heart failure) [I50.9]  Yes      Resolved Hospital Problems   No resolved problems to  display.            Heart failure with reduced ejection fraction         Lab Results   Component Value Date     TROPONINT 12 08/06/2023     TROPONINT 9 08/05/2023     PROBNP 979.4 (H) 08/05/2023     PROBNP 885.8 (H) 11/09/2022      08/06/2023   -Echocardiogram pending results  -Chest x-ray: Stable cardiomegaly, clear lungs  -CT abdomen pelvis showed no evidence of bursitis and no acute intra-abdominal injury or pelvic abnormality.  -EKG showed sinus rhythm with left bundle branch block with MS interval 205 and .  -proBNP 979.4  -CMP, lipid panel, CBC generally unremarkable  -In the ED patient received Bumex 2 mg and nitroglycerin ointment  -Continue aspirin, Coreg, losartan, spironolactone  -Patient take Lasix daily as needed at home, ordered daily  -Cardiology consulted and recommended monitoring overnight with gentle diuresis  -Monitor I's and O's and daily weights  -2 g sodium diet      Hyperlipidemia  -Continue pravastatin     Chronic pain  -Continue Norco and gabapentin  -Inspect complete     Obesity  -BMI 37.56  -Lifestyle modifications          VTE Prophylaxis -   Mechanical Order History:        Ordered        08/06/23 0704  Place Sequential Compression Device  Once            08/06/23 0704  Maintain Sequential Compression Device  Continuous            08/05/23 2043  Place Sequential Compression Device  Once            08/05/23 2043  Maintain Sequential Compression Device  Continuous                          Pharmalogical Order History:       None            CODE STATUS:    Code Status and Medical Interventions:   Ordered at: 08/06/23 0704     Level Of Support Discussed With:    Patient     Code Status (Patient has no pulse and is not breathing):    CPR (Attempt to Resuscitate)     Medical Interventions (Patient has pulse or is breathing):    Full Support     Release to patient:    Routine Release       This patient has been examined wearing personal protective equipment.     I discussed the  patient's findings and my recommendations with patient and nursing staff.      Signature:Electronically signed by ANNIA Davidson, 08/06/23, 2:36 PM EDT.

## 2023-08-06 NOTE — PLAN OF CARE
Goal Outcome Evaluation:  Plan of Care Reviewed With: patient           Outcome Evaluation: Pt had echo this AM. Pt was also consulted with cardiology and seen by Neeta. Pt has denied any chest pain or sob today to nurse.  Plan for patient is to have diuretics and adding lasixs daily to his routine. Dr. Rose is also going to see patient tomorrow and go over his results with possible discharge tomorrow. Will continue to monitor patient.

## 2023-08-06 NOTE — DISCHARGE SUMMARY
[facility]    Santana Pillai MD    CHIEF COMPLAINT:     Shortness of breath    HISTORY OF PRESENT ILLNESS:    Shortness of Breath  Pertinent negatives include no chest pain or leg swelling.   ED 08/05/2023  42-year-old male complaining of increasing shortness of breath and cough.  He states the cough has been productive only of white clear sputum he states he is a marked recent decline in exercise tolerance and reports that he has had some chest tightness with exertion.  He is followed by Dr. Jose Antonio Acosta.  He reports no fever or chills.  He states that he has been mildly diaphoretic with exertion.  He reports he has had some palpitations.  He states he has not had much lower extremity swelling but states that he feels like he has swelling in his abdominal area.  He states he is taken some leftover diuretics this week because he felt like he was retaining fluid he does not report a lot of benefit    Observation 08/05/223  Patient agrees with HPI noted above including shortness of breath with history of congestive heart failure.  He states shortness of breath got worse yesterday and was associated with lightheadedness and diaphoresis.  Patient reports a cough for the past 2 weeks with white sputum.  Denies any history of lung disease.  Patient is states he is feeling better now since getting Bumex in the ED.  He reports that he has not been taking Lasix at home since its ordered as needed.    Observation 08/7/2023  Patient feeling pretty good this morning and hoping to be discharged today.  Waiting on cardiologist to round.  No acute distress or complaints    Past Medical History:   Diagnosis Date    Arrhythmia     Cardiomyopathy     Diverticulitis     Hyperlipidemia     Palpitations      Past Surgical History:   Procedure Laterality Date    BACK SURGERY      CARDIAC CATHETERIZATION      CARDIAC CATHETERIZATION N/A 06/12/2020    Procedure: Left Heart Cath;  Surgeon: Fernanda Jackson,  MD;  Location: [facility] CATH INVASIVE LOCATION;  Service: Cardiovascular;  Laterality: N/A;    CARDIAC CATHETERIZATION N/A 06/12/2020    Procedure: Coronary angiography;  Surgeon: Fernanda Jackson MD;  Location: [facility] CATH INVASIVE LOCATION;  Service: Cardiovascular;  Laterality: N/A;    CARDIAC DEFIBRILLATOR PLACEMENT      COLON RESECTION      HERNIA REPAIR      INSERT / REPLACE / REMOVE PACEMAKER      ICD    KNEE ACL RECONSTRUCTION       Family History   Problem Relation Age of Onset    Heart disease Father     Hypertension Mother      Social History     Tobacco Use    Smoking status: Former     Packs/day: 1.50     Types: Cigarettes    Smokeless tobacco: Former     Types: Snuff    Tobacco comments:     QUIT SMOKING 5/20   Vaping Use    Vaping Use: Never used   Substance Use Topics    Alcohol use: No    Drug use: Yes     Types: Marijuana     Comment: once/day     Medications Prior to Admission   Medication Sig Dispense Refill Last Dose    aspirin 81 MG chewable tablet Chew 1 tablet Daily.   8/5/2023    carvedilol (COREG) 12.5 MG tablet TAKE 1 TABLET BY MOUTH TWICE DAILY WITH MEALS 180 tablet 2 8/5/2023    furosemide (LASIX) 40 MG tablet Take 1 tablet by mouth Daily As Needed.  0 Past Week    gabapentin (NEURONTIN) 600 MG tablet    Past Week    HYDROcodone-acetaminophen (NORCO)  MG per tablet Take 1 tablet by mouth 2 (Two) Times a Day As Needed for Moderate Pain.   8/5/2023    losartan (COZAAR) 25 MG tablet TAKE 1 TABLET BY MOUTH DAILY 90 tablet 2 8/5/2023    Magnesium Oxide 400 MG capsule Take 800 mg by mouth Daily. 30 each 0 8/5/2023    multivitamin with minerals tablet tablet Take 1 tablet by mouth Daily.   8/5/2023    Omega-3 Fatty Acids (fish oil) 1000 MG capsule capsule Take  by mouth Daily With Breakfast.   Past Week    pravastatin (PRAVACHOL) 20 MG tablet TAKE 1 TABLET BY MOUTH EVERY NIGHT AT BEDTIME 90 tablet 2 8/4/2023    spironolactone (ALDACTONE) 25 MG tablet TAKE 1 TABLET BY MOUTH DAILY 90  tablet 2 8/4/2023    nitroglycerin (NITROSTAT) 0.4 MG SL tablet Place 1 tablet under the tongue Every 5 (Five) Minutes As Needed for Chest Pain. Take no more than 3 doses in 15 minutes. 30 tablet 2 More than a month     Allergies:  Patient has no known allergies.    Immunization History   Administered Date(s) Administered    Tdap 10/03/2020           REVIEW OF SYSTEMS:    Review of Systems   Constitutional: Positive for diaphoresis and weight gain.   HENT:  Positive for congestion.    Cardiovascular:  Positive for dyspnea on exertion. Negative for chest pain, leg swelling, near-syncope and palpitations.   Respiratory:  Positive for shortness of breath.    Gastrointestinal:  Positive for bloating.   All other systems reviewed and are negative.      Vital Signs  Temp:  [97.8 øF (36.6 øC)-98.1 øF (36.7 øC)] 97.9 øF (36.6 øC)  Heart Rate:  [60-74] 70  Resp:  [15-18] 16  BP: ()/(58-83) 112/82          Physical Exam:  Physical Exam  Vitals and nursing note reviewed.   Constitutional:       Appearance: Normal appearance.   HENT:      Head: Normocephalic and atraumatic.      Right Ear: External ear normal.      Left Ear: External ear normal.      Nose: Nose normal.      Mouth/Throat:      Mouth: Mucous membranes are moist.   Eyes:      Extraocular Movements: Extraocular movements intact.   Cardiovascular:      Rate and Rhythm: Normal rate and regular rhythm.      Pulses: Normal pulses.      Heart sounds: Normal heart sounds.   Pulmonary:      Effort: Pulmonary effort is normal.      Breath sounds: Normal breath sounds.   Abdominal:      General: Abdomen is flat. Bowel sounds are normal.      Palpations: Abdomen is soft.   Musculoskeletal:         General: Normal range of motion.      Cervical back: Normal range of motion.   Skin:     General: Skin is warm.   Neurological:      Mental Status: He is alert and oriented to person, place, and time.   Psychiatric:         Behavior: Behavior normal.         Thought Content:  Thought content normal.         Judgment: Judgment normal.       Emotional Behavior:   Normal   Debilities:  None  Results Review:    I reviewed the patient's new clinical results.  Lab Results (most recent)       Procedure Component Value Units Date/Time    Basic Metabolic Panel [[id number]]  (Abnormal) Collected: 08/06/23 0407    Specimen: Blood from Arm, Left Updated: 08/06/23 0502     Glucose 117 mg/dL      BUN 18 mg/dL      Creatinine 0.88 mg/dL      Sodium 139 mmol/L      Potassium 3.8 mmol/L      Chloride 103 mmol/L      CO2 23.0 mmol/L      Calcium 8.9 mg/dL      BUN/Creatinine Ratio 20.5     Anion Gap 13.0 mmol/L      eGFR 110.1 mL/min/1.73     Narrative:      GFR Normal >60  Chronic Kidney Disease <60  Kidney Failure <15      High Sensitivity Troponin T [[id number]]  (Normal) Collected: 08/06/23 0407    Specimen: Blood from Arm, Left Updated: 08/06/23 0502     HS Troponin T 12 ng/L     Narrative:      High Sensitive Troponin T Reference Range:  <10.0 ng/L- Negative Female for AMI  <15.0 ng/L- Negative Male for AMI  >=10 - Abnormal Female indicating possible myocardial injury.  >=15 - Abnormal Male indicating possible myocardial injury.   Clinicians would have to utilize clinical acumen, EKG, Troponin, and serial changes to determine if it is an Acute Myocardial Infarction or myocardial injury due to an underlying chronic condition.         Lipid Panel [[id number]]  (Abnormal) Collected: 08/06/23 0407    Specimen: Blood from Arm, Left Updated: 08/06/23 0502     Total Cholesterol 172 mg/dL      Triglycerides 133 mg/dL      HDL Cholesterol 47 mg/dL      LDL Cholesterol  101 mg/dL      VLDL Cholesterol 24 mg/dL      LDL/HDL Ratio 2.09    Narrative:      Cholesterol Reference Ranges  (U.S. Department of Health and Human Services ATP III Classifications)    Desirable          <200 mg/dL  Borderline High    200-239 mg/dL  High Risk          >240 mg/dL      Triglyceride Reference Ranges  (U.S. Department of Health  and Human Services ATP III Classifications)    Normal           <150 mg/dL  Borderline High  150-199 mg/dL  High             200-499 mg/dL  Very High        >500 mg/dL    HDL Reference Ranges  (U.S. Department of Health and Human Services ATP III Classifications)    Low     <40 mg/dl (major risk factor for CHD)  High    >60 mg/dl ('negative' risk factor for CHD)        LDL Reference Ranges  (U.S. Department of Health and Human Services ATP III Classifications)    Optimal          <100 mg/dL  Near Optimal     100-129 mg/dL  Borderline High  130-159 mg/dL  High             160-189 mg/dL  Very High        >189 mg/dL    CBC Auto Differential [[id number]]  (Normal) Collected: 08/06/23 0407    Specimen: Blood from Arm, Left Updated: 08/06/23 0439     WBC 9.60 10*3/mm3      RBC 5.03 10*6/mm3      Hemoglobin 15.9 g/dL      Hematocrit 46.9 %      MCV 93.3 fL      MCH 31.6 pg      MCHC 33.9 g/dL      RDW 12.5 %      RDW-SD 42.9 fl      MPV 8.3 fL      Platelets 184 10*3/mm3      Neutrophil % 61.5 %      Lymphocyte % 28.0 %      Monocyte % 7.9 %      Eosinophil % 2.2 %      Basophil % 0.4 %      Neutrophils, Absolute 5.90 10*3/mm3      Lymphocytes, Absolute 2.70 10*3/mm3      Monocytes, Absolute 0.80 10*3/mm3      Eosinophils, Absolute 0.20 10*3/mm3      Basophils, Absolute 0.00 10*3/mm3      nRBC 0.0 /100 WBC     Respiratory Panel PCR w/COVID-19(SARS-CoV-2) LALA/LOREN/NATALIA/PAD/COR/MAD/SANDER In-House, NP Swab in [facility], 3-4 HR TAT - Swab, Nasopharynx [[id number]]  (Normal) Collected: 08/05/23 1731    Specimen: Swab from Nasopharynx Updated: 08/05/23 1822     ADENOVIRUS, PCR Not Detected     Coronavirus 229E Not Detected     Coronavirus HKU1 Not Detected     Coronavirus NL63 Not Detected     Coronavirus OC43 Not Detected     COVID19 Not Detected     Human Metapneumovirus Not Detected     Human Rhinovirus/Enterovirus Not Detected     Influenza A PCR Not Detected     Influenza B PCR Not Detected     Parainfluenza Virus 1 Not Detected      Parainfluenza Virus 2 Not Detected     Parainfluenza Virus 3 Not Detected     Parainfluenza Virus 4 Not Detected     RSV, PCR Not Detected     Bordetella pertussis pcr Not Detected     Bordetella parapertussis PCR Not Detected     Chlamydophila pneumoniae PCR Not Detected     Mycoplasma pneumo by PCR Not Detected    Narrative:      In the setting of a positive respiratory panel with a viral infection PLUS a negative procalcitonin without other underlying concern for bacterial infection, consider observing off antibiotics or discontinuation of antibiotics and continue supportive care. If the respiratory panel is positive for atypical bacterial infection (Bordetella pertussis, Chlamydophila pneumoniae, or Mycoplasma pneumoniae), consider antibiotic de-escalation to target atypical bacterial infection.    Single High Sensitivity Troponin T [[id number]]  (Normal) Collected: 08/05/23 1731    Specimen: Blood Updated: 08/05/23 1803     HS Troponin T 9 ng/L     Narrative:      High Sensitive Troponin T Reference Range:  <10.0 ng/L- Negative Female for AMI  <15.0 ng/L- Negative Male for AMI  >=10 - Abnormal Female indicating possible myocardial injury.  >=15 - Abnormal Male indicating possible myocardial injury.   Clinicians would have to utilize clinical acumen, EKG, Troponin, and serial changes to determine if it is an Acute Myocardial Infarction or myocardial injury due to an underlying chronic condition.         TSH [[id number]]  (Normal) Collected: 08/05/23 1731    Specimen: Blood Updated: 08/05/23 1803     TSH 0.793 uIU/mL     BNP [[id number]]  (Abnormal) Collected: 08/05/23 1731    Specimen: Blood Updated: 08/05/23 1803     proBNP 979.4 pg/mL     Narrative:      Among patients with dyspnea, NT-proBNP is highly sensitive for the detection of acute congestive heart failure. In addition NT-proBNP of <300 pg/ml effectively rules out acute congestive heart failure with 99% negative predictive value.      Comprehensive  "Metabolic Panel [[id number]]  (Abnormal) Collected: 08/05/23 1731    Specimen: Blood Updated: 08/05/23 1759     Glucose 115 mg/dL      BUN 19 mg/dL      Creatinine 0.94 mg/dL      Sodium 139 mmol/L      Potassium 4.2 mmol/L      Comment: Slight hemolysis detected by analyzer. Results may be affected.        Chloride 104 mmol/L      CO2 25.0 mmol/L      Calcium 9.1 mg/dL      Total Protein 6.3 g/dL      Albumin 4.1 g/dL      ALT (SGPT) 25 U/L      AST (SGOT) 40 U/L      Alkaline Phosphatase 75 U/L      Total Bilirubin 0.3 mg/dL      Globulin 2.2 gm/dL      A/G Ratio 1.9 g/dL      BUN/Creatinine Ratio 20.2     Anion Gap 10.0 mmol/L      eGFR 103.8 mL/min/1.73     Narrative:      GFR Normal >60  Chronic Kidney Disease <60  Kidney Failure <15      D-dimer, Quantitative [[id number]]  (Normal) Collected: 08/05/23 1731    Specimen: Blood Updated: 08/05/23 1747     D-Dimer, Quantitative 0.23 mg/L (FEU)     Narrative:      According to the assay 's published package insert, a normal (<0.50 mg/L (FEU)) D-dimer result in conjunction with a non-high clinical probability assessment, excludes deep vein thrombosis (DVT) and pulmonary embolism (PE) with high sensitivity.    D-dimer values increase with age and this can make VTE exclusion of an older population difficult. To address this, the [employer], based on best available evidence and recent guidelines, recommends that clinicians use age-adjusted D-dimer thresholds in patients greater than 50 years of age with: a) a low probability of PE who do not meet all Pulmonary Embolism Rule Out Criteria, or b) in those with intermediate probability of PE.   The formula for an age-adjusted D-dimer cut-off is \"age/100\".  For example, a 60 year old patient would have an age-adjusted cut-off of 0.60 mg/L (FEU) and an 80 year old 0.80 mg/L (FEU).    CBC & Differential [[id number]]  (Normal) Collected: 08/05/23 1731    Specimen: Blood Updated: 08/05/23 1736    " Narrative:      The following orders were created for panel order CBC & Differential.  Procedure                               Abnormality         Status                     ---------                               -----------         ------                     CBC Auto Differential[[id number]]        Normal              Final result                 Please view results for these tests on the individual orders.    CBC Auto Differential [[id number]]  (Normal) Collected: 08/05/23 1731    Specimen: Blood Updated: 08/05/23 1736     WBC 9.10 10*3/mm3      RBC 4.76 10*6/mm3      Hemoglobin 15.3 g/dL      Hematocrit 45.5 %      MCV 95.6 fL      MCH 32.1 pg      MCHC 33.6 g/dL      RDW 12.8 %      RDW-SD 42.4 fl      MPV 8.3 fL      Platelets 187 10*3/mm3      Neutrophil % 67.2 %      Lymphocyte % 23.3 %      Monocyte % 7.2 %      Eosinophil % 1.6 %      Basophil % 0.7 %      Neutrophils, Absolute 6.10 10*3/mm3      Lymphocytes, Absolute 2.10 10*3/mm3      Monocytes, Absolute 0.70 10*3/mm3      Eosinophils, Absolute 0.10 10*3/mm3      Basophils, Absolute 0.10 10*3/mm3      nRBC 0.1 /100 WBC             Imaging Results (Most Recent)       Procedure Component Value Units Date/Time    CT Abdomen Pelvis Without Contrast [[id number]] Collected: 08/05/23 1831     Updated: 08/05/23 1907    Narrative:      CT ABDOMEN PELVIS WO CONTRAST    Date of Exam: 8/5/2023 6:27 PM EDT    Indication: Possible ascites.    Comparison: 3/17/2022 and prior    Technique: Axial CT images were obtained of the abdomen and pelvis without the administration of contrast. Sagittal and coronal reconstructions were performed.  Automated exposure control and iterative reconstruction methods were used.        FINDINGS:    Abdomen/Pelvis:    Lower Chest: Lung bases are grossly clear with minimal dependent atelectasis noted posteriorly.    No free air noted below the diaphragm.    No ascites noted. Postsurgical changes from prior ventral hernia repair  noted.    Organs: Limited noncontrast imaging of the liver, gallbladder, pancreas, kidneys, spleen and adrenal glands are unremarkable    GI/Bowel: Limited noncontrast imaging of the stomach and small bowel is unremarkable. The ileocecal valve and the appendix appear unremarkable in appearance. There is diverticulosis without evidence of acute diverticulitis. Postsurgical changes of the   distal sigmoid colon noted.    No suspicious mesenteric adenopathy or fluid collections    Pelvis: The urinary bladder, prostate and pelvic structures demonstrate no acute abnormality.    Peritoneum/Retroperitoneum: The aorta is normal in caliber. No suspicious retroperitoneal adenopathy    Bones/Soft Tissues: No destructive bone lesion. Postsurgical changes noted of the lower lumbar sacral spine.      Impression:        1. No evidence of ascites    2. No acute intra-abdominal or intrapelvic abnormality appreciated    3. Other findings as above          Electronically Signed: Cruz Hidalgo    8/5/2023 7:05 PM EDT    Workstation ID: [id number]    XR Chest 1 View [[id number]] Collected: 08/05/23 1810     Updated: 08/05/23 1820    Narrative:      XR CHEST 1 VW    Date of Exam: 8/5/2023 5:54 PM EDT    Indication: cough    Comparison: 11/9/2022 and prior    Findings:  Study is limited by overlying support and monitoring apparatus.    Left-sided subclavian approach ICD in place. Generator obscures portions of the left hemithorax. The heart and mediastinal contours are stable.    Lungs are grossly clear. Osseous structures demonstrate no acute abnormality.      Impression:      Impression:    1. Stable cardiomegaly accentuated by portable technique    2. Lungs are clear      Electronically Signed: Cruz Hidalgo    8/5/2023 6:18 PM EDT    Workstation ID: [id number]          reviewed    ECG/EMG Results (most recent)       Procedure Component Value Units Date/Time    ECG 12 Lead Dyspnea [[id number]] Collected: 08/05/23 1637     Updated:  08/06/23 0658     QT Interval 438 ms     Narrative:      HEART RATE= 63  bpm  RR Interval= 948  ms  DE Interval= 205  ms  P Horizontal Axis= 10  deg  P Front Axis= -11  deg  QRSD Interval= 122  ms  QT Interval= 438  ms  QRS Axis= -25  deg  T Wave Axis= 134  deg  - ABNORMAL ECG -  Sinus rhythm  Borderline prolonged DE interval  Left bundle branch block  When compared with ECG of 09-Nov-2022 0:42:48,  Significant axis, voltage or hypertrophy change  Electronically Signed By: Preston Chaudhry (Esa) 06-Aug-2023 06:57:53  Date and Time of Study: 2023-08-05 16:37:24    Adult Transthoracic Echo Complete w/ Color, Spectral and Contrast if Necessary Per Protocol [[id number]] Resulted: 08/06/23 0708     Updated: 08/06/23 0749          reviewed        Results for orders placed during the hospital encounter of 03/20/23    Adult Transthoracic Echo Complete W/ Cont if Necessary Per Protocol    Interpretation Summary    Left ventricular systolic function is moderately decreased. Calculated left ventricular EF = 26% Left ventricular ejection fraction appears to be 26 - 30%.    The left ventricular cavity is moderate to severely dilated.    Left ventricular wall thickness is consistent with mild concentric hypertrophy.    The following left ventricular wall segments are hypokinetic: mid anterior, apical anterior, basal anterolateral, mid anterolateral, apical lateral, basal inferolateral, mid inferolateral, apical inferior, mid inferior, apical septal, basal inferoseptal, mid inferoseptal, apex hypokinetic, mid anteroseptal, basal anterior, basal inferior and basal inferoseptal.    Left ventricular diastolic function is consistent with (grade I) impaired relaxation.    Mildly reduced right ventricular systolic function noted.    The right ventricular cavity is mildly dilated.    Estimated right ventricular systolic pressure from tricuspid regurgitation is normal (<35 mmHg).      Microbiology Results (last 10 days)       Procedure  Component Value - Date/Time    Respiratory Panel PCR w/COVID-19(SARS-CoV-2) LALA/LOREN/NATALIA/PAD/COR/MAD/SANDER In-House, NP Swab in UTM/VTM, 3-4 HR TAT - Swab, Nasopharynx [[id number]]  (Normal) Collected: 08/05/23 1731    Lab Status: Final result Specimen: Swab from Nasopharynx Updated: 08/05/23 1822     ADENOVIRUS, PCR Not Detected     Coronavirus 229E Not Detected     Coronavirus HKU1 Not Detected     Coronavirus NL63 Not Detected     Coronavirus OC43 Not Detected     COVID19 Not Detected     Human Metapneumovirus Not Detected     Human Rhinovirus/Enterovirus Not Detected     Influenza A PCR Not Detected     Influenza B PCR Not Detected     Parainfluenza Virus 1 Not Detected     Parainfluenza Virus 2 Not Detected     Parainfluenza Virus 3 Not Detected     Parainfluenza Virus 4 Not Detected     RSV, PCR Not Detected     Bordetella pertussis pcr Not Detected     Bordetella parapertussis PCR Not Detected     Chlamydophila pneumoniae PCR Not Detected     Mycoplasma pneumo by PCR Not Detected    Narrative:      In the setting of a positive respiratory panel with a viral infection PLUS a negative procalcitonin without other underlying concern for bacterial infection, consider observing off antibiotics or discontinuation of antibiotics and continue supportive care. If the respiratory panel is positive for atypical bacterial infection (Bordetella pertussis, Chlamydophila pneumoniae, or Mycoplasma pneumoniae), consider antibiotic de-escalation to target atypical bacterial infection.            Assessment & Plan     Acute CHF (congestive heart failure)       Heart failure with reduced ejection fraction   Lab Results   Component Value Date    TROPONINT 12 08/06/2023    TROPONINT 9 08/05/2023    PROBNP 979.4 (H) 08/05/2023    PROBNP 885.8 (H) 11/09/2022     08/06/2023   -Echocardiogram showed EF 21-25%.  Recent 2D echo from 3/20/2023 showed EF 26-30%  -Chest x-ray: Stable cardiomegaly, clear lungs  -CT abdomen pelvis showed no  evidence of bursitis and no acute intra-abdominal injury or pelvic abnormality.  -EKG showed sinus rhythm with left bundle branch block with MD interval 205 and .  -proBNP 979.4  -CMP, lipid panel, CBC generally unremarkable  -In the ED patient received Bumex 2 mg and nitroglycerin ointment  -Continue aspirin, Coreg, losartan, spironolactone  -Patient take Lasix daily as needed at home, ordered daily  -Cardiology consulted and recommended Lasix daily versus as needed and follow-up in ambulatory heart failure clinic as well as cardiologist  -Monitor I's and O's and daily weights  -2 g sodium diet   -Pulmonology outpatient per patient's request     Hyperlipidemia  -Continue pravastatin    Chronic pain  -Continue Norco and gabapentin  -Inspect complete    Obesity  -BMI 37.56  -Lifestyle modifications    I discussed the patients findings and my recommendations with patient and nursing staff.     Discharge Diagnosis:      Acute CHF (congestive heart failure)      Hospital Course  Patient is a 42 y.o. male presented with shortness of breath with history of CHF as noted in HPI above.  proBNP 979.4.  CMP, lipid panel, CBC generally unremarkable.  Chest x-ray showed stable cardiomegaly and no pulmonary disease.  CT abdomen pelvis showed no acute abnormality.  EKG showed sinus rhythm with left bundle branch block.  In the ED patient received about Bumex 2 mg and nitroglycerin.  He was placed in the observation unit for further monitoring and cardiologist was consulted.  2D echo showed EF 21-25% which is mildly decreased from 2D echo on 3/20/2023.  Cardiologist cleared patient for discharge today and recommended Lasix daily versus as needed.  Patient to follow-up with outpatient heart failure clinic in 7 days and the cardiologist at next scheduled appointment. At this time, patient felt to be in good condition for discharge with close follow up with PCP. Instructed to take all medications as prescribed and to return to  ED if any concerning signs/symptoms. All test/lab results were discussed with patient. All questions were answered and patient verbalizes understanding.       Past Medical History:     Past Medical History:   Diagnosis Date    Arrhythmia     Cardiomyopathy     Diverticulitis     Hyperlipidemia     Palpitations        Past Surgical History:     Past Surgical History:   Procedure Laterality Date    BACK SURGERY      CARDIAC CATHETERIZATION      CARDIAC CATHETERIZATION N/A 06/12/2020    Procedure: Left Heart Cath;  Surgeon: Fernanda Jackson MD;  Location:  [facility];  Service: Cardiovascular;  Laterality: N/A;    CARDIAC CATHETERIZATION N/A 06/12/2020    Procedure: Coronary angiography;  Surgeon: Fernanda Jackson MD;  Location:  [facility];  Service: Cardiovascular;  Laterality: N/A;    CARDIAC DEFIBRILLATOR PLACEMENT      COLON RESECTION      HERNIA REPAIR      INSERT / REPLACE / REMOVE PACEMAKER      ICD    KNEE ACL RECONSTRUCTION         Social History:   Social History     Socioeconomic History    Marital status: Single   Tobacco Use    Smoking status: Former     Packs/day: 1.50     Types: Cigarettes    Smokeless tobacco: Former     Types: Snuff    Tobacco comments:     QUIT SMOKING 5/20   Vaping Use    Vaping Use: Never used   Substance and Sexual Activity    Alcohol use: No    Drug use: Yes     Types: Marijuana     Comment: once/day    Sexual activity: Yes     Partners: Female       Procedures Performed         Consults:   Consults       Date and Time Order Name Status Description    8/5/2023  8:43 PM Inpatient Cardiology Consult Completed             Condition on Discharge:     Stable    Discharge Disposition      Discharge Medications     Discharge Medications        ASK your doctor about these medications        Instructions Start Date   aspirin 81 MG chewable tablet   81 mg, Oral, Daily      carvedilol 12.5 MG tablet  Commonly known as: COREG   TAKE 1 TABLET  BY MOUTH TWICE DAILY WITH MEALS      fish oil 1000 MG capsule capsule   Oral, Daily With Breakfast      furosemide 40 MG tablet  Commonly known as: LASIX   40 mg, Oral, Daily PRN      gabapentin 600 MG tablet  Commonly known as: NEURONTIN   No dose, route, or frequency recorded.      HYDROcodone-acetaminophen  MG per tablet  Commonly known as: NORCO   1 tablet, Oral, 2 Times Daily PRN      losartan 25 MG tablet  Commonly known as: COZAAR   25 mg, Oral, Daily      Magnesium Oxide 400 MG capsule   800 mg, Oral, Daily      multivitamin with minerals tablet tablet   1 tablet, Oral, Daily      nitroglycerin 0.4 MG SL tablet  Commonly known as: NITROSTAT   0.4 mg, Sublingual, Every 5 Minutes PRN, Take no more than 3 doses in 15 minutes.       pravastatin 20 MG tablet  Commonly known as: PRAVACHOL   20 mg, Oral, Every Night at Bedtime      spironolactone 25 MG tablet  Commonly known as: ALDACTONE   25 mg, Oral, Daily               Discharge Diet:     Activity at Discharge:     Follow-up Appointments  Future Appointments   Date Time Provider Department Center   9/22/2023 10:45 AM Fernanda Jackson MD [facility]   1/22/2024  3:00 PM Aneesh Rosenberg MD [facility]         Test Results Pending at Discharge       Risk for Readmission (LACE) Score: 3 (8/6/2023  6:00 AM)      Greater than 30 minutes spent in discharge activities for this patient    Reina Coleman, APRN  08/06/23  09:15 EDT

## 2023-08-06 NOTE — PLAN OF CARE
Goal Outcome Evaluation:              Outcome Evaluation: Pt admitted for c/o shortness of breath, Pt Scheduled for echo this morning, with a pending cardiology consult. VSS, pt resting with no complaints at this time. Care Continues.

## 2023-08-06 NOTE — CONSULTS
CARDIOLOGY CONSULT NOTE      Referring Provider: Dr. Chaudhry    Reason for Consultation: Cardiomyopathy, congestive heart failure    Attending: Preston Chaudhry MD    Chief complaint    Shortness of breath    Subjective .     History of present illness:  Brian Stewart is a 42 y.o. male who presents with shortness of breath.     Patient presented to the emergency room at [facility] with complaints of increasing cough, shortness of breath, activity intolerance and chest tightness.    Patient is routinely followed by [facility] cardiology.  He is known to have nonischemic cardiomyopathy.  He has had previous AICD implant.  Previous cardiac catheterization in 2020 demonstrated normal coronaryArteries.  EF at the time of his cath was 15%.  Patient was recently seen in the office by Dr. Rosenberg.    He has a single-chamber ICD.  Patient has had previous PACs, PVCs and nonsustained ventricular tachycardia.    Evaluation in the emergency room proBNP was 979.  BUN and creatinine 18 and 0.88 D-dimer negative high-sensitivity troponin 9, 12.  Chest x-ray showed the lungs are grossly clear.  CT of the abdomen was performed no evidence of ascites or acute intra-abdominal process.  Respiratory virus panel was negative    Review of Systems   Cardiovascular:  Positive for dyspnea on exertion.   Respiratory:  Positive for cough and shortness of breath.    All other systems reviewed and are negative.  Pertinent items are noted in HPI, all other systems reviewed and negative  History  Past Medical History:   Diagnosis Date    Arrhythmia     Cardiomyopathy     Diverticulitis     Hyperlipidemia     Palpitations        Past Surgical History:   Procedure Laterality Date    BACK SURGERY      CARDIAC CATHETERIZATION      CARDIAC CATHETERIZATION N/A 06/12/2020    Procedure: Left Heart Cath;  Surgeon: Fernanda Jackson MD;  Location: [facility] INVASIVE LOCATION;  Service: Cardiovascular;  Laterality: N/A;    CARDIAC  CATHETERIZATION N/A 06/12/2020    Procedure: Coronary angiography;  Surgeon: Fernanda Jackson MD;  Location: [facility] CATH INVASIVE LOCATION;  Service: Cardiovascular;  Laterality: N/A;    CARDIAC DEFIBRILLATOR PLACEMENT      COLON RESECTION      HERNIA REPAIR      INSERT / REPLACE / REMOVE PACEMAKER      ICD    KNEE ACL RECONSTRUCTION         Family History   Problem Relation Age of Onset    Heart disease Father     Hypertension Mother        Social History     Tobacco Use    Smoking status: Former     Packs/day: 1.50     Types: Cigarettes    Smokeless tobacco: Former     Types: Snuff    Tobacco comments:     QUIT SMOKING 5/20   Vaping Use    Vaping Use: Never used   Substance Use Topics    Alcohol use: No    Drug use: Yes     Types: Marijuana     Comment: once/day        Medications Prior to Admission   Medication Sig Dispense Refill Last Dose    aspirin 81 MG chewable tablet Chew 1 tablet Daily.   8/5/2023    carvedilol (COREG) 12.5 MG tablet TAKE 1 TABLET BY MOUTH TWICE DAILY WITH MEALS 180 tablet 2 8/5/2023    furosemide (LASIX) 40 MG tablet Take 1 tablet by mouth Daily As Needed.  0 Past Week    gabapentin (NEURONTIN) 600 MG tablet    Past Week    HYDROcodone-acetaminophen (NORCO)  MG per tablet Take 1 tablet by mouth 2 (Two) Times a Day As Needed for Moderate Pain.   8/5/2023    losartan (COZAAR) 25 MG tablet TAKE 1 TABLET BY MOUTH DAILY 90 tablet 2 8/5/2023    Magnesium Oxide 400 MG capsule Take 800 mg by mouth Daily. 30 each 0 8/5/2023    multivitamin with minerals tablet tablet Take 1 tablet by mouth Daily.   8/5/2023    Omega-3 Fatty Acids (fish oil) 1000 MG capsule capsule Take  by mouth Daily With Breakfast.   Past Week    pravastatin (PRAVACHOL) 20 MG tablet TAKE 1 TABLET BY MOUTH EVERY NIGHT AT BEDTIME 90 tablet 2 8/4/2023    spironolactone (ALDACTONE) 25 MG tablet TAKE 1 TABLET BY MOUTH DAILY 90 tablet 2 8/4/2023    nitroglycerin (NITROSTAT) 0.4 MG SL tablet Place 1 tablet under the tongue  "Every 5 (Five) Minutes As Needed for Chest Pain. Take no more than 3 doses in 15 minutes. 30 tablet 2 More than a month         Patient has no known allergies.    Scheduled Meds:aspirin, 81 mg, Oral, Daily  atorvastatin, 10 mg, Oral, Daily  carvedilol, 12.5 mg, Oral, BID With Meals  gabapentin, 600 mg, Oral, Q8H  losartan, 25 mg, Oral, Daily  senna-docusate sodium, 2 tablet, Oral, BID  sodium chloride, 10 mL, Intravenous, Q12H  sodium chloride, 10 mL, Intravenous, Q12H  spironolactone, 25 mg, Oral, Daily      Continuous Infusions:   PRN Meds:.  acetaminophen **OR** acetaminophen **OR** acetaminophen    senna-docusate sodium **AND** polyethylene glycol **AND** bisacodyl **AND** bisacodyl    HYDROcodone-acetaminophen    HYDROcodone-acetaminophen    melatonin    nitroglycerin    ondansetron **OR** ondansetron    sodium chloride    sodium chloride    sodium chloride    sodium chloride    Objective     VITAL SIGNS  Vitals:    08/05/23 2110 08/06/23 0339 08/06/23 0618 08/06/23 0707   BP: 125/83 90/58 95/70 112/82   BP Location: Left arm Right arm Right arm    Patient Position: Lying Lying Lying    Pulse: 74 62 60 70   Resp: 18 18 16    Temp: 98.1 øF (36.7 øC) 97.8 øF (36.6 øC) 97.9 øF (36.6 øC)    TempSrc: Oral Oral Oral    SpO2: 93% 92% 93%    Weight: 112 kg (247 lb)   112 kg (247 lb)   Height: 172.7 cm (68\")   172.7 cm (68\")       Flowsheet Rows      Flowsheet Row First Filed Value   Admission Height 172.7 cm (68\") Documented at 08/05/2023 1630   Admission Weight 109 kg (240 lb) Documented at 08/05/2023 1630            Body mass index is 37.56 kg/mý.     TELEMETRY: Sinus rhythm    Physical Exam:  Vitals reviewed.   Constitutional:       General: Not in acute distress.     Appearance: Normal appearance. Well-developed.   Eyes:      Pupils: Pupils are equal, round, and reactive to light.   HENT:      Head: Normocephalic and atraumatic.   Neck:      Vascular: No JVD.   Pulmonary:      Effort: Pulmonary effort is normal.    "   Breath sounds: Normal breath sounds. Decreased breath sounds present.   Cardiovascular:      Normal rate. Regular rhythm.   Pulses:     Intact distal pulses.   Edema:     Peripheral edema absent.   Abdominal:      General: There is no distension.      Palpations: Abdomen is soft.      Tenderness: There is no abdominal tenderness.   Musculoskeletal: Normal range of motion.      Cervical back: Normal range of motion and neck supple. Skin:     General: Skin is warm and dry.   Neurological:      Mental Status: Alert and oriented to person, place, and time.        Results Review:   I reviewed the patient's new clinical results.    CBC    Results from last 7 days   Lab Units 08/06/23  0407 08/05/23  1731   WBC 10*3/mm3 9.60 9.10   HEMOGLOBIN g/dL 15.9 15.3   PLATELETS 10*3/mm3 184 187     BMP   Results from last 7 days   Lab Units 08/06/23  0407 08/05/23  1731   SODIUM mmol/L 139 139   POTASSIUM mmol/L 3.8 4.2   CHLORIDE mmol/L 103 104   CO2 mmol/L 23.0 25.0   BUN mg/dL 18 19   CREATININE mg/dL 0.88 0.94   GLUCOSE mg/dL 117* 115*     Cr Clearance Estimated Creatinine Clearance: 132.7 mL/min (by C-G formula based on SCr of 0.88 mg/dL).  Coag     HbA1C No results found for: HGBA1C  Blood Glucose No results found for: POCGLU  Infection     CMP   Results from last 7 days   Lab Units 08/06/23  0407 08/05/23  1731   SODIUM mmol/L 139 139   POTASSIUM mmol/L 3.8 4.2   CHLORIDE mmol/L 103 104   CO2 mmol/L 23.0 25.0   BUN mg/dL 18 19   CREATININE mg/dL 0.88 0.94   GLUCOSE mg/dL 117* 115*   ALBUMIN g/dL  --  4.1   BILIRUBIN mg/dL  --  0.3   ALK PHOS U/L  --  75   AST (SGOT) U/L  --  40   ALT (SGPT) U/L  --  25     ABG      UA      CHEYENNE  No results found for: POCMETH, POCAMPHET, POCBARBITUR, POCBENZO, POCCOCAINE, POCOPIATES, POCOXYCODO, POCPHENCYC, POCPROPOXY, POCTHC, POCTRICYC  Lysis Labs   Results from last 7 days   Lab Units 08/06/23  0407 08/05/23  1731   HEMOGLOBIN g/dL 15.9 15.3   PLATELETS 10*3/mm3 184 187   CREATININE mg/dL  0.88 0.94     Radiology(recent) CT Abdomen Pelvis Without Contrast    Result Date: 8/5/2023  1. No evidence of ascites 2. No acute intra-abdominal or intrapelvic abnormality appreciated 3. Other findings as above Electronically Signed: Cruz Hidalgo  8/5/2023 7:05 PM EDT  Workstation ID: [id number]    XR Chest 1 View    Result Date: 8/5/2023  Impression: 1. Stable cardiomegaly accentuated by portable technique 2. Lungs are clear Electronically Signed: Cruz Hidalgo  8/5/2023 6:18 PM EDT  Workstation ID: [id number]     Results from last 7 days   Lab Units 08/06/23  0407   HSTROP T ng/L 12       Imaging Results (Last 24 Hours)       Procedure Component Value Units Date/Time    CT Abdomen Pelvis Without Contrast [[id number]] Collected: 08/05/23 1831     Updated: 08/05/23 1907    Narrative:      CT ABDOMEN PELVIS WO CONTRAST    Date of Exam: 8/5/2023 6:27 PM EDT    Indication: Possible ascites.    Comparison: 3/17/2022 and prior    Technique: Axial CT images were obtained of the abdomen and pelvis without the administration of contrast. Sagittal and coronal reconstructions were performed.  Automated exposure control and iterative reconstruction methods were used.        FINDINGS:    Abdomen/Pelvis:    Lower Chest: Lung bases are grossly clear with minimal dependent atelectasis noted posteriorly.    No free air noted below the diaphragm.    No ascites noted. Postsurgical changes from prior ventral hernia repair noted.    Organs: Limited noncontrast imaging of the liver, gallbladder, pancreas, kidneys, spleen and adrenal glands are unremarkable    GI/Bowel: Limited noncontrast imaging of the stomach and small bowel is unremarkable. The ileocecal valve and the appendix appear unremarkable in appearance. There is diverticulosis without evidence of acute diverticulitis. Postsurgical changes of the   distal sigmoid colon noted.    No suspicious mesenteric adenopathy or fluid collections    Pelvis: The urinary bladder, prostate  and pelvic structures demonstrate no acute abnormality.    Peritoneum/Retroperitoneum: The aorta is normal in caliber. No suspicious retroperitoneal adenopathy    Bones/Soft Tissues: No destructive bone lesion. Postsurgical changes noted of the lower lumbar sacral spine.      Impression:        1. No evidence of ascites    2. No acute intra-abdominal or intrapelvic abnormality appreciated    3. Other findings as above          Electronically Signed: Cruz Hidalgo    8/5/2023 7:05 PM EDT    Workstation ID: [id number]    XR Chest 1 View [[id number]] Collected: 08/05/23 1810     Updated: 08/05/23 1820    Narrative:      XR CHEST 1 VW    Date of Exam: 8/5/2023 5:54 PM EDT    Indication: cough    Comparison: 11/9/2022 and prior    Findings:  Study is limited by overlying support and monitoring apparatus.    Left-sided subclavian approach ICD in place. Generator obscures portions of the left hemithorax. The heart and mediastinal contours are stable.    Lungs are grossly clear. Osseous structures demonstrate no acute abnormality.      Impression:      Impression:    1. Stable cardiomegaly accentuated by portable technique    2. Lungs are clear      Electronically Signed: Cruz Hidalgo    8/5/2023 6:18 PM EDT    Workstation ID: [id number]            EKG      I personally viewed and interpreted the patient's EKG/Telemetry data:    ECHOCARDIOGRAM:  Pending    STRESS MYOVIEW:  2018    Interpretation Summary       Findings consistent with an equivocal ECG stress test.  Breast attenuation and diaphragmatic attenuation artifacts are present.  Left ventricular ejection fraction is normal (Calculated EF = 50%).  Myocardial perfusion imaging indicates a normal myocardial perfusion study with no evidence of ischemia.  Impressions are consistent with a low risk study.  Suboptimal study     Asymptomatic for chest pain. Specificity of study reduced secondary to baseline Non-specific ST-T wave abnormalities, however ECG is not suggestive  of ischemia  Ectopy: none.  Blood pressure  And heart rate response:  Appropriate for Beta-blocker therapy  Pharmacologic study due to inability to tolerate increasing speed and grade of treadmill due to c/o  becoming very fatigued Stage II, and had to change to pharmacological procedure.  Participated in Low Level exercise and tolerance was fair.       CARDIAC CATHETERIZATION:  6/2020  Impressions:  Normal coronaries  Severe cardiomyopathy  Severe LV dysfunction  Elevated left-sided filling pressures  Estimated LV ejection fraction of 15%     Recommendations:  Medical management for cardiomyopathy  Test results and treatment options discussed with patient and family      OTHER:         Assessment & Plan       Acute CHF (congestive heart failure)      Assessment:      Heart failure reduced ejection fraction  Nonischemic cardiomyopathy  Single-chamber ICD mitral regurgitation  Dyslipidemia  History of tobacco abuse    Plan:  Continue gentle diuresis  With transition home diuretics to daily for now  Consider pulmonary function test  Dr. Rose to review echocardiogram  Additional recommendations per Dr. Rose    Patient is seen and examined and findings are verified.  All data is reviewed by me personally.  Assessment and plan formulated by APC was done after discussion with attending.  I spent more than 50% of time in taking care of the patient.    Patient is admitted with shortness of breath.  Patient has history of dilated cardiomyopathy    Normal S1 and S2.  No pericardial rub or murmur there was no gallop or S3 noted.  Leg edema was absent    Patient is comfortable after diuretic uretic's.  I will continue gentle diuresis.  Primary cardiologist to see the patient tomorrow.  I anticipate discharge tomorrow.    I discussed the patients findings and my recommendations with patient and RN      Electronically signed by ANNIA Batista, 08/06/23, 7:39 AM EDT.      ANNIA Batista  08/06/23  07:39 EDT

## 2023-08-07 ENCOUNTER — READMISSION MANAGEMENT (OUTPATIENT)
Dept: CALL CENTER | Facility: HOSPITAL | Age: 43
End: 2023-08-07
Payer: MEDICARE

## 2023-08-07 VITALS
BODY MASS INDEX: 36.37 KG/M2 | OXYGEN SATURATION: 95 % | HEIGHT: 68 IN | WEIGHT: 240 LBS | RESPIRATION RATE: 18 BRPM | HEART RATE: 71 BPM | TEMPERATURE: 98.3 F | SYSTOLIC BLOOD PRESSURE: 116 MMHG | DIASTOLIC BLOOD PRESSURE: 73 MMHG

## 2023-08-07 LAB
ANION GAP SERPL CALCULATED.3IONS-SCNC: 11 MMOL/L (ref 5–15)
BASOPHILS # BLD AUTO: 0 10*3/MM3 (ref 0–0.2)
BASOPHILS NFR BLD AUTO: 0.3 % (ref 0–1.5)
BUN SERPL-MCNC: 21 MG/DL (ref 6–20)
BUN/CREAT SERPL: 21.4 (ref 7–25)
CALCIUM SPEC-SCNC: 9.3 MG/DL (ref 8.6–10.5)
CHLORIDE SERPL-SCNC: 101 MMOL/L (ref 98–107)
CO2 SERPL-SCNC: 26 MMOL/L (ref 22–29)
CREAT SERPL-MCNC: 0.98 MG/DL (ref 0.76–1.27)
DEPRECATED RDW RBC AUTO: 42.4 FL (ref 37–54)
EGFRCR SERPLBLD CKD-EPI 2021: 98.7 ML/MIN/1.73
EOSINOPHIL # BLD AUTO: 0.2 10*3/MM3 (ref 0–0.4)
EOSINOPHIL NFR BLD AUTO: 2.3 % (ref 0.3–6.2)
ERYTHROCYTE [DISTWIDTH] IN BLOOD BY AUTOMATED COUNT: 12.8 % (ref 12.3–15.4)
GLUCOSE SERPL-MCNC: 113 MG/DL (ref 65–99)
HCT VFR BLD AUTO: 48 % (ref 37.5–51)
HGB BLD-MCNC: 16.6 G/DL (ref 13–17.7)
LYMPHOCYTES # BLD AUTO: 2.4 10*3/MM3 (ref 0.7–3.1)
LYMPHOCYTES NFR BLD AUTO: 25 % (ref 19.6–45.3)
MAGNESIUM SERPL-MCNC: 2.2 MG/DL (ref 1.6–2.6)
MCH RBC QN AUTO: 32.5 PG (ref 26.6–33)
MCHC RBC AUTO-ENTMCNC: 34.5 G/DL (ref 31.5–35.7)
MCV RBC AUTO: 94.3 FL (ref 79–97)
MONOCYTES # BLD AUTO: 0.8 10*3/MM3 (ref 0.1–0.9)
MONOCYTES NFR BLD AUTO: 8.8 % (ref 5–12)
NEUTROPHILS NFR BLD AUTO: 6 10*3/MM3 (ref 1.7–7)
NEUTROPHILS NFR BLD AUTO: 63.6 % (ref 42.7–76)
NRBC BLD AUTO-RTO: 0.1 /100 WBC (ref 0–0.2)
PLATELET # BLD AUTO: 189 10*3/MM3 (ref 140–450)
PMV BLD AUTO: 8.4 FL (ref 6–12)
POTASSIUM SERPL-SCNC: 4.3 MMOL/L (ref 3.5–5.2)
RBC # BLD AUTO: 5.09 10*6/MM3 (ref 4.14–5.8)
SODIUM SERPL-SCNC: 138 MMOL/L (ref 136–145)
WBC NRBC COR # BLD: 9.4 10*3/MM3 (ref 3.4–10.8)

## 2023-08-07 PROCEDURE — 83735 ASSAY OF MAGNESIUM: CPT | Performed by: NURSE PRACTITIONER

## 2023-08-07 PROCEDURE — 80048 BASIC METABOLIC PNL TOTAL CA: CPT | Performed by: NURSE PRACTITIONER

## 2023-08-07 PROCEDURE — G0378 HOSPITAL OBSERVATION PER HR: HCPCS

## 2023-08-07 PROCEDURE — 85025 COMPLETE CBC W/AUTO DIFF WBC: CPT | Performed by: NURSE PRACTITIONER

## 2023-08-07 PROCEDURE — 99214 OFFICE O/P EST MOD 30 MIN: CPT | Performed by: INTERNAL MEDICINE

## 2023-08-07 RX ORDER — FUROSEMIDE 40 MG/1
40 TABLET ORAL DAILY
Qty: 30 TABLET | Refills: 0 | Status: SHIPPED | OUTPATIENT
Start: 2023-08-08 | End: 2023-09-07

## 2023-08-07 RX ADMIN — Medication 10 ML: at 09:11

## 2023-08-07 RX ADMIN — HYDROCODONE BITARTRATE AND ACETAMINOPHEN 1 TABLET: 10; 325 TABLET ORAL at 09:08

## 2023-08-07 RX ADMIN — SENNOSIDES AND DOCUSATE SODIUM 2 TABLET: 50; 8.6 TABLET ORAL at 10:34

## 2023-08-07 RX ADMIN — LOSARTAN POTASSIUM 25 MG: 25 TABLET, FILM COATED ORAL at 09:09

## 2023-08-07 RX ADMIN — ASPIRIN 81 MG: 81 TABLET, CHEWABLE ORAL at 09:08

## 2023-08-07 RX ADMIN — FUROSEMIDE 40 MG: 40 TABLET ORAL at 09:08

## 2023-08-07 RX ADMIN — CARVEDILOL 12.5 MG: 6.25 TABLET, FILM COATED ORAL at 09:09

## 2023-08-07 RX ADMIN — GABAPENTIN 600 MG: 600 TABLET, FILM COATED ORAL at 05:41

## 2023-08-07 NOTE — CASE MANAGEMENT/SOCIAL WORK
Discharge Planning Assessment   [facility]     Patient Name: Brian Stewart  MRN: [id number]  Today's Date: 8/7/2023    Admit Date: 8/5/2023    Plan: home   Discharge Needs Assessment       Row Name 08/07/23 1138       Living Environment    People in Home alone    Current Living Arrangements home    Potentially Unsafe Housing Conditions none    Primary Care Provided by self    Provides Primary Care For no one    Able to Return to Prior Arrangements yes       Resource/Environmental Concerns    Resource/Environmental Concerns none    Transportation Concerns none       Transition Planning    Patient/Family Anticipates Transition to home    Patient/Family Anticipated Services at Transition none    Transportation Anticipated car, drives self;family or friend will provide       Discharge Needs Assessment    Readmission Within the Last 30 Days no previous admission in last 30 days    Equipment Currently Used at Home none    Concerns to be Addressed denies needs/concerns at this time    Anticipated Changes Related to Illness none    Equipment Needed After Discharge bath bench;bp cuff                   Discharge Plan       Row Name 08/07/23 1139       Plan    Plan home    Plan Comments Met with Patient at bedside Lives at home with wife. IADL's PCP and PHarmacy verified, able to afford medications. Patient voiced he would like a Shower bench and BP cuff, unable to provide him those from hospital through insurance but patient can privately purchase from medical Equipment supply such as [employer], [employer], or anywhere he chooses no order is needed as they are private pay. Patient stated his insurance will cover Instructed patient to follow up with his insurance company to see if they are able to provide him with those items but per [facility] Liaison Those are private pay items.  d/c barriers: pending cards rounding                  Continued Care and Services - Admitted Since 8/5/2023    Coordination has not been started for this  encounter.       Expected Discharge Date and Time       Expected Discharge Date Expected Discharge Time    Aug 7, 2023            Demographic Summary       Row Name 08/07/23 1137       General Information    Admission Type observation    Arrived From emergency department    Referral Source admission list    Reason for Consult discharge planning    Preferred Language English                   Functional Status       Row Name 08/07/23 1138       Functional Status    Usual Activity Tolerance good    Current Activity Tolerance good       Functional Status, IADL    Medications independent    Meal Preparation independent    Housekeeping independent    Laundry independent    Shopping independent       Mental Status    General Appearance WDL WDL       Mental Status Summary    Recent Changes in Mental Status/Cognitive Functioning no changes                          Patient Forms       Row Name 08/07/23 0927       Patient Forms    Important Message from Medicare (IMM) --  Bear 8/5 per reg                      Noreen Barbosa RN

## 2023-08-07 NOTE — PROGRESS NOTES
Cardiology Progress Note      PATIENT IDENTIFICATION    Name: Brian Stewart  Age: 42 y.o. Sex: male : 1980  MRN: [id number]    Requesting Provider    Preston Chaudhry MD     LOS: 0 days       Reason For Followup:  Heart failure reduced ejection fraction      Subjective:    Interval History:  Seen and examined.  Chart labs reviewed.  Patient denies any chest pain pressure heaviness or tightness.  Reports feeling better than on admission.  Ready to go home.    Review of Systems:  A complete review of systems was undertaken with the pertinent cardiovascular findings listed in history of present illness and all other systems being negative.     Assessment & Plan    Impressions:  Heart failure with reduced ejection fraction  Nonischemic cardiomyopathy most recent ejection fraction 20 to 25%    Recommendations:  Cardiology status appropriate for discharge to next destination of care when okay with other services.  Heart failure clinic follow-up in 1 to 2 weeks  Follow-up with primary cardiologist as scheduled.        Objective:    Medication Review:   Scheduled Meds:aspirin, 81 mg, Oral, Daily  atorvastatin, 10 mg, Oral, Daily  carvedilol, 12.5 mg, Oral, BID With Meals  furosemide, 40 mg, Oral, Daily  gabapentin, 600 mg, Oral, Q8H  losartan, 25 mg, Oral, Daily  senna-docusate sodium, 2 tablet, Oral, BID  sodium chloride, 10 mL, Intravenous, Q12H  sodium chloride, 10 mL, Intravenous, Q12H  spironolactone, 25 mg, Oral, Daily      Continuous Infusions:   PRN Meds:.  acetaminophen **OR** acetaminophen **OR** acetaminophen    senna-docusate sodium **AND** polyethylene glycol **AND** bisacodyl **AND** bisacodyl    HYDROcodone-acetaminophen    melatonin    nitroglycerin    ondansetron **OR** ondansetron    sodium chloride    sodium chloride    sodium chloride    sodium chloride      Acute CHF (congestive heart failure)         Physical Exam:    General: Alert, cooperative, no distress, appears stated  age  Head:  Normocephalic, atraumatic, mucous membranes moist  Eyes:  Conjunctivae/corneas clear, EOMs intact     Neck:  Supple,  no bruit  Lungs:  Clear to auscultation bilaterally, no wheezes, rhonchi or rales are noted  Chest wall: No tenderness  Heart::  Regular rate and rhythm, S1 and S2 normal, 1/6 holosystolic murmur.  No rub or gallop  Abdomen: Soft, nontender, nondistended, bowel sounds active  Extremities: No cyanosis, clubbing, or edema  Pulses: 2+ and symmetric all extremities  Skin:  No rashes or lesions  Neuro/psych: A&O x3. CN II through XII are grossly intact with appropriate affect    Vital Signs:  Vitals:    08/07/23 0515 08/07/23 0833 08/07/23 0856 08/07/23 1031   BP: 100/72  112/80 116/73   BP Location: Left arm   Right arm   Patient Position: Lying   Sitting   Pulse: 55 68  71   Resp: 18   18   Temp: 97.9 øF (36.6 øC)   98.3 øF (36.8 øC)   TempSrc: Oral   Oral   SpO2: 96%   95%   Weight: 109 kg (240 lb)      Height:         Wt Readings from Last 1 Encounters:   08/07/23 109 kg (240 lb)       Intake/Output Summary (Last 24 hours) at 8/7/2023 1137  Last data filed at 8/7/2023 1024  Gross per 24 hour   Intake 1182 ml   Output --   Net 1182 ml         Results Review:     CBC    Results from last 7 days   Lab Units 08/07/23  0442 08/06/23  0407 08/05/23  1731   WBC 10*3/mm3 9.40 9.60 9.10   HEMOGLOBIN g/dL 16.6 15.9 15.3   PLATELETS 10*3/mm3 189 184 187     Cr Clearance Estimated Creatinine Clearance: 117.5 mL/min (by C-G formula based on SCr of 0.98 mg/dL).  Coag     HbA1C No results found for: HGBA1C  Blood Glucose No results found for: POCGLU  Infection     CMP   Results from last 7 days   Lab Units 08/07/23  0442 08/06/23  0407 08/05/23  1731   SODIUM mmol/L 138 139 139   POTASSIUM mmol/L 4.3 3.8 4.2   CHLORIDE mmol/L 101 103 104   CO2 mmol/L 26.0 23.0 25.0   BUN mg/dL 21* 18 19   CREATININE mg/dL 0.98 0.88 0.94   GLUCOSE mg/dL 113* 117* 115*   ALBUMIN g/dL  --   --  4.1   BILIRUBIN mg/dL  --    --  0.3   ALK PHOS U/L  --   --  75   AST (SGOT) U/L  --   --  40   ALT (SGPT) U/L  --   --  25     ABG      UA      CHEYENNE  No results found for: POCMETH, POCAMPHET, POCBARBITUR, POCBENZO, POCCOCAINE, POCOPIATES, POCOXYCODO, POCPHENCYC, POCPROPOXY, POCTHC, POCTRICYC  Lysis Labs   Results from last 7 days   Lab Units 08/07/23  0442 08/06/23  0407 08/05/23  1731   HEMOGLOBIN g/dL 16.6 15.9 15.3   PLATELETS 10*3/mm3 189 184 187   CREATININE mg/dL 0.98 0.88 0.94     Radiology(recent) CT Abdomen Pelvis Without Contrast    Result Date: 8/5/2023  1. No evidence of ascites 2. No acute intra-abdominal or intrapelvic abnormality appreciated 3. Other findings as above Electronically Signed: Cruz Hidalgo  8/5/2023 7:05 PM EDT  Workstation ID: [id number]    XR Chest 1 View    Result Date: 8/5/2023  Impression: 1. Stable cardiomegaly accentuated by portable technique 2. Lungs are clear Electronically Signed: Cruz Hidalgo  8/5/2023 6:18 PM EDT  Workstation ID: [id number]       Results from last 7 days   Lab Units 08/06/23  0407   HSTROP T ng/L 12       Imaging Results (Last 24 Hours)       ** No results found for the last 24 hours. **            Cardiac Studies:  Echo- Results for orders placed during the hospital encounter of 08/05/23    Adult Transthoracic Echo Complete w/ Color, Spectral and Contrast if Necessary Per Protocol    Interpretation Summary    Left ventricular systolic function is severely decreased. Left ventricular ejection fraction appears to be 21 - 25%.    Akinetic septum and inferior wall noted    Left ventricular diastolic function was normal.    Estimated right ventricular systolic pressure from tricuspid regurgitation is normal (<35 mmHg).    Stress Myoview-  Cath-        Petr Gaspar DO  08/07/23  11:37 EDT

## 2023-08-07 NOTE — PLAN OF CARE
Goal Outcome Evaluation:  Plan of Care Reviewed With: patient        Progress: improving  Outcome Evaluation: Pt rested throughout the night without complaints of chest pain. Plan is for possible discharge after seen by cardiology. Will await further orders from MD

## 2023-08-07 NOTE — CONSULTS
"Heart Failure Program  Nurse Navigator  Discharge Planning    Patient Name:Brian Stewart  :1980  Cardiologist:Renetta  Current Admission Date: 2023   Previous Admission:    Admission frequency: 1 admissions in 6 months    Heart Failure history per record:    Symptoms on admission:c/o SOA, chest tightness with activity coughing up white phlegm, aslo abd swelling. Pt advises he takes lasix as needed, he started on them a few days ago without relief. Pt  advises of medication adherence. He states he was on farxiga but has been off a couple of months due feeling poorly.      Admission Weight:  Flowsheet Rows      Flowsheet Row First Filed Value   Admission Height 172.7 cm (68\") Documented at 2023 1630   Admission Weight 109 kg (240 lb) Documented at 2023 1630              Current Home Medications:  Prior to Admission medications    Medication Sig Start Date End Date Taking? Authorizing Provider   aspirin 81 MG chewable tablet Chew 1 tablet Daily.   Yes Bella Dempsey MD   carvedilol (COREG) 12.5 MG tablet TAKE 1 TABLET BY MOUTH TWICE DAILY WITH MEALS 22  Yes Fernanda Jackson MD   furosemide (LASIX) 40 MG tablet Take 1 tablet by mouth Daily As Needed. 20  Yes Fernanda Jackson MD   gabapentin (NEURONTIN) 600 MG tablet  6/10/22  Yes Bella Depmsey MD   HYDROcodone-acetaminophen (NORCO)  MG per tablet Take 1 tablet by mouth 2 (Two) Times a Day As Needed for Moderate Pain.   Yes Bella Dempsey MD   losartan (COZAAR) 25 MG tablet TAKE 1 TABLET BY MOUTH DAILY 22  Yes Fernanda Jackson MD   Magnesium Oxide 400 MG capsule Take 800 mg by mouth Daily. 22  Yes Yemi Holloway MD   multivitamin with minerals tablet tablet Take 1 tablet by mouth Daily.   Yes Bella Dempsey MD   Omega-3 Fatty Acids (fish oil) 1000 MG capsule capsule Take  by mouth Daily With Breakfast.   Yes Bella Dempsey MD   pravastatin (PRAVACHOL) 20 " "MG tablet TAKE 1 TABLET BY MOUTH EVERY NIGHT AT BEDTIME 1/19/23  Yes Fernanda Jackson MD   spironolactone (ALDACTONE) 25 MG tablet TAKE 1 TABLET BY MOUTH DAILY 9/6/22  Yes Fernanda Jackson MD   nitroglycerin (NITROSTAT) 0.4 MG SL tablet Place 1 tablet under the tongue Every 5 (Five) Minutes As Needed for Chest Pain. Take no more than 3 doses in 15 minutes. 2/23/21   Fernanda Jackson MD       Social history:   Pt from home, with significant other. No issues with obtaining meds or taking them. Pt does not follow daily weights. \"I use to but not lately.\"    Smoking status:     Diagnostics Testing:  proBNP level: 979    Echocardiogram:Results for orders placed during the hospital encounter of 08/05/23    Adult Transthoracic Echo Complete w/ Color, Spectral and Contrast if Necessary Per Protocol    Interpretation Summary    Left ventricular systolic function is severely decreased. Left ventricular ejection fraction appears to be 21 - 25%.    Akinetic septum and inferior wall noted    Left ventricular diastolic function was normal.    Estimated right ventricular systolic pressure from tricuspid regurgitation is normal (<35 mmHg).        Patient Assessment:   Pt lying in bed, resp even and unlabored, no soa with conversation. No edema ankles, abd swelling improved per pt    Current O2:    Home O2:      Education provided to patient:  yes- Heart Failure disease education  yes -Symptom identification/management  yes -Daily Weights  yes- Diet education  na- Fluid restriction (if ordered)  yes- Activity education  yes- Medication education  na- Smoking cessation  yes- Follow-up Appointments  yes-Provided information on how to access [facility] My HF Guide/Heart Failure Interactive workbook    Acceptance of learning: acceptance, cooperative    Heart Failure education interactive teaching session time: 30 minutes    GWTG: EF 21-25%    Identified needs/barriers:   Pending cardiology, I&O, daily weights "     Intervention:   Education, request ambulatory referral to HF clinic for 1 week follow-up discussed with provider

## 2023-08-07 NOTE — CASE MANAGEMENT/SOCIAL WORK
Case Management Discharge Note      Final Note: home         Selected Continued Care - Discharged on 8/7/2023 Admission date: 8/5/2023 - Discharge disposition: Home or Self Care      Destination    No services have been selected for the patient.                Durable Medical Equipment    No services have been selected for the patient.                Dialysis/Infusion    No services have been selected for the patient.                Home Medical Care    No services have been selected for the patient.                Therapy    No services have been selected for the patient.                Community Resources    No services have been selected for the patient.                Community & DME    No services have been selected for the patient.                    Transportation Services  Private: Car    Final Discharge Disposition Code: 01 - home or self-care

## 2023-08-08 NOTE — OUTREACH NOTE
Prep Survey      Flowsheet Row Responses   [facility] facility patient discharged from? [facility]   Is LACE score < 7 ? Yes   Eligibility Readm Mgmt   Discharge diagnosis Acute CHF   Does the patient have one of the following disease processes/diagnoses(primary or secondary)? CHF   Does the patient have Home health ordered? No   Is there a DME ordered? No   Prep survey completed? Yes            Eleanor KIDD - Registered Nurse

## 2023-08-09 PROBLEM — I42.0 DILATED CARDIOMYOPATHY: Chronic | Status: ACTIVE | Noted: 2020-06-08

## 2023-08-09 NOTE — PROGRESS NOTES
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 12.8     RDW-SD 08/07/2023 42.4     MPV 08/07/2023 8.4     Platelets 08/07/2023 189     Neutrophil % 08/07/2023 63.6     Lymphocyte % 08/07/2023 25.0     Monocyte % 08/07/2023 8.8     Eosinophil % 08/07/2023 2.3     Basophil % 08/07/2023 0.3     Neutrophils, Absolute 08/07/2023 6.00     Lymphocytes, Absolute 08/07/2023 2.40     Monocytes, Absolute 08/07/2023 0.80     Eosinophils, Absolute 08/07/2023 0.20     Basophils, Absolute 08/07/2023 0.00     nRBC 08/07/2023 0.1    Hospital Outpatient Visit on 03/20/2023   Component Date Value    Target HR (85%) 03/20/2023 151     Max. Pred. HR (100%) 03/20/2023 178     LVIDd 03/20/2023 6.8     LVIDs 03/20/2023 6.0     IVSd 03/20/2023 1.05     LVPWd 03/20/2023 1.23     FS 03/20/2023 12.7     IVS/LVPW 03/20/2023 0.86     ESV(cubed) 03/20/2023 212.1     EDV(cubed) 03/20/2023 319.1     LVOT area 03/20/2023 4.6     LV mass(C)d 03/20/2023 365.6     LVOT diam 03/20/2023 2.41     EDV(MOD-sp4) 03/20/2023 218.5     ESV(MOD-sp4) 03/20/2023 162.8     SV(MOD-sp4) 03/20/2023 55.7     EF(MOD-sp4) 03/20/2023 25.5     MV E max chandler 03/20/2023 50.1     MV A max chandler 03/20/2023 69.5     MV dec time 03/20/2023 0.24     MV E/A 03/20/2023 0.72     Pulm A Revs Dur 03/20/2023 0.12     SV(LVOT) 03/20/2023 79.0     RVIDd 03/20/2023 2.43     LA dimension (2D)  03/20/2023 4.0     Pulm Sys Chandler 03/20/2023 39.7     Pulm  Stevens Chandler 03/20/2023 34.4     Pulm S/D 03/20/2023 1.16     Pulm A Revs Chandler 03/20/2023 30.7     LV V1 max 03/20/2023 96.0     LV V1 max PG 03/20/2023 3.7     LV V1 mean PG 03/20/2023 2.16     LV V1 VTI 03/20/2023 17.3     Ao pk chandler 03/20/2023 103.2     Ao max PG 03/20/2023 4.3     Ao mean PG 03/20/2023 2.44     Ao V2 VTI 03/20/2023 18.0     RENATO(I,D) 03/20/2023 4.4     MV max PG 03/20/2023 3.0     MV mean PG 03/20/2023 1.32     MV V2 VTI 03/20/2023 15.5     MVA(VTI) 03/20/2023 5.1     MV dec slope 03/20/2023 206.7     MR max chandler 03/20/2023 476.4     MR max PG 03/20/2023 90.8     TR max chandler 03/20/2023 221.6     TR max PG 03/20/2023 19.7     RV V1 max PG 03/20/2023 2.39     RV V1 max 03/20/2023 77.2     RV V1 VTI 03/20/2023 13.6     PA V2 max 03/20/2023 103.5     Ao root diam 03/20/2023 2.9     ACS 03/20/2023 2.30     EF(MOD-bp) 03/20/2023 26.0      Recent labs reviewed and interpreted for clinical significance and application    We discussed his elevated blood glucose this visit and plan on checking an A1c at his visit in 2 weeks  Magnesium was normal however at patient request were going ahead and starting him on magnesium oxide 400 twice daily instead of the OTC medication he has been taking at home            Assessment:       Diagnoses and all orders for this visit:    1. Non- Ischemic Dilated cardiomyopathy (Primary)  Overview:         A. Non-ischemic BiV                 I. 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 EF 21-25% 7/23 gr1DD               III. S/p ICD      Orders:  -     Basic Metabolic Panel  -     proBNP  -     D-dimer, Quantitative  -     TSH    2. Hypertensive heart disease with chronic combined systolic and diastolic congestive heart failure  -     TSH    Other orders  -     ECG 12 Lead Other; NIDCM; Standing  -     ECG 12 Lead Other; NIDCM  -     Discontinue: carvedilol (COREG) tablet 25 mg  -     Discontinue: sacubitril-valsartan (ENTRESTO) 24-26 MG tablet 1 tablet  -      Discontinue: magnesium oxide (MAG-OX) tablet 400 mg  -     Discontinue: isosorbide mononitrate (IMDUR) 24 hr tablet 30 mg             Plan/discussion    Volume overload    Heart Failure Core Measures    Type : Nonischemic    EF: 21 to 25% grade 1 DD    [place] Heart association Class & Stage : Class III stage B        HF Meds pre-visit    Beta Blocker: Coreg 12.5 mg twice daily  ARNI/ACE/ARB: Losartan 25 mg daily  SGLT 2 inhibitors: None upon initial presentation  Diuretics: Lasix 40 mg daily  Furoscix: Currently not prescribed  Aldosterone Antagonist: Spironolactone 25 mg daily  Digitalis: Currently not taking  Vasodilators & Nitrates: No nitrates or vasodilators    HF Meds post visit    Beta Blocker: Coreg 25 mg twice daily (hold for systolic BP less than 90 mmHg)  ARNI/ACE/ARB: Entresto 24/26 twice daily  SGLT 2 inhibitors: Farxiga 10 mg daily, (patient was taking 5 mg daily which was self initiated at home after having stopped the medication for a considerable period of time he started it 2 days ago)  Diuretics: Lasix 40 mg in the a.m. and 20 mg in the p.m.(of note the patient was taking prior to the visit vise what was noted above this was also self initiated  Furoscix: Currently not prescribed  Aldosterone Antagonist: Spironolactone 25 mg daily  Digitalis: Currently not taking  Vasodilators & Nitrates: Imdur 30 mg daily    [facility] monitoring : Likely will be recommending initiation of the heart failure management program through [facility] if cost effective and will discuss in 2 weeks    Cardiac rehabilitation will discuss candidacy at next visit    Cardiac medicines reviewed with risk, benefits, and necessity of each discussed. By myself well as our staff pharmacist    As far as current heart failure medications:  Will go ahead and increase his Coreg up to 25 mg twice daily  Will titrate his ARB for an ARNI Entresto 24/26 twice daily  Will restart as patient already has his Farxiga at 10 mg daily  We will go ahead  "and place on a nitrate with Imdur 30 mg daily  Other:  Patient request we will go ahead and switch to Mag-Ox 400 mg twice daily  Overall the patient seems very motivated and has himself requested the 2-week follow-up appointment.  Of course we discussed typical nursing core measures for heart failure including diet as well as a fluid restriction to about 2 L daily  Patient did express some interest in cardiac rehabilitation which we will further evaluate at upon his next visit in 2 weeks.  He has a scheduled to follow-up with his primary cardiologist Dr. Brady in approximately 1 month.         It is a pleasure to be involved in this patient's cardiovascular care relating to their heart failure.  Please feel free to call me with any questions or concerns.    Mainor \"William\" Cynthia MILNER, [facility]  Heart failure program clinical provider    ANNIA Nugent   08/09/23  .  "

## 2023-08-10 ENCOUNTER — HOSPITAL ENCOUNTER (OUTPATIENT)
Facility: HOSPITAL | Age: 43
Discharge: HOME | End: 2023-08-10
Admitting: NURSE PRACTITIONER
Payer: MEDICARE

## 2023-08-10 ENCOUNTER — READMISSION MANAGEMENT (OUTPATIENT)
Dept: CALL CENTER | Facility: HOSPITAL | Age: 43
End: 2023-08-10
Payer: MEDICARE

## 2023-08-10 VITALS
TEMPERATURE: 97.5 F | RESPIRATION RATE: 18 BRPM | DIASTOLIC BLOOD PRESSURE: 82 MMHG | WEIGHT: 242.4 LBS | SYSTOLIC BLOOD PRESSURE: 114 MMHG | BODY MASS INDEX: 36.86 KG/M2 | HEART RATE: 91 BPM | OXYGEN SATURATION: 98 %

## 2023-08-10 DIAGNOSIS — I11.0 HYPERTENSIVE HEART DISEASE WITH CHRONIC COMBINED SYSTOLIC AND DIASTOLIC CONGESTIVE HEART FAILURE: ICD-10-CM

## 2023-08-10 DIAGNOSIS — I42.0 DILATED CARDIOMYOPATHY: Primary | Chronic | ICD-10-CM

## 2023-08-10 DIAGNOSIS — I50.42 HYPERTENSIVE HEART DISEASE WITH CHRONIC COMBINED SYSTOLIC AND DIASTOLIC CONGESTIVE HEART FAILURE: ICD-10-CM

## 2023-08-10 LAB
ANION GAP SERPL CALCULATED.3IONS-SCNC: 13 MMOL/L (ref 5–15)
BUN SERPL-MCNC: 25 MG/DL (ref 6–20)
BUN/CREAT SERPL: 24.8 (ref 7–25)
CALCIUM SPEC-SCNC: 9.8 MG/DL (ref 8.6–10.5)
CHLORIDE SERPL-SCNC: 99 MMOL/L (ref 98–107)
CO2 SERPL-SCNC: 25 MMOL/L (ref 22–29)
CREAT SERPL-MCNC: 1.01 MG/DL (ref 0.76–1.27)
D DIMER PPP FEU-MCNC: 0.31 MG/L (FEU) (ref 0–0.5)
EGFRCR SERPLBLD CKD-EPI 2021: 95.2 ML/MIN/1.73
GLUCOSE SERPL-MCNC: 82 MG/DL (ref 65–99)
NT-PROBNP SERPL-MCNC: 923.2 PG/ML (ref 0–450)
POTASSIUM SERPL-SCNC: 4.6 MMOL/L (ref 3.5–5.2)
SODIUM SERPL-SCNC: 137 MMOL/L (ref 136–145)
TSH SERPL DL<=0.05 MIU/L-ACNC: 0.58 UIU/ML (ref 0.27–4.2)

## 2023-08-10 PROCEDURE — 83880 ASSAY OF NATRIURETIC PEPTIDE: CPT | Performed by: NURSE PRACTITIONER

## 2023-08-10 PROCEDURE — 80048 BASIC METABOLIC PNL TOTAL CA: CPT | Performed by: NURSE PRACTITIONER

## 2023-08-10 PROCEDURE — G0463 HOSPITAL OUTPT CLINIC VISIT: HCPCS | Performed by: NURSE PRACTITIONER

## 2023-08-10 PROCEDURE — 85379 FIBRIN DEGRADATION QUANT: CPT | Performed by: NURSE PRACTITIONER

## 2023-08-10 PROCEDURE — 84443 ASSAY THYROID STIM HORMONE: CPT | Performed by: NURSE PRACTITIONER

## 2023-08-10 PROCEDURE — 93005 ELECTROCARDIOGRAM TRACING: CPT | Performed by: NURSE PRACTITIONER

## 2023-08-10 RX ORDER — DAPAGLIFLOZIN 10 MG/1
10 TABLET, FILM COATED ORAL EVERY MORNING
COMMUNITY

## 2023-08-10 RX ORDER — ISOSORBIDE MONONITRATE 30 MG/1
30 TABLET, EXTENDED RELEASE ORAL DAILY
Qty: 30 TABLET | Refills: 11 | Status: SHIPPED | OUTPATIENT
Start: 2023-08-10

## 2023-08-10 RX ORDER — SACUBITRIL AND VALSARTAN 24; 26 MG/1; MG/1
1 TABLET, FILM COATED ORAL 2 TIMES DAILY
Qty: 60 TABLET | Refills: 11 | Status: SHIPPED | OUTPATIENT
Start: 2023-08-10

## 2023-08-10 RX ORDER — CARVEDILOL 25 MG/1
25 TABLET ORAL 2 TIMES DAILY WITH MEALS
Status: CANCELLED | OUTPATIENT
Start: 2023-08-10

## 2023-08-10 RX ORDER — ISOSORBIDE MONONITRATE 30 MG/1
30 TABLET, EXTENDED RELEASE ORAL
Status: DISCONTINUED | OUTPATIENT
Start: 2023-08-10 | End: 2023-08-10

## 2023-08-10 RX ORDER — CALCIUM CARBONATE/VITAMIN D3 500-10/5ML
400 LIQUID (ML) ORAL
Qty: 60 EACH | Refills: 11 | Status: SHIPPED | OUTPATIENT
Start: 2023-08-10

## 2023-08-10 RX ORDER — CALCIUM CARBONATE/VITAMIN D3 500-10/5ML
1 LIQUID (ML) ORAL
Status: CANCELLED
Start: 2023-08-10

## 2023-08-10 RX ORDER — FUROSEMIDE 20 MG/1
20 TABLET ORAL NIGHTLY
COMMUNITY

## 2023-08-10 RX ORDER — CARVEDILOL 25 MG/1
25 TABLET ORAL 2 TIMES DAILY
Qty: 180 TABLET | Refills: 3 | Status: SHIPPED | OUTPATIENT
Start: 2023-08-10

## 2023-08-10 RX ORDER — SACUBITRIL AND VALSARTAN 24; 26 MG/1; MG/1
1 TABLET, FILM COATED ORAL 2 TIMES DAILY
Status: CANCELLED | OUTPATIENT
Start: 2023-08-10

## 2023-08-10 RX ORDER — ISOSORBIDE MONONITRATE 30 MG/1
30 TABLET, EXTENDED RELEASE ORAL DAILY
Status: CANCELLED
Start: 2023-08-10

## 2023-08-10 RX ORDER — CARVEDILOL 25 MG/1
25 TABLET ORAL 2 TIMES DAILY WITH MEALS
Status: DISCONTINUED | OUTPATIENT
Start: 2023-08-10 | End: 2023-08-10

## 2023-08-10 NOTE — LETTER
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     Globulin 08/05/2023 2.2     A/G Ratio 08/05/2023 1.9     BUN/Creatinine Ratio 08/05/2023 20.2     Anion Gap 08/05/2023 10.0     eGFR 08/05/2023 103.8     ADENOVIRUS, PCR 08/05/2023 Not Detected     Coronavirus 229E 08/05/2023 Not Detected     Coronavirus HKU1 08/05/2023 Not Detected     Coronavirus NL63 08/05/2023 Not Detected     Coronavirus OC43 08/05/2023 Not Detected     COVID19 08/05/2023  Not Detected     Human Metapneumovirus 08/05/2023 Not Detected     Human Rhinovirus/Enterov* 08/05/2023 Not Detected     Influenza A PCR 08/05/2023 Not Detected     Influenza B PCR 08/05/2023 Not Detected     Parainfluenza Virus 1 08/05/2023 Not Detected     Parainfluenza Virus 2 08/05/2023 Not Detected     Parainfluenza Virus 3 08/05/2023 Not Detected     Parainfluenza Virus 4 08/05/2023 Not Detected     RSV, PCR 08/05/2023 Not Detected     Bordetella pertussis pcr 08/05/2023 Not Detected     Bordetella parapertussis* 08/05/2023 Not Detected     Chlamydophila pneumoniae* 08/05/2023 Not Detected     Mycoplasma pneumo by PCR 08/05/2023 Not Detected     proBNP 08/05/2023 979.4 (H)     HS Troponin T 08/05/2023 9     D-Dimer, Quantitative 08/05/2023 0.23     TSH 08/05/2023 0.793     WBC 08/05/2023 9.10     RBC 08/05/2023 4.76     Hemoglobin 08/05/2023 15.3     Hematocrit 08/05/2023 45.5     MCV 08/05/2023 95.6     MCH 08/05/2023 32.1     MCHC 08/05/2023 33.6     RDW 08/05/2023 12.8     RDW-SD 08/05/2023 42.4     MPV 08/05/2023 8.3     Platelets 08/05/2023 187     Neutrophil % 08/05/2023 67.2     Lymphocyte % 08/05/2023 23.3     Monocyte % 08/05/2023 7.2     Eosinophil % 08/05/2023 1.6     Basophil % 08/05/2023 0.7     Neutrophils, Absolute 08/05/2023 6.10     Lymphocytes, Absolute 08/05/2023 2.10     Monocytes, Absolute 08/05/2023 0.70     Eosinophils, Absolute 08/05/2023 0.10     Basophils, Absolute 08/05/2023 0.10     nRBC 08/05/2023 0.1     LVIDd 08/06/2023 5.8     LVIDs 08/06/2023 5.5     IVSd 08/06/2023 1.10     LVPWd 08/06/2023 0.90     FS 08/06/2023 5.2     IVS/LVPW 08/06/2023 1.22     ESV(cubed) 08/06/2023 166.4     LV Sys Vol (BSA correcte* 08/06/2023 53.7     EDV(cubed) 08/06/2023 195.1     LV Stevens Vol (BSA correct* 08/06/2023 76.0     LVOT area 08/06/2023 4.9     LV mass(C)d 08/06/2023 233.1     LVOT diam 08/06/2023 2.5     EDV(MOD-sp4) 08/06/2023 170.0      ESV(MOD-sp4) 08/06/2023 120.0     SV(MOD-sp4) 08/06/2023 50.0     SI(MOD-sp4) 08/06/2023 22.4     EF(MOD-sp4) 08/06/2023 29.4     MV E max chandler 08/06/2023 60.4     MV A max chandler 08/06/2023 68.8     MV dec time 08/06/2023 0.19     MV E/A 08/06/2023 0.88     LA ESV Index (BP) 08/06/2023 22.8     Med Peak E' Chandler 08/06/2023 6.1     Lat Peak E' Chandler 08/06/2023 4.9     Avg E/e' ratio 08/06/2023 10.98     SV(LVOT) 08/06/2023 46.0     TAPSE (>1.6) 08/06/2023 2.38     RV S' 08/06/2023 8.1     LA dimension (2D)  08/06/2023 4.2     LV V1 max 08/06/2023 43.7     LV V1 max PG 08/06/2023 0.76     LV V1 mean PG 08/06/2023 0.00     LV V1 VTI 08/06/2023 9.4     Ao pk chandler 08/06/2023 94.7     Ao max PG 08/06/2023 3.6     Ao mean PG 08/06/2023 2.00     Ao V2 VTI 08/06/2023 21.0     RENATO(I,D) 08/06/2023 2.19     MV max PG 08/06/2023 2.04     MV mean PG 08/06/2023 1.00     MV V2 VTI 08/06/2023 20.7     MV P1/2t 08/06/2023 52.5     MVA(P1/2t) 08/06/2023 4.2     MVA(VTI) 08/06/2023 2.22     MV dec slope 08/06/2023 368.0     MR max chandler 08/06/2023 431.0     MR max PG 08/06/2023 74.3     TR max chandler 08/06/2023 201.0     TR max PG 08/06/2023 16.2     RVSP(TR) 08/06/2023 24.2     RAP systole 08/06/2023 8.0     RV V1 max PG 08/06/2023 0.80     RV V1 max 08/06/2023 44.7     RV V1 VTI 08/06/2023 10.8     PA V2 max 08/06/2023 82.2     Ao root diam 08/06/2023 3.2     ACS 08/06/2023 2.30     Sinus 08/06/2023 2.9     EF(MOD-bp) 08/06/2023 29.0     Glucose 08/06/2023 117 (H)     BUN 08/06/2023 18     Creatinine 08/06/2023 0.88     Sodium 08/06/2023 139     Potassium 08/06/2023 3.8     Chloride 08/06/2023 103     CO2 08/06/2023 23.0     Calcium 08/06/2023 8.9     BUN/Creatinine Ratio 08/06/2023 20.5     Anion Gap 08/06/2023 13.0     eGFR 08/06/2023 110.1     WBC 08/06/2023 9.60     RBC 08/06/2023 5.03     Hemoglobin 08/06/2023 15.9     Hematocrit 08/06/2023 46.9     MCV 08/06/2023 93.3     MCH  08/06/2023 31.6     MCHC 08/06/2023 33.9     RDW 08/06/2023 12.5     RDW-SD 08/06/2023 42.9     MPV 08/06/2023 8.3     Platelets 08/06/2023 184     Neutrophil % 08/06/2023 61.5     Lymphocyte % 08/06/2023 28.0     Monocyte % 08/06/2023 7.9     Eosinophil % 08/06/2023 2.2     Basophil % 08/06/2023 0.4     Neutrophils, Absolute 08/06/2023 5.90     Lymphocytes, Absolute 08/06/2023 2.70     Monocytes, Absolute 08/06/2023 0.80     Eosinophils, Absolute 08/06/2023 0.20     Basophils, Absolute 08/06/2023 0.00     nRBC 08/06/2023 0.0     HS Troponin T 08/06/2023 12     Total Cholesterol 08/06/2023 172     Triglycerides 08/06/2023 133     HDL Cholesterol 08/06/2023 47     LDL Cholesterol  08/06/2023 101 (H)     VLDL Cholesterol 08/06/2023 24     LDL/HDL Ratio 08/06/2023 2.09     Glucose 08/07/2023 113 (H)     BUN 08/07/2023 21 (H)     Creatinine 08/07/2023 0.98     Sodium 08/07/2023 138     Potassium 08/07/2023 4.3     Chloride 08/07/2023 101     CO2 08/07/2023 26.0     Calcium 08/07/2023 9.3     BUN/Creatinine Ratio 08/07/2023 21.4     Anion Gap 08/07/2023 11.0     eGFR 08/07/2023 98.7     Magnesium 08/07/2023 2.2     WBC 08/07/2023 9.40     RBC 08/07/2023 5.09     Hemoglobin 08/07/2023 16.6     Hematocrit 08/07/2023 48.0     MCV 08/07/2023 94.3     MCH 08/07/2023 32.5     MCHC 08/07/2023 34.5     RDW 08/07/2023 12.8     RDW-SD 08/07/2023 42.4     MPV 08/07/2023 8.4     Platelets 08/07/2023 189     Neutrophil % 08/07/2023 63.6     Lymphocyte % 08/07/2023 25.0     Monocyte % 08/07/2023 8.8     Eosinophil % 08/07/2023 2.3     Basophil % 08/07/2023 0.3     Neutrophils, Absolute 08/07/2023 6.00     Lymphocytes, Absolute 08/07/2023 2.40     Monocytes, Absolute 08/07/2023 0.80     Eosinophils, Absolute 08/07/2023 0.20     Basophils, Absolute 08/07/2023 0.00     nRBC 08/07/2023 0.1    Hospital Outpatient Visit on 03/20/2023   Component Date Value    Target HR (85%)  03/20/2023 151     Max. Pred. HR (100%) 03/20/2023 178     LVIDd 03/20/2023 6.8     LVIDs 03/20/2023 6.0     IVSd 03/20/2023 1.05     LVPWd 03/20/2023 1.23     FS 03/20/2023 12.7     IVS/LVPW 03/20/2023 0.86     ESV(cubed) 03/20/2023 212.1     EDV(cubed) 03/20/2023 319.1     LVOT area 03/20/2023 4.6     LV mass(C)d 03/20/2023 365.6     LVOT diam 03/20/2023 2.41     EDV(MOD-sp4) 03/20/2023 218.5     ESV(MOD-sp4) 03/20/2023 162.8     SV(MOD-sp4) 03/20/2023 55.7     EF(MOD-sp4) 03/20/2023 25.5     MV E max chandler 03/20/2023 50.1     MV A max chandler 03/20/2023 69.5     MV dec time 03/20/2023 0.24     MV E/A 03/20/2023 0.72     Pulm A Revs Dur 03/20/2023 0.12     SV(LVOT) 03/20/2023 79.0     RVIDd 03/20/2023 2.43     LA dimension (2D)  03/20/2023 4.0     Pulm Sys Chandler 03/20/2023 39.7     Pulm Stevens Chandler 03/20/2023 34.4     Pulm S/D 03/20/2023 1.16     Pulm A Revs Chandler 03/20/2023 30.7     LV V1 max 03/20/2023 96.0     LV V1 max PG 03/20/2023 3.7     LV V1 mean PG 03/20/2023 2.16     LV V1 VTI 03/20/2023 17.3     Ao pk chandler 03/20/2023 103.2     Ao max PG 03/20/2023 4.3     Ao mean PG 03/20/2023 2.44     Ao V2 VTI 03/20/2023 18.0     RENATO(I,D) 03/20/2023 4.4     MV max PG 03/20/2023 3.0     MV mean PG 03/20/2023 1.32     MV V2 VTI 03/20/2023 15.5     MVA(VTI) 03/20/2023 5.1     MV dec slope 03/20/2023 206.7     MR max chandler 03/20/2023 476.4     MR max PG 03/20/2023 90.8     TR max chandler 03/20/2023 221.6     TR max PG 03/20/2023 19.7     RV V1 max PG 03/20/2023 2.39     RV V1 max 03/20/2023 77.2     RV V1 VTI 03/20/2023 13.6     PA V2 max 03/20/2023 103.5     Ao root diam 03/20/2023 2.9     ACS 03/20/2023 2.30     EF(MOD-bp) 03/20/2023 26.0      Recent labs reviewed and interpreted for clinical significance and application    We discussed his elevated blood glucose this visit and plan on checking an A1c at his visit in 2 weeks  Magnesium was normal however at patient request were going  ahead and starting him on magnesium oxide 400 twice daily instead of the OTC medication he has been taking at home            Assessment:       Diagnoses and all orders for this visit:    1. Non- Ischemic Dilated cardiomyopathy (Primary)  Overview:         A. Non-ischemic BiV                 I. 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 EF 21-25% 7/23 gr1DD               III. S/p ICD      Orders:  -     Basic Metabolic Panel  -     proBNP  -     D-dimer, Quantitative  -     TSH    2. Hypertensive heart disease with chronic combined systolic and diastolic congestive heart failure  -     TSH    Other orders  -     ECG 12 Lead Other; NIDCM; Standing  -     ECG 12 Lead Other; NIDCM  -     Discontinue: carvedilol (COREG) tablet 25 mg  -     Discontinue: sacubitril-valsartan (ENTRESTO) 24-26 MG tablet 1 tablet  -     Discontinue: magnesium oxide (MAG-OX) tablet 400 mg  -     Discontinue: isosorbide mononitrate (IMDUR) 24 hr tablet 30 mg             Plan/discussion    Volume overload    Heart Failure Core Measures    Type : Nonischemic    EF: 21 to 25% grade 1 DD    [place] Heart association Class & Stage : Class III stage B        HF Meds pre-visit    Beta Blocker: Coreg 12.5 mg twice daily  ARNI/ACE/ARB: Losartan 25 mg daily  SGLT 2 inhibitors: None upon initial presentation  Diuretics: Lasix 40 mg daily  Furoscix: Currently not prescribed  Aldosterone Antagonist: Spironolactone 25 mg daily  Digitalis: Currently not taking  Vasodilators & Nitrates: No nitrates or vasodilators    HF Meds post visit    Beta Blocker: Coreg 25 mg twice daily (hold for systolic BP less than 90 mmHg)  ARNI/ACE/ARB: Entresto 24/26 twice daily  SGLT 2 inhibitors: Farxiga 10 mg daily, (patient was taking 5 mg daily which was self initiated at home after having stopped the medication for a considerable period of time he started it 2 days ago)  Diuretics: Lasix 40 mg in the a.m. and 20 mg in the p.m.(of note the patient  "was taking prior to the visit vise what was noted above this was also self initiated  Furoscix: Currently not prescribed  Aldosterone Antagonist: Spironolactone 25 mg daily  Digitalis: Currently not taking  Vasodilators & Nitrates: Imdur 30 mg daily    [facility] monitoring : Likely will be recommending initiation of the heart failure management program through [facility] if cost effective and will discuss in 2 weeks    Cardiac rehabilitation will discuss candidacy at next visit    Cardiac medicines reviewed with risk, benefits, and necessity of each discussed. By myself well as our staff pharmacist    As far as current heart failure medications:  Will go ahead and increase his Coreg up to 25 mg twice daily  Will titrate his ARB for an ARNI Entresto 24/26 twice daily  Will restart as patient already has his Farxiga at 10 mg daily  We will go ahead and place on a nitrate with Imdur 30 mg daily  Other:  Patient request we will go ahead and switch to Mag-Ox 400 mg twice daily  Overall the patient seems very motivated and has himself requested the 2-week follow-up appointment.  Of course we discussed typical nursing core measures for heart failure including diet as well as a fluid restriction to about 2 L daily  Patient did express some interest in cardiac rehabilitation which we will further evaluate at upon his next visit in 2 weeks.  He has a scheduled to follow-up with his primary cardiologist Dr. Brady in approximately 1 month.         It is a pleasure to be involved in this patient's cardiovascular care relating to their heart failure.  Please feel free to call me with any questions or concerns.    Mainor \"William\" Cynthia MILNER, [facility]  Heart failure program clinical provider    ANNIA Nugent   08/09/23  .    "

## 2023-08-10 NOTE — ADDENDUM NOTE
Encounter addended by: Chrissy Tobar, Domi on: 8/10/2023 4:50 PM   Actions taken: SmartForm saved, Order Reconciliation Section accessed, i-Vent created or edited

## 2023-08-10 NOTE — OUTREACH NOTE
CHF Week 1 Survey      Flowsheet Row Responses   [facility] patient discharged from? [facility]   Does the patient have one of the following disease processes/diagnoses(primary or secondary)? CHF   CHF Week 1 attempt successful? Yes   Call start time 1041   Call end time 1045   Discharge diagnosis Acute CHF   Meds reviewed with patient/caregiver? Yes   Is the patient having any side effects they believe may be caused by any medication additions or changes? No   Does the patient have all medications ordered at discharge? N/A   Is the patient taking all medications as directed (includes completed medication regime)? Yes   Comments regarding appointments Cardiology appt 8/10/23   Does the patient have a primary care provider?  Yes   Does the patient have an appointment with their PCP within 7 days of discharge? Yes   Comments regarding PCP PCP follow up 8/14/23   Has the patient kept scheduled appointments due by today? N/A   Has home health visited the patient within 72 hours of discharge? N/A   Pulse Ox monitoring None   Psychosocial issues? No   Did the patient receive a copy of their discharge instructions? Yes   Nursing interventions Reviewed instructions with patient   What is the patient's perception of their health status since discharge? Improving   Nursing interventions Nurse provided patient education   Is the patient able to teach back signs and symptoms of worsening condition? (i.e. weight gain, shortness of air, etc.) Yes   If the patient is a current smoker, are they able to teach back resources for cessation? --  [Phone call dropped, unable to assess]   Is the patient/caregiver able to teach back the hierarchy of who to call/visit for symptoms/problems? PCP, Specialist, Home health nurse, Urgent Care, ED, 911 Yes   Is the patient able to teach back Heart Failure Zones? Yes   CHF Nursing Interventions Education provided on various zones   CHF Zone this Call Green Zone   Green Zone Patient reports doing  well, No new or worsening shortness of breath, Physical activity level is normal for you, No chest pain, No new swelling -  feet, ankles and legs look normal for you  [Patient needs battery for scales]   Green Zone Interventions Daily weight check, Meds as directed, Low sodium diet, Follow up visits planned    CHF Week 1 call completed? Yes   Is the patient interested in additional calls from an ambulatory ? No   Would this patient benefit from a Referral to [facility] Social Work? No   Call end time 1042            Girard BRYANT - Registered Nurse

## 2023-08-11 LAB — QT INTERVAL: 393 MS

## 2023-08-18 ENCOUNTER — READMISSION MANAGEMENT (OUTPATIENT)
Dept: CALL CENTER | Facility: HOSPITAL | Age: 43
End: 2023-08-18
Payer: MEDICARE

## 2023-08-18 NOTE — OUTREACH NOTE
CHF Week 2 Survey      Flowsheet Row Responses   [facility] patient discharged from? [facility]   Does the patient have one of the following disease processes/diagnoses(primary or secondary)? CHF   Week 2 attempt successful? Yes   Call start time 1213   Call end time 1224   Meds reviewed with patient/caregiver? Yes   Is the patient having any side effects they believe may be caused by any medication additions or changes? No   Does the patient have all medications ordered at discharge? N/A   Is the patient taking all medications as directed (includes completed medication regime)? Yes   Medication comments States HF clinic started him on isosorbide nitrate, and was told to hold it for 3 days. States Coreg dosage was doubled by HF clinic, also started on Entresto. States losartan was discontinued.   Does the patient have a primary care provider?  Yes   Does the patient have an appointment with their PCP within 7 days of discharge? Yes   Has the patient kept scheduled appointments due by today? Yes   Comments States will be rescheduling appt with PCP. [facility] will be testing for TB due to exposure to TB positive worker at another hospital.   Has home health visited the patient within 72 hours of discharge? N/A   Pulse Ox monitoring None   Psychosocial issues? No   Did the patient receive a copy of their discharge instructions? Yes   Nursing interventions Reviewed instructions with patient   What is the patient's perception of their health status since discharge? Same   Nursing interventions Nurse provided patient education   Is the patient able to teach back signs and symptoms of worsening condition? (i.e. weight gain, shortness of air, etc.) Yes   If the patient is a current smoker, are they able to teach back resources for cessation? Not a smoker   Is the patient/caregiver able to teach back the hierarchy of who to call/visit for symptoms/problems? PCP, Specialist, Home health nurse, Urgent Care, ED, 911 Yes   Is the  patient able to teach back Heart Failure Zones? No   CHF Nursing Interventions Education provided on various zones   CHF Zone this Call Yellow Zone   Yellow Zone Discomfort or swelling in the abdomen   Yellow Zone Interventions Consider contacting your doctor or health care team, Consider asking your health care team about a change in medications   CHF Week 2 call completed? Yes   Is the patient interested in additional calls from an ambulatory ? No   Would this patient benefit from a Referral to [facility] Social Work? No   Wrap up additional comments Patient states is feeling slightly worse, and believes is due to new meds.  is f/u with Heart Failure clinic, and will notify his PCP for evaluation. Denies any edema or SOA.  has lost 15# since discharge intentionally through diet. Denies any needs today.   Call end time 1224            Sara MENDIOLA - Registered Nurse

## 2023-08-23 ENCOUNTER — OFFICE VISIT (OUTPATIENT)
Dept: PULMONOLOGY | Facility: HOSPITAL | Age: 43
End: 2023-08-23
Payer: MEDICARE

## 2023-08-23 VITALS
RESPIRATION RATE: 12 BRPM | HEART RATE: 74 BPM | HEIGHT: 68 IN | WEIGHT: 235 LBS | OXYGEN SATURATION: 95 % | BODY MASS INDEX: 35.61 KG/M2 | SYSTOLIC BLOOD PRESSURE: 97 MMHG | DIASTOLIC BLOOD PRESSURE: 68 MMHG

## 2023-08-23 DIAGNOSIS — G47.33 OSA (OBSTRUCTIVE SLEEP APNEA): Primary | ICD-10-CM

## 2023-08-23 PROCEDURE — G0463 HOSPITAL OUTPT CLINIC VISIT: HCPCS

## 2023-08-23 RX ORDER — GABAPENTIN 800 MG/1
800 TABLET ORAL 3 TIMES DAILY
COMMUNITY
Start: 2023-07-02 | End: 2023-08-24

## 2023-08-23 NOTE — PROGRESS NOTES
PULMONARY/ CRITICAL CARE/ SLEEP MEDICINE OUTPATIENT CONSULT/ FOLLOW UP NOTE        Patient Name:  Brian Stewart    :  1980    Medical Record:  [id number]    PRIMARY CARE PHYSICIAN     Santana Pillai MD    REASON FOR CONSULTATION    Brian Stewart is a 42 y.o. male who is referred for consultation for cardiomyopathy and STEPHON  REVIEW OF SYSTEMS    Constitutional:  Denies fever or chills   Eyes:  Denies change in visual acuity   HENT:  Denies nasal congestion or sore throat   Respiratory:  Denies cough or shortness of breath   Cardiovascular:  Denies chest pain or edema   GI:  Denies abdominal pain, nausea, vomiting, bloody stools or diarrhea   :  Denies dysuria   Musculoskeletal:  Denies back pain or joint pain   Integument:  Denies rash   Neurologic:  Denies headache, focal weakness or sensory changes   Endocrine:  Denies polyuria or polydipsia   Lymphatic:  Denies swollen glands   Psychiatric:  Denies depression or anxiety     MEDICAL HISTORY    Past Medical History:   Diagnosis Date    Arrhythmia     Cardiomyopathy     Diverticulitis     Hyperlipidemia     Palpitations         SURGICAL HISTORY    Past Surgical History:   Procedure Laterality Date    BACK SURGERY      CARDIAC CATHETERIZATION      CARDIAC CATHETERIZATION N/A 2020    Procedure: Left Heart Cath;  Surgeon: Fernanda Jackson MD;  Location: [facility] CATH INVASIVE LOCATION;  Service: Cardiovascular;  Laterality: N/A;    CARDIAC CATHETERIZATION N/A 2020    Procedure: Coronary angiography;  Surgeon: Fernanda Jackson MD;  Location: [facility] CATH INVASIVE LOCATION;  Service: Cardiovascular;  Laterality: N/A;    CARDIAC DEFIBRILLATOR PLACEMENT      COLON RESECTION      HERNIA REPAIR      INSERT / REPLACE / REMOVE PACEMAKER      ICD    KNEE ACL RECONSTRUCTION          FAMILY HISTORY    Family History   Problem Relation Age of Onset    Heart disease Father     Hypertension Mother        SOCIAL HISTORY    Social History      Tobacco Use    Smoking status: Former     Packs/day: 1.50     Types: Cigarettes     Passive exposure: Past    Smokeless tobacco: Former     Types: Snuff    Tobacco comments:     QUIT SMOKING 5/20   Substance Use Topics    Alcohol use: No        ALLERGIES    No Known Allergies      MEDICATIONS    Current Outpatient Medications on File Prior to Visit   Medication Sig Dispense Refill    aspirin 81 MG chewable tablet Chew 1 tablet Daily.      carvedilol (COREG) 25 MG tablet Take 1 tablet by mouth 2 (Two) Times a Day. 180 tablet 3    dapagliflozin Propanediol (Farxiga) 10 MG tablet Take 10 mg by mouth Every Morning.      furosemide (LASIX) 40 MG tablet Take 1 tablet by mouth Daily for 30 days. (Patient taking differently: Take 1 tablet by mouth Every Morning.) 30 tablet 0    gabapentin (NEURONTIN) 800 MG tablet Take 1 tablet by mouth 3 (Three) Times a Day.      HYDROcodone-acetaminophen (NORCO)  MG per tablet Take 1 tablet by mouth Every 8 (Eight) Hours As Needed for Moderate Pain (for back pain).      isosorbide mononitrate (IMDUR) 30 MG 24 hr tablet Take 1 tablet by mouth Daily. 30 tablet 11    Magnesium Oxide 400 MG capsule Take 400 mg by mouth 2 (Two) Times a Day Before Meals. 60 each 11    multivitamin with minerals tablet tablet Take 1 tablet by mouth Daily.      nitroglycerin (NITROSTAT) 0.4 MG SL tablet Place 1 tablet under the tongue Every 5 (Five) Minutes As Needed for Chest Pain. Take no more than 3 doses in 15 minutes. 30 tablet 2    Omega-3 Fatty Acids (fish oil) 1000 MG capsule capsule Take 3 capsules by mouth Daily With Breakfast.      pravastatin (PRAVACHOL) 20 MG tablet TAKE 1 TABLET BY MOUTH EVERY NIGHT AT BEDTIME 90 tablet 2    sacubitril-valsartan (Entresto) 24-26 MG tablet Take 1 tablet by mouth 2 (Two) Times a Day. 60 tablet 11    spironolactone (ALDACTONE) 25 MG tablet TAKE 1 TABLET BY MOUTH DAILY (Patient taking differently: Take 1 tablet by mouth Every Night.) 90 tablet 2     "[DISCONTINUED] gabapentin (NEURONTIN) 600 MG tablet Take 1 tablet by mouth 2 (Two) Times a Day As Needed (as neeeded for back pain).      [DISCONTINUED] furosemide (LASIX) 20 MG tablet Take 1 tablet by mouth Every Night.       No current facility-administered medications on file prior to visit.       PHYSICAL EXAM    Vitals:    08/23/23 1014   Weight: 107 kg (235 lb)   Height: 172.7 cm (68\")        Constitutional:  Well developed, well nourished, no acute distress, non-toxic appearance   Eyes:  PERRL, conjunctiva normal   HENT:  Atraumatic, external ears normal, nose normal, oropharynx moist, no pharyngeal exudates. mallampatti   Neck- normal range of motion, no tenderness, supple   Respiratory:  No respiratory distress, normal breath sounds, no rales, no wheezing   Cardiovascular:  Normal rate, normal rhythm, no murmurs, no gallops, no rubs   GI:  Soft, nondistended, normal bowel sounds, nontender, no organomegaly, no mass, no rebound, no guarding   :  No costovertebral angle tenderness   Musculoskeletal:  No edema, no tenderness, no deformities. Back- no tenderness  Integument:  Well hydrated, no rash   Lymphatic:  No lymphadenopathy noted   Neurologic:  Alert & oriented x 3, CN 2-12 normal, normal motor function, normal sensory function, no focal deficits noted   Psychiatric:  Speech and behavior appropriate     CT Abdomen Pelvis Without Contrast    Result Date: 8/5/2023  1. No evidence of ascites 2. No acute intra-abdominal or intrapelvic abnormality appreciated 3. Other findings as above Electronically Signed: Cruz Hidalgo  8/5/2023 7:05 PM EDT  Workstation ID: [id number]    XR Chest 1 View    Result Date: 8/5/2023  Impression: 1. Stable cardiomegaly accentuated by portable technique 2. Lungs are clear Electronically Signed: Cruz Hidalgo  8/5/2023 6:18 PM EDT  Workstation ID: [id number]    Results for orders placed during the hospital encounter of 08/05/23    Adult Transthoracic Echo Complete w/ Color, " Spectral and Contrast if Necessary Per Protocol    Interpretation Summary    Left ventricular systolic function is severely decreased. Left ventricular ejection fraction appears to be 21 - 25%.    Akinetic septum and inferior wall noted    Left ventricular diastolic function was normal.    Estimated right ventricular systolic pressure from tricuspid regurgitation is normal (<35 mmHg).      ASSESSMENT & PLAN:      42-year-old male quit smoking 2 years ago with 20-pack-year history of smoking, was in the hospital recently for cough/dyspnea found to have  Cardiomyopathy   Left ventricular systolic function is severely decreased. Left ventricular ejection fraction appears to be 21 - 25%.    Akinetic septum and inferior wall noted    Left ventricular diastolic function was normal.    Estimated right ventricular systolic pressure from tricuspid regurgitation is normal (<35 mmHg).      STEPHON, tried CPAP however could not tolerate mask   features of obstructive sleep apnea with excessive daytime sleepiness loud snoring witnessed apneas    Assessment  Obstructive sleep apnea  Cardiomyopathy  Although he stopped smoking cigarettes but he still use marijuana occasionally      Plan:  In lab polysomnography, split if appropriate      This document has been electronically signed by  Jasvir Munoz MD  10:17 EDT

## 2023-08-24 ENCOUNTER — HOSPITAL ENCOUNTER (OUTPATIENT)
Facility: HOSPITAL | Age: 43
Discharge: HOME | End: 2023-08-24
Admitting: NURSE PRACTITIONER
Payer: MEDICARE

## 2023-08-24 ENCOUNTER — DISEASE STATE MANAGEMENT VISIT (OUTPATIENT)
Dept: PHARMACY | Facility: HOSPITAL | Age: 43
End: 2023-08-24
Payer: MEDICARE

## 2023-08-24 ENCOUNTER — ANTICOAGULATION VISIT (OUTPATIENT)
Dept: PHARMACY | Facility: HOSPITAL | Age: 43
End: 2023-08-24
Payer: MEDICARE

## 2023-08-24 VITALS
SYSTOLIC BLOOD PRESSURE: 95 MMHG | HEART RATE: 81 BPM | OXYGEN SATURATION: 96 % | TEMPERATURE: 97.9 F | DIASTOLIC BLOOD PRESSURE: 70 MMHG | HEIGHT: 68 IN | BODY MASS INDEX: 35.77 KG/M2 | RESPIRATION RATE: 16 BRPM | WEIGHT: 236 LBS

## 2023-08-24 DIAGNOSIS — I50.41 ACUTE COMBINED SYSTOLIC AND DIASTOLIC CONGESTIVE HEART FAILURE: ICD-10-CM

## 2023-08-24 DIAGNOSIS — I42.0 DILATED CARDIOMYOPATHY: Primary | Chronic | ICD-10-CM

## 2023-08-24 DIAGNOSIS — Z95.810 AICD (AUTOMATIC CARDIOVERTER/DEFIBRILLATOR) PRESENT: ICD-10-CM

## 2023-08-24 DIAGNOSIS — E88.81 METABOLIC SYNDROME: Chronic | ICD-10-CM

## 2023-08-24 PROBLEM — I50.9 ACUTE CHF (CONGESTIVE HEART FAILURE): Status: RESOLVED | Noted: 2023-08-05 | Resolved: 2023-08-24

## 2023-08-24 PROBLEM — E88.810 METABOLIC SYNDROME: Chronic | Status: ACTIVE | Noted: 2023-08-24

## 2023-08-24 PROBLEM — I42.9 CARDIOMYOPATHY: Status: RESOLVED | Noted: 2019-03-20 | Resolved: 2023-08-24

## 2023-08-24 LAB
ANION GAP SERPL CALCULATED.3IONS-SCNC: 11 MMOL/L (ref 5–15)
BUN SERPL-MCNC: 26 MG/DL (ref 6–20)
BUN/CREAT SERPL: 25 (ref 7–25)
CALCIUM SPEC-SCNC: 10.2 MG/DL (ref 8.6–10.5)
CHLORIDE SERPL-SCNC: 103 MMOL/L (ref 98–107)
CO2 SERPL-SCNC: 23 MMOL/L (ref 22–29)
CREAT SERPL-MCNC: 1.04 MG/DL (ref 0.76–1.27)
EGFRCR SERPLBLD CKD-EPI 2021: 91.9 ML/MIN/1.73
GLUCOSE SERPL-MCNC: 102 MG/DL (ref 65–99)
MAGNESIUM SERPL-MCNC: 2.3 MG/DL (ref 1.6–2.6)
NT-PROBNP SERPL-MCNC: 610.7 PG/ML (ref 0–450)
POTASSIUM SERPL-SCNC: 4.8 MMOL/L (ref 3.5–5.2)
SODIUM SERPL-SCNC: 137 MMOL/L (ref 136–145)

## 2023-08-24 PROCEDURE — 83735 ASSAY OF MAGNESIUM: CPT | Performed by: NURSE PRACTITIONER

## 2023-08-24 PROCEDURE — G0463 HOSPITAL OUTPT CLINIC VISIT: HCPCS

## 2023-08-24 PROCEDURE — 80048 BASIC METABOLIC PNL TOTAL CA: CPT | Performed by: NURSE PRACTITIONER

## 2023-08-24 PROCEDURE — 83880 ASSAY OF NATRIURETIC PEPTIDE: CPT | Performed by: NURSE PRACTITIONER

## 2023-08-24 RX ORDER — NITROGLYCERIN 0.4 MG/1
0.4 TABLET SUBLINGUAL
Qty: 100 TABLET | Refills: 12 | Status: CANCELLED | OUTPATIENT
Start: 2023-08-24

## 2023-08-24 NOTE — PROGRESS NOTES
Pharmacy Service    Vitals:  Wt: 238  BP: 95/70 and 99/60  HR: 81    HF Meds     Beta Blocker: Coreg 12.5 mg twice daily  ARNI/ACE/ARB: Entresto 24-26 twice daily  SGLT 2 inhibitors: Farxiga 10 mg daily  Diuretics: Lasix 20 mg in the p.m. and 40 mg in the a.m.  Aldosterone Antagonist: Aldactone 25 mg daily  Digitalis: Not taking  Vasodilators & Nitrates: Imdur 30       Unable to increase beta-blocker this visit secondary to borderline blood pressure systolic 95 mmHg

## 2023-08-24 NOTE — LETTER
"August 24, 2023       No Recipients    Patient: Brian Stewart   YOB: 1980   Date of Visit: 8/24/2023     Dear Santana Pillai MD:       Thank you for referring Brian Stewart to me for evaluation. Below are the relevant portions of my assessment and plan of care.    If you have questions, please do not hesitate to call me. I look forward to following Brian along with you.         Sincerely,        ANNIA Nugent        CC:   No Recipients    Mainor Marie APRN  08/24/23 1239  Signed  Cardiology Heart Failure Clinic Note  Mainor \"William\" Cynthia MILNER, [facility]      Patient ID: Brian Stewart is a 42 y.o. male.    Encounter Date:08/24/2023    Subjective:     No chief complaint on file.      HPI:  42-year-old  male's patient of Dr. Jose Antonio Hart with a past medical history of biventricular nonischemic dilated dilated cardiomyopathy with his last reported ejection fraction 30% with grade 1 diastolic dysfunction in July 2023 TTE.  He is also  s/p an ICD placed in 2019 he has a LBBB baseline suffers past medical history includes metabolic syndrome with hypertension dyslipidemia.  Since last visit he has continued to lose weight he is engaged actively in an exercise and diet program however failing somewhat on previous dietary attempts.  Previous ischemic assessment showed no CAD.  He has not recently had any chest discomfort, he remains somewhat dyspneic with physical activity particularly when he is power washing and mowing.  However overall he does state his level of activity is increased since last visit when he was placed on Entresto, he is noted no significant lower extremity edema, today he is complaining of some left leg pain.  Overall he seems to be feeling better and doing better.  He has seen his pulmonologist who suggested he get another sleep study he has been noncompliant with any CPAP or BiPAP stating he cannot utilize because he cannot keep his mouth closed when he " sleeps.    ROS:  14 point review of systems negative except as mentioned above    REVIEW OF SYSTEMS:    Constitutional: + weakness, + fatigue, but improved since last visit no fever, rigors, chills   Eyes: No recent vision changes, eye pain   ENT/oropharynx: No recent difficulty swallowing, sore throat, epistaxis, changes in hearing   Cardiovascular: No recent chest pain, chest tightness, palpitations except as noted in HPI, no real paroxysmal nocturnal dyspnea, orthopnea, diaphoresis, dizziness & no pre or jeff syncopal episodes   Respiratory: + shortness of breath, + dyspnea on exertion, no significant productive cough, wheezing and no hemoptysis   Gastrointestinal: No abdominal pain, nausea, vomiting, diarrhea, bloody stools   Genitourinary: No hematuria, dysuria other than increased frequency   Neurological: No headache, tremors, numbness,  or hemiparesis    Musculoskeletal: No cramps, myalgias,  joint pain, joint swelling with noted discomfort in the left lower extremity as noted in the HPI   Integument: No recent rash, no increasing edema       Patient Active Problem List   Diagnosis    Hyperlipidemia    Essential hypertension    Palpitations    Shortness of breath     AICD (automatic cardioverter/defibrillator) present    Nonrheumatic mitral valve regurgitation    Mixed hyperlipidemia    Non- Ischemic Dilated cardiomyopathy    Fatigue    Abnormal finding of blood chemistry, unspecified     Near syncope    ICD (implantable cardioverter-defibrillator) malfunction    Metabolic syndrome              Past Medical History:   Diagnosis Date    Arrhythmia     Cardiomyopathy     Diverticulitis     Hyperlipidemia     Palpitations        Past Surgical History:   Procedure Laterality Date    BACK SURGERY      CARDIAC CATHETERIZATION      CARDIAC CATHETERIZATION N/A 06/12/2020    Procedure: Left Heart Cath;  Surgeon: Fernanda Jackson MD;  Location: [facility] CATH INVASIVE LOCATION;  Service:  Cardiovascular;  Laterality: N/A;    CARDIAC CATHETERIZATION N/A 06/12/2020    Procedure: Coronary angiography;  Surgeon: Fernanda Jackson MD;  Location: [facility] INVASIVE LOCATION;  Service: Cardiovascular;  Laterality: N/A;    CARDIAC DEFIBRILLATOR PLACEMENT      COLON RESECTION      HERNIA REPAIR      INSERT / REPLACE / REMOVE PACEMAKER      ICD    KNEE ACL RECONSTRUCTION         Social History     Socioeconomic History    Marital status: Single   Tobacco Use    Smoking status: Former     Packs/day: 1.50     Types: Cigarettes     Passive exposure: Past    Smokeless tobacco: Former     Types: Snuff    Tobacco comments:     QUIT SMOKING 5/20   Vaping Use    Vaping Use: Never used   Substance and Sexual Activity    Alcohol use: No    Drug use: Yes     Types: Marijuana     Comment: once/day    Sexual activity: Yes     Partners: Female       No Known Allergies      Current Outpatient Medications:     aspirin 81 MG chewable tablet, Chew 1 tablet Daily., Disp: , Rfl:     carvedilol (COREG) 25 MG tablet, Take 1 tablet by mouth 2 (Two) Times a Day., Disp: 180 tablet, Rfl: 3    dapagliflozin Propanediol (Farxiga) 10 MG tablet, Take 10 mg by mouth Every Morning., Disp: , Rfl:     furosemide (LASIX) 40 MG tablet, Take 1 tablet by mouth Daily for 30 days. (Patient taking differently: Take 1 tablet by mouth Every Morning.), Disp: 30 tablet, Rfl: 0    HYDROcodone-acetaminophen (NORCO)  MG per tablet, Take 1 tablet by mouth Every 8 (Eight) Hours As Needed for Moderate Pain (for back pain)., Disp: , Rfl:     isosorbide mononitrate (IMDUR) 30 MG 24 hr tablet, Take 1 tablet by mouth Daily., Disp: 30 tablet, Rfl: 11    Magnesium Oxide 400 MG capsule, Take 400 mg by mouth 2 (Two) Times a Day Before Meals., Disp: 60 each, Rfl: 11    multivitamin with minerals tablet tablet, Take 1 tablet by mouth Daily., Disp: , Rfl:     Omega-3 Fatty Acids (fish oil) 1000 MG capsule capsule, Take 3 capsules by  mouth Daily With Breakfast., Disp: , Rfl:     pravastatin (PRAVACHOL) 20 MG tablet, TAKE 1 TABLET BY MOUTH EVERY NIGHT AT BEDTIME, Disp: 90 tablet, Rfl: 2    sacubitril-valsartan (Entresto) 24-26 MG tablet, Take 1 tablet by mouth 2 (Two) Times a Day., Disp: 60 tablet, Rfl: 11    spironolactone (ALDACTONE) 25 MG tablet, TAKE 1 TABLET BY MOUTH DAILY (Patient taking differently: Take 1 tablet by mouth Every Night.), Disp: 90 tablet, Rfl: 2    Immunization History   Administered Date(s) Administered    Tdap 10/03/2020         Most recent EKG as reviewed:  Procedures     Most recent echo as reviewed:  Results for orders placed during the hospital encounter of 08/05/23    Adult Transthoracic Echo Complete w/ Color, Spectral and Contrast if Necessary Per Protocol    Interpretation Summary    Left ventricular systolic function is severely decreased. Left ventricular ejection fraction appears to be 21 - 25%.    Akinetic septum and inferior wall noted    Left ventricular diastolic function was normal.    Estimated right ventricular systolic pressure from tricuspid regurgitation is normal (<35 mmHg).      Most recent stress test results:  Results for orders placed in visit on 05/21/18    Stress Test With Myocardial Perfusion One Day    Interpretation Summary  ú Findings consistent with an equivocal ECG stress test.  ú Breast attenuation and diaphragmatic attenuation artifacts are present.  ú Left ventricular ejection fraction is normal (Calculated EF = 50%).  ú Myocardial perfusion imaging indicates a normal myocardial perfusion study with no evidence of ischemia.  ú Impressions are consistent with a low risk study.  ú Suboptimal study    Asymptomatic for chest pain. Specificity of study reduced secondary to baseline Non-specific ST-T wave abnormalities, however ECG is not suggestive of ischemia  Ectopy: none.  Blood pressure  And heart rate response:  Appropriate for Beta-blocker therapy  Pharmacologic study due to  inability to tolerate increasing speed and grade of treadmill due to c/o  becoming very fatigued Stage II, and had to change to pharmacological procedure.  Participated in Low Level exercise and tolerance was fair.      Most recent cardiac catheterization results:  Results for orders placed during the hospital encounter of 06/12/20    Cardiac Catheterization/Vascular Study    Narrative  Table formatting from the original result was not included.  Cardiac Catheterization Operative Report    Brian Stewart  [id number]  6/12/2020  @PCP@    He underwent cardiac catheterization.    Indications for the procedure include: CHF.    Procedure Details:  The risks, benefits, complications, treatment options, and expected outcomes were discussed with the patient. The patient and/or family concurred with the proposed plan, giving informed consent.    After informed consent the patient was brought to the cath lab after appropriate IV hydration was begun and oral premedication was given. He was further sedated with fentanyl. He was prepped and draped in the usual manner. Using the modified Seldinger access technique, a 6 Albanian sheath was placed in the right radial artery. A left heart catheterization with coronary arteriography was performed. Findings are discussed below.    After the procedure was completed, sedation was stopped and the sheaths and catheters were all removed. Hemostasis was achieved per established hospital protocols.    Findings:    Hemodynamics  Central aortic pressure systolic 90 and diastolic 60  Left ventricular end-diastolic pressure is elevated at 26  Left Main  normal  RCA  normal  LAD  normal  Circ  normal  LV  severe LV dysfunction with estimated LV ejection fraction of 15% with global LV hypokinesis  Elevated left ventricular end-diastolic pressure with LVEDP of 26 mmHg  Coronary Dominance  left circumflex artery    Estimated Blood Loss:  Minimal    Complications:  None; patient tolerated the  "procedure well.    Disposition: PACU - hemodynamically stable.    Condition: stable    Impressions:  Normal coronaries  Severe cardiomyopathy  Severe LV dysfunction  Elevated left-sided filling pressures  Estimated LV ejection fraction of 15%    Recommendations:  Medical management for cardiomyopathy  Test results and treatment options discussed with patient and family        Historical data copied forward from previous encounters in EMR including the history, exam, and assessment/plan has been reviewed and is unchanged except as I have noted and otherwise indicated.      Objective:         BP 95/70 (Patient Position: Sitting) Comment: 95/70 left arm, 99/60 right arm  Pulse 81   Temp 97.9 øF (36.6 øC) (Oral)   Resp 16   Ht 172.7 cm (67.99\")   Wt 107 kg (236 lb) Comment: pt states 233 at home  SpO2 96%   BMI 35.89 kg/mý     Physical Examination  General:          Well-developed, [place] well-nourished, cooperative, no distress, appears stated age if not slightly older,   Neuro              A&O x3.  No obvious focal neurodeficits  psych:            Pleasant affect  Head:              Normocephalic, atraumatic, moist mucous membranes  Eyes:               PERRLA, Conjunctivae not injected, EOM's intact     Neck:              Supple, Mildly thickened, no lymph adenopathy nor thyromegaly no JVD or bruit  Lungs:            Symmetrical rise and fall the chest identified with a normal respiratory pattern, clear to auscultation bilaterally, no wheezes, rhonchi or rales are noted  Chest wall:     No significant tenderness when palpated.  PMI is mildly laterally displaced at the sixth intercostal space mid axillary line  Heart::             Regular rate and rhythm, S1 and S2 normal, no S3 questionable mild S4 gr 1/6 systolic ejection murmur best heard at the apex , no rub or gallop,  Abdomen:      Soft, non-distended, non-tender, bowel sounds noted in the 4 quadrants of the abdomen, significant adipose tissue " identified  Extremities:   Equal color motion temperature and sensitivity trace if any lower extremity  edema. Rapid capillary refill noted within the nailbeds of fingers and toes bilaterally  Pulses:           2+ and symmetric all extremities  Skin:                No obvious rashes, significant lesions identified warm dry and of normal turgor          In-Office Procedure(s):  No orders to display            Imaging:    Results for orders placed during the hospital encounter of 08/05/23    XR Chest 1 View    Narrative  XR CHEST 1 VW    Date of Exam: 8/5/2023 5:54 PM EDT    Indication: cough    Comparison: 11/9/2022 and prior    Findings:  Study is limited by overlying support and monitoring apparatus.    Left-sided subclavian approach ICD in place. Generator obscures portions of the left hemithorax. The heart and mediastinal contours are stable.    Lungs are grossly clear. Osseous structures demonstrate no acute abnormality.    Impression  Impression:    1. Stable cardiomegaly accentuated by portable technique    2. Lungs are clear      Electronically Signed: Cruz Hidalgo  8/5/2023 6:18 PM EDT  Workstation ID: [id number]       Results for orders placed during the hospital encounter of 08/05/23    CT Abdomen Pelvis Without Contrast    Narrative  CT ABDOMEN PELVIS WO CONTRAST    Date of Exam: 8/5/2023 6:27 PM EDT    Indication: Possible ascites.    Comparison: 3/17/2022 and prior    Technique: Axial CT images were obtained of the abdomen and pelvis without the administration of contrast. Sagittal and coronal reconstructions were performed.  Automated exposure control and iterative reconstruction methods were used.        FINDINGS:    Abdomen/Pelvis:    Lower Chest: Lung bases are grossly clear with minimal dependent atelectasis noted posteriorly.    No free air noted below the diaphragm.    No ascites noted. Postsurgical changes from prior ventral hernia repair noted.    Organs: Limited noncontrast imaging of the  liver, gallbladder, pancreas, kidneys, spleen and adrenal glands are unremarkable    GI/Bowel: Limited noncontrast imaging of the stomach and small bowel is unremarkable. The ileocecal valve and the appendix appear unremarkable in appearance. There is diverticulosis without evidence of acute diverticulitis. Postsurgical changes of the  distal sigmoid colon noted.    No suspicious mesenteric adenopathy or fluid collections    Pelvis: The urinary bladder, prostate and pelvic structures demonstrate no acute abnormality.    Peritoneum/Retroperitoneum: The aorta is normal in caliber. No suspicious retroperitoneal adenopathy    Bones/Soft Tissues: No destructive bone lesion. Postsurgical changes noted of the lower lumbar sacral spine.    Impression  1. No evidence of ascites    2. No acute intra-abdominal or intrapelvic abnormality appreciated    3. Other findings as above          Electronically Signed: Cruz Hidalgo  8/5/2023 7:05 PM EDT  Workstation ID: [id number]      Results for orders placed during the hospital encounter of 08/05/23    CT Abdomen Pelvis Without Contrast    Narrative  CT ABDOMEN PELVIS WO CONTRAST    Date of Exam: 8/5/2023 6:27 PM EDT    Indication: Possible ascites.    Comparison: 3/17/2022 and prior    Technique: Axial CT images were obtained of the abdomen and pelvis without the administration of contrast. Sagittal and coronal reconstructions were performed.  Automated exposure control and iterative reconstruction methods were used.        FINDINGS:    Abdomen/Pelvis:    Lower Chest: Lung bases are grossly clear with minimal dependent atelectasis noted posteriorly.    No free air noted below the diaphragm.    No ascites noted. Postsurgical changes from prior ventral hernia repair noted.    Organs: Limited noncontrast imaging of the liver, gallbladder, pancreas, kidneys, spleen and adrenal glands are unremarkable    GI/Bowel: Limited noncontrast imaging of the stomach and small bowel is  unremarkable. The ileocecal valve and the appendix appear unremarkable in appearance. There is diverticulosis without evidence of acute diverticulitis. Postsurgical changes of the  distal sigmoid colon noted.    No suspicious mesenteric adenopathy or fluid collections    Pelvis: The urinary bladder, prostate and pelvic structures demonstrate no acute abnormality.    Peritoneum/Retroperitoneum: The aorta is normal in caliber. No suspicious retroperitoneal adenopathy    Bones/Soft Tissues: No destructive bone lesion. Postsurgical changes noted of the lower lumbar sacral spine.    Impression  1. No evidence of ascites    2. No acute intra-abdominal or intrapelvic abnormality appreciated    3. Other findings as above          Electronically Signed: Cruz Hidalgo  8/5/2023 7:05 PM EDT  Workstation ID: [id number]      Lab Review:   Hospital Outpatient Visit on 08/24/2023   Component Date Value    Glucose 08/24/2023 102 (H)     BUN 08/24/2023 26 (H)     Creatinine 08/24/2023 1.04     Sodium 08/24/2023 137     Potassium 08/24/2023 4.8     Chloride 08/24/2023 103     CO2 08/24/2023 23.0     Calcium 08/24/2023 10.2     BUN/Creatinine Ratio 08/24/2023 25.0     Anion Gap 08/24/2023 11.0     eGFR 08/24/2023 91.9     proBNP 08/24/2023 610.7 (H)     Magnesium 08/24/2023 2.3    Hospital Outpatient Visit on 08/10/2023   Component Date Value    Glucose 08/10/2023 82     BUN 08/10/2023 25 (H)     Creatinine 08/10/2023 1.01     Sodium 08/10/2023 137     Potassium 08/10/2023 4.6     Chloride 08/10/2023 99     CO2 08/10/2023 25.0     Calcium 08/10/2023 9.8     BUN/Creatinine Ratio 08/10/2023 24.8     Anion Gap 08/10/2023 13.0     eGFR 08/10/2023 95.2     proBNP 08/10/2023 923.2 (H)     D-Dimer, Quantitative 08/10/2023 0.31     TSH 08/10/2023 0.582     QT Interval 08/10/2023 393    Admission on 08/05/2023, Discharged on 08/07/2023   Component Date Value    QT Interval 08/05/2023 438     Glucose 08/05/2023 115  (H)     BUN 08/05/2023 19     Creatinine 08/05/2023 0.94     Sodium 08/05/2023 139     Potassium 08/05/2023 4.2     Chloride 08/05/2023 104     CO2 08/05/2023 25.0     Calcium 08/05/2023 9.1     Total Protein 08/05/2023 6.3     Albumin 08/05/2023 4.1     ALT (SGPT) 08/05/2023 25     AST (SGOT) 08/05/2023 40     Alkaline Phosphatase 08/05/2023 75     Total Bilirubin 08/05/2023 0.3     Globulin 08/05/2023 2.2     A/G Ratio 08/05/2023 1.9     BUN/Creatinine Ratio 08/05/2023 20.2     Anion Gap 08/05/2023 10.0     eGFR 08/05/2023 103.8     ADENOVIRUS, PCR 08/05/2023 Not Detected     Coronavirus 229E 08/05/2023 Not Detected     Coronavirus HKU1 08/05/2023 Not Detected     Coronavirus NL63 08/05/2023 Not Detected     Coronavirus OC43 08/05/2023 Not Detected     COVID19 08/05/2023 Not Detected     Human Metapneumovirus 08/05/2023 Not Detected     Human Rhinovirus/Enterov* 08/05/2023 Not Detected     Influenza A PCR 08/05/2023 Not Detected     Influenza B PCR 08/05/2023 Not Detected     Parainfluenza Virus 1 08/05/2023 Not Detected     Parainfluenza Virus 2 08/05/2023 Not Detected     Parainfluenza Virus 3 08/05/2023 Not Detected     Parainfluenza Virus 4 08/05/2023 Not Detected     RSV, PCR 08/05/2023 Not Detected     Bordetella pertussis pcr 08/05/2023 Not Detected     Bordetella parapertussis* 08/05/2023 Not Detected     Chlamydophila pneumoniae* 08/05/2023 Not Detected     Mycoplasma pneumo by PCR 08/05/2023 Not Detected     proBNP 08/05/2023 979.4 (H)     HS Troponin T 08/05/2023 9     D-Dimer, Quantitative 08/05/2023 0.23     TSH 08/05/2023 0.793     WBC 08/05/2023 9.10     RBC 08/05/2023 4.76     Hemoglobin 08/05/2023 15.3     Hematocrit 08/05/2023 45.5     MCV 08/05/2023 95.6     MCH 08/05/2023 32.1     MCHC 08/05/2023 33.6     RDW 08/05/2023 12.8     RDW-SD 08/05/2023 42.4     MPV 08/05/2023 8.3     Platelets 08/05/2023 187     Neutrophil % 08/05/2023 67.2      Lymphocyte % 08/05/2023 23.3     Monocyte % 08/05/2023 7.2     Eosinophil % 08/05/2023 1.6     Basophil % 08/05/2023 0.7     Neutrophils, Absolute 08/05/2023 6.10     Lymphocytes, Absolute 08/05/2023 2.10     Monocytes, Absolute 08/05/2023 0.70     Eosinophils, Absolute 08/05/2023 0.10     Basophils, Absolute 08/05/2023 0.10     nRBC 08/05/2023 0.1     LVIDd 08/06/2023 5.8     LVIDs 08/06/2023 5.5     IVSd 08/06/2023 1.10     LVPWd 08/06/2023 0.90     FS 08/06/2023 5.2     IVS/LVPW 08/06/2023 1.22     ESV(cubed) 08/06/2023 166.4     LV Sys Vol (BSA correcte* 08/06/2023 53.7     EDV(cubed) 08/06/2023 195.1     LV Stevens Vol (BSA correct* 08/06/2023 76.0     LVOT area 08/06/2023 4.9     LV mass(C)d 08/06/2023 233.1     LVOT diam 08/06/2023 2.5     EDV(MOD-sp4) 08/06/2023 170.0     ESV(MOD-sp4) 08/06/2023 120.0     SV(MOD-sp4) 08/06/2023 50.0     SI(MOD-sp4) 08/06/2023 22.4     EF(MOD-sp4) 08/06/2023 29.4     MV E max chandler 08/06/2023 60.4     MV A max chandler 08/06/2023 68.8     MV dec time 08/06/2023 0.19     MV E/A 08/06/2023 0.88     LA ESV Index (BP) 08/06/2023 22.8     Med Peak E' Chandler 08/06/2023 6.1     Lat Peak E' Chandler 08/06/2023 4.9     Avg E/e' ratio 08/06/2023 10.98     SV(LVOT) 08/06/2023 46.0     TAPSE (>1.6) 08/06/2023 2.38     RV S' 08/06/2023 8.1     LA dimension (2D)  08/06/2023 4.2     LV V1 max 08/06/2023 43.7     LV V1 max PG 08/06/2023 0.76     LV V1 mean PG 08/06/2023 0.00     LV V1 VTI 08/06/2023 9.4     Ao pk chandler 08/06/2023 94.7     Ao max PG 08/06/2023 3.6     Ao mean PG 08/06/2023 2.00     Ao V2 VTI 08/06/2023 21.0     RENATO(I,D) 08/06/2023 2.19     MV max PG 08/06/2023 2.04     MV mean PG 08/06/2023 1.00     MV V2 VTI 08/06/2023 20.7     MV P1/2t 08/06/2023 52.5     MVA(P1/2t) 08/06/2023 4.2     MVA(VTI) 08/06/2023 2.22     MV dec slope 08/06/2023 368.0     MR max chandler 08/06/2023 431.0     MR max PG 08/06/2023 74.3     TR max chandler 08/06/2023 201.0      TR max PG 08/06/2023 16.2     RVSP(TR) 08/06/2023 24.2     RAP systole 08/06/2023 8.0     RV V1 max PG 08/06/2023 0.80     RV V1 max 08/06/2023 44.7     RV V1 VTI 08/06/2023 10.8     PA V2 max 08/06/2023 82.2     Ao root diam 08/06/2023 3.2     ACS 08/06/2023 2.30     Sinus 08/06/2023 2.9     EF(MOD-bp) 08/06/2023 29.0     Glucose 08/06/2023 117 (H)     BUN 08/06/2023 18     Creatinine 08/06/2023 0.88     Sodium 08/06/2023 139     Potassium 08/06/2023 3.8     Chloride 08/06/2023 103     CO2 08/06/2023 23.0     Calcium 08/06/2023 8.9     BUN/Creatinine Ratio 08/06/2023 20.5     Anion Gap 08/06/2023 13.0     eGFR 08/06/2023 110.1     WBC 08/06/2023 9.60     RBC 08/06/2023 5.03     Hemoglobin 08/06/2023 15.9     Hematocrit 08/06/2023 46.9     MCV 08/06/2023 93.3     MCH 08/06/2023 31.6     MCHC 08/06/2023 33.9     RDW 08/06/2023 12.5     RDW-SD 08/06/2023 42.9     MPV 08/06/2023 8.3     Platelets 08/06/2023 184     Neutrophil % 08/06/2023 61.5     Lymphocyte % 08/06/2023 28.0     Monocyte % 08/06/2023 7.9     Eosinophil % 08/06/2023 2.2     Basophil % 08/06/2023 0.4     Neutrophils, Absolute 08/06/2023 5.90     Lymphocytes, Absolute 08/06/2023 2.70     Monocytes, Absolute 08/06/2023 0.80     Eosinophils, Absolute 08/06/2023 0.20     Basophils, Absolute 08/06/2023 0.00     nRBC 08/06/2023 0.0     HS Troponin T 08/06/2023 12     Total Cholesterol 08/06/2023 172     Triglycerides 08/06/2023 133     HDL Cholesterol 08/06/2023 47     LDL Cholesterol  08/06/2023 101 (H)     VLDL Cholesterol 08/06/2023 24     LDL/HDL Ratio 08/06/2023 2.09     Glucose 08/07/2023 113 (H)     BUN 08/07/2023 21 (H)     Creatinine 08/07/2023 0.98     Sodium 08/07/2023 138     Potassium 08/07/2023 4.3     Chloride 08/07/2023 101     CO2 08/07/2023 26.0     Calcium 08/07/2023 9.3     BUN/Creatinine Ratio 08/07/2023 21.4     Anion Gap 08/07/2023 11.0     eGFR 08/07/2023 98.7      Magnesium 08/07/2023 2.2     WBC 08/07/2023 9.40     RBC 08/07/2023 5.09     Hemoglobin 08/07/2023 16.6     Hematocrit 08/07/2023 48.0     MCV 08/07/2023 94.3     MCH 08/07/2023 32.5     MCHC 08/07/2023 34.5     RDW 08/07/2023 12.8     RDW-SD 08/07/2023 42.4     MPV 08/07/2023 8.4     Platelets 08/07/2023 189     Neutrophil % 08/07/2023 63.6     Lymphocyte % 08/07/2023 25.0     Monocyte % 08/07/2023 8.8     Eosinophil % 08/07/2023 2.3     Basophil % 08/07/2023 0.3     Neutrophils, Absolute 08/07/2023 6.00     Lymphocytes, Absolute 08/07/2023 2.40     Monocytes, Absolute 08/07/2023 0.80     Eosinophils, Absolute 08/07/2023 0.20     Basophils, Absolute 08/07/2023 0.00     nRBC 08/07/2023 0.1    Hospital Outpatient Visit on 03/20/2023   Component Date Value    Target HR (85%) 03/20/2023 151     Max. Pred. HR (100%) 03/20/2023 178     LVIDd 03/20/2023 6.8     LVIDs 03/20/2023 6.0     IVSd 03/20/2023 1.05     LVPWd 03/20/2023 1.23     FS 03/20/2023 12.7     IVS/LVPW 03/20/2023 0.86     ESV(cubed) 03/20/2023 212.1     EDV(cubed) 03/20/2023 319.1     LVOT area 03/20/2023 4.6     LV mass(C)d 03/20/2023 365.6     LVOT diam 03/20/2023 2.41     EDV(MOD-sp4) 03/20/2023 218.5     ESV(MOD-sp4) 03/20/2023 162.8     SV(MOD-sp4) 03/20/2023 55.7     EF(MOD-sp4) 03/20/2023 25.5     MV E max chandler 03/20/2023 50.1     MV A max chandler 03/20/2023 69.5     MV dec time 03/20/2023 0.24     MV E/A 03/20/2023 0.72     Pulm A Revs Dur 03/20/2023 0.12     SV(LVOT) 03/20/2023 79.0     RVIDd 03/20/2023 2.43     LA dimension (2D)  03/20/2023 4.0     Pulm Sys Chandler 03/20/2023 39.7     Pulm Stevens Chandler 03/20/2023 34.4     Pulm S/D 03/20/2023 1.16     Pulm A Revs Chandler 03/20/2023 30.7     LV V1 max 03/20/2023 96.0     LV V1 max PG 03/20/2023 3.7     LV V1 mean PG 03/20/2023 2.16     LV V1 VTI 03/20/2023 17.3     Ao pk chandler 03/20/2023 103.2     Ao max PG 03/20/2023 4.3     Ao mean PG 03/20/2023 2.44     Ao  V2 VTI 03/20/2023 18.0     RENATO(I,D) 03/20/2023 4.4     MV max PG 03/20/2023 3.0     MV mean PG 03/20/2023 1.32     MV V2 VTI 03/20/2023 15.5     MVA(VTI) 03/20/2023 5.1     MV dec slope 03/20/2023 206.7     MR max anabella 03/20/2023 476.4     MR max PG 03/20/2023 90.8     TR max anabella 03/20/2023 221.6     TR max PG 03/20/2023 19.7     RV V1 max PG 03/20/2023 2.39     RV V1 max 03/20/2023 77.2     RV V1 VTI 03/20/2023 13.6     PA V2 max 03/20/2023 103.5     Ao root diam 03/20/2023 2.9     ACS 03/20/2023 2.30     EF(MOD-bp) 03/20/2023 26.0      Recent labs reviewed and interpreted for clinical significance and application    Viewed the patient's lab with patient's they are basically WNL with noted mild increase in magnesium since started on mag oxide his proBNP is decreased            Assessment:       Diagnoses and all orders for this visit:    1. Acute combined systolic and diastolic congestive heart failure (Primary)  -     Basic Metabolic Panel  -     proBNP  -     Magnesium    2. Non- Ischemic Dilated cardiomyopathy  Overview:         A. Non-ischemic BiV                 I. 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 EF 21-25% 7/23 gr1DD               III. S/p ICD        3. AICD (automatic cardioverter/defibrillator) present    4. Metabolic syndrome  Overview:      A. HTN      B. HLD      C. Elevated BMI             Plan/discussion    Volume overload    Heart Failure Core Measures    Type : Nonischemic, dilated, combined systolic and diastolic     EF: 21 to 25% with grade 1 diastolic    [place] Heart association Class & Stage : Now a class IIb        HF Meds    Beta Blocker: Coreg 12.5 mg twice daily  ARNI/ACE/ARB: Entresto 24-26 daily  SGLT 2 inhibitors: Farxiga 10 mg daily  Diuretics: Lasix 20 mg in the p.m. and 40 mg in the a.m.  Furoscix: Not currently taking  Aldosterone Antagonist: Aldactone 25 mg daily  Digitalis: Not taking  Vasodilators & Nitrates: Imdur 30    Zoll  "heart failure monitoring system: Me again attempted to enroll patient if not cost prohibitive    Unable to increase beta-blocker this visit secondary to borderline blood pressure systolic 95 mmHg  Seemingly a positive impact overall on physical activity, proBNP,  I have deferred refilling his as needed sublingual nitroglycerin since there is no documented history of CAD, and no recent chest discomfort  Would continue with Imdur, Entresto, Farxiga, Aldactone.  I made no changes to his current diuretics as he appears to be being sufficiently diuresed  He does state relative compliance with fluid restrictions but does occasionally go over  He is somewhat noncompliant with dietary restrictions and sodium intake  Overall quite pleased with the improvement since initial visit  I will see again in follow-up in about 6 weeks sooner if needed      Cardiac medicines reviewed with risk, benefits, and necessity of each discussed.           It is a pleasure to be involved in this patient's cardiovascular care relating to their heart failure.  Please feel free to call me with any questions or concerns.  We seem to be making headway with regards to his heart failure    Mainor \"William\" Cynthia MILNER, [facility]  Heart failure program clinical provider    ANNIA Nugent   08/24/23  .    "

## 2023-08-29 ENCOUNTER — READMISSION MANAGEMENT (OUTPATIENT)
Dept: CALL CENTER | Facility: HOSPITAL | Age: 43
End: 2023-08-29
Payer: MEDICARE

## 2023-08-29 NOTE — OUTREACH NOTE
CHF Week 3 Survey      Flowsheet Row Responses   [facility] [place] patient discharged from? [facility]   Does the patient have one of the following disease processes/diagnoses(primary or secondary)? CHF   Week 3 attempt successful? Yes   Call start time 1136   Call end time 1140   Discharge diagnosis Acute CHF   Meds reviewed with patient/caregiver? Yes   Is the patient taking all medications as directed (includes completed medication regime)? Yes   Has the patient kept scheduled appointments due by today? Yes   Has home health visited the patient within 72 hours of discharge? N/A   Pulse Ox monitoring None   Psychosocial issues? No   Did the patient receive a copy of their discharge instructions? Yes   Nursing interventions Reviewed instructions with patient   What is the patient's perception of their health status since discharge? Improving   Nursing interventions Nurse provided patient education   Is the patient able to teach back signs and symptoms of worsening condition? (i.e. weight gain, shortness of air, etc.) Yes   Is the patient/caregiver able to teach back the hierarchy of who to call/visit for symptoms/problems? PCP, Specialist, Home health nurse, Urgent Care, ED, 911 Yes   Is the patient able to teach back Heart Failure Zones? Yes   CHF Nursing Interventions Education provided on various zones   CHF Zone this Call Green Zone   Green Zone Patient reports doing well, No new or worsening shortness of breath, No new swelling -  feet, ankles and legs look normal for you, Physical activity level is normal for you, Weight check stable, No chest pain   Green Zone Interventions Daily weight check, Meds as directed, Low sodium diet, Follow up visits planned   CHF Week 3 call completed? Yes   Graduated Yes   Is the patient interested in additional calls from an ambulatory ? No   Would this patient benefit from a Referral to [facility] Social Work? No   Call end time 1140            Leilani HURTADO - Registered Nurse

## 2023-09-05 ENCOUNTER — TELEPHONE (OUTPATIENT)
Dept: CARDIOLOGY | Facility: CLINIC | Age: 43
End: 2023-09-05

## 2023-09-05 ENCOUNTER — APPOINTMENT (OUTPATIENT)
Dept: CT IMAGING | Facility: HOSPITAL | Age: 43
End: 2023-09-05
Payer: MEDICARE

## 2023-09-05 ENCOUNTER — HOSPITAL ENCOUNTER (EMERGENCY)
Facility: HOSPITAL | Age: 43
Discharge: HOME | End: 2023-09-06
Attending: EMERGENCY MEDICINE
Payer: MEDICARE

## 2023-09-05 ENCOUNTER — APPOINTMENT (OUTPATIENT)
Dept: GENERAL RADIOLOGY | Facility: HOSPITAL | Age: 43
End: 2023-09-05
Payer: MEDICARE

## 2023-09-05 VITALS
DIASTOLIC BLOOD PRESSURE: 59 MMHG | BODY MASS INDEX: 38.03 KG/M2 | HEART RATE: 81 BPM | HEIGHT: 67 IN | RESPIRATION RATE: 16 BRPM | OXYGEN SATURATION: 94 % | TEMPERATURE: 98.7 F | WEIGHT: 242.29 LBS | SYSTOLIC BLOOD PRESSURE: 98 MMHG

## 2023-09-05 DIAGNOSIS — R53.1 WEAKNESS: Primary | ICD-10-CM

## 2023-09-05 LAB
ALBUMIN SERPL-MCNC: 4.3 G/DL (ref 3.5–5.2)
ALBUMIN/GLOB SERPL: 1.9 G/DL
ALP SERPL-CCNC: 72 U/L (ref 39–117)
ALT SERPL W P-5'-P-CCNC: 15 U/L (ref 1–41)
AMPHET+METHAMPHET UR QL: NEGATIVE
ANION GAP SERPL CALCULATED.3IONS-SCNC: 11 MMOL/L (ref 5–15)
AST SERPL-CCNC: 30 U/L (ref 1–40)
BARBITURATES UR QL SCN: NEGATIVE
BASOPHILS # BLD AUTO: 0 10*3/MM3 (ref 0–0.2)
BASOPHILS NFR BLD AUTO: 0.6 % (ref 0–1.5)
BENZODIAZ UR QL SCN: NEGATIVE
BILIRUB SERPL-MCNC: 0.3 MG/DL (ref 0–1.2)
BILIRUB UR QL STRIP: NEGATIVE
BUN SERPL-MCNC: 21 MG/DL (ref 6–20)
BUN/CREAT SERPL: 20.4 (ref 7–25)
CALCIUM SPEC-SCNC: 9.3 MG/DL (ref 8.6–10.5)
CANNABINOIDS SERPL QL: POSITIVE
CHLORIDE SERPL-SCNC: 102 MMOL/L (ref 98–107)
CLARITY UR: CLEAR
CO2 SERPL-SCNC: 24 MMOL/L (ref 22–29)
COCAINE UR QL: NEGATIVE
COLOR UR: YELLOW
CREAT SERPL-MCNC: 1.03 MG/DL (ref 0.76–1.27)
DEPRECATED RDW RBC AUTO: 41.6 FL (ref 37–54)
EGFRCR SERPLBLD CKD-EPI 2021: 93 ML/MIN/1.73
EOSINOPHIL # BLD AUTO: 0.1 10*3/MM3 (ref 0–0.4)
EOSINOPHIL NFR BLD AUTO: 1.6 % (ref 0.3–6.2)
ERYTHROCYTE [DISTWIDTH] IN BLOOD BY AUTOMATED COUNT: 12.5 % (ref 12.3–15.4)
ETHANOL UR QL: <0.01 %
GLOBULIN UR ELPH-MCNC: 2.3 GM/DL
GLUCOSE BLDC GLUCOMTR-MCNC: 114 MG/DL (ref 70–105)
GLUCOSE SERPL-MCNC: 131 MG/DL (ref 65–99)
GLUCOSE UR STRIP-MCNC: ABNORMAL MG/DL
HCT VFR BLD AUTO: 48 % (ref 37.5–51)
HGB BLD-MCNC: 16.3 G/DL (ref 13–17.7)
HGB UR QL STRIP.AUTO: NEGATIVE
KETONES UR QL STRIP: NEGATIVE
LEUKOCYTE ESTERASE UR QL STRIP.AUTO: NEGATIVE
LYMPHOCYTES # BLD AUTO: 1.8 10*3/MM3 (ref 0.7–3.1)
LYMPHOCYTES NFR BLD AUTO: 21.1 % (ref 19.6–45.3)
MAGNESIUM SERPL-MCNC: 2.2 MG/DL (ref 1.6–2.6)
MCH RBC QN AUTO: 32.4 PG (ref 26.6–33)
MCHC RBC AUTO-ENTMCNC: 34 G/DL (ref 31.5–35.7)
MCV RBC AUTO: 95.3 FL (ref 79–97)
METHADONE UR QL SCN: NEGATIVE
MONOCYTES # BLD AUTO: 0.6 10*3/MM3 (ref 0.1–0.9)
MONOCYTES NFR BLD AUTO: 6.8 % (ref 5–12)
NEUTROPHILS NFR BLD AUTO: 5.8 10*3/MM3 (ref 1.7–7)
NEUTROPHILS NFR BLD AUTO: 69.9 % (ref 42.7–76)
NITRITE UR QL STRIP: NEGATIVE
NRBC BLD AUTO-RTO: 0.1 /100 WBC (ref 0–0.2)
OPIATES UR QL: NEGATIVE
OXYCODONE UR QL SCN: NEGATIVE
PH UR STRIP.AUTO: 5.5 [PH] (ref 5–8)
PLATELET # BLD AUTO: 200 10*3/MM3 (ref 140–450)
PMV BLD AUTO: 8.1 FL (ref 6–12)
POTASSIUM SERPL-SCNC: 4.1 MMOL/L (ref 3.5–5.2)
PROT SERPL-MCNC: 6.6 G/DL (ref 6–8.5)
PROT UR QL STRIP: NEGATIVE
RBC # BLD AUTO: 5.04 10*6/MM3 (ref 4.14–5.8)
SODIUM SERPL-SCNC: 137 MMOL/L (ref 136–145)
SP GR UR STRIP: 1.03 (ref 1–1.03)
UROBILINOGEN UR QL STRIP: ABNORMAL
WBC NRBC COR # BLD: 8.3 10*3/MM3 (ref 3.4–10.8)

## 2023-09-05 PROCEDURE — 81003 URINALYSIS AUTO W/O SCOPE: CPT | Performed by: PHYSICIAN ASSISTANT

## 2023-09-05 PROCEDURE — 99284 EMERGENCY DEPT VISIT MOD MDM: CPT

## 2023-09-05 PROCEDURE — 80307 DRUG TEST PRSMV CHEM ANLYZR: CPT | Performed by: PHYSICIAN ASSISTANT

## 2023-09-05 PROCEDURE — 70450 CT HEAD/BRAIN W/O DYE: CPT

## 2023-09-05 PROCEDURE — 80053 COMPREHEN METABOLIC PANEL: CPT | Performed by: PHYSICIAN ASSISTANT

## 2023-09-05 PROCEDURE — 82077 ASSAY SPEC XCP UR&BREATH IA: CPT | Performed by: PHYSICIAN ASSISTANT

## 2023-09-05 PROCEDURE — 93005 ELECTROCARDIOGRAM TRACING: CPT | Performed by: PHYSICIAN ASSISTANT

## 2023-09-05 PROCEDURE — 85025 COMPLETE CBC W/AUTO DIFF WBC: CPT | Performed by: PHYSICIAN ASSISTANT

## 2023-09-05 PROCEDURE — 83735 ASSAY OF MAGNESIUM: CPT | Performed by: PHYSICIAN ASSISTANT

## 2023-09-05 PROCEDURE — 71045 X-RAY EXAM CHEST 1 VIEW: CPT

## 2023-09-05 PROCEDURE — 82948 REAGENT STRIP/BLOOD GLUCOSE: CPT

## 2023-09-05 RX ORDER — SODIUM CHLORIDE 0.9 % (FLUSH) 0.9 %
10 SYRINGE (ML) INJECTION AS NEEDED
Status: DISCONTINUED | OUTPATIENT
Start: 2023-09-05 | End: 2023-09-06 | Stop reason: HOSPADM

## 2023-09-05 NOTE — TELEPHONE ENCOUNTER
Caller: Brian Stewart      Best call back number: [phone]     What is your medical concern? PT STATES HE TOOK HIS WATER PILL LAST NIGHT AND WHEN HE WOKE UP HE FELT LIKE HE HADNT GOTTEN MUCH OXYGEN AND HIS LEGS WER WOBBLY AND VISION BLURRY - PT STATES HE ISNT SURE IF THE WATER PILL CAUSED HIS BLOOD SUGAR TO GO LOW OR IF HIS OXYGEN IS WHAT'S CAUSING THE PROBLEM - PT IS AT [employer] AND USING HER O2 AND STILL DOES NOT FEEL BETTER     How long has this issue been going on? THIS MORNING     Is your provider already aware of this issue? NO    Have you been treated for this issue? NO

## 2023-09-06 LAB
QT INTERVAL: 380 MS
QTC INTERVAL: 442 MS

## 2023-09-06 NOTE — ED PROVIDER NOTES
"Subjective   Provider in Triage Note  Patient is a 42-year-old male presents to the ED with reports of \"feeling wonky my legs did not work this morning\" patient states he woke up around 7/7:30 AM and went to bed around 11 PM last night he states when he woke up he almost \"felt drunk\" thought his blood sugar was low but did not check it he states he feels \"wavy in the head\" he denies any unilateral symptoms including numbness or weakness.  He also denies any chest pain or shortness of breath.  Patient is alert oriented and ambulatory in triage.  He does report generalized fatigue denies any recent fever or illness    History of Present Illness  History of present illness provider triage note reviewed and agree.  Patient denies any headache or loss of vision he denies any speech difficulty or paralysis no chest pain neck arm jaw pain or shortness of breath no fevers flus viruses no black or bloody stools no urinary symptoms.  He states his symptoms are persistent throughout the day.  He has been able to otherwise eat drink top walk and function normally otherwise.      Review of Systems   Constitutional:  Negative for chills and fever.   Eyes:  Negative for photophobia and visual disturbance.   Respiratory:  Negative for chest tightness and shortness of breath.    Cardiovascular:  Negative for chest pain and palpitations.   Gastrointestinal:  Positive for nausea. Negative for abdominal pain and blood in stool.   Genitourinary:  Negative for difficulty urinating and dysuria.   Neurological:  Positive for dizziness and light-headedness. Negative for facial asymmetry, speech difficulty and headaches.   Psychiatric/Behavioral:  Negative for confusion.      Past Medical History:   Diagnosis Date    Arrhythmia     Cardiomyopathy     Diverticulitis     Hyperlipidemia     Palpitations        No Known Allergies    Past Surgical History:   Procedure Laterality Date    BACK SURGERY      CARDIAC CATHETERIZATION      CARDIAC " CATHETERIZATION N/A 06/12/2020    Procedure: Left Heart Cath;  Surgeon: Fernanda Jackson MD;  Location: [facility] CATH INVASIVE LOC[facility];  Service: Cardiovascular;  Laterality: N/A;    CARDIAC CATHETERIZATION N/A 06/12/2020    Procedure: Coronary angiography;  Surgeon: Fernanda Jackson MD;  Location:  [facility] CATH INVASIVE LOCATION;  Service: Cardiovascular;  Laterality: N/A;    CARDIAC DEFIBRILLATOR PLACEMENT      COLON RESECTION      HERNIA REPAIR      INSERT / REPLACE / REMOVE PACEMAKER      ICD    KNEE ACL RECONSTRUCTION         Family History   Problem Relation Age of Onset    Heart disease Father     Hypertension Mother        Social History     Socioeconomic History    Marital status: Single   Tobacco Use    Smoking status: Former     Packs/day: 1.50     Types: Cigarettes     Passive exposure: Past    Smokeless tobacco: Former     Types: Snuff    Tobacco comments:     QUIT SMOKING 5/20   Vaping Use    Vaping Use: Never used   Substance and Sexual Activity    Alcohol use: No    Drug use: Yes     Types: Marijuana     Comment: once/day    Sexual activity: Yes     Partners: Female     Prior to Admission medications    Medication Sig Start Date End Date Taking? Authorizing Provider   aspirin 81 MG chewable tablet Chew 1 tablet Daily.    ProviderBella MD   carvedilol (COREG) 25 MG tablet Take 1 tablet by mouth 2 (Two) Times a Day. 8/10/23   Mainor Marie APRN   dapagliflozin Propanediol (Farxiga) 10 MG tablet Take 10 mg by mouth Every Morning.    ProviderBella MD   furosemide (LASIX) 40 MG tablet Take 1 tablet by mouth Daily for 30 days.  Patient taking differently: Take 1 tablet by mouth Every Morning. 8/8/23 9/7/23  Reina Coleman APRN   HYDROcodone-acetaminophen (NORCO)  MG per tablet Take 1 tablet by mouth Every 8 (Eight) Hours As Needed for Moderate Pain (for back pain).    ProviderBella MD   isosorbide mononitrate (IMDUR) 30 MG 24 hr tablet Take 1 tablet by mouth  Daily. 8/10/23   Mainor Marie APRN   Magnesium Oxide 400 MG capsule Take 400 mg by mouth 2 (Two) Times a Day Before Meals. 8/10/23   Mainor Marie APRN   multivitamin with minerals tablet tablet Take 1 tablet by mouth Daily.    Provider, MD Bella   Omega-3 Fatty Acids (fish oil) 1000 MG capsule capsule Take 3 capsules by mouth Daily With Breakfast.    Provider, MD Bella   pravastatin (PRAVACHOL) 20 MG tablet TAKE 1 TABLET BY MOUTH EVERY NIGHT AT BEDTIME 1/19/23   Fernanda Jackson MD   sacubitril-valsartan (Entresto) 24-26 MG tablet Take 1 tablet by mouth 2 (Two) Times a Day. 8/10/23   Mainor Maire APRN   spironolactone (ALDACTONE) 25 MG tablet TAKE 1 TABLET BY MOUTH DAILY  Patient taking differently: Take 1 tablet by mouth Every Night. 9/6/22   Fernanda Jackson MD          Objective   Physical Exam  Constitutional 40-year-old male awake alert no acute distress triage vital signs reviewed.  HEENT extraocular muscles intact pupils equal round reactive no photophobia mouth clear.  No nystagmus.  Neck supple no adenopathy no JVD no bruits no meningeal signs lungs clear no retraction heart regular without murmur rub abdomen soft without tenderness good bowel sounds no peritoneal findings or other masses no pulsatile mass or bruits extremities pulses are equal in lower extremities skin is warm and dry without rashes or cellulitic changes neurologic awake alert orientated x4 no facial asymmetry speech normal tongue soft palate normal no nystagmus no drift the arms or legs normal finger-nose he stands up and walks without difficulty there is no ataxia NIH of 0.  Break-up with  Procedures           ED Course      Results for orders placed or performed during the hospital encounter of 09/05/23   Comprehensive Metabolic Panel    Specimen: Blood   Result Value Ref Range    Glucose 131 (H) 65 - 99 mg/dL    BUN 21 (H) 6 - 20 mg/dL    Creatinine 1.03 0.76 - 1.27 mg/dL    Sodium 137 136 - 145  mmol/L    Potassium 4.1 3.5 - 5.2 mmol/L    Chloride 102 98 - 107 mmol/L    CO2 24.0 22.0 - 29.0 mmol/L    Calcium 9.3 8.6 - 10.5 mg/dL    Total Protein 6.6 6.0 - 8.5 g/dL    Albumin 4.3 3.5 - 5.2 g/dL    ALT (SGPT) 15 1 - 41 U/L    AST (SGOT) 30 1 - 40 U/L    Alkaline Phosphatase 72 39 - 117 U/L    Total Bilirubin 0.3 0.0 - 1.2 mg/dL    Globulin 2.3 gm/dL    A/G Ratio 1.9 g/dL    BUN/Creatinine Ratio 20.4 7.0 - 25.0    Anion Gap 11.0 5.0 - 15.0 mmol/L    eGFR 93.0 >60.0 mL/min/1.73   Urinalysis With Microscopic If Indicated (No Culture) - Urine, Catheter    Specimen: Urine, Catheter   Result Value Ref Range    Color, UA Yellow Yellow, Straw    Appearance, UA Clear Clear    pH, UA 5.5 5.0 - 8.0    Specific Gravity, UA 1.034 (H) 1.005 - 1.030    Glucose, UA >=1000 mg/dL (3+) (A) Negative    Ketones, UA Negative Negative    Bilirubin, UA Negative Negative    Blood, UA Negative Negative    Protein, UA Negative Negative    Leuk Esterase, UA Negative Negative    Nitrite, UA Negative Negative    Urobilinogen, UA 1.0 E.U./dL 0.2 - 1.0 E.U./dL   Urine Drug Screen - Urine, Clean Catch    Specimen: Urine, Clean Catch   Result Value Ref Range    Amphet/Methamphet, Screen Negative Negative    Barbiturates Screen, Urine Negative Negative    Benzodiazepine Screen, Urine Negative Negative    Cocaine Screen, Urine Negative Negative    Opiate Screen Negative Negative    THC, Screen, Urine Positive (A) Negative    Methadone Screen, Urine Negative Negative    Oxycodone Screen, Urine Negative Negative   Ethanol    Specimen: Blood   Result Value Ref Range    Ethanol % <0.010 %   Magnesium    Specimen: Blood   Result Value Ref Range    Magnesium 2.2 1.6 - 2.6 mg/dL   CBC Auto Differential    Specimen: Blood   Result Value Ref Range    WBC 8.30 3.40 - 10.80 10*3/mm3    RBC 5.04 4.14 - 5.80 10*6/mm3    Hemoglobin 16.3 13.0 - 17.7 g/dL    Hematocrit 48.0 37.5 - 51.0 %    MCV 95.3 79.0 - 97.0 fL    MCH 32.4 26.6 - 33.0 pg    MCHC 34.0 31.5 -  35.7 g/dL    RDW 12.5 12.3 - 15.4 %    RDW-SD 41.6 37.0 - 54.0 fl    MPV 8.1 6.0 - 12.0 fL    Platelets 200 140 - 450 10*3/mm3    Neutrophil % 69.9 42.7 - 76.0 %    Lymphocyte % 21.1 19.6 - 45.3 %    Monocyte % 6.8 5.0 - 12.0 %    Eosinophil % 1.6 0.3 - 6.2 %    Basophil % 0.6 0.0 - 1.5 %    Neutrophils, Absolute 5.80 1.70 - 7.00 10*3/mm3    Lymphocytes, Absolute 1.80 0.70 - 3.10 10*3/mm3    Monocytes, Absolute 0.60 0.10 - 0.90 10*3/mm3    Eosinophils, Absolute 0.10 0.00 - 0.40 10*3/mm3    Basophils, Absolute 0.00 0.00 - 0.20 10*3/mm3    nRBC 0.1 0.0 - 0.2 /100 WBC   POC Glucose Once    Specimen: Blood   Result Value Ref Range    Glucose 114 (H) 70 - 105 mg/dL   ECG 12 Lead Altered Mental Status   Result Value Ref Range    QT Interval 380 ms    QTC Interval 442 ms     CT Head Without Contrast    Result Date: 9/5/2023  Impression: No acute intracranial pathology. Electronically Signed: Blayne Khan MD  9/5/2023 9:50 PM EDT  Workstation ID: [id number]    XR Chest 1 View    Result Date: 9/5/2023  Impression: No acute cardiopulmonary process. Electronically Signed: Georgia Joel MD  9/5/2023 10:51 PM EDT  Workstation ID: [id number]   Medications - No data to display         EKG my interpretation normal sinus rhythm rate of 81 nonspecific T wave changes noted in the lateral leads QTc of 442 this is unchanged from previous EKG when compared to 8/10/2023 patient has left bundle branch block as well.                                  Medical Decision Making  Medical decision making.  Patient had the above exam evaluation EKG obtained Lidopin review shows a sinus rhythm rate of 81 nonspecific changes lateral leads is unchanged from previous on 8/10/2023.  CT scan head without obtain my opinion.  No tumors masses or hemorrhage.  Chest x-ray minus.  No pneumonia pneumothorax or acute findings.  Labs obtained and treated by me complex metabolic profile unremarkable and a glucose of 131 urine is negative.  The patient's drug  screen positive for THC.  Magnesium normal alcohol negative CBC normal.  Patient exam resting comfortably.  I do not see evidence of an acute stroke on stance of acute pneumonia urinary tract infection sepsis bacteremia I do not see any evidence that suggest any acute CHF.  DVT or pulmonary embolism.  Complete list of all possibilities.  We talked about the findings and we talked about overnight observation versus outpatient..  The patient would rather go home.  We talked about what to return for and he voices understanding he was discharged home stable unremarkable records.    Problems Addressed:  Weakness: complicated acute illness or injury    Amount and/or Complexity of Data Reviewed  Labs: ordered. Decision-making details documented in ED Course.  Radiology: ordered and independent interpretation performed. Decision-making details documented in ED Course.  ECG/medicine tests: ordered and independent interpretation performed. Decision-making details documented in ED Course.        Final diagnoses:   Weakness       ED Disposition  ED Disposition       ED Disposition   Discharge    Condition   Stable    Comment   --               Santana Pillai MD  [address] [address]  [phone]    In 1 day           Medication List        Changed      furosemide 40 MG tablet  Commonly known as: LASIX  Take 1 tablet by mouth Daily for 30 days.  What changed: when to take this     spironolactone 25 MG tablet  Commonly known as: ALDACTONE  TAKE 1 TABLET BY MOUTH DAILY  What changed: when to take this                 Jasson Medina MD  09/06/23 [id number]

## 2023-09-06 NOTE — DISCHARGE INSTRUCTIONS
Call your doctor tomorrow and follow-up.  Return for reoccurrence of your symptoms chest pain shortness of breath passing out speech difficulty paralysis unable to eat drink talk walk or function normally or any other new or worsening problems or concerns.  Talk to your doctor about getting supplies to monitor your sugars at home.

## 2023-09-22 ENCOUNTER — OFFICE VISIT (OUTPATIENT)
Dept: CARDIOLOGY | Facility: CLINIC | Age: 43
End: 2023-09-22
Payer: MEDICARE

## 2023-09-22 VITALS
WEIGHT: 236 LBS | OXYGEN SATURATION: 95 % | HEART RATE: 68 BPM | SYSTOLIC BLOOD PRESSURE: 105 MMHG | BODY MASS INDEX: 37.04 KG/M2 | HEIGHT: 67 IN | DIASTOLIC BLOOD PRESSURE: 72 MMHG

## 2023-09-22 DIAGNOSIS — I34.0 NONRHEUMATIC MITRAL VALVE REGURGITATION: Primary | ICD-10-CM

## 2023-09-22 DIAGNOSIS — I10 ESSENTIAL HYPERTENSION: ICD-10-CM

## 2023-09-22 DIAGNOSIS — R00.2 PALPITATIONS: ICD-10-CM

## 2023-09-22 DIAGNOSIS — R79.9 ABNORMAL FINDING OF BLOOD CHEMISTRY, UNSPECIFIED: ICD-10-CM

## 2023-09-22 DIAGNOSIS — E78.2 MIXED HYPERLIPIDEMIA: ICD-10-CM

## 2023-09-22 DIAGNOSIS — T82.118A MALFUNCTION OF IMPLANTABLE CARDIOVERTER-DEFIBRILLATOR (ICD), INITIAL ENCOUNTER: ICD-10-CM

## 2023-09-22 DIAGNOSIS — I42.0 DILATED CARDIOMYOPATHY: Chronic | ICD-10-CM

## 2023-09-22 DIAGNOSIS — Z95.810 AICD (AUTOMATIC CARDIOVERTER/DEFIBRILLATOR) PRESENT: ICD-10-CM

## 2023-09-22 RX ORDER — FUROSEMIDE 40 MG/1
40 TABLET ORAL 2 TIMES DAILY
Qty: 180 TABLET | Refills: 3 | Status: SHIPPED | OUTPATIENT
Start: 2023-09-22

## 2023-09-22 NOTE — PROGRESS NOTES
Cardiology Office Visit      Encounter Date:  09/22/2023    Patient ID:   Brian Stewart is a 42 y.o. male.    Reason For Consultation:  Congestive heart failure  Cardiomyopathy    History of Present Illness:  Dear Santana Garcia MD    I had the pleasure of seeing Brian Stewart today. As you are well aware, this is a 42 y.o. male with past medical history that is significant for nonischemic cardiomyopathy severe cardiomyopathy requiring AICD with some improvement of LV systolic function was stable until last year now he is having worsening symptoms of shortness of breath dyspnea on exertion weight gain    Denies any new cardiac symptoms  No dizziness or syncope  Compliant with medical therapy  Denies any new cardiac symptoms  Complaining of some intermittent palpitations recent emergency room visit    Impressions:  Normal coronaries  Severe cardiomyopathy  Severe LV dysfunction  Elevated left-sided filling pressures  Estimated LV ejection fraction of 15%      Interpretation Summary    The left ventricular cavity is severely dilated.  Estimated left ventricular EF = 25% Estimated left ventricular EF was in agreement with the calculated left ventricular EF. Left ventricular systolic function is severely decreased.  The following left ventricular wall segments are hypokinetic: mid anterior, apical anterior, basal anterolateral, basal inferolateral, basal inferoseptal, mid anteroseptal, basal anterior, basal inferior and basal inferoseptal. The following left ventricular wall segments are akinetic: mid anterolateral, apical lateral, mid inferolateral, apical inferior, mid inferior, apical septal, mid inferoseptal and apex.  Moderate mitral valve regurgitation is present.  Left ventricular diastolic function is consistent with (grade I) impaired relaxation.  Estimated right ventricular systolic pressure from tricuspid regurgitation is normal (<35 mmHg).           Assessment & Plan    Impressions:  chronic  systolic heart failure/stable  Congestive heart failure NYHA class II/no new complaints  Cardiomyopathy most likely nonischemic  Severe mitral regurgitation nonrheumatic/now moderate  Hypertension  Obesity  Obstructive sleep apnea  Status post ICD with nonsustained ventricular tachycardia  Noncompliance with medical therapy  Nonischemic cardiomyopathy with LV ejection fraction of 25%  Recent hospitalization for CHF exacerbation  Impaired fasting glucose  Recommendations:  Continue current medical therapy  Recent medical records from hospitalization reviewed and discussed with patient  Risk benefits and alternatives and need for close monitoring and follow-up reviewed and discussed with the patient  Continue current medical management  Low-salt diet  Recent device interrogation with normal function  Repeat echocardiogram to assess LV systolic function and also severity of the mitral regurgitation  Recent labs reviewed and discussed with patient  Medication refills  Labs with the primary care physician office  Continue follow-up with EP for ICD  Continue current medical therapy with aspirin 81 mg p.o. once a day Coreg 25 mg p.o. twice daily Farxiga 10 mg p.o. once a day continue Entresto twice a day Pravachol 20 mg p.o. once a day spironolactone 25 mg p.o. once a day  Is currently on Lasix 40 mg in the morning and 20 mg at nighttime eventually based on the labs and right heart cath findings we will adjust the dosing of the Lasix  Check proBNP and hemoglobin A1c  Recommend right heart catheterization to further assess hemodynamics and need for evaluation by advanced heart failure  Made for referral for advanced heart failure  prior work-up reviewed and discussed with patient  Follow-up in office in 2 months            Lab Results   Component Value Date    GLUCOSE 131 (H) 09/05/2023    BUN 21 (H) 09/05/2023    CREATININE 1.03 09/05/2023    EGFR 93.0 09/05/2023    BCR 20.4 09/05/2023    K 4.1 09/05/2023    CO2 24.0  09/05/2023    CALCIUM 9.3 09/05/2023    ALBUMIN 4.3 09/05/2023    BILITOT 0.3 09/05/2023    AST 30 09/05/2023    ALT 15 09/05/2023     Results for orders placed during the hospital encounter of 08/05/23    Adult Transthoracic Echo Complete w/ Color, Spectral and Contrast if Necessary Per Protocol    Interpretation Summary    Left ventricular systolic function is severely decreased. Left ventricular ejection fraction appears to be 21 - 25%.    Akinetic septum and inferior wall noted    Left ventricular diastolic function was normal.    Estimated right ventricular systolic pressure from tricuspid regurgitation is normal (<35 mmHg).     Results for orders placed during the hospital encounter of 06/12/20    Cardiac Catheterization/Vascular Study    Narrative  Table formatting from the original result was not included.  Cardiac Catheterization Operative Report    Brian Stewart  [id number]  6/12/2020  @PCP@    He underwent cardiac catheterization.    Indications for the procedure include: CHF.    Procedure Details:  The risks, benefits, complications, treatment options, and expected outcomes were discussed with the patient. The patient and/or family concurred with the proposed plan, giving informed consent.    After informed consent the patient was brought to the cath lab after appropriate IV hydration was begun and oral premedication was given. He was further sedated with fentanyl. He was prepped and draped in the usual manner. Using the modified Seldinger access technique, a 6 Maori sheath was placed in the right radial artery. A left heart catheterization with coronary arteriography was performed. Findings are discussed below.    After the procedure was completed, sedation was stopped and the sheaths and catheters were all removed. Hemostasis was achieved per established hospital protocols.    Findings:    Hemodynamics  Central aortic pressure systolic 90 and diastolic 60  Left ventricular end-diastolic pressure is  elevated at 26  Left Main  normal  RCA  normal  LAD  normal  Circ  normal  LV  severe LV dysfunction with estimated LV ejection fraction of 15% with global LV hypokinesis  Elevated left ventricular end-diastolic pressure with LVEDP of 26 mmHg  Coronary Dominance  left circumflex artery    Estimated Blood Loss:  Minimal    Complications:  None; patient tolerated the procedure well.    Disposition: PACU - hemodynamically stable.    Condition: stable    Impressions:  Normal coronaries  Severe cardiomyopathy  Severe LV dysfunction  Elevated left-sided filling pressures  Estimated LV ejection fraction of 15%    Recommendations:  Medical management for cardiomyopathy  Test results and treatment options discussed with patient and family     Lab Results   Component Value Date    CHOL 172 08/06/2023    TRIG 133 08/06/2023    HDL 47 08/06/2023     (H) 08/06/2023      Results for orders placed in visit on 05/21/18    Stress Test With Myocardial Perfusion One Day    Interpretation Summary  · Findings consistent with an equivocal ECG stress test.  · Breast attenuation and diaphragmatic attenuation artifacts are present.  · Left ventricular ejection fraction is normal (Calculated EF = 50%).  · Myocardial perfusion imaging indicates a normal myocardial perfusion study with no evidence of ischemia.  · Impressions are consistent with a low risk study.  · Suboptimal study    Asymptomatic for chest pain. Specificity of study reduced secondary to baseline Non-specific ST-T wave abnormalities, however ECG is not suggestive of ischemia  Ectopy: none.  Blood pressure  And heart rate response:  Appropriate for Beta-blocker therapy  Pharmacologic study due to inability to tolerate increasing speed and grade of treadmill due to c/o  becoming very fatigued Stage II, and had to change to pharmacological procedure.  Participated in Low Level exercise and tolerance was fair.   Results for orders placed during the hospital encounter of  "11/09/22    Mobile Cardiac Outpatient Telemetry    Interpretation Summary  Extended Holter monitor study for symptoms of palpitations  Patient was monitored from November 9, 2022 to December 8, 2022  Rhythm is sinus rhythm with a minimal heart rate of 50 beats per [place] 169 bpm average heart rate of 77 bpm  No atrial fibrillation  No clinically significant pauses  First-degree heart block  PVC burden of 7%  PAC burden of 8%  Nonsustained ventricular tachycardia for total 4 beats at rate of 170 bpm  Patient had multiple symptomatic episodes with heart racing and skipped beat during that time patient noted to be in sinus rhythm with PVC burden most likely patient's symptoms are secondary to PVC burden based on this Holter monitor study  Abnormal Holter monitor study  Clinical correlation is recommended           Objective:    Vitals:  Vitals:    09/22/23 1040   BP: 105/72   BP Location: Left arm   Patient Position: Sitting   Pulse: 68   SpO2: 95%   Weight: 107 kg (236 lb)   Height: 170.2 cm (67\")       Physical Exam:    General: Alert, cooperative, no distress, appears stated age  Head:  Normocephalic, atraumatic, mucous membranes moist  Eyes:  Conjunctiva/corneas clear, EOM's intact     Neck:  Supple,  no adenopathy;      Lungs: Clear to auscultation bilaterally, no wheezes rhonchi rales are noted  Chest wall: No tenderness  Heart::  Regular rate and rhythm, S1 and S2 normal, no murmur, rub or gallop  Abdomen: Soft, non-tender, nondistended bowel sounds active  Extremities: No cyanosis, clubbing, or edema  Pulses: 2+ and symmetric all extremities  Skin:  No rashes or lesions  Neuro/psych: A&O x3. CN II through XII are grossly intact with appropriate affect      Allergies:  No Known Allergies    Medication Review:     Current Outpatient Medications:     aspirin 81 MG chewable tablet, Chew 1 tablet Daily., Disp: , Rfl:     carvedilol (COREG) 25 MG tablet, Take 1 tablet by mouth 2 (Two) Times a Day., Disp: 180 " tablet, Rfl: 3    dapagliflozin Propanediol (Farxiga) 10 MG tablet, Take 10 mg by mouth Every Morning., Disp: , Rfl:     HYDROcodone-acetaminophen (NORCO)  MG per tablet, Take 1 tablet by mouth Every 8 (Eight) Hours As Needed for Moderate Pain (for back pain)., Disp: , Rfl:     isosorbide mononitrate (IMDUR) 30 MG 24 hr tablet, Take 1 tablet by mouth Daily., Disp: 30 tablet, Rfl: 11    Magnesium Oxide 400 MG capsule, Take 400 mg by mouth 2 (Two) Times a Day Before Meals., Disp: 60 each, Rfl: 11    multivitamin with minerals tablet tablet, Take 1 tablet by mouth Daily., Disp: , Rfl:     Omega-3 Fatty Acids (fish oil) 1000 MG capsule capsule, Take 3 capsules by mouth Daily With Breakfast., Disp: , Rfl:     pravastatin (PRAVACHOL) 20 MG tablet, TAKE 1 TABLET BY MOUTH EVERY NIGHT AT BEDTIME, Disp: 90 tablet, Rfl: 2    sacubitril-valsartan (Entresto) 24-26 MG tablet, Take 1 tablet by mouth 2 (Two) Times a Day., Disp: 60 tablet, Rfl: 11    spironolactone (ALDACTONE) 25 MG tablet, TAKE 1 TABLET BY MOUTH DAILY (Patient taking differently: Take 1 tablet by mouth Every Night.), Disp: 90 tablet, Rfl: 2    furosemide (LASIX) 40 MG tablet, Take 1 tablet by mouth 2 (Two) Times a Day., Disp: 180 tablet, Rfl: 3    Family History:  Family History   Problem Relation Age of Onset    Heart disease Father     Hypertension Mother        Past Medical History:  Past Medical History:   Diagnosis Date    Arrhythmia     Cardiomyopathy     Diverticulitis     Hyperlipidemia     Palpitations        Past surgical History:  Past Surgical History:   Procedure Laterality Date    BACK SURGERY      CARDIAC CATHETERIZATION      CARDIAC CATHETERIZATION N/A 06/12/2020    Procedure: Left Heart Cath;  Surgeon: Fernanda Jackson MD;  Location: [facility] CATH INVASIVE LOC[facility];  Service: Cardiovascular;  Laterality: N/A;    CARDIAC CATHETERIZATION N/A 06/12/2020    Procedure: Coronary angiography;  Surgeon: Fernanda Jackson MD;  Location:   [facility] CATH INVASIVE LOCATION;  Service: Cardiovascular;  Laterality: N/A;    CARDIAC DEFIBRILLATOR PLACEMENT      COLON RESECTION      HERNIA REPAIR      INSERT / REPLACE / REMOVE PACEMAKER      ICD    KNEE ACL RECONSTRUCTION         Social History:  Social History     Socioeconomic History    Marital status: Single   Tobacco Use    Smoking status: Former     Packs/day: 1.50     Types: Cigarettes     Passive exposure: Past    Smokeless tobacco: Former     Types: Snuff    Tobacco comments:     QUIT SMOKING 5/20   Vaping Use    Vaping Use: Never used   Substance and Sexual Activity    Alcohol use: No    Drug use: Yes     Types: Marijuana     Comment: once/day    Sexual activity: Yes     Partners: Female       Review of Systems:  The following systems were reviewed as they relate to the cardiovascular system: Constitutional, Eyes, ENT, Cardiovascular, Respiratory, Gastrointestinal, Integumentary, Neurological, Psychiatric, Hematologic, Endocrine, Musculoskeletal, and Genitourinary. The pertinent cardiovascular findings are reported above with all other cardiovascular points within those systems being negative.    Diagnostic Study Review:     Current Electrocardiogram:  Procedures      Advance Care Planning   ACP discussion was held with the patient during this visit. Patient has an advance directive in EMR which is still valid.         NOTE: The following portions of the patient's history were reviewed and updated this visit as appropriate: allergies, current medications, past family history, past medical history, past social history, past surgical history and problem list.

## 2023-09-22 NOTE — H&P (VIEW-ONLY)
Cardiology Office Visit      Encounter Date:  09/22/2023    Patient ID:   Brian Stewart is a 42 y.o. male.    Reason For Consultation:  Congestive heart failure  Cardiomyopathy    History of Present Illness:  Dear Santana Garcia MD    I had the pleasure of seeing Brian Stewart today. As you are well aware, this is a 42 y.o. male with past medical history that is significant for nonischemic cardiomyopathy severe cardiomyopathy requiring AICD with some improvement of LV systolic function was stable until last year now he is having worsening symptoms of shortness of breath dyspnea on exertion weight gain    Denies any new cardiac symptoms  No dizziness or syncope  Compliant with medical therapy  Denies any new cardiac symptoms  Complaining of some intermittent palpitations recent emergency room visit    Impressions:  Normal coronaries  Severe cardiomyopathy  Severe LV dysfunction  Elevated left-sided filling pressures  Estimated LV ejection fraction of 15%      Interpretation Summary    The left ventricular cavity is severely dilated.  Estimated left ventricular EF = 25% Estimated left ventricular EF was in agreement with the calculated left ventricular EF. Left ventricular systolic function is severely decreased.  The following left ventricular wall segments are hypokinetic: mid anterior, apical anterior, basal anterolateral, basal inferolateral, basal inferoseptal, mid anteroseptal, basal anterior, basal inferior and basal inferoseptal. The following left ventricular wall segments are akinetic: mid anterolateral, apical lateral, mid inferolateral, apical inferior, mid inferior, apical septal, mid inferoseptal and apex.  Moderate mitral valve regurgitation is present.  Left ventricular diastolic function is consistent with (grade I) impaired relaxation.  Estimated right ventricular systolic pressure from tricuspid regurgitation is normal (<35 mmHg).           Assessment & Plan    Impressions:  chronic  systolic heart failure/stable  Congestive heart failure NYHA class II/no new complaints  Cardiomyopathy most likely nonischemic  Severe mitral regurgitation nonrheumatic/now moderate  Hypertension  Obesity  Obstructive sleep apnea  Status post ICD with nonsustained ventricular tachycardia  Noncompliance with medical therapy  Nonischemic cardiomyopathy with LV ejection fraction of 25%  Recent hospitalization for CHF exacerbation  Impaired fasting glucose  Recommendations:  Continue current medical therapy  Recent medical records from hospitalization reviewed and discussed with patient  Risk benefits and alternatives and need for close monitoring and follow-up reviewed and discussed with the patient  Continue current medical management  Low-salt diet  Recent device interrogation with normal function  Repeat echocardiogram to assess LV systolic function and also severity of the mitral regurgitation  Recent labs reviewed and discussed with patient  Medication refills  Labs with the primary care physician office  Continue follow-up with EP for ICD  Continue current medical therapy with aspirin 81 mg p.o. once a day Coreg 25 mg p.o. twice daily Farxiga 10 mg p.o. once a day continue Entresto twice a day Pravachol 20 mg p.o. once a day spironolactone 25 mg p.o. once a day  Is currently on Lasix 40 mg in the morning and 20 mg at nighttime eventually based on the labs and right heart cath findings we will adjust the dosing of the Lasix  Check proBNP and hemoglobin A1c  Recommend right heart catheterization to further assess hemodynamics and need for evaluation by advanced heart failure  Made for referral for advanced heart failure  prior work-up reviewed and discussed with patient  Follow-up in office in 2 months            Lab Results   Component Value Date    GLUCOSE 131 (H) 09/05/2023    BUN 21 (H) 09/05/2023    CREATININE 1.03 09/05/2023    EGFR 93.0 09/05/2023    BCR 20.4 09/05/2023    K 4.1 09/05/2023    CO2 24.0  09/05/2023    CALCIUM 9.3 09/05/2023    ALBUMIN 4.3 09/05/2023    BILITOT 0.3 09/05/2023    AST 30 09/05/2023    ALT 15 09/05/2023     Results for orders placed during the hospital encounter of 08/05/23    Adult Transthoracic Echo Complete w/ Color, Spectral and Contrast if Necessary Per Protocol    Interpretation Summary    Left ventricular systolic function is severely decreased. Left ventricular ejection fraction appears to be 21 - 25%.    Akinetic septum and inferior wall noted    Left ventricular diastolic function was normal.    Estimated right ventricular systolic pressure from tricuspid regurgitation is normal (<35 mmHg).     Results for orders placed during the hospital encounter of 06/12/20    Cardiac Catheterization/Vascular Study    Narrative  Table formatting from the original result was not included.  Cardiac Catheterization Operative Report    Brian Stewart  [id number]  6/12/2020  @PCP@    He underwent cardiac catheterization.    Indications for the procedure include: CHF.    Procedure Details:  The risks, benefits, complications, treatment options, and expected outcomes were discussed with the patient. The patient and/or family concurred with the proposed plan, giving informed consent.    After informed consent the patient was brought to the cath lab after appropriate IV hydration was begun and oral premedication was given. He was further sedated with fentanyl. He was prepped and draped in the usual manner. Using the modified Seldinger access technique, a 6 Danish sheath was placed in the right radial artery. A left heart catheterization with coronary arteriography was performed. Findings are discussed below.    After the procedure was completed, sedation was stopped and the sheaths and catheters were all removed. Hemostasis was achieved per established hospital protocols.    Findings:    Hemodynamics  Central aortic pressure systolic 90 and diastolic 60  Left ventricular end-diastolic pressure is  elevated at 26  Left Main  normal  RCA  normal  LAD  normal  Circ  normal  LV  severe LV dysfunction with estimated LV ejection fraction of 15% with global LV hypokinesis  Elevated left ventricular end-diastolic pressure with LVEDP of 26 mmHg  Coronary Dominance  left circumflex artery    Estimated Blood Loss:  Minimal    Complications:  None; patient tolerated the procedure well.    Disposition: PACU - hemodynamically stable.    Condition: stable    Impressions:  Normal coronaries  Severe cardiomyopathy  Severe LV dysfunction  Elevated left-sided filling pressures  Estimated LV ejection fraction of 15%    Recommendations:  Medical management for cardiomyopathy  Test results and treatment options discussed with patient and family     Lab Results   Component Value Date    CHOL 172 08/06/2023    TRIG 133 08/06/2023    HDL 47 08/06/2023     (H) 08/06/2023      Results for orders placed in visit on 05/21/18    Stress Test With Myocardial Perfusion One Day    Interpretation Summary  · Findings consistent with an equivocal ECG stress test.  · Breast attenuation and diaphragmatic attenuation artifacts are present.  · Left ventricular ejection fraction is normal (Calculated EF = 50%).  · Myocardial perfusion imaging indicates a normal myocardial perfusion study with no evidence of ischemia.  · Impressions are consistent with a low risk study.  · Suboptimal study    Asymptomatic for chest pain. Specificity of study reduced secondary to baseline Non-specific ST-T wave abnormalities, however ECG is not suggestive of ischemia  Ectopy: none.  Blood pressure  And heart rate response:  Appropriate for Beta-blocker therapy  Pharmacologic study due to inability to tolerate increasing speed and grade of treadmill due to c/o  becoming very fatigued Stage II, and had to change to pharmacological procedure.  Participated in Low Level exercise and tolerance was fair.   Results for orders placed during the hospital encounter of  "11/09/22    Mobile Cardiac Outpatient Telemetry    Interpretation Summary  Extended Holter monitor study for symptoms of palpitations  Patient was monitored from November 9, 2022 to December 8, 2022  Rhythm is sinus rhythm with a minimal heart rate of 50 beats per [place] 169 bpm average heart rate of 77 bpm  No atrial fibrillation  No clinically significant pauses  First-degree heart block  PVC burden of 7%  PAC burden of 8%  Nonsustained ventricular tachycardia for total 4 beats at rate of 170 bpm  Patient had multiple symptomatic episodes with heart racing and skipped beat during that time patient noted to be in sinus rhythm with PVC burden most likely patient's symptoms are secondary to PVC burden based on this Holter monitor study  Abnormal Holter monitor study  Clinical correlation is recommended           Objective:    Vitals:  Vitals:    09/22/23 1040   BP: 105/72   BP Location: Left arm   Patient Position: Sitting   Pulse: 68   SpO2: 95%   Weight: 107 kg (236 lb)   Height: 170.2 cm (67\")       Physical Exam:    General: Alert, cooperative, no distress, appears stated age  Head:  Normocephalic, atraumatic, mucous membranes moist  Eyes:  Conjunctiva/corneas clear, EOM's intact     Neck:  Supple,  no adenopathy;      Lungs: Clear to auscultation bilaterally, no wheezes rhonchi rales are noted  Chest wall: No tenderness  Heart::  Regular rate and rhythm, S1 and S2 normal, no murmur, rub or gallop  Abdomen: Soft, non-tender, nondistended bowel sounds active  Extremities: No cyanosis, clubbing, or edema  Pulses: 2+ and symmetric all extremities  Skin:  No rashes or lesions  Neuro/psych: A&O x3. CN II through XII are grossly intact with appropriate affect      Allergies:  No Known Allergies    Medication Review:     Current Outpatient Medications:     aspirin 81 MG chewable tablet, Chew 1 tablet Daily., Disp: , Rfl:     carvedilol (COREG) 25 MG tablet, Take 1 tablet by mouth 2 (Two) Times a Day., Disp: 180 " tablet, Rfl: 3    dapagliflozin Propanediol (Farxiga) 10 MG tablet, Take 10 mg by mouth Every Morning., Disp: , Rfl:     HYDROcodone-acetaminophen (NORCO)  MG per tablet, Take 1 tablet by mouth Every 8 (Eight) Hours As Needed for Moderate Pain (for back pain)., Disp: , Rfl:     isosorbide mononitrate (IMDUR) 30 MG 24 hr tablet, Take 1 tablet by mouth Daily., Disp: 30 tablet, Rfl: 11    Magnesium Oxide 400 MG capsule, Take 400 mg by mouth 2 (Two) Times a Day Before Meals., Disp: 60 each, Rfl: 11    multivitamin with minerals tablet tablet, Take 1 tablet by mouth Daily., Disp: , Rfl:     Omega-3 Fatty Acids (fish oil) 1000 MG capsule capsule, Take 3 capsules by mouth Daily With Breakfast., Disp: , Rfl:     pravastatin (PRAVACHOL) 20 MG tablet, TAKE 1 TABLET BY MOUTH EVERY NIGHT AT BEDTIME, Disp: 90 tablet, Rfl: 2    sacubitril-valsartan (Entresto) 24-26 MG tablet, Take 1 tablet by mouth 2 (Two) Times a Day., Disp: 60 tablet, Rfl: 11    spironolactone (ALDACTONE) 25 MG tablet, TAKE 1 TABLET BY MOUTH DAILY (Patient taking differently: Take 1 tablet by mouth Every Night.), Disp: 90 tablet, Rfl: 2    furosemide (LASIX) 40 MG tablet, Take 1 tablet by mouth 2 (Two) Times a Day., Disp: 180 tablet, Rfl: 3    Family History:  Family History   Problem Relation Age of Onset    Heart disease Father     Hypertension Mother        Past Medical History:  Past Medical History:   Diagnosis Date    Arrhythmia     Cardiomyopathy     Diverticulitis     Hyperlipidemia     Palpitations        Past surgical History:  Past Surgical History:   Procedure Laterality Date    BACK SURGERY      CARDIAC CATHETERIZATION      CARDIAC CATHETERIZATION N/A 06/12/2020    Procedure: Left Heart Cath;  Surgeon: Fernanda Jackson MD;  Location: [facility] CATH INVASIVE LOCATION;  Service: Cardiovascular;  Laterality: N/A;    CARDIAC CATHETERIZATION N/A 06/12/2020    Procedure: Coronary angiography;  Surgeon: Fernanda Jackson MD;  Location:   [facility] CATH INVASIVE LOCATION;  Service: Cardiovascular;  Laterality: N/A;    CARDIAC DEFIBRILLATOR PLACEMENT      COLON RESECTION      HERNIA REPAIR      INSERT / REPLACE / REMOVE PACEMAKER      ICD    KNEE ACL RECONSTRUCTION         Social History:  Social History     Socioeconomic History    Marital status: Single   Tobacco Use    Smoking status: Former     Packs/day: 1.50     Types: Cigarettes     Passive exposure: Past    Smokeless tobacco: Former     Types: Snuff    Tobacco comments:     QUIT SMOKING 5/20   Vaping Use    Vaping Use: Never used   Substance and Sexual Activity    Alcohol use: No    Drug use: Yes     Types: Marijuana     Comment: once/day    Sexual activity: Yes     Partners: Female       Review of Systems:  The following systems were reviewed as they relate to the cardiovascular system: Constitutional, Eyes, ENT, Cardiovascular, Respiratory, Gastrointestinal, Integumentary, Neurological, Psychiatric, Hematologic, Endocrine, Musculoskeletal, and Genitourinary. The pertinent cardiovascular findings are reported above with all other cardiovascular points within those systems being negative.    Diagnostic Study Review:     Current Electrocardiogram:  Procedures      Advance Care Planning   ACP discussion was held with the patient during this visit. Patient has an advance directive in EMR which is still valid.         NOTE: The following portions of the patient's history were reviewed and updated this visit as appropriate: allergies, current medications, past family history, past medical history, past social history, past surgical history and problem list.

## 2023-09-26 ENCOUNTER — LAB (OUTPATIENT)
Dept: LAB | Facility: HOSPITAL | Age: 43
End: 2023-09-26
Payer: MEDICARE

## 2023-09-26 DIAGNOSIS — E78.2 MIXED HYPERLIPIDEMIA: ICD-10-CM

## 2023-09-26 DIAGNOSIS — Z95.810 AICD (AUTOMATIC CARDIOVERTER/DEFIBRILLATOR) PRESENT: ICD-10-CM

## 2023-09-26 DIAGNOSIS — I42.0 DILATED CARDIOMYOPATHY: ICD-10-CM

## 2023-09-26 DIAGNOSIS — I34.0 NONRHEUMATIC MITRAL VALVE REGURGITATION: ICD-10-CM

## 2023-09-26 DIAGNOSIS — T82.118A MALFUNCTION OF IMPLANTABLE CARDIOVERTER-DEFIBRILLATOR (ICD), INITIAL ENCOUNTER: ICD-10-CM

## 2023-09-26 DIAGNOSIS — R79.9 ABNORMAL FINDING OF BLOOD CHEMISTRY, UNSPECIFIED: ICD-10-CM

## 2023-09-26 DIAGNOSIS — R00.2 PALPITATIONS: ICD-10-CM

## 2023-09-26 DIAGNOSIS — I10 ESSENTIAL HYPERTENSION: ICD-10-CM

## 2023-09-26 LAB
ANION GAP SERPL CALCULATED.3IONS-SCNC: 12.8 MMOL/L (ref 5–15)
BASOPHILS # BLD AUTO: 0 10*3/MM3 (ref 0–0.2)
BASOPHILS NFR BLD AUTO: 0.3 % (ref 0–1.5)
BUN SERPL-MCNC: 21 MG/DL (ref 6–20)
BUN/CREAT SERPL: 21.9 (ref 7–25)
CALCIUM SPEC-SCNC: 9.5 MG/DL (ref 8.6–10.5)
CHLORIDE SERPL-SCNC: 102 MMOL/L (ref 98–107)
CO2 SERPL-SCNC: 22.2 MMOL/L (ref 22–29)
CREAT SERPL-MCNC: 0.96 MG/DL (ref 0.76–1.27)
DEPRECATED RDW RBC AUTO: 40.3 FL (ref 37–54)
EGFRCR SERPLBLD CKD-EPI 2021: 100.6 ML/MIN/1.73
EOSINOPHIL # BLD AUTO: 0.1 10*3/MM3 (ref 0–0.4)
EOSINOPHIL NFR BLD AUTO: 1.8 % (ref 0.3–6.2)
ERYTHROCYTE [DISTWIDTH] IN BLOOD BY AUTOMATED COUNT: 12.1 % (ref 12.3–15.4)
GLUCOSE SERPL-MCNC: 109 MG/DL (ref 65–99)
HBA1C MFR BLD: 5.5 % (ref 4.8–5.6)
HCT VFR BLD AUTO: 49.2 % (ref 37.5–51)
HGB BLD-MCNC: 16.6 G/DL (ref 13–17.7)
INR PPP: 0.98 (ref 0.93–1.1)
LYMPHOCYTES # BLD AUTO: 1.5 10*3/MM3 (ref 0.7–3.1)
LYMPHOCYTES NFR BLD AUTO: 18.6 % (ref 19.6–45.3)
MCH RBC QN AUTO: 32.1 PG (ref 26.6–33)
MCHC RBC AUTO-ENTMCNC: 33.6 G/DL (ref 31.5–35.7)
MCV RBC AUTO: 95.5 FL (ref 79–97)
MONOCYTES # BLD AUTO: 0.8 10*3/MM3 (ref 0.1–0.9)
MONOCYTES NFR BLD AUTO: 9.5 % (ref 5–12)
NEUTROPHILS NFR BLD AUTO: 5.7 10*3/MM3 (ref 1.7–7)
NEUTROPHILS NFR BLD AUTO: 69.8 % (ref 42.7–76)
NRBC BLD AUTO-RTO: 0.1 /100 WBC (ref 0–0.2)
NT-PROBNP SERPL-MCNC: 480 PG/ML (ref 0–450)
PLATELET # BLD AUTO: 210 10*3/MM3 (ref 140–450)
PMV BLD AUTO: 8.6 FL (ref 6–12)
POTASSIUM SERPL-SCNC: 4.1 MMOL/L (ref 3.5–5.2)
PROTHROMBIN TIME: 10.7 SECONDS (ref 9.6–11.7)
RBC # BLD AUTO: 5.16 10*6/MM3 (ref 4.14–5.8)
SODIUM SERPL-SCNC: 137 MMOL/L (ref 136–145)
WBC NRBC COR # BLD: 8.1 10*3/MM3 (ref 3.4–10.8)

## 2023-09-26 PROCEDURE — 36415 COLL VENOUS BLD VENIPUNCTURE: CPT

## 2023-09-26 PROCEDURE — 83036 HEMOGLOBIN GLYCOSYLATED A1C: CPT

## 2023-09-26 PROCEDURE — 85610 PROTHROMBIN TIME: CPT

## 2023-09-26 PROCEDURE — 80048 BASIC METABOLIC PNL TOTAL CA: CPT

## 2023-09-26 PROCEDURE — 83880 ASSAY OF NATRIURETIC PEPTIDE: CPT

## 2023-09-26 PROCEDURE — 85025 COMPLETE CBC W/AUTO DIFF WBC: CPT

## 2023-09-27 ENCOUNTER — LAB (OUTPATIENT)
Dept: LAB | Facility: HOSPITAL | Age: 43
End: 2023-09-27
Payer: MEDICARE

## 2023-09-27 DIAGNOSIS — Z01.818 PREOP TESTING: ICD-10-CM

## 2023-09-27 DIAGNOSIS — Z01.818 PREOP TESTING: Primary | ICD-10-CM

## 2023-09-27 LAB — SARS-COV-2 RNA RESP QL NAA+PROBE: NOT DETECTED

## 2023-09-27 PROCEDURE — 87635 SARS-COV-2 COVID-19 AMP PRB: CPT

## 2023-09-28 ENCOUNTER — HOSPITAL ENCOUNTER (OUTPATIENT)
Facility: HOSPITAL | Age: 43
Setting detail: HOSPITAL OUTPATIENT SURGERY
Discharge: HOME | End: 2023-09-28
Attending: INTERNAL MEDICINE | Admitting: INTERNAL MEDICINE
Payer: MEDICARE

## 2023-09-28 VITALS
HEART RATE: 65 BPM | OXYGEN SATURATION: 96 % | WEIGHT: 233.69 LBS | HEIGHT: 67 IN | RESPIRATION RATE: 14 BRPM | TEMPERATURE: 98.5 F | SYSTOLIC BLOOD PRESSURE: 103 MMHG | BODY MASS INDEX: 36.68 KG/M2 | DIASTOLIC BLOOD PRESSURE: 65 MMHG

## 2023-09-28 DIAGNOSIS — Z95.810 AICD (AUTOMATIC CARDIOVERTER/DEFIBRILLATOR) PRESENT: ICD-10-CM

## 2023-09-28 DIAGNOSIS — I34.0 NONRHEUMATIC MITRAL VALVE REGURGITATION: ICD-10-CM

## 2023-09-28 DIAGNOSIS — I10 ESSENTIAL HYPERTENSION: ICD-10-CM

## 2023-09-28 DIAGNOSIS — R00.2 PALPITATIONS: ICD-10-CM

## 2023-09-28 DIAGNOSIS — T82.118A MALFUNCTION OF IMPLANTABLE CARDIOVERTER-DEFIBRILLATOR (ICD), INITIAL ENCOUNTER: ICD-10-CM

## 2023-09-28 DIAGNOSIS — E78.2 MIXED HYPERLIPIDEMIA: ICD-10-CM

## 2023-09-28 DIAGNOSIS — I42.0 DILATED CARDIOMYOPATHY: ICD-10-CM

## 2023-09-28 PROBLEM — I50.22 CHRONIC SYSTOLIC HEART FAILURE: Status: ACTIVE | Noted: 2023-09-28

## 2023-09-28 PROBLEM — I50.21 ACUTE SYSTOLIC HEART FAILURE: Status: ACTIVE | Noted: 2023-09-28

## 2023-09-28 PROBLEM — I50.23 ACUTE ON CHRONIC SYSTOLIC HEART FAILURE: Status: ACTIVE | Noted: 2023-09-28

## 2023-09-28 PROCEDURE — 93451 RIGHT HEART CATH: CPT | Performed by: INTERNAL MEDICINE

## 2023-09-28 PROCEDURE — C1894 INTRO/SHEATH, NON-LASER: HCPCS | Performed by: INTERNAL MEDICINE

## 2023-09-28 PROCEDURE — 25010000002 MIDAZOLAM PER 1 MG: Performed by: INTERNAL MEDICINE

## 2023-09-28 PROCEDURE — 99152 MOD SED SAME PHYS/QHP 5/>YRS: CPT | Performed by: INTERNAL MEDICINE

## 2023-09-28 PROCEDURE — 99153 MOD SED SAME PHYS/QHP EA: CPT | Performed by: INTERNAL MEDICINE

## 2023-09-28 PROCEDURE — 25010000002 FENTANYL CITRATE (PF) 100 MCG/2ML SOLUTION: Performed by: INTERNAL MEDICINE

## 2023-09-28 RX ORDER — MIDAZOLAM HYDROCHLORIDE 1 MG/ML
INJECTION INTRAMUSCULAR; INTRAVENOUS
Status: DISCONTINUED | OUTPATIENT
Start: 2023-09-28 | End: 2023-09-28 | Stop reason: HOSPADM

## 2023-09-28 RX ORDER — ONDANSETRON 2 MG/ML
4 INJECTION INTRAMUSCULAR; INTRAVENOUS EVERY 6 HOURS PRN
Status: DISCONTINUED | OUTPATIENT
Start: 2023-09-28 | End: 2023-09-28 | Stop reason: HOSPADM

## 2023-09-28 RX ORDER — FENTANYL CITRATE 50 UG/ML
INJECTION, SOLUTION INTRAMUSCULAR; INTRAVENOUS
Status: DISCONTINUED | OUTPATIENT
Start: 2023-09-28 | End: 2023-09-28 | Stop reason: HOSPADM

## 2023-09-28 RX ORDER — ONDANSETRON 4 MG/1
4 TABLET, FILM COATED ORAL EVERY 6 HOURS PRN
Status: DISCONTINUED | OUTPATIENT
Start: 2023-09-28 | End: 2023-09-28 | Stop reason: HOSPADM

## 2023-09-28 RX ORDER — SODIUM CHLORIDE 9 MG/ML
250 INJECTION, SOLUTION INTRAVENOUS ONCE AS NEEDED
Status: DISCONTINUED | OUTPATIENT
Start: 2023-09-28 | End: 2023-09-28 | Stop reason: HOSPADM

## 2023-09-28 RX ORDER — ACETAMINOPHEN 325 MG/1
650 TABLET ORAL EVERY 4 HOURS PRN
Status: DISCONTINUED | OUTPATIENT
Start: 2023-09-28 | End: 2023-09-28 | Stop reason: HOSPADM

## 2023-09-28 RX ORDER — LIDOCAINE HYDROCHLORIDE 20 MG/ML
INJECTION, SOLUTION INFILTRATION; PERINEURAL
Status: DISCONTINUED | OUTPATIENT
Start: 2023-09-28 | End: 2023-09-28 | Stop reason: HOSPADM

## 2023-09-28 RX ORDER — NITROGLYCERIN 0.4 MG/1
0.4 TABLET SUBLINGUAL
Status: DISCONTINUED | OUTPATIENT
Start: 2023-09-28 | End: 2023-09-28 | Stop reason: HOSPADM

## 2023-09-28 RX ORDER — HYDROCODONE BITARTRATE AND ACETAMINOPHEN 10; 325 MG/1; MG/1
1 TABLET ORAL EVERY 8 HOURS PRN
Status: DISCONTINUED | OUTPATIENT
Start: 2023-09-28 | End: 2023-09-28 | Stop reason: HOSPADM

## 2023-09-28 RX ADMIN — HYDROCODONE BITARTRATE AND ACETAMINOPHEN 1 TABLET: 10; 325 TABLET ORAL at 15:17

## 2023-09-28 NOTE — DISCHARGE INSTRUCTIONS
Post Cath Instructions    Follow up with Dr. Larsen as scheduled.  If you need to reschedule for any reason call Dr. Larsen's office at [phone].    Drink plenty of fluids for the next 24 hours.  This helps to eliminate the dye used in your procedure through urination.  You may resume a normal diet; however, try to avoid foods that would cause gas or constipation.    Sedative medication given to you during your catheterization may decrease your judgement and reaction time for up to 24-48 hours.  Therefore:  DO NOT drive or operate hazardous machinery (48 hours)  DO NOT consume alcoholic beverages  DO NOT make any important/legal decisions  Have someone stay with you for at least 24 hours    To allow proper healing and prevent bleeding, the following activities are to be strictly avoided for the next 24-48 hours:  Excessive bending at wound site  Straining (anything that would tense up muscles around the affected puncture site)  Lifting objects greater than 5 pounds, pushing, or pulling for 5 days  For Groin Cases:  Refrain from sexual activity  Refrain from running or vigorous walking  No prolonged sitting or standing  Limit stair climbing as much as possible    Keep the puncture site clean and dry.  You may remove the dressing tomorrow and replace it with a band-aid for at least one additional day.  Gently clean the site with mild soap and water.  No scrubbing/rubbing and lightly pat the area dry.  Showers are acceptable; however, avoid submerging in water (tub baths, hot tubs, swimming pools, dishwater, etc…) for at least one week.  The site should be completely healed before resuming these activities to reduce the risk of infection.  Check the site often.  Watch for signs and symptoms of infection and notify your physician if any of the following occur:  Bleeding or an increase in swelling at the puncture site  Fever  Increased soreness around puncture site  Foul odor or significant drainage from the puncture  site  Swelling, redness, or warmth at the puncture site    **A bruise or small “pea sized” lump under the skin at the puncture site is not unusual.  This should disappear within 3-4 weeks.**  CONTACT YOUR PHYSICIAN OR CALL 911 IF YOU EXPERIENCE ANY OF THE FOLLOWING:  Increased angina (chest pain) or frequent sensations of pressure, burning, pain, or other discomfort in the chest, arm, jaws, or stomach  Lightheadedness, dizziness, faint feeling, sweating, or difficulty breathing  Odd sensation changes like numbness, tingling, coldness, or pain in the arm or leg in which the catheter was inserted  Limb in which the catheter was inserted becomes pale/bluish in color    IMPORTANT:  Although this occurs very rarely, if you should develop bright red or excessive bleeding, feel a “pop” inside at the insertion site, or notice a sudden increase in swelling larger than a walnut, you should call 911.  Hold continuous firm pressure to the access site until emergency personnel arrive.  It is best if someone else can do this for you.

## 2023-10-02 LAB
BASE DEFICIT: ABNORMAL
BASE DEFICIT: ABNORMAL
BASE EXCESS BLDV CALC-SCNC: ABNORMAL MMOL/L
BASE EXCESS BLDV CALC-SCNC: ABNORMAL MMOL/L
CA-I BLDA-SCNC: 1.25 MMOL/L (ref 1.12–1.32)
CA-I BLDA-SCNC: 1.27 MMOL/L (ref 1.12–1.32)
CO2 CONTENT VENOUS: ABNORMAL
CO2 CONTENT VENOUS: ABNORMAL
GLUCOSE BLDC GLUCOMTR-MCNC: 83 MG/DL (ref 70–105)
GLUCOSE BLDC GLUCOMTR-MCNC: 85 MG/DL (ref 70–105)
HCO3 BLDV-SCNC: 28.6 MMOL/L (ref 23–28)
HCO3 BLDV-SCNC: 29.3 MMOL/L (ref 23–28)
HCT VFR BLDA CALC: 44 % (ref 38–51)
HCT VFR BLDA CALC: 44 % (ref 38–51)
HGB BLDA-MCNC: 15 G/DL (ref 12–17)
HGB BLDA-MCNC: 15 G/DL (ref 12–17)
PCO2 BLDV: 48.6 MM HG (ref 41–51)
PCO2 BLDV: 49.3 MM HG (ref 41–51)
PH BLDV: 7.38 PH UNITS (ref 7.31–7.41)
PH BLDV: 7.38 PH UNITS (ref 7.31–7.41)
PO2 BLDV: 29 MM HG (ref 35–42)
PO2 BLDV: 30 MM HG (ref 35–42)
POTASSIUM BLDA-SCNC: 4 MMOL/L (ref 3.5–4.9)
POTASSIUM BLDA-SCNC: 4.1 MMOL/L (ref 3.5–4.9)
SAO2 % BLDCOV: 30 % (ref 45–75)
SAO2 % BLDCOV: 31 % (ref 45–75)
SODIUM BLD-SCNC: 141 MMOL/L (ref 138–146)
SODIUM BLD-SCNC: 142 MMOL/L (ref 138–146)

## 2023-10-04 PROBLEM — I50.23 ACUTE ON CHRONIC SYSTOLIC HEART FAILURE: Status: RESOLVED | Noted: 2023-09-28 | Resolved: 2023-10-04

## 2023-10-04 PROBLEM — I50.21 ACUTE SYSTOLIC HEART FAILURE: Status: RESOLVED | Noted: 2023-09-28 | Resolved: 2023-10-04

## 2023-10-04 PROBLEM — I50.22 CHRONIC SYSTOLIC HEART FAILURE: Chronic | Status: ACTIVE | Noted: 2023-09-28

## 2023-10-04 NOTE — PROGRESS NOTES
"Cardiology Heart Failure Clinic Note  Mainor \"William\" Cynthia MILNER [facility]      Patient ID: Brian Stewart is a 43 y.o. male.    Encounter Date:10/05/2023    Subjective:     Chief Complaint   Patient presents with    Congestive Heart Failure       HPI:  42-year-old  male's patient of Dr. Jackson with a past medical history of biventricular nonischemic dilated cardiomyopathy with his last reported EF 30% with grade 1 diastolic dysfunction in July 2023 by TTE.  He is also  s/p an ICD placed in 2019 he has a LBBB at baseline past medical history includes metabolic syndrome with hypertension dyslipidemia, elevated BMI.  Since last visit 8/24/23 he has continued to lose weight he is engaged actively in an exercise and diet program however failing somewhat on previous dietary attempts he thinks overall he is doing better with diet.  Previous ischemic assessment showed no CAD. current medical therapy with aspirin 81 mg p.o. once a day Coreg 25 mg p.o. twice daily Farxiga 10 mg p.o. once a day continue Entresto twice a day Pravachol 20 mg p.o. once a day spironolactone 25 mg p.o. once a day. He has had a RHC 9/28/23 (results not available) and an ED visit 9/5/23 when pt stated \"feeling wonky my legs did not work this morning\" in ED note provider stated  I do not see evidence of an acute stroke on stance of acute pneumonia urinary tract infection sepsis bacteremia I do not see any evidence that suggest any acute CHF. DVT or pulmonary embolism.”  He tells me since her last visit he has been doing fairly well he has had a right heart cath he has noted that he has been having a systolic blood pressures been running a little low in the 90s and on occasion has taken half a dose of his Coreg..  He is somewhat excited to go see the  of  advanced heart failure team which he reportedly has an appointment with Dr. Danette mike.  He has had no real chest pain palpitations no pre or jeff syncopal episodes he tells me he " is has not increased anxiety related to his mother's recent illness or he has been her primary caregiver.    ROS:  14 point review of systems negative except as mentioned above    REVIEW OF SYSTEMS:    Constitutional: + weakness, + fatigue, No fever, rigors, chills   Eyes: No recent vision changes, eye pain   ENT/oropharynx: No recent difficulty swallowing, sore throat, epistaxis, changes in hearing   Cardiovascular: No recent chest pain, chest tightness, palpitations except as noted in HPI, paroxysmal nocturnal dyspnea, orthopnea, diaphoresis, dizziness & no pre or jeff syncopal episodes   Respiratory: + shortness of breath, + dyspnea on exertion, but its been relatively minimal no significant productive cough, wheezing and no hemoptysis   Gastrointestinal: No abdominal pain, nausea, vomiting, diarrhea, bloody stools   Genitourinary: No hematuria, dysuria other than increased frequency   Neurological: No headache, tremors, numbness,  or hemiparesis    Musculoskeletal: No cramps, myalgias,  joint pain, joint swelling   Integument: No recent rash, no issues recently with edema       Patient Active Problem List   Diagnosis    Hyperlipidemia    Essential hypertension    Palpitations    Shortness of breath     AICD (automatic cardioverter/defibrillator) present    Nonrheumatic mitral valve regurgitation    Mixed hyperlipidemia    Non- Ischemic Dilated cardiomyopathy    Fatigue    Abnormal finding of blood chemistry, unspecified     Near syncope    ICD (implantable cardioverter-defibrillator) malfunction    Metabolic syndrome    Chronic systolic heart failure              Past Medical History:   Diagnosis Date    Arrhythmia     Cardiomyopathy     Diverticulitis     Hyperlipidemia     Palpitations        Past Surgical History:   Procedure Laterality Date    BACK SURGERY      CARDIAC CATHETERIZATION      CARDIAC CATHETERIZATION N/A 06/12/2020    Procedure: Left Heart Cath;  Surgeon: Fernanda Jackson MD;  Location:  [facility] CATH INVASIVE LOCATION;  Service: Cardiovascular;  Laterality: N/A;    CARDIAC CATHETERIZATION N/A 2020    Procedure: Coronary angiography;  Surgeon: Fernanda Jackson MD;  Location: [facility] CATH INVASIVE LOCATION;  Service: Cardiovascular;  Laterality: N/A;    CARDIAC CATHETERIZATION Right 2023    Procedure: Right Heart Cath;  Surgeon: Fernanda Jackson MD;  Location: [facility] CATH INVASIVE LOCATION;  Service: Cardiovascular;  Laterality: Right;    CARDIAC DEFIBRILLATOR PLACEMENT      COLON RESECTION      HERNIA REPAIR      INSERT / REPLACE / REMOVE PACEMAKER      ICD    KNEE ACL RECONSTRUCTION         Social History     Socioeconomic History    Marital status: Single   Tobacco Use    Smoking status: Former     Packs/day: 1.50     Types: Cigarettes     Quit date: 2021     Years since quittin.0     Passive exposure: Past    Smokeless tobacco: Former     Types: Snuff    Tobacco comments:     QUIT SMOKING    Vaping Use    Vaping Use: Never used   Substance and Sexual Activity    Alcohol use: Not Currently    Drug use: Yes     Types: Marijuana     Comment: once/day    Sexual activity: Yes     Partners: Female       No Known Allergies      Current Outpatient Medications:     aspirin 81 MG chewable tablet, Chew 1 tablet Daily., Disp: , Rfl:     carvedilol (COREG) 25 MG tablet, Take 1 tablet by mouth 2 (Two) Times a Day., Disp: 180 tablet, Rfl: 3    dapagliflozin Propanediol (Farxiga) 10 MG tablet, Take 10 mg by mouth Every Morning., Disp: , Rfl:     furosemide (LASIX) 20 MG tablet, Take 1 tablet by mouth Every Evening., Disp: , Rfl:     furosemide (LASIX) 40 MG tablet, Take 1 tablet by mouth Every Morning., Disp: , Rfl:     HYDROcodone-acetaminophen (NORCO)  MG per tablet, Take 1 tablet by mouth Every 8 (Eight) Hours As Needed for Moderate Pain (for back pain)., Disp: , Rfl:     isosorbide mononitrate (IMDUR) 30 MG 24 hr tablet, Take 1 tablet by mouth Daily., Disp: 30 tablet,  Rfl: 11    Magnesium Oxide 400 MG capsule, Take 400 mg by mouth 2 (Two) Times a Day Before Meals., Disp: 60 each, Rfl: 11    multivitamin with minerals tablet tablet, Take 1 tablet by mouth Daily., Disp: , Rfl:     pravastatin (PRAVACHOL) 20 MG tablet, TAKE 1 TABLET BY MOUTH EVERY NIGHT AT BEDTIME, Disp: 90 tablet, Rfl: 2    sacubitril-valsartan (Entresto) 24-26 MG tablet, Take 1 tablet by mouth 2 (Two) Times a Day., Disp: 60 tablet, Rfl: 11    spironolactone (ALDACTONE) 25 MG tablet, TAKE 1 TABLET BY MOUTH DAILY (Patient taking differently: Take 1 tablet by mouth Every Night.), Disp: 90 tablet, Rfl: 2    gabapentin (NEURONTIN) 800 MG tablet, Take 1 tablet by mouth 3 (Three) Times a Day. (Patient not taking: Reported on 10/5/2023), Disp: , Rfl:     Omega-3 Fatty Acids (fish oil) 1000 MG capsule capsule, Take 3 capsules by mouth Daily With Breakfast. (Patient not taking: Reported on 10/5/2023), Disp: , Rfl:     Immunization History   Administered Date(s) Administered    Tdap 10/03/2020         Most recent EKG as reviewed:  Procedures     Most recent echo as reviewed:  Results for orders placed during the hospital encounter of 08/05/23    Adult Transthoracic Echo Complete w/ Color, Spectral and Contrast if Necessary Per Protocol    Interpretation Summary    Left ventricular systolic function is severely decreased. Left ventricular ejection fraction appears to be 21 - 25%.    Akinetic septum and inferior wall noted    Left ventricular diastolic function was normal.    Estimated right ventricular systolic pressure from tricuspid regurgitation is normal (<35 mmHg).      Most recent stress test results:  Results for orders placed in visit on 05/21/18    Stress Test With Myocardial Perfusion One Day    Interpretation Summary  · Findings consistent with an equivocal ECG stress test.  · Breast attenuation and diaphragmatic attenuation artifacts are present.  · Left ventricular ejection fraction is normal (Calculated EF =  50%).  · Myocardial perfusion imaging indicates a normal myocardial perfusion study with no evidence of ischemia.  · Impressions are consistent with a low risk study.  · Suboptimal study    Asymptomatic for chest pain. Specificity of study reduced secondary to baseline Non-specific ST-T wave abnormalities, however ECG is not suggestive of ischemia  Ectopy: none.  Blood pressure  And heart rate response:  Appropriate for Beta-blocker therapy  Pharmacologic study due to inability to tolerate increasing speed and grade of treadmill due to c/o  becoming very fatigued Stage II, and had to change to pharmacological procedure.  Participated in Low Level exercise and tolerance was fair.      Most recent cardiac catheterization results:  Results for orders placed during the hospital encounter of 09/28/23    Cardiac Catheterization/Vascular Study    Narrative  Table formatting from the original result was not included.  Cardiac Catheterization Operative Report    Brian Stewart  [id number]  9/28/2023  @PCP@    He underwent cardiac catheterization.    Indications for the procedure include: Cardiomyopathy.        Procedure Details:  The risks, benefits, complications, treatment options, and expected outcomes were discussed with the patient. The patient and/or family concurred with the proposed plan, giving informed consent.    After informed consent the patient was brought to the cath lab after appropriate IV hydration was begun and oral premedication was given. He was further sedated with fentanyl. He was prepped and draped in the usual manner. Using the modified Seldinger access technique, a 6 Khmer sheath was placed in the femoral artery. A left heart catheterization with coronary arteriography was performed. Findings are discussed below.    After the procedure was completed, sedation was stopped and the sheaths and catheters were all removed. Hemostasis was achieved per established hospital protocols.    Conscious  sedation:  Conscious sedation was performed according to protocol.  I supervised and directed an independent trained observer with the assistance of monitoring the patient's level of consciousness and physiologic status throughout the procedure.  Intraoperative service time was 30 minutes.    Findings:    Hemodynamics Right heart catheterization and hemodynamic    Pulmonary capillary wedge pressure was 10 mmHg PA pressure systolic 22 diastolic 80 with a mean pressure of 14 mmHg  RV pressure systolic 20 mmHg  Right atrial pressure was 4 mm of medical  Femoral arterial saturation was 99%  PA saturation is 55%  Right atrial saturation is 53%  Darrion equation cardiac output is 2.75 L/min  Darrion equation cardiac index is 1.2 L/min/m²      Estimated Blood Loss:  Minimal    Specimens: None    Complications:  None; patient tolerated the procedure well.    Disposition: PACU - hemodynamically stable.    Condition: stable    Impressions:  Normal PA pressures and normal filling pressures    Recommendations:  Medical management for cardiomyopathy  Test results reviewed and discussed with patient and family        Historical data copied forward from previous encounters in EMR including the history, exam, and assessment/plan has been reviewed and is unchanged except as I have noted and otherwise indicated.      Objective:         BP 95/70 (BP Location: Right arm)   Pulse 71   Resp 16   Wt 106 kg (234 lb)   BMI 37.20 kg/m²     Physical Examination  General:  Well-developed, [place] well-nourished, cooperative, no distress, appears stated age if not slightly older,      Neuro A&O x3.  No obvious focal neurodeficits     psych:Pleasant affect     Head: Normocephalic, atraumatic, moist mucous membranes     Eyes:  PERRLA,+ mild arcus senilis, Conjunctivae not injected, EOM's intact        Neck:  Supple, Mildly thickened, no lymph adenopathy nor thyromegaly no JVD or bruit     Lungs:  Symmetrical rise and fall the chest identified with a  normal respiratory pattern, clear to auscultation bilaterally, no wheezes, rhonchi or rales are noted     Chest wall:     No significant tenderness when palpated.  PMI is mildly laterally displaced at the sixth intercostal space mid axillary line     Heart::  Regular rate and rhythm, S1 and S2 normal, no S3 questionable mild S4 gr 1-2/6 systolic ejection murmur best heard at the apex , no rub or gallop,     Abdomen:      Soft, non-distended, non-tender, bowel sounds noted in the 4 quadrants of the abdomen, moderate adipose tissue identified     Extremities:   Equal color motion temperature and sensitivity, no noted lower extremity  edema. Rapid capillary refill noted within the nailbeds of fingers and toes bilaterally     Pulses: 2+ and symmetric all extremities     Skin: No obvious rashes, significant lesions identified warm dry and of normal turgor       In-Office Procedure(s):  ECG 12 Lead Electrolyte Imbalance   Preliminary Result   HEART RATE= 56  bpm   RR Interval= 1064  ms   RI Interval= 191  ms   P Horizontal Axis= 20  deg   P Front Axis= -3  deg   QRSD Interval= 114  ms   QT Interval= 426  ms   QTcB= 413  ms   QRS Axis= -15  deg   T Wave Axis= 135  deg   - ABNORMAL ECG -   Sinus bradycardia   Incomplete left bundle branch block   Low voltage, precordial leads   Electronically Signed By:    Date and Time of Study: 2023-10-05 11:58:20               Imaging:    Results for orders placed during the hospital encounter of 09/05/23    XR Chest 1 View    Narrative  XR CHEST 1 VW    Date of Exam: 9/5/2023 8:43 PM EDT    Indication: weakness    Comparison: 8/5/2023.    Findings:  Stable left subclavian ICD/pacemaker device. There are no airspace consolidations. No pleural fluid. No pneumothorax. The pulmonary vasculature appears within normal limits. The cardiac and mediastinal silhouette appear unremarkable. No acute osseous  abnormality identified.    Impression  Impression:  No acute cardiopulmonary  process.      Electronically Signed: Georgia Joel MD  9/5/2023 10:51 PM EDT  Workstation ID: [id number]       Results for orders placed during the hospital encounter of 09/05/23    CT Head Without Contrast    Narrative  CT HEAD WO CONTRAST    Date of Exam: 9/5/2023 9:21 PM EDT    Indication: AMS.    Comparison: None available.    Technique: Axial CT images were obtained of the head without contrast administration.  Coronal reconstructions were performed.  Automated exposure control and iterative reconstruction methods were used.      Findings:      The ventricles and subarachnoid spaces are unremarkable.    There is no intracranial hemorrhage.    There is no evidence of resent large arterial distribution infarct.    There is no edema or mass effect. No midline shift.    Limited evaluation of the orbits is unremarkable    The visualized paranasal sinuses are unremarkable..    Evaluation of the osseous structure at the appropriate bones window is unremarkable.    Impression  Impression:    No acute intracranial pathology.            Electronically Signed: Blayne Khan MD  9/5/2023 9:50 PM EDT  Workstation ID: [id number]      Results for orders placed during the hospital encounter of 09/05/23    CT Head Without Contrast    Narrative  CT HEAD WO CONTRAST    Date of Exam: 9/5/2023 9:21 PM EDT    Indication: AMS.    Comparison: None available.    Technique: Axial CT images were obtained of the head without contrast administration.  Coronal reconstructions were performed.  Automated exposure control and iterative reconstruction methods were used.      Findings:      The ventricles and subarachnoid spaces are unremarkable.    There is no intracranial hemorrhage.    There is no evidence of resent large arterial distribution infarct.    There is no edema or mass effect. No midline shift.    Limited evaluation of the orbits is unremarkable    The visualized paranasal sinuses are unremarkable..    Evaluation of the osseous  structure at the appropriate bones window is unremarkable.    Impression  Impression:    No acute intracranial pathology.            Electronically Signed: Blayne Khan MD  9/5/2023 9:50 PM EDT  Workstation ID: [id number]      Lab Review:   Hospital Outpatient Visit on 10/05/2023   Component Date Value    Glucose 10/05/2023 94     BUN 10/05/2023 19     Creatinine 10/05/2023 0.84     Sodium 10/05/2023 137     Potassium 10/05/2023 4.5     Chloride 10/05/2023 102     CO2 10/05/2023 26.0     Calcium 10/05/2023 9.0     BUN/Creatinine Ratio 10/05/2023 22.6     Anion Gap 10/05/2023 9.0     eGFR 10/05/2023 111.0     Magnesium 10/05/2023 2.3     proBNP 10/05/2023 538.5 (H)     QT Interval 10/05/2023 426     QTC Interval 10/05/2023 413    Lab on 09/27/2023   Component Date Value    COVID19 09/27/2023 Not Detected     Glucose 09/28/2023 85     Sodium 09/28/2023 142     POC Potassium 09/28/2023 4.0     Ionized Calcium 09/28/2023 1.27     Hematocrit 09/28/2023 44     Hemoglobin 09/28/2023 15.0     pH, Venous 09/28/2023 7.380     pCO2, Venous 09/28/2023 48.6     HCO3, Venous 09/28/2023 28.6 (H)     O2 Saturation, Venous 09/28/2023 30.0 (L)     pO2, Venous 09/28/2023 30.0 (L)     Glucose 09/28/2023 83     Sodium 09/28/2023 141     POC Potassium 09/28/2023 4.1     Ionized Calcium 09/28/2023 1.25     Hematocrit 09/28/2023 44     Hemoglobin 09/28/2023 15.0     pH, Venous 09/28/2023 7.380     pCO2, Venous 09/28/2023 49.3     HCO3, Venous 09/28/2023 29.3 (H)     O2 Saturation, Venous 09/28/2023 31.0 (L)     pO2, Venous 09/28/2023 29.0 (L)    Lab on 09/26/2023   Component Date Value    Glucose 09/26/2023 109 (H)     BUN 09/26/2023 21 (H)     Creatinine 09/26/2023 0.96     Sodium 09/26/2023 137     Potassium 09/26/2023 4.1     Chloride 09/26/2023 102     CO2 09/26/2023 22.2     Calcium 09/26/2023 9.5     BUN/Creatinine Ratio 09/26/2023 21.9     Anion Gap 09/26/2023 12.8     eGFR 09/26/2023 100.6     Protime 09/26/2023 10.7     INR  09/26/2023 0.98     proBNP 09/26/2023 480.0 (H)     Hemoglobin A1C 09/26/2023 5.50     WBC 09/26/2023 8.10     RBC 09/26/2023 5.16     Hemoglobin 09/26/2023 16.6     Hematocrit 09/26/2023 49.2     MCV 09/26/2023 95.5     MCH 09/26/2023 32.1     MCHC 09/26/2023 33.6     RDW 09/26/2023 12.1 (L)     RDW-SD 09/26/2023 40.3     MPV 09/26/2023 8.6     Platelets 09/26/2023 210     Neutrophil % 09/26/2023 69.8     Lymphocyte % 09/26/2023 18.6 (L)     Monocyte % 09/26/2023 9.5     Eosinophil % 09/26/2023 1.8     Basophil % 09/26/2023 0.3     Neutrophils, Absolute 09/26/2023 5.70     Lymphocytes, Absolute 09/26/2023 1.50     Monocytes, Absolute 09/26/2023 0.80     Eosinophils, Absolute 09/26/2023 0.10     Basophils, Absolute 09/26/2023 0.00     nRBC 09/26/2023 0.1    Admission on 09/05/2023, Discharged on 09/06/2023   Component Date Value    Glucose 09/05/2023 114 (H)     Glucose 09/05/2023 131 (H)     BUN 09/05/2023 21 (H)     Creatinine 09/05/2023 1.03     Sodium 09/05/2023 137     Potassium 09/05/2023 4.1     Chloride 09/05/2023 102     CO2 09/05/2023 24.0     Calcium 09/05/2023 9.3     Total Protein 09/05/2023 6.6     Albumin 09/05/2023 4.3     ALT (SGPT) 09/05/2023 15     AST (SGOT) 09/05/2023 30     Alkaline Phosphatase 09/05/2023 72     Total Bilirubin 09/05/2023 0.3     Globulin 09/05/2023 2.3     A/G Ratio 09/05/2023 1.9     BUN/Creatinine Ratio 09/05/2023 20.4     Anion Gap 09/05/2023 11.0     eGFR 09/05/2023 93.0     Color, UA 09/05/2023 Yellow     Appearance, UA 09/05/2023 Clear     pH, UA 09/05/2023 5.5     Specific Gravity, UA 09/05/2023 1.034 (H)     Glucose, UA 09/05/2023 >=1000 mg/dL (3+) (A)     Ketones, UA 09/05/2023 Negative     Bilirubin, UA 09/05/2023 Negative     Blood, UA 09/05/2023 Negative     Protein, UA 09/05/2023 Negative     Leuk Esterase, UA 09/05/2023 Negative     Nitrite, UA 09/05/2023 Negative     Urobilinogen, UA 09/05/2023 1.0 E.U./dL     Amphet/Methamphet, Screen 09/05/2023 Negative      Barbiturates Screen, Uri* 09/05/2023 Negative     Benzodiazepine Screen, U* 09/05/2023 Negative     Cocaine Screen, Urine 09/05/2023 Negative     Opiate Screen 09/05/2023 Negative     THC, Screen, Urine 09/05/2023 Positive (A)     Methadone Screen, Urine 09/05/2023 Negative     Oxycodone Screen, Urine 09/05/2023 Negative     Ethanol % 09/05/2023 <0.010     Magnesium 09/05/2023 2.2     QT Interval 09/05/2023 380     QTC Interval 09/05/2023 442     WBC 09/05/2023 8.30     RBC 09/05/2023 5.04     Hemoglobin 09/05/2023 16.3     Hematocrit 09/05/2023 48.0     MCV 09/05/2023 95.3     MCH 09/05/2023 32.4     MCHC 09/05/2023 34.0     RDW 09/05/2023 12.5     RDW-SD 09/05/2023 41.6     MPV 09/05/2023 8.1     Platelets 09/05/2023 200     Neutrophil % 09/05/2023 69.9     Lymphocyte % 09/05/2023 21.1     Monocyte % 09/05/2023 6.8     Eosinophil % 09/05/2023 1.6     Basophil % 09/05/2023 0.6     Neutrophils, Absolute 09/05/2023 5.80     Lymphocytes, Absolute 09/05/2023 1.80     Monocytes, Absolute 09/05/2023 0.60     Eosinophils, Absolute 09/05/2023 0.10     Basophils, Absolute 09/05/2023 0.00     nRBC 09/05/2023 0.1    Hospital Outpatient Visit on 08/24/2023   Component Date Value    Glucose 08/24/2023 102 (H)     BUN 08/24/2023 26 (H)     Creatinine 08/24/2023 1.04     Sodium 08/24/2023 137     Potassium 08/24/2023 4.8     Chloride 08/24/2023 103     CO2 08/24/2023 23.0     Calcium 08/24/2023 10.2     BUN/Creatinine Ratio 08/24/2023 25.0     Anion Gap 08/24/2023 11.0     eGFR 08/24/2023 91.9     proBNP 08/24/2023 610.7 (H)     Magnesium 08/24/2023 2.3    Hospital Outpatient Visit on 08/10/2023   Component Date Value    Glucose 08/10/2023 82     BUN 08/10/2023 25 (H)     Creatinine 08/10/2023 1.01     Sodium 08/10/2023 137     Potassium 08/10/2023 4.6     Chloride 08/10/2023 99     CO2 08/10/2023 25.0     Calcium 08/10/2023 9.8     BUN/Creatinine Ratio 08/10/2023 24.8     Anion Gap 08/10/2023 13.0     eGFR 08/10/2023 95.2      proBNP 08/10/2023 923.2 (H)     D-Dimer, Quantitative 08/10/2023 0.31     TSH 08/10/2023 0.582     QT Interval 08/10/2023 393    Admission on 08/05/2023, Discharged on 08/07/2023   Component Date Value    QT Interval 08/05/2023 438     Glucose 08/05/2023 115 (H)     BUN 08/05/2023 19     Creatinine 08/05/2023 0.94     Sodium 08/05/2023 139     Potassium 08/05/2023 4.2     Chloride 08/05/2023 104     CO2 08/05/2023 25.0     Calcium 08/05/2023 9.1     Total Protein 08/05/2023 6.3     Albumin 08/05/2023 4.1     ALT (SGPT) 08/05/2023 25     AST (SGOT) 08/05/2023 40     Alkaline Phosphatase 08/05/2023 75     Total Bilirubin 08/05/2023 0.3     Globulin 08/05/2023 2.2     A/G Ratio 08/05/2023 1.9     BUN/Creatinine Ratio 08/05/2023 20.2     Anion Gap 08/05/2023 10.0     eGFR 08/05/2023 103.8     ADENOVIRUS, PCR 08/05/2023 Not Detected     Coronavirus 229E 08/05/2023 Not Detected     Coronavirus HKU1 08/05/2023 Not Detected     Coronavirus NL63 08/05/2023 Not Detected     Coronavirus OC43 08/05/2023 Not Detected     COVID19 08/05/2023 Not Detected     Human Metapneumovirus 08/05/2023 Not Detected     Human Rhinovirus/Enterov* 08/05/2023 Not Detected     Influenza A PCR 08/05/2023 Not Detected     Influenza B PCR 08/05/2023 Not Detected     Parainfluenza Virus 1 08/05/2023 Not Detected     Parainfluenza Virus 2 08/05/2023 Not Detected     Parainfluenza Virus 3 08/05/2023 Not Detected     Parainfluenza Virus 4 08/05/2023 Not Detected     RSV, PCR 08/05/2023 Not Detected     Bordetella pertussis pcr 08/05/2023 Not Detected     Bordetella parapertussis* 08/05/2023 Not Detected     Chlamydophila pneumoniae* 08/05/2023 Not Detected     Mycoplasma pneumo by PCR 08/05/2023 Not Detected     proBNP 08/05/2023 979.4 (H)     HS Troponin T 08/05/2023 9     D-Dimer, Quantitative 08/05/2023 0.23     TSH 08/05/2023 0.793     WBC 08/05/2023 9.10     RBC 08/05/2023 4.76     Hemoglobin 08/05/2023 15.3     Hematocrit 08/05/2023 45.5     MCV  08/05/2023 95.6     MCH 08/05/2023 32.1     MCHC 08/05/2023 33.6     RDW 08/05/2023 12.8     RDW-SD 08/05/2023 42.4     MPV 08/05/2023 8.3     Platelets 08/05/2023 187     Neutrophil % 08/05/2023 67.2     Lymphocyte % 08/05/2023 23.3     Monocyte % 08/05/2023 7.2     Eosinophil % 08/05/2023 1.6     Basophil % 08/05/2023 0.7     Neutrophils, Absolute 08/05/2023 6.10     Lymphocytes, Absolute 08/05/2023 2.10     Monocytes, Absolute 08/05/2023 0.70     Eosinophils, Absolute 08/05/2023 0.10     Basophils, Absolute 08/05/2023 0.10     nRBC 08/05/2023 0.1     LVIDd 08/06/2023 5.8     LVIDs 08/06/2023 5.5     IVSd 08/06/2023 1.10     LVPWd 08/06/2023 0.90     FS 08/06/2023 5.2     IVS/LVPW 08/06/2023 1.22     ESV(cubed) 08/06/2023 166.4     LV Sys Vol (BSA correcte* 08/06/2023 53.7     EDV(cubed) 08/06/2023 195.1     LV Stevens Vol (BSA correct* 08/06/2023 76.0     LVOT area 08/06/2023 4.9     LV mass(C)d 08/06/2023 233.1     LVOT diam 08/06/2023 2.5     EDV(MOD-sp4) 08/06/2023 170.0     ESV(MOD-sp4) 08/06/2023 120.0     SV(MOD-sp4) 08/06/2023 50.0     SI(MOD-sp4) 08/06/2023 22.4     EF(MOD-sp4) 08/06/2023 29.4     MV E max chandler 08/06/2023 60.4     MV A max chandler 08/06/2023 68.8     MV dec time 08/06/2023 0.19     MV E/A 08/06/2023 0.88     LA ESV Index (BP) 08/06/2023 22.8     Med Peak E' Chandler 08/06/2023 6.1     Lat Peak E' Chandler 08/06/2023 4.9     Avg E/e' ratio 08/06/2023 10.98     SV(LVOT) 08/06/2023 46.0     TAPSE (>1.6) 08/06/2023 2.38     RV S' 08/06/2023 8.1     LA dimension (2D)  08/06/2023 4.2     LV V1 max 08/06/2023 43.7     LV V1 max PG 08/06/2023 0.76     LV V1 mean PG 08/06/2023 0.00     LV V1 VTI 08/06/2023 9.4     Ao pk chandler 08/06/2023 94.7     Ao max PG 08/06/2023 3.6     Ao mean PG 08/06/2023 2.00     Ao V2 VTI 08/06/2023 21.0     RENATO(I,D) 08/06/2023 2.19     MV max PG 08/06/2023 2.04     MV mean PG 08/06/2023 1.00     MV V2 VTI 08/06/2023 20.7     MV P1/2t 08/06/2023 52.5     MVA(P1/2t) 08/06/2023 4.2      MVA(VTI) 08/06/2023 2.22     MV dec slope 08/06/2023 368.0     MR max anabella 08/06/2023 431.0     MR max PG 08/06/2023 74.3     TR max anabella 08/06/2023 201.0     TR max PG 08/06/2023 16.2     RVSP(TR) 08/06/2023 24.2     RAP systole 08/06/2023 8.0     RV V1 max PG 08/06/2023 0.80     RV V1 max 08/06/2023 44.7     RV V1 VTI 08/06/2023 10.8     PA V2 max 08/06/2023 82.2     Ao root diam 08/06/2023 3.2     ACS 08/06/2023 2.30     Sinus 08/06/2023 2.9     EF(MOD-bp) 08/06/2023 29.0     Glucose 08/06/2023 117 (H)     BUN 08/06/2023 18     Creatinine 08/06/2023 0.88     Sodium 08/06/2023 139     Potassium 08/06/2023 3.8     Chloride 08/06/2023 103     CO2 08/06/2023 23.0     Calcium 08/06/2023 8.9     BUN/Creatinine Ratio 08/06/2023 20.5     Anion Gap 08/06/2023 13.0     eGFR 08/06/2023 110.1     WBC 08/06/2023 9.60     RBC 08/06/2023 5.03     Hemoglobin 08/06/2023 15.9     Hematocrit 08/06/2023 46.9     MCV 08/06/2023 93.3     MCH 08/06/2023 31.6     MCHC 08/06/2023 33.9     RDW 08/06/2023 12.5     RDW-SD 08/06/2023 42.9     MPV 08/06/2023 8.3     Platelets 08/06/2023 184     Neutrophil % 08/06/2023 61.5     Lymphocyte % 08/06/2023 28.0     Monocyte % 08/06/2023 7.9     Eosinophil % 08/06/2023 2.2     Basophil % 08/06/2023 0.4     Neutrophils, Absolute 08/06/2023 5.90     Lymphocytes, Absolute 08/06/2023 2.70     Monocytes, Absolute 08/06/2023 0.80     Eosinophils, Absolute 08/06/2023 0.20     Basophils, Absolute 08/06/2023 0.00     nRBC 08/06/2023 0.0     HS Troponin T 08/06/2023 12     Total Cholesterol 08/06/2023 172     Triglycerides 08/06/2023 133     HDL Cholesterol 08/06/2023 47     LDL Cholesterol  08/06/2023 101 (H)     VLDL Cholesterol 08/06/2023 24     LDL/HDL Ratio 08/06/2023 2.09     Glucose 08/07/2023 113 (H)     BUN 08/07/2023 21 (H)     Creatinine 08/07/2023 0.98     Sodium 08/07/2023 138     Potassium 08/07/2023 4.3     Chloride 08/07/2023 101     CO2 08/07/2023 26.0     Calcium 08/07/2023 9.3      BUN/Creatinine Ratio 08/07/2023 21.4     Anion Gap 08/07/2023 11.0     eGFR 08/07/2023 98.7     Magnesium 08/07/2023 2.2     WBC 08/07/2023 9.40     RBC 08/07/2023 5.09     Hemoglobin 08/07/2023 16.6     Hematocrit 08/07/2023 48.0     MCV 08/07/2023 94.3     MCH 08/07/2023 32.5     MCHC 08/07/2023 34.5     RDW 08/07/2023 12.8     RDW-SD 08/07/2023 42.4     MPV 08/07/2023 8.4     Platelets 08/07/2023 189     Neutrophil % 08/07/2023 63.6     Lymphocyte % 08/07/2023 25.0     Monocyte % 08/07/2023 8.8     Eosinophil % 08/07/2023 2.3     Basophil % 08/07/2023 0.3     Neutrophils, Absolute 08/07/2023 6.00     Lymphocytes, Absolute 08/07/2023 2.40     Monocytes, Absolute 08/07/2023 0.80     Eosinophils, Absolute 08/07/2023 0.20     Basophils, Absolute 08/07/2023 0.00     nRBC 08/07/2023 0.1      Recent labs reviewed and interpreted for clinical significance and application    Went over the labs while he was still in clinic with the patient            Assessment:       Diagnoses and all orders for this visit:    1. Non- Ischemic Dilated cardiomyopathy (Primary)  Overview:         A. Non-ischemic BiV                 I. 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 EF 21-25% 7/23 gr1DD               III. S/p ICD      Orders:  -     Basic Metabolic Panel  -     Magnesium  -     proBNP    2. Chronic systolic heart failure  Overview:      A. Non-ischemic BiV                 I. 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 EF 21-25% 7/23 gr1DD               III. S/p ICD    Orders:  -     Basic Metabolic Panel  -     Magnesium  -     proBNP    3. Other fatigue    4. Metabolic syndrome  Overview:      A. HTN      B. HLD      C. Elevated BMI    Orders:  -     Basic Metabolic Panel  -     Magnesium  -     proBNP    5. Abnormal finding of blood chemistry, unspecified   -     Basic Metabolic Panel  -     Magnesium  -     proBNP    6. Nonrheumatic mitral valve regurgitation  -     Basic  Metabolic Panel  -     Magnesium  -     proBNP    7. Essential hypertension  -     Basic Metabolic Panel  -     Magnesium  -     proBNP    Other orders  -     ECG 12 Lead Electrolyte Imbalance; Standing  -     ECG 12 Lead Electrolyte Imbalance             Plan/discussion    Volume overload    Heart Failure Core Measures    Type of Overload : Combined systolic and diastolic heart failure    Most recent LVEF:  21-25% 7/23 gr1DD    [place] Heart association Class & Stage : 3B        HF Meds    Beta Blocker: Coreg 25 mg BID  ARNI/ACE/ARB: Entresto 24-26 mg twice daily  SGLT 2 inhibitors: Farxiga 10 mg daily  Diuretics: Lasix 40 mg twice daily  Furoscix: Not presently taking  Aldosterone Antagonist: Spironolactone 25 mg daily  Vasodilators & Nitrates: Imdur 30 mg daily      Overall quite pleased with his progress he has been without volume overloads for the last few months, tells me he has been extremely compliant with diet, exercise, and most of his heart failure core measures  He seems somewhat excited about getting ready for his visit to [facility] we will see the [facility] advanced heart failure team to see if he qualifies for any further considerations regarding his heart failure  His labs today were basically unremarkable however his proBNP did elevate slightly to just greater than 500, mild increase since last evaluation  Made no medication changes  We will see again in about 6 weeks just to ensure continued compliance  Continue heart failure core nursing measures Vesely instituted i.e. daily weights, low-salt diet, medication adherence, fluid restriction.      Cardiac medicines reviewed with risk, benefits, and necessity of each discussed with myself & a [facility].    I spent 45 minutes caring for Brian Stewart  on this date of service 10/5/2023. This time includes time spent by me in the following activities:preparing for the visit, reviewing tests including his recent RHC, performing a medically appropriate examination  "and/or evaluation , counseling and educating the patient/family/caregiver, ordering medications, tests, or procedures, referring and communicating with other health care professionals , documenting information in the medical record.           It is a pleasure to be involved in this patient's cardiovascular care relating to their heart failure.  Please feel free to call me with any questions or concerns.    Mainor \"William\" Cynthia MILNER, [facility]  Heart failure program clinical provider    ANNIA Nugent   10/04/23  .  "

## 2023-10-05 ENCOUNTER — HOSPITAL ENCOUNTER (OUTPATIENT)
Dept: CARDIOLOGY | Facility: HOSPITAL | Age: 43
Discharge: HOME | End: 2023-10-05
Payer: MEDICARE

## 2023-10-05 ENCOUNTER — HOSPITAL ENCOUNTER (OUTPATIENT)
Facility: HOSPITAL | Age: 43
Discharge: HOME | End: 2023-10-05
Payer: MEDICARE

## 2023-10-05 ENCOUNTER — DISEASE STATE MANAGEMENT VISIT (OUTPATIENT)
Facility: HOSPITAL | Age: 43
End: 2023-10-05
Payer: MEDICARE

## 2023-10-05 VITALS
DIASTOLIC BLOOD PRESSURE: 70 MMHG | BODY MASS INDEX: 37.2 KG/M2 | WEIGHT: 234 LBS | SYSTOLIC BLOOD PRESSURE: 95 MMHG | RESPIRATION RATE: 16 BRPM | HEART RATE: 71 BPM

## 2023-10-05 DIAGNOSIS — R79.9 ABNORMAL FINDING OF BLOOD CHEMISTRY, UNSPECIFIED: ICD-10-CM

## 2023-10-05 DIAGNOSIS — E88.810 METABOLIC SYNDROME: Chronic | ICD-10-CM

## 2023-10-05 DIAGNOSIS — R53.83 OTHER FATIGUE: ICD-10-CM

## 2023-10-05 DIAGNOSIS — I10 ESSENTIAL HYPERTENSION: ICD-10-CM

## 2023-10-05 DIAGNOSIS — I50.22 CHRONIC SYSTOLIC HEART FAILURE: Chronic | ICD-10-CM

## 2023-10-05 DIAGNOSIS — I34.0 NONRHEUMATIC MITRAL VALVE REGURGITATION: ICD-10-CM

## 2023-10-05 DIAGNOSIS — I42.0 DILATED CARDIOMYOPATHY: Primary | Chronic | ICD-10-CM

## 2023-10-05 LAB
ANION GAP SERPL CALCULATED.3IONS-SCNC: 9 MMOL/L (ref 5–15)
BUN SERPL-MCNC: 19 MG/DL (ref 6–20)
BUN/CREAT SERPL: 22.6 (ref 7–25)
CALCIUM SPEC-SCNC: 9 MG/DL (ref 8.6–10.5)
CHLORIDE SERPL-SCNC: 102 MMOL/L (ref 98–107)
CO2 SERPL-SCNC: 26 MMOL/L (ref 22–29)
CREAT SERPL-MCNC: 0.84 MG/DL (ref 0.76–1.27)
EGFRCR SERPLBLD CKD-EPI 2021: 111 ML/MIN/1.73
GLUCOSE SERPL-MCNC: 94 MG/DL (ref 65–99)
MAGNESIUM SERPL-MCNC: 2.3 MG/DL (ref 1.6–2.6)
NT-PROBNP SERPL-MCNC: 538.5 PG/ML (ref 0–450)
POTASSIUM SERPL-SCNC: 4.5 MMOL/L (ref 3.5–5.2)
SODIUM SERPL-SCNC: 137 MMOL/L (ref 136–145)

## 2023-10-05 PROCEDURE — 93005 ELECTROCARDIOGRAM TRACING: CPT | Performed by: NURSE PRACTITIONER

## 2023-10-05 PROCEDURE — G0463 HOSPITAL OUTPT CLINIC VISIT: HCPCS

## 2023-10-05 PROCEDURE — 83880 ASSAY OF NATRIURETIC PEPTIDE: CPT | Performed by: NURSE PRACTITIONER

## 2023-10-05 PROCEDURE — 83735 ASSAY OF MAGNESIUM: CPT | Performed by: NURSE PRACTITIONER

## 2023-10-05 PROCEDURE — 80048 BASIC METABOLIC PNL TOTAL CA: CPT | Performed by: NURSE PRACTITIONER

## 2023-10-05 RX ORDER — GABAPENTIN 800 MG/1
800 TABLET ORAL 3 TIMES DAILY
COMMUNITY

## 2023-10-05 RX ORDER — FUROSEMIDE 20 MG/1
20 TABLET ORAL EVERY EVENING
COMMUNITY

## 2023-10-05 RX ORDER — FUROSEMIDE 40 MG/1
40 TABLET ORAL EVERY MORNING
COMMUNITY

## 2023-10-05 NOTE — PROGRESS NOTES
HEART FAILURE CLINIC - PHARMACY SERVICE     HFrEF with EF 21-25% (8/6/23).     The patient's last EKG was reviewed from today and shows a QTcB of 413 ms.      Cardiologist: Dr. Jackson     -CHF-specific BB:      Pre Visit Dose: Carvedilol 25 mg PO BID    Post Visit Dose: Carvedilol 25 mg PO BID (unable to titrate due to HR 71 bpm and BP 80/64 mmHg)     - Target Dose: Carvedilol 25 mg PO BID (<85 kg) or 50 mg PO BID (>85 kg).     - Goal HR of 50s to 60s.     - Patient should be seen every 10 to 14 days for a pulse check with plans for up-titration until target heart rate is achieved.        -ACE/ARB/ARNI:     Pre Visit Dose: Sacubitril/valsartan 24/26 mg PO BID    Post Visit Dose: Sacubitril/valsartan 24/26 mg PO BID (unable to titrate due to HR 71 bpm and BP 80/64 mmHg)    - Target Dose: Sacubitril/valsartan 97/103 mg PO BID    - Patient should have a follow-up appointment every 2 to 4 weeks for a hemodynamic check with possible up-titration to target dose.         -SGLT2 inhibitor therapy:    Pre Visit Dose: Dapagliflozin 10 mg PO daily    Post Visit Dose: Dapagliflozin 10 mg PO daily    - Target Dose: Dapagliflozin 10 mg PO daily       -MRA:     Pre Visit Dose: Spironolactone 25 mg PO daily    Post Visit Dose: Spironolactone  25 mg PO daily    - Target Dose: Spironolactone 25-50 mg PO daily    - K is < 5 mEq/L and SCr is </= 2.5 mg/dL in male.        -DIURETIC:     Pre Visit Dose: Furosemide 40 mg PO QAM and 20 mg PO QPM    Post Visit Dose: Furosemide 40 mg PO QAM and 20 mg PO QPM        -OTHER CV MEDS:     Pre Visit Dose: aspirin 81 mg PO daily, isosorbide mononitrate 30 mg PO daily, and pravastatin 20 mg PO QHS    Post Visit Dose: no changes      -Clinic Administered Medications:     None      Vaccines:     Not assessed at today's appointment              PLAN:  Initiation/Discontinuation/Dose Adjustment: No changes  Education provided: None needed  Coordination of Care: None needed  Refills: None  needed          Chely Melendez, Domi  10/5/2023  14:44 EDT

## 2023-10-06 LAB
QT INTERVAL: 426 MS
QTC INTERVAL: 413 MS

## 2023-10-18 RX ORDER — SPIRONOLACTONE 25 MG/1
25 TABLET ORAL DAILY
Qty: 90 TABLET | Refills: 2 | Status: SHIPPED | OUTPATIENT
Start: 2023-10-18

## 2023-11-01 RX ORDER — PRAVASTATIN SODIUM 20 MG
20 TABLET ORAL
Qty: 90 TABLET | Refills: 2 | Status: SHIPPED | OUTPATIENT
Start: 2023-11-01

## 2023-11-07 RX ORDER — DAPAGLIFLOZIN 10 MG/1
1 TABLET, FILM COATED ORAL DAILY
Qty: 90 TABLET | Refills: 1 | Status: SHIPPED | OUTPATIENT
Start: 2023-11-07

## 2023-11-08 ENCOUNTER — HOSPITAL ENCOUNTER (OUTPATIENT)
Dept: SLEEP MEDICINE | Facility: HOSPITAL | Age: 43
End: 2023-11-08
Payer: MEDICARE

## 2023-11-08 VITALS — WEIGHT: 233.69 LBS | HEIGHT: 66 IN | BODY MASS INDEX: 37.56 KG/M2

## 2023-11-08 DIAGNOSIS — G47.33 OSA (OBSTRUCTIVE SLEEP APNEA): ICD-10-CM

## 2023-11-08 PROCEDURE — 95810 POLYSOM 6/> YRS 4/> PARAM: CPT

## 2023-11-15 ENCOUNTER — OFFICE VISIT (OUTPATIENT)
Dept: CARDIOLOGY | Facility: CLINIC | Age: 43
End: 2023-11-15
Payer: MEDICARE

## 2023-11-15 VITALS
HEIGHT: 66 IN | DIASTOLIC BLOOD PRESSURE: 60 MMHG | WEIGHT: 236 LBS | OXYGEN SATURATION: 96 % | BODY MASS INDEX: 37.93 KG/M2 | HEART RATE: 65 BPM | SYSTOLIC BLOOD PRESSURE: 88 MMHG

## 2023-11-15 DIAGNOSIS — Z95.810 AICD (AUTOMATIC CARDIOVERTER/DEFIBRILLATOR) PRESENT: ICD-10-CM

## 2023-11-15 DIAGNOSIS — T82.118A MALFUNCTION OF IMPLANTABLE CARDIOVERTER-DEFIBRILLATOR (ICD), INITIAL ENCOUNTER: ICD-10-CM

## 2023-11-15 DIAGNOSIS — R00.2 PALPITATIONS: ICD-10-CM

## 2023-11-15 DIAGNOSIS — E78.2 MIXED HYPERLIPIDEMIA: ICD-10-CM

## 2023-11-15 DIAGNOSIS — I10 ESSENTIAL HYPERTENSION: ICD-10-CM

## 2023-11-15 DIAGNOSIS — I42.0 DILATED CARDIOMYOPATHY: Primary | ICD-10-CM

## 2023-11-15 DIAGNOSIS — I34.0 NONRHEUMATIC MITRAL VALVE REGURGITATION: ICD-10-CM

## 2023-11-15 DIAGNOSIS — I50.22 CHRONIC SYSTOLIC HEART FAILURE: ICD-10-CM

## 2023-11-15 NOTE — PROGRESS NOTES
"Cardiology Heart Failure Clinic Note  Mainor \"William\" ALCIRA Smart    Patient ID: Brian Stewart is a 43 y.o. male.    Encounter Date:11/16/2023     Assessment:   Diagnoses and all orders for this visit:    1. Non- Ischemic Dilated cardiomyopathy (Primary)  Overview:         A. Non-ischemic BiV                 I. 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 EF 21-25% 7/23 gr1DD               III. S/p ICD        2. Chronic systolic heart failure  Overview:      A. Non-ischemic BiV                 I. 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 EF 21-25% 7/23 gr1DD               III. S/p ICD      3. Nonrheumatic mitral valve regurgitation    4. AICD (automatic cardioverter/defibrillator) present    5. Shortness of breath     6. Metabolic syndrome  Overview:      A. HTN      B. HLD      C. Elevated BMI          Plan/discussion    Volume overload    Heart Failure Core Measures    Type of Overload :   Combined systolic and diastolic heart failure    Most recent LVEF: 21-25 grade 1 DD (TTE July 23)    [place] Heart association Class & Stage : Iib      HF Meds    Beta Blocker: Coreg 25 twice daily  ARNI/ACE/ARB: Entresto being increased from low-dose to 49/51 mg twice daily will increase dose today 11/16/23  SGLT 2 inhibitors: Farxiga 10 mg daily  Diuretics: Lasix 40 mg twice daily  Furoscix: Currently not required  Aldosterone Antagonist: Spironolactone 25 mg daily  Digitalis if applicable: Not applicable  Vasodilators & Nitrates: Imdur 30 mg daily        Cardiac medicines reviewed with risk, benefits, and necessity of each discussed with myself & a [facility].     ____________________________________________________________    Discussion    Overall relatively pleased with progress patient does seem to be making a concerted effort to manage his heart failure with medication and compliance  already on heart failure core measures including beta-blocker, ARNI, SGLT2, diuretic,  " Aldactone & even a nitrate  Of note it is time to go up on his Entresto to high dose range  Continue his typical heart failure nursing interventions including:        A. Fluid restriction at less than 2000 cc daily       B. Daily weights        C.  1 g low-sodium diet      D. Consideration for cardiac rehabilitation    I did the following activities preparing for the visit with Brian including: reviewing tests, once pt arrived in clinic I also performed a medically appropriate examination and/or evaluation, I personally spent considerable time counseling and educating the patient/family/caregiver, ordering medications, tests, or procedures, referring and communicating with other health care professionals, and documenting information in the medical record. I estimate including preparation 20 minutes          Subjective    Subjective:     No chief complaint on file.      HPI:  42-year-old  male's patient of Dr. Jackson with a PMH of biventricular non-ischemic dilated cardiomyopathy, Combined systolic and diastolic heart failure with his last EF reported @ 30% with grade 1 diastolic dysfunction in July 23 by TTE.  He is also  s/p an ICD placed in 2019 he has a LBBB at baseline additionl PMH includes metabolic syndrome with hypertension dyslipidemia, elevated BMI.  Since last visit 8/24/23 and f/u 10/5/23 he has continued to lose weight he is engaged actively in an exercise and diet program however failing somewhat on dietary compliance attempts he thinks overall he is doing better with diet.  Previous ischemic assessment showed no CAD.  Saw Dr. Jackson yesterday in his clinic.Reports no use of nicotine products sincew July 23 Pt has had wt gain but pt doesn't feel it's related to fluid gain. All in all, feeling alright     ROS:    Constitutional: + weakness, + fatigue, No fever, rigors, chills   Eyes: No recent vision changes, eye pain   ENT/oropharynx: No recent difficulty swallowing, sore throat,  epistaxis, changes in hearing   Cardiovascular: No recent chest pain, chest tightness, palpitations except as noted in HPI, paroxysmal nocturnal dyspnea, orthopnea, diaphoresis, dizziness & no pre or jeff syncopal episodes   Respiratory: No at rest shortness of breath, + dyspnea on exertion but with a monium of improment, no significant productive cough, no wheezing and no hemoptysis   Gastrointestinal: No abdominal pain, nausea, vomiting, diarrhea, bloody stools   Genitourinary: No hematuria, dysuria other than increased frequency   Neurological: No headache, tremors, numbness,  or hemiparesis    Musculoskeletal: No cramps, myalgias,  joint pain, joint swelling   Integument: No recent rash, + edema      Patient Active Problem List   Diagnosis    Hyperlipidemia    Essential hypertension    Palpitations    Shortness of breath     AICD (automatic cardioverter/defibrillator) present    Nonrheumatic mitral valve regurgitation    Mixed hyperlipidemia    Non- Ischemic Dilated cardiomyopathy    Fatigue    Abnormal finding of blood chemistry, unspecified     Near syncope    ICD (implantable cardioverter-defibrillator) malfunction    Metabolic syndrome    Chronic systolic heart failure       Past Medical History:   Diagnosis Date    Arrhythmia     Cardiomyopathy     Diverticulitis     Hyperlipidemia     Palpitations        Past Surgical History:   Procedure Laterality Date    BACK SURGERY      CARDIAC CATHETERIZATION      CARDIAC CATHETERIZATION N/A 06/12/2020    Procedure: Left Heart Cath;  Surgeon: Fernanda Jackson MD;  Location: [facility] CATH INVASIVE LOCATION;  Service: Cardiovascular;  Laterality: N/A;    CARDIAC CATHETERIZATION N/A 06/12/2020    Procedure: Coronary angiography;  Surgeon: Fernanda Jackson MD;  Location: [facility] CATH INVASIVE LOCATION;  Service: Cardiovascular;  Laterality: N/A;    CARDIAC CATHETERIZATION Right 9/28/2023    Procedure: Right Heart Cath;  Surgeon: Fernanda Jackson MD;   Location: [facility] CATH INVASIVE LOCATION;  Service: Cardiovascular;  Laterality: Right;    CARDIAC DEFIBRILLATOR PLACEMENT      COLON RESECTION      HERNIA REPAIR      INSERT / REPLACE / REMOVE PACEMAKER      ICD    KNEE ACL RECONSTRUCTION         Social History     Socioeconomic History    Marital status: Single   Tobacco Use    Smoking status: Former     Packs/day: 1.5     Types: Cigarettes     Quit date: 2021     Years since quittin.2     Passive exposure: Past    Smokeless tobacco: Former     Types: Snuff    Tobacco comments:     QUIT SMOKING    Vaping Use    Vaping Use: Never used    Passive vaping exposure: Yes   Substance and Sexual Activity    Alcohol use: Not Currently    Drug use: Yes     Types: Marijuana     Comment: once/day    Sexual activity: Yes     Partners: Female       No Known Allergies      Current Outpatient Medications:     aspirin 81 MG chewable tablet, Chew 1 tablet Daily., Disp: , Rfl:     carvedilol (COREG) 25 MG tablet, Take 1 tablet by mouth 2 (Two) Times a Day., Disp: 180 tablet, Rfl: 3    dapagliflozin Propanediol (Farxiga) 10 MG tablet, TAKE 1 TABLET BY MOUTH DAILY, Disp: 90 tablet, Rfl: 1    furosemide (LASIX) 20 MG tablet, Take 1 tablet by mouth Every Evening., Disp: , Rfl:     furosemide (LASIX) 40 MG tablet, Take 1 tablet by mouth Every Morning., Disp: , Rfl:     gabapentin (NEURONTIN) 800 MG tablet, Take 1 tablet by mouth 3 (Three) Times a Day., Disp: , Rfl:     HYDROcodone-acetaminophen (NORCO)  MG per tablet, Take 1 tablet by mouth Every 8 (Eight) Hours As Needed for Moderate Pain (for back pain). (Patient not taking: Reported on 11/15/2023), Disp: , Rfl:     isosorbide mononitrate (IMDUR) 30 MG 24 hr tablet, Take 1 tablet by mouth Daily., Disp: 30 tablet, Rfl: 11    Magnesium Oxide 400 MG capsule, Take 400 mg by mouth 2 (Two) Times a Day Before Meals., Disp: 60 each, Rfl: 11    multivitamin with minerals tablet tablet, Take 1 tablet by mouth Daily., Disp: ,  Rfl:     Omega-3 Fatty Acids (fish oil) 1000 MG capsule capsule, Take 3 capsules by mouth Daily With Breakfast., Disp: , Rfl:     pravastatin (PRAVACHOL) 20 MG tablet, TAKE 1 TABLET BY MOUTH EVERY NIGHT AT BEDTIME, Disp: 90 tablet, Rfl: 2    sacubitril-valsartan (Entresto) 24-26 MG tablet, Take 1 tablet by mouth 2 (Two) Times a Day., Disp: 60 tablet, Rfl: 11    spironolactone (ALDACTONE) 25 MG tablet, TAKE 1 TABLET BY MOUTH DAILY, Disp: 90 tablet, Rfl: 2    Immunization History   Administered Date(s) Administered    Tdap 10/03/2020         Most recent EKG as reviewed:  Procedures     Most recent echo as reviewed:  Results for orders placed during the hospital encounter of 08/05/23    Adult Transthoracic Echo Complete w/ Color, Spectral and Contrast if Necessary Per Protocol    Interpretation Summary    Left ventricular systolic function is severely decreased. Left ventricular ejection fraction appears to be 21 - 25%.    Akinetic septum and inferior wall noted    Left ventricular diastolic function was normal.    Estimated right ventricular systolic pressure from tricuspid regurgitation is normal (<35 mmHg).      Most recent stress test results:  Results for orders placed in visit on 05/21/18    Stress Test With Myocardial Perfusion One Day    Interpretation Summary  · Findings consistent with an equivocal ECG stress test.  · Breast attenuation and diaphragmatic attenuation artifacts are present.  · Left ventricular ejection fraction is normal (Calculated EF = 50%).  · Myocardial perfusion imaging indicates a normal myocardial perfusion study with no evidence of ischemia.  · Impressions are consistent with a low risk study.  · Suboptimal study    Asymptomatic for chest pain. Specificity of study reduced secondary to baseline Non-specific ST-T wave abnormalities, however ECG is not suggestive of ischemia  Ectopy: none.  Blood pressure  And heart rate response:  Appropriate for Beta-blocker therapy  Pharmacologic study  due to inability to tolerate increasing speed and grade of treadmill due to c/o  becoming very fatigued Stage II, and had to change to pharmacological procedure.  Participated in Low Level exercise and tolerance was fair.      Most recent cardiac catheterization results:  Results for orders placed during the hospital encounter of 09/28/23    Cardiac Catheterization/Vascular Study    Narrative  Table formatting from the original result was not included.  Cardiac Catheterization Operative Report    Brian Stewart  [id number]  9/28/2023  @PCP@    He underwent cardiac catheterization.    Indications for the procedure include: Cardiomyopathy.        Procedure Details:  The risks, benefits, complications, treatment options, and expected outcomes were discussed with the patient. The patient and/or family concurred with the proposed plan, giving informed consent.    After informed consent the patient was brought to the cath lab after appropriate IV hydration was begun and oral premedication was given. He was further sedated with fentanyl. He was prepped and draped in the usual manner. Using the modified Seldinger access technique, a 6 Danish sheath was placed in the femoral artery. A left heart catheterization with coronary arteriography was performed. Findings are discussed below.    After the procedure was completed, sedation was stopped and the sheaths and catheters were all removed. Hemostasis was achieved per established hospital protocols.    Conscious sedation:  Conscious sedation was performed according to protocol.  I supervised and directed an independent trained observer with the assistance of monitoring the patient's level of consciousness and physiologic status throughout the procedure.  Intraoperative service time was 30 minutes.    Findings:    Hemodynamics Right heart catheterization and hemodynamic    Pulmonary capillary wedge pressure was 10 mmHg PA pressure systolic 22 diastolic 80 with a mean pressure  of 14 mmHg  RV pressure systolic 20 mmHg  Right atrial pressure was 4 mm of medical  Femoral arterial saturation was 99%  PA saturation is 55%  Right atrial saturation is 53%  Darrion equation cardiac output is 2.75 L/min  Darrion equation cardiac index is 1.2 L/min/m²      Estimated Blood Loss:  Minimal    Specimens: None    Complications:  None; patient tolerated the procedure well.    Disposition: PACU - hemodynamically stable.    Condition: stable    Impressions:  Normal PA pressures and normal filling pressures    Recommendations:  Medical management for cardiomyopathy  Test results reviewed and discussed with patient and family        Historical data copied forward from previous encounters in EMR including the history, exam, and assessment/plan has been reviewed and is unchanged except as I have noted and otherwise indicated.      Objective:         There were no vitals taken for this visit.    General:  Well-developed, [place] well-nourished, cooperative, no distress, appears stated age if not slightly older    Neuro:  A&O x3. No significantly obvious focal neuro deficet    Psych:  Pleasant - mildly flattened affect    HENT:  Normocephalic, atraumatic, moist mucous membranes , Normal ear placement,Throat not injected   Eyes:  PERRLA, Conjunctivae not injected, EOM's intact, conjunctiva does not appear significantly injected   Neck:  Supple, Mildly thickened, no lymph adenopathy nor thyromegaly no JVD or bruit    Lungs:    Symmetrical rise & fall of chest with baseline respiratory pattern. To auscultation lungs are Clear bilaterally, faint late phase expiratory wheezes, no rhonchi or rales are noted    Chest wall:  No significant tenderness when palpated. PMI @ 6th ICS MAL but difficult to palpate given body habitus   Heart:  Regular rate and rhythm, S1 and S2 normal, no S3 or S4, Gr I/VI systolic ejection murmur best heard at the apex , no obvious rub, click or gallop.    Abdomen:  non-distended, non-tender, bowel  sounds noted in the 4 quadrants of the abdomen, significant adipose tissue identified    Extremities:  Equal color motion temperature and sensitivity, Rapid capillary refill noted within the nailbeds of fingers and toes bilaterally no obvious lower extremity   edema.    Pulses:  2+ and symmetric all extremities, rapid capillary refill    Skin:  No obvious rashes, significant lesions identified, warm dry and of normal turgor      In-Office Procedure(s):  No orders to display        Imaging:    Results for orders placed during the hospital encounter of 09/05/23    XR Chest 1 View    Narrative  XR CHEST 1 VW    Date of Exam: 9/5/2023 8:43 PM EDT    Indication: weakness    Comparison: 8/5/2023.    Findings:  Stable left subclavian ICD/pacemaker device. There are no airspace consolidations. No pleural fluid. No pneumothorax. The pulmonary vasculature appears within normal limits. The cardiac and mediastinal silhouette appear unremarkable. No acute osseous  abnormality identified.    Impression  Impression:  No acute cardiopulmonary process.      Electronically Signed: Georgia Joel MD  9/5/2023 10:51 PM EDT  Workstation ID: [id number]       Results for orders placed during the hospital encounter of 09/05/23    CT Head Without Contrast    Narrative  CT HEAD WO CONTRAST    Date of Exam: 9/5/2023 9:21 PM EDT    Indication: AMS.    Comparison: None available.    Technique: Axial CT images were obtained of the head without contrast administration.  Coronal reconstructions were performed.  Automated exposure control and iterative reconstruction methods were used.      Findings:      The ventricles and subarachnoid spaces are unremarkable.    There is no intracranial hemorrhage.    There is no evidence of resent large arterial distribution infarct.    There is no edema or mass effect. No midline shift.    Limited evaluation of the orbits is unremarkable    The visualized paranasal sinuses are unremarkable..    Evaluation of the  osseous structure at the appropriate bones window is unremarkable.    Impression  Impression:    No acute intracranial pathology.            Electronically Signed: Blayne Khan MD  9/5/2023 9:50 PM EDT  Workstation ID: [id number]      Results for orders placed during the hospital encounter of 09/05/23    CT Head Without Contrast    Narrative  CT HEAD WO CONTRAST    Date of Exam: 9/5/2023 9:21 PM EDT    Indication: AMS.    Comparison: None available.    Technique: Axial CT images were obtained of the head without contrast administration.  Coronal reconstructions were performed.  Automated exposure control and iterative reconstruction methods were used.      Findings:      The ventricles and subarachnoid spaces are unremarkable.    There is no intracranial hemorrhage.    There is no evidence of resent large arterial distribution infarct.    There is no edema or mass effect. No midline shift.    Limited evaluation of the orbits is unremarkable    The visualized paranasal sinuses are unremarkable..    Evaluation of the osseous structure at the appropriate bones window is unremarkable.    Impression  Impression:    No acute intracranial pathology.            Electronically Signed: Blayne Khan MD  9/5/2023 9:50 PM EDT  Workstation ID: [id number]      Lab Review:   Hospital Outpatient Visit on 10/05/2023   Component Date Value    Glucose 10/05/2023 94     BUN 10/05/2023 19     Creatinine 10/05/2023 0.84     Sodium 10/05/2023 137     Potassium 10/05/2023 4.5     Chloride 10/05/2023 102     CO2 10/05/2023 26.0     Calcium 10/05/2023 9.0     BUN/Creatinine Ratio 10/05/2023 22.6     Anion Gap 10/05/2023 9.0     eGFR 10/05/2023 111.0     Magnesium 10/05/2023 2.3     proBNP 10/05/2023 538.5 (H)     QT Interval 10/05/2023 426     QTC Interval 10/05/2023 413    Lab on 09/27/2023   Component Date Value    COVID19 09/27/2023 Not Detected     Glucose 09/28/2023 85     Sodium 09/28/2023 142     POC Potassium 09/28/2023 4.0      Ionized Calcium 09/28/2023 1.27     Hematocrit 09/28/2023 44     Hemoglobin 09/28/2023 15.0     pH, Venous 09/28/2023 7.380     pCO2, Venous 09/28/2023 48.6     HCO3, Venous 09/28/2023 28.6 (H)     O2 Saturation, Venous 09/28/2023 30.0 (L)     pO2, Venous 09/28/2023 30.0 (L)     Glucose 09/28/2023 83     Sodium 09/28/2023 141     POC Potassium 09/28/2023 4.1     Ionized Calcium 09/28/2023 1.25     Hematocrit 09/28/2023 44     Hemoglobin 09/28/2023 15.0     pH, Venous 09/28/2023 7.380     pCO2, Venous 09/28/2023 49.3     HCO3, Venous 09/28/2023 29.3 (H)     O2 Saturation, Venous 09/28/2023 31.0 (L)     pO2, Venous 09/28/2023 29.0 (L)    Lab on 09/26/2023   Component Date Value    Glucose 09/26/2023 109 (H)     BUN 09/26/2023 21 (H)     Creatinine 09/26/2023 0.96     Sodium 09/26/2023 137     Potassium 09/26/2023 4.1     Chloride 09/26/2023 102     CO2 09/26/2023 22.2     Calcium 09/26/2023 9.5     BUN/Creatinine Ratio 09/26/2023 21.9     Anion Gap 09/26/2023 12.8     eGFR 09/26/2023 100.6     Protime 09/26/2023 10.7     INR 09/26/2023 0.98     proBNP 09/26/2023 480.0 (H)     Hemoglobin A1C 09/26/2023 5.50     WBC 09/26/2023 8.10     RBC 09/26/2023 5.16     Hemoglobin 09/26/2023 16.6     Hematocrit 09/26/2023 49.2     MCV 09/26/2023 95.5     MCH 09/26/2023 32.1     MCHC 09/26/2023 33.6     RDW 09/26/2023 12.1 (L)     RDW-SD 09/26/2023 40.3     MPV 09/26/2023 8.6     Platelets 09/26/2023 210     Neutrophil % 09/26/2023 69.8     Lymphocyte % 09/26/2023 18.6 (L)     Monocyte % 09/26/2023 9.5     Eosinophil % 09/26/2023 1.8     Basophil % 09/26/2023 0.3     Neutrophils, Absolute 09/26/2023 5.70     Lymphocytes, Absolute 09/26/2023 1.50     Monocytes, Absolute 09/26/2023 0.80     Eosinophils, Absolute 09/26/2023 0.10     Basophils, Absolute 09/26/2023 0.00     nRBC 09/26/2023 0.1    Admission on 09/05/2023, Discharged on 09/06/2023   Component Date Value    Glucose 09/05/2023 114 (H)     Glucose 09/05/2023 131 (H)     BUN  09/05/2023 21 (H)     Creatinine 09/05/2023 1.03     Sodium 09/05/2023 137     Potassium 09/05/2023 4.1     Chloride 09/05/2023 102     CO2 09/05/2023 24.0     Calcium 09/05/2023 9.3     Total Protein 09/05/2023 6.6     Albumin 09/05/2023 4.3     ALT (SGPT) 09/05/2023 15     AST (SGOT) 09/05/2023 30     Alkaline Phosphatase 09/05/2023 72     Total Bilirubin 09/05/2023 0.3     Globulin 09/05/2023 2.3     A/G Ratio 09/05/2023 1.9     BUN/Creatinine Ratio 09/05/2023 20.4     Anion Gap 09/05/2023 11.0     eGFR 09/05/2023 93.0     Color, UA 09/05/2023 Yellow     Appearance, UA 09/05/2023 Clear     pH, UA 09/05/2023 5.5     Specific Gravity, UA 09/05/2023 1.034 (H)     Glucose, UA 09/05/2023 >=1000 mg/dL (3+) (A)     Ketones, UA 09/05/2023 Negative     Bilirubin, UA 09/05/2023 Negative     Blood, UA 09/05/2023 Negative     Protein, UA 09/05/2023 Negative     Leuk Esterase, UA 09/05/2023 Negative     Nitrite, UA 09/05/2023 Negative     Urobilinogen, UA 09/05/2023 1.0 E.U./dL     Amphet/Methamphet, Screen 09/05/2023 Negative     Barbiturates Screen, Uri* 09/05/2023 Negative     Benzodiazepine Screen, U* 09/05/2023 Negative     Cocaine Screen, Urine 09/05/2023 Negative     Opiate Screen 09/05/2023 Negative     THC, Screen, Urine 09/05/2023 Positive (A)     Methadone Screen, Urine 09/05/2023 Negative     Oxycodone Screen, Urine 09/05/2023 Negative     Ethanol % 09/05/2023 <0.010     Magnesium 09/05/2023 2.2     QT Interval 09/05/2023 380     QTC Interval 09/05/2023 442     WBC 09/05/2023 8.30     RBC 09/05/2023 5.04     Hemoglobin 09/05/2023 16.3     Hematocrit 09/05/2023 48.0     MCV 09/05/2023 95.3     MCH 09/05/2023 32.4     MCHC 09/05/2023 34.0     RDW 09/05/2023 12.5     RDW-SD 09/05/2023 41.6     MPV 09/05/2023 8.1     Platelets 09/05/2023 200     Neutrophil % 09/05/2023 69.9     Lymphocyte % 09/05/2023 21.1     Monocyte % 09/05/2023 6.8     Eosinophil % 09/05/2023 1.6     Basophil % 09/05/2023 0.6     Neutrophils,  Absolute 09/05/2023 5.80     Lymphocytes, Absolute 09/05/2023 1.80     Monocytes, Absolute 09/05/2023 0.60     Eosinophils, Absolute 09/05/2023 0.10     Basophils, Absolute 09/05/2023 0.00     nRBC 09/05/2023 0.1    Hospital Outpatient Visit on 08/24/2023   Component Date Value    Glucose 08/24/2023 102 (H)     BUN 08/24/2023 26 (H)     Creatinine 08/24/2023 1.04     Sodium 08/24/2023 137     Potassium 08/24/2023 4.8     Chloride 08/24/2023 103     CO2 08/24/2023 23.0     Calcium 08/24/2023 10.2     BUN/Creatinine Ratio 08/24/2023 25.0     Anion Gap 08/24/2023 11.0     eGFR 08/24/2023 91.9     proBNP 08/24/2023 610.7 (H)     Magnesium 08/24/2023 2.3    Hospital Outpatient Visit on 08/10/2023   Component Date Value    Glucose 08/10/2023 82     BUN 08/10/2023 25 (H)     Creatinine 08/10/2023 1.01     Sodium 08/10/2023 137     Potassium 08/10/2023 4.6     Chloride 08/10/2023 99     CO2 08/10/2023 25.0     Calcium 08/10/2023 9.8     BUN/Creatinine Ratio 08/10/2023 24.8     Anion Gap 08/10/2023 13.0     eGFR 08/10/2023 95.2     proBNP 08/10/2023 923.2 (H)     D-Dimer, Quantitative 08/10/2023 0.31     TSH 08/10/2023 0.582     QT Interval 08/10/2023 393    Admission on 08/05/2023, Discharged on 08/07/2023   Component Date Value    QT Interval 08/05/2023 438     Glucose 08/05/2023 115 (H)     BUN 08/05/2023 19     Creatinine 08/05/2023 0.94     Sodium 08/05/2023 139     Potassium 08/05/2023 4.2     Chloride 08/05/2023 104     CO2 08/05/2023 25.0     Calcium 08/05/2023 9.1     Total Protein 08/05/2023 6.3     Albumin 08/05/2023 4.1     ALT (SGPT) 08/05/2023 25     AST (SGOT) 08/05/2023 40     Alkaline Phosphatase 08/05/2023 75     Total Bilirubin 08/05/2023 0.3     Globulin 08/05/2023 2.2     A/G Ratio 08/05/2023 1.9     BUN/Creatinine Ratio 08/05/2023 20.2     Anion Gap 08/05/2023 10.0     eGFR 08/05/2023 103.8     ADENOVIRUS, PCR 08/05/2023 Not Detected     Coronavirus 229E 08/05/2023 Not Detected     Coronavirus HKU1  08/05/2023 Not Detected     Coronavirus NL63 08/05/2023 Not Detected     Coronavirus OC43 08/05/2023 Not Detected     COVID19 08/05/2023 Not Detected     Human Metapneumovirus 08/05/2023 Not Detected     Human Rhinovirus/Enterov* 08/05/2023 Not Detected     Influenza A PCR 08/05/2023 Not Detected     Influenza B PCR 08/05/2023 Not Detected     Parainfluenza Virus 1 08/05/2023 Not Detected     Parainfluenza Virus 2 08/05/2023 Not Detected     Parainfluenza Virus 3 08/05/2023 Not Detected     Parainfluenza Virus 4 08/05/2023 Not Detected     RSV, PCR 08/05/2023 Not Detected     Bordetella pertussis pcr 08/05/2023 Not Detected     Bordetella parapertussis* 08/05/2023 Not Detected     Chlamydophila pneumoniae* 08/05/2023 Not Detected     Mycoplasma pneumo by PCR 08/05/2023 Not Detected     proBNP 08/05/2023 979.4 (H)     HS Troponin T 08/05/2023 9     D-Dimer, Quantitative 08/05/2023 0.23     TSH 08/05/2023 0.793     WBC 08/05/2023 9.10     RBC 08/05/2023 4.76     Hemoglobin 08/05/2023 15.3     Hematocrit 08/05/2023 45.5     MCV 08/05/2023 95.6     MCH 08/05/2023 32.1     MCHC 08/05/2023 33.6     RDW 08/05/2023 12.8     RDW-SD 08/05/2023 42.4     MPV 08/05/2023 8.3     Platelets 08/05/2023 187     Neutrophil % 08/05/2023 67.2     Lymphocyte % 08/05/2023 23.3     Monocyte % 08/05/2023 7.2     Eosinophil % 08/05/2023 1.6     Basophil % 08/05/2023 0.7     Neutrophils, Absolute 08/05/2023 6.10     Lymphocytes, Absolute 08/05/2023 2.10     Monocytes, Absolute 08/05/2023 0.70     Eosinophils, Absolute 08/05/2023 0.10     Basophils, Absolute 08/05/2023 0.10     nRBC 08/05/2023 0.1     LVIDd 08/06/2023 5.8     LVIDs 08/06/2023 5.5     IVSd 08/06/2023 1.10     LVPWd 08/06/2023 0.90     FS 08/06/2023 5.2     IVS/LVPW 08/06/2023 1.22     ESV(cubed) 08/06/2023 166.4     LV Sys Vol (BSA correcte* 08/06/2023 53.7     EDV(cubed) 08/06/2023 195.1     LV Stevens Vol (BSA correct* 08/06/2023 76.0     LVOT area 08/06/2023 4.9     LV mass(C)d  08/06/2023 233.1     LVOT diam 08/06/2023 2.5     EDV(MOD-sp4) 08/06/2023 170.0     ESV(MOD-sp4) 08/06/2023 120.0     SV(MOD-sp4) 08/06/2023 50.0     SI(MOD-sp4) 08/06/2023 22.4     EF(MOD-sp4) 08/06/2023 29.4     MV E max chandler 08/06/2023 60.4     MV A max chandler 08/06/2023 68.8     MV dec time 08/06/2023 0.19     MV E/A 08/06/2023 0.88     LA ESV Index (BP) 08/06/2023 22.8     Med Peak E' Chandler 08/06/2023 6.1     Lat Peak E' Chandler 08/06/2023 4.9     Avg E/e' ratio 08/06/2023 10.98     SV(LVOT) 08/06/2023 46.0     TAPSE (>1.6) 08/06/2023 2.38     RV S' 08/06/2023 8.1     LA dimension (2D)  08/06/2023 4.2     LV V1 max 08/06/2023 43.7     LV V1 max PG 08/06/2023 0.76     LV V1 mean PG 08/06/2023 0.00     LV V1 VTI 08/06/2023 9.4     Ao pk chandler 08/06/2023 94.7     Ao max PG 08/06/2023 3.6     Ao mean PG 08/06/2023 2.00     Ao V2 VTI 08/06/2023 21.0     RENATO(I,D) 08/06/2023 2.19     MV max PG 08/06/2023 2.04     MV mean PG 08/06/2023 1.00     MV V2 VTI 08/06/2023 20.7     MV P1/2t 08/06/2023 52.5     MVA(P1/2t) 08/06/2023 4.2     MVA(VTI) 08/06/2023 2.22     MV dec slope 08/06/2023 368.0     MR max chandler 08/06/2023 431.0     MR max PG 08/06/2023 74.3     TR max chandler 08/06/2023 201.0     TR max PG 08/06/2023 16.2     RVSP(TR) 08/06/2023 24.2     RAP systole 08/06/2023 8.0     RV V1 max PG 08/06/2023 0.80     RV V1 max 08/06/2023 44.7     RV V1 VTI 08/06/2023 10.8     PA V2 max 08/06/2023 82.2     Ao root diam 08/06/2023 3.2     ACS 08/06/2023 2.30     Sinus 08/06/2023 2.9     EF(MOD-bp) 08/06/2023 29.0     Glucose 08/06/2023 117 (H)     BUN 08/06/2023 18     Creatinine 08/06/2023 0.88     Sodium 08/06/2023 139     Potassium 08/06/2023 3.8     Chloride 08/06/2023 103     CO2 08/06/2023 23.0     Calcium 08/06/2023 8.9     BUN/Creatinine Ratio 08/06/2023 20.5     Anion Gap 08/06/2023 13.0     eGFR 08/06/2023 110.1     WBC 08/06/2023 9.60     RBC 08/06/2023 5.03     Hemoglobin 08/06/2023 15.9     Hematocrit 08/06/2023 46.9     MCV  08/06/2023 93.3     MCH 08/06/2023 31.6     MCHC 08/06/2023 33.9     RDW 08/06/2023 12.5     RDW-SD 08/06/2023 42.9     MPV 08/06/2023 8.3     Platelets 08/06/2023 184     Neutrophil % 08/06/2023 61.5     Lymphocyte % 08/06/2023 28.0     Monocyte % 08/06/2023 7.9     Eosinophil % 08/06/2023 2.2     Basophil % 08/06/2023 0.4     Neutrophils, Absolute 08/06/2023 5.90     Lymphocytes, Absolute 08/06/2023 2.70     Monocytes, Absolute 08/06/2023 0.80     Eosinophils, Absolute 08/06/2023 0.20     Basophils, Absolute 08/06/2023 0.00     nRBC 08/06/2023 0.0     HS Troponin T 08/06/2023 12     Total Cholesterol 08/06/2023 172     Triglycerides 08/06/2023 133     HDL Cholesterol 08/06/2023 47     LDL Cholesterol  08/06/2023 101 (H)     VLDL Cholesterol 08/06/2023 24     LDL/HDL Ratio 08/06/2023 2.09     Glucose 08/07/2023 113 (H)     BUN 08/07/2023 21 (H)     Creatinine 08/07/2023 0.98     Sodium 08/07/2023 138     Potassium 08/07/2023 4.3     Chloride 08/07/2023 101     CO2 08/07/2023 26.0     Calcium 08/07/2023 9.3     BUN/Creatinine Ratio 08/07/2023 21.4     Anion Gap 08/07/2023 11.0     eGFR 08/07/2023 98.7     Magnesium 08/07/2023 2.2     WBC 08/07/2023 9.40     RBC 08/07/2023 5.09     Hemoglobin 08/07/2023 16.6     Hematocrit 08/07/2023 48.0     MCV 08/07/2023 94.3     MCH 08/07/2023 32.5     MCHC 08/07/2023 34.5     RDW 08/07/2023 12.8     RDW-SD 08/07/2023 42.4     MPV 08/07/2023 8.4     Platelets 08/07/2023 189     Neutrophil % 08/07/2023 63.6     Lymphocyte % 08/07/2023 25.0     Monocyte % 08/07/2023 8.8     Eosinophil % 08/07/2023 2.3     Basophil % 08/07/2023 0.3     Neutrophils, Absolute 08/07/2023 6.00     Lymphocytes, Absolute 08/07/2023 2.40     Monocytes, Absolute 08/07/2023 0.80     Eosinophils, Absolute 08/07/2023 0.20     Basophils, Absolute 08/07/2023 0.00     nRBC 08/07/2023 0.1      Recent labs reviewed and interpreted for clinical significance and application    Went over labs with pt while still in  "clinic          It is a pleasure to be involved in this patient's cardiovascular care relating to their heart failure.  Please feel free to call me with any questions or concerns.    Mainor \"William\" Cynthia MILNER, Psychiatric  Heart failure program clinical provider    Mainor Marie, ANNIA   11/15/23  .  "

## 2023-11-15 NOTE — PROGRESS NOTES
Cardiology Office Visit      Encounter Date:  11/15/2023    Patient ID:   Brian Stewart is a 43 y.o. male.    Reason For Consultation:  Congestive heart failure  Cardiomyopathy    History of Present Illness:  Dear Santana Garcia MD    I had the pleasure of seeing Brian Stewart today. As you are well aware, this is a 43 y.o. male with past medical history that is significant for nonischemic cardiomyopathy severe cardiomyopathy requiring AICD with some improvement of LV systolic function was stable until last year now he is having worsening symptoms of shortness of breath dyspnea on exertion weight gain    Denies any new cardiac symptoms  No dizziness or syncope  Compliant with medical therapy  Denies any new cardiac symptoms  Complaining of some intermittent palpitations recent emergency room visit    Impressions:  Normal coronaries  Severe cardiomyopathy  Severe LV dysfunction  Elevated left-sided filling pressures  Estimated LV ejection fraction of 15%      Interpretation Summary    The left ventricular cavity is severely dilated.  Estimated left ventricular EF = 25% Estimated left ventricular EF was in agreement with the calculated left ventricular EF. Left ventricular systolic function is severely decreased.  The following left ventricular wall segments are hypokinetic: mid anterior, apical anterior, basal anterolateral, basal inferolateral, basal inferoseptal, mid anteroseptal, basal anterior, basal inferior and basal inferoseptal. The following left ventricular wall segments are akinetic: mid anterolateral, apical lateral, mid inferolateral, apical inferior, mid inferior, apical septal, mid inferoseptal and apex.  Moderate mitral valve regurgitation is present.  Left ventricular diastolic function is consistent with (grade I) impaired relaxation.  Estimated right ventricular systolic pressure from tricuspid regurgitation is normal (<35 mmHg).           Assessment & Plan    Impressions:  chronic  "systolic heart failure/stable  Congestive heart failure NYHA class II/no new complaints  Cardiomyopathy most likely nonischemic  Severe mitral regurgitation nonrheumatic/now moderate  Hypertension  Obesity  Obstructive sleep apnea  Status post ICD with nonsustained ventricular tachycardia  Noncompliance with medical therapy  Nonischemic cardiomyopathy with LV ejection fraction of 25%  Recent hospitalization for CHF exacerbation  Impaired fasting glucose  Recent normal right heart catheterization with normal PA pressures  Recommendations:  Continue current medical therapy  Recent medical records from hospitalization reviewed and discussed with patient  Risk benefits and alternatives and need for close monitoring and follow-up reviewed and discussed with the patient  Continue current medical management  Low-salt diet  Recent device interrogation with normal function  Repeat echocardiogram to assess LV systolic function and also severity of the mitral regurgitation  Recent labs reviewed and discussed with patient  Medication refills  Labs with the primary care physician office  Continue follow-up with EP for ICD  Continue current medical therapy with aspirin 81 mg p.o. once a day Coreg 25 mg p.o. twice daily Farxiga 10 mg p.o. once a day continue Entresto twice a day Pravachol 20 mg p.o. once a day spironolactone 25 mg p.o. once a day  Is currently on Lasix 40 mg in the morning and 20 mg at nighttime eventually based on the labs and right heart cath findings we will adjust the dosing of the Lasix  Repeat echocardiogram to assess the LV systolic function  Made for referral for advanced heart failure  prior work-up reviewed and discussed with patient  Commend device interrogation check with EP  Follow-up in office in 3 months          Vitals:  Vitals:    11/15/23 1012   BP: (!) 88/60   BP Location: Left arm   Patient Position: Sitting   Pulse: 65   SpO2: 96%   Weight: 107 kg (236 lb)   Height: 167.6 cm (66\") "       Physical Exam:    General: Alert, cooperative, no distress, appears stated age  Head:  Normocephalic, atraumatic, mucous membranes moist  Eyes:  Conjunctiva/corneas clear, EOM's intact     Neck:  Supple,  no adenopathy;      Lungs: Clear to auscultation bilaterally, no wheezes rhonchi rales are noted  Chest wall: No tenderness  Heart::  Regular rate and rhythm, S1 and S2 normal, no murmur, rub or gallop  Abdomen: Soft, non-tender, nondistended bowel sounds active  Extremities: No cyanosis, clubbing, or edema  Pulses: 2+ and symmetric all extremities  Skin:  No rashes or lesions  Neuro/psych: A&O x3. CN II through XII are grossly intact with appropriate affect              Lab Results   Component Value Date    GLUCOSE 94 10/05/2023    BUN 19 10/05/2023    CREATININE 0.84 10/05/2023    EGFR 111.0 10/05/2023    BCR 22.6 10/05/2023    K 4.5 10/05/2023    CO2 26.0 10/05/2023    CALCIUM 9.0 10/05/2023    ALBUMIN 4.3 09/05/2023    BILITOT 0.3 09/05/2023    AST 30 09/05/2023    ALT 15 09/05/2023     Results for orders placed during the hospital encounter of 08/05/23    Adult Transthoracic Echo Complete w/ Color, Spectral and Contrast if Necessary Per Protocol    Interpretation Summary    Left ventricular systolic function is severely decreased. Left ventricular ejection fraction appears to be 21 - 25%.    Akinetic septum and inferior wall noted    Left ventricular diastolic function was normal.    Estimated right ventricular systolic pressure from tricuspid regurgitation is normal (<35 mmHg).     Results for orders placed during the hospital encounter of 09/28/23    Cardiac Catheterization/Vascular Study    Narrative  Table formatting from the original result was not included.  Cardiac Catheterization Operative Report    Brian JUVENTINO Pat  [id number]  9/28/2023  @[email or web address]@    He underwent cardiac catheterization.    Indications for the procedure include: Cardiomyopathy.        Procedure Details:  The risks, benefits,  complications, treatment options, and expected outcomes were discussed with the patient. The patient and/or family concurred with the proposed plan, giving informed consent.    After informed consent the patient was brought to the cath lab after appropriate IV hydration was begun and oral premedication was given. He was further sedated with fentanyl. He was prepped and draped in the usual manner. Using the modified Seldinger access technique, a 6 Indonesian sheath was placed in the femoral artery. A left heart catheterization with coronary arteriography was performed. Findings are discussed below.    After the procedure was completed, sedation was stopped and the sheaths and catheters were all removed. Hemostasis was achieved per established hospital protocols.    Conscious sedation:  Conscious sedation was performed according to protocol.  I supervised and directed an independent trained observer with the assistance of monitoring the patient's level of consciousness and physiologic status throughout the procedure.  Intraoperative service time was 30 minutes.    Findings:    Hemodynamics Right heart catheterization and hemodynamic    Pulmonary capillary wedge pressure was 10 mmHg PA pressure systolic 22 diastolic 80 with a mean pressure of 14 mmHg  RV pressure systolic 20 mmHg  Right atrial pressure was 4 mm of medical  Femoral arterial saturation was 99%  PA saturation is 55%  Right atrial saturation is 53%  Darrion equation cardiac output is 2.75 L/min  Darrion equation cardiac index is 1.2 L/min/m²      Estimated Blood Loss:  Minimal    Specimens: None    Complications:  None; patient tolerated the procedure well.    Disposition: PACU - hemodynamically stable.    Condition: stable    Impressions:  Normal PA pressures and normal filling pressures    Recommendations:  Medical management for cardiomyopathy  Test results reviewed and discussed with patient and family     Lab Results   Component Value Date    CHOL 172  08/06/2023    TRIG 133 08/06/2023    HDL 47 08/06/2023     (H) 08/06/2023      Results for orders placed in visit on 05/21/18    Stress Test With Myocardial Perfusion One Day    Interpretation Summary  · Findings consistent with an equivocal ECG stress test.  · Breast attenuation and diaphragmatic attenuation artifacts are present.  · Left ventricular ejection fraction is normal (Calculated EF = 50%).  · Myocardial perfusion imaging indicates a normal myocardial perfusion study with no evidence of ischemia.  · Impressions are consistent with a low risk study.  · Suboptimal study    Asymptomatic for chest pain. Specificity of study reduced secondary to baseline Non-specific ST-T wave abnormalities, however ECG is not suggestive of ischemia  Ectopy: none.  Blood pressure  And heart rate response:  Appropriate for Beta-blocker therapy  Pharmacologic study due to inability to tolerate increasing speed and grade of treadmill due to c/o  becoming very fatigued Stage II, and had to change to pharmacological procedure.  Participated in Low Level exercise and tolerance was fair.   Results for orders placed during the hospital encounter of 11/09/22    Mobile Cardiac Outpatient Telemetry    Interpretation Summary  Extended Holter monitor study for symptoms of palpitations  Patient was monitored from November 9, 2022 to December 8, 2022  Rhythm is sinus rhythm with a minimal heart rate of 50 beats per [place] 169 bpm average heart rate of 77 bpm  No atrial fibrillation  No clinically significant pauses  First-degree heart block  PVC burden of 7%  PAC burden of 8%  Nonsustained ventricular tachycardia for total 4 beats at rate of 170 bpm  Patient had multiple symptomatic episodes with heart racing and skipped beat during that time patient noted to be in sinus rhythm with PVC burden most likely patient's symptoms are secondary to PVC burden based on this Holter monitor study  Abnormal Holter monitor study  Clinical  correlation is recommended           Objective:          Allergies:  No Known Allergies    Medication Review:     Current Outpatient Medications:     aspirin 81 MG chewable tablet, Chew 1 tablet Daily., Disp: , Rfl:     carvedilol (COREG) 25 MG tablet, Take 1 tablet by mouth 2 (Two) Times a Day., Disp: 180 tablet, Rfl: 3    dapagliflozin Propanediol (Farxiga) 10 MG tablet, TAKE 1 TABLET BY MOUTH DAILY, Disp: 90 tablet, Rfl: 1    furosemide (LASIX) 20 MG tablet, Take 1 tablet by mouth Every Evening., Disp: , Rfl:     furosemide (LASIX) 40 MG tablet, Take 1 tablet by mouth Every Morning., Disp: , Rfl:     gabapentin (NEURONTIN) 800 MG tablet, Take 1 tablet by mouth 3 (Three) Times a Day., Disp: , Rfl:     isosorbide mononitrate (IMDUR) 30 MG 24 hr tablet, Take 1 tablet by mouth Daily., Disp: 30 tablet, Rfl: 11    Magnesium Oxide 400 MG capsule, Take 400 mg by mouth 2 (Two) Times a Day Before Meals., Disp: 60 each, Rfl: 11    multivitamin with minerals tablet tablet, Take 1 tablet by mouth Daily., Disp: , Rfl:     Omega-3 Fatty Acids (fish oil) 1000 MG capsule capsule, Take 3 capsules by mouth Daily With Breakfast., Disp: , Rfl:     pravastatin (PRAVACHOL) 20 MG tablet, TAKE 1 TABLET BY MOUTH EVERY NIGHT AT BEDTIME, Disp: 90 tablet, Rfl: 2    sacubitril-valsartan (Entresto) 24-26 MG tablet, Take 1 tablet by mouth 2 (Two) Times a Day., Disp: 60 tablet, Rfl: 11    spironolactone (ALDACTONE) 25 MG tablet, TAKE 1 TABLET BY MOUTH DAILY, Disp: 90 tablet, Rfl: 2    HYDROcodone-acetaminophen (NORCO)  MG per tablet, Take 1 tablet by mouth Every 8 (Eight) Hours As Needed for Moderate Pain (for back pain). (Patient not taking: Reported on 11/15/2023), Disp: , Rfl:     Family History:  Family History   Problem Relation Age of Onset    Heart disease Father     Hypertension Mother        Past Medical History:  Past Medical History:   Diagnosis Date    Arrhythmia     Cardiomyopathy     Diverticulitis     Hyperlipidemia      Palpitations        Past surgical History:  Past Surgical History:   Procedure Laterality Date    BACK SURGERY      CARDIAC CATHETERIZATION      CARDIAC CATHETERIZATION N/A 2020    Procedure: Left Heart Cath;  Surgeon: Fernanda Jackson MD;  Location: [facility] CATH INVASIVE LOCATION;  Service: Cardiovascular;  Laterality: N/A;    CARDIAC CATHETERIZATION N/A 2020    Procedure: Coronary angiography;  Surgeon: Fernanda Jackson MD;  Location: [facility] CATH INVASIVE LOCATION;  Service: Cardiovascular;  Laterality: N/A;    CARDIAC CATHETERIZATION Right 2023    Procedure: Right Heart Cath;  Surgeon: Fernanda Jackson MD;  Location: [facility] CATH INVASIVE LOCATION;  Service: Cardiovascular;  Laterality: Right;    CARDIAC DEFIBRILLATOR PLACEMENT      COLON RESECTION      HERNIA REPAIR      INSERT / REPLACE / REMOVE PACEMAKER      ICD    KNEE ACL RECONSTRUCTION         Social History:  Social History     Socioeconomic History    Marital status: Single   Tobacco Use    Smoking status: Former     Packs/day: 1.5     Types: Cigarettes     Quit date: 2021     Years since quittin.2     Passive exposure: Past    Smokeless tobacco: Former     Types: Snuff    Tobacco comments:     QUIT SMOKING    Vaping Use    Vaping Use: Never used    Passive vaping exposure: Yes   Substance and Sexual Activity    Alcohol use: Not Currently    Drug use: Yes     Types: Marijuana     Comment: once/day    Sexual activity: Yes     Partners: Female       Review of Systems:  The following systems were reviewed as they relate to the cardiovascular system: Constitutional, Eyes, ENT, Cardiovascular, Respiratory, Gastrointestinal, Integumentary, Neurological, Psychiatric, Hematologic, Endocrine, Musculoskeletal, and Genitourinary. The pertinent cardiovascular findings are reported above with all other cardiovascular points within those systems being negative.    Diagnostic Study Review:     Current  Electrocardiogram:  Procedures          NOTE: The following portions of the patient's history were reviewed and updated this visit as appropriate: allergies, current medications, past family history, past medical history, past social history, past surgical history and problem list.

## 2023-11-16 ENCOUNTER — HOSPITAL ENCOUNTER (OUTPATIENT)
Facility: HOSPITAL | Age: 43
Discharge: HOME | End: 2023-11-16
Payer: MEDICARE

## 2023-11-16 ENCOUNTER — DISEASE STATE MANAGEMENT VISIT (OUTPATIENT)
Facility: HOSPITAL | Age: 43
End: 2023-11-16
Payer: MEDICARE

## 2023-11-16 VITALS
SYSTOLIC BLOOD PRESSURE: 97 MMHG | RESPIRATION RATE: 16 BRPM | DIASTOLIC BLOOD PRESSURE: 63 MMHG | BODY MASS INDEX: 38.48 KG/M2 | OXYGEN SATURATION: 96 % | HEART RATE: 75 BPM | WEIGHT: 238.4 LBS

## 2023-11-16 DIAGNOSIS — I50.22 CHRONIC SYSTOLIC HEART FAILURE: Chronic | ICD-10-CM

## 2023-11-16 DIAGNOSIS — E88.810 METABOLIC SYNDROME: Chronic | ICD-10-CM

## 2023-11-16 DIAGNOSIS — R06.02 SHORTNESS OF BREATH: ICD-10-CM

## 2023-11-16 DIAGNOSIS — Z95.810 AICD (AUTOMATIC CARDIOVERTER/DEFIBRILLATOR) PRESENT: ICD-10-CM

## 2023-11-16 DIAGNOSIS — I34.0 NONRHEUMATIC MITRAL VALVE REGURGITATION: ICD-10-CM

## 2023-11-16 DIAGNOSIS — I42.0 DILATED CARDIOMYOPATHY: Primary | Chronic | ICD-10-CM

## 2023-11-16 LAB
ANION GAP SERPL CALCULATED.3IONS-SCNC: 12 MMOL/L (ref 5–15)
BUN SERPL-MCNC: 22 MG/DL (ref 6–20)
BUN/CREAT SERPL: 23.9 (ref 7–25)
CALCIUM SPEC-SCNC: 9.7 MG/DL (ref 8.6–10.5)
CHLORIDE SERPL-SCNC: 101 MMOL/L (ref 98–107)
CO2 SERPL-SCNC: 25 MMOL/L (ref 22–29)
CREAT SERPL-MCNC: 0.92 MG/DL (ref 0.76–1.27)
EGFRCR SERPLBLD CKD-EPI 2021: 105.8 ML/MIN/1.73
GLUCOSE SERPL-MCNC: 100 MG/DL (ref 65–99)
MAGNESIUM SERPL-MCNC: 2 MG/DL (ref 1.6–2.6)
NT-PROBNP SERPL-MCNC: 648.6 PG/ML (ref 0–450)
POTASSIUM SERPL-SCNC: 4 MMOL/L (ref 3.5–5.2)
SODIUM SERPL-SCNC: 138 MMOL/L (ref 136–145)

## 2023-11-16 PROCEDURE — 83880 ASSAY OF NATRIURETIC PEPTIDE: CPT | Performed by: NURSE PRACTITIONER

## 2023-11-16 PROCEDURE — 80048 BASIC METABOLIC PNL TOTAL CA: CPT | Performed by: NURSE PRACTITIONER

## 2023-11-16 PROCEDURE — 83735 ASSAY OF MAGNESIUM: CPT | Performed by: NURSE PRACTITIONER

## 2023-11-16 PROCEDURE — G0463 HOSPITAL OUTPT CLINIC VISIT: HCPCS

## 2023-11-16 RX ORDER — SACUBITRIL AND VALSARTAN 49; 51 MG/1; MG/1
1 TABLET, FILM COATED ORAL 2 TIMES DAILY
Qty: 180 TABLET | Refills: 0 | Status: SHIPPED | OUTPATIENT
Start: 2023-11-16

## 2023-11-16 NOTE — PROGRESS NOTES
HEART FAILURE CLINIC - PHARMACY SERVICE     HFrEF with EF 21-25% (Last Echo: 8/6/23).    NYHA Class II B     The patient's last EKG was reviewed from 10/5/23 and shows a QTcB of 413 ms.      Cardiologist: Renetta  Nephrologist: None  PCP: Rosas     -CHF-specific BB:      Pre Visit Dose: Carvedilol 25 mg PO BID    Post Visit Dose: Carvedilol 25 mg PO BID (HR 75 bpm and BP 97/63 mmHg)     - Target Dose: Carvedilol 25 mg PO BID (<85 kg) or 50 mg PO BID (>85 kg).     - Goal HR of 50s to 60s.     - Patient should be seen every 10 to 14 days for a pulse check with plans for up-titration until target heart rate is achieved.        -ACE/ARB/ARNI:     Pre Visit Dose: Sacubitril/valsartan 24/26 mg PO BID    Post Visit Dose: Sacubitril/valsartan 49/51 mg PO BID (BP 97/63 mmHg but APRN wanted to increase toward target dose)    - Target Dose: Sacubitril/valsartan 97/103 mg PO BID    - Patient should have a follow-up appointment every 2 to 4 weeks for a hemodynamic check with possible up-titration to target dose.         -SGLT2 inhibitor therapy:    Pre Visit Dose: Dapagliflozin 10 mg PO daily    Post Visit Dose: Dapagliflozin 10 mg PO daily    - Target Dose: Dapagliflozin 10 mg PO daily : CrCl > 20 mL/min which has shown benefit in patients with HF       -MRA:     Pre Visit Dose: Spironolactone  25 mg PO daily    Post Visit Dose: Spironolactone  25 mg PO daily    - Target Dose: Spironolactone 25-50 mg PO daily    - K is < 5 mEq/L and SCr is </= 2.5 mg/dL in male and eGFR > 30 mL/min/1.73m3        -DIURETIC:     Pre Visit Dose: Furosemide 40 mg PO QAM and 20 mg PO QPM    Post Visit Dose: Furosemide 40 mg PO QAM and 20 mg PO QPM      -Magnesium:     Mag is > 2 mg/dL    -If Magnesium 1.6-1.9 mg/dL, Initiate Magnesium 400 mg PO daily  -If Magnesium is less than 1.6 mg/dL, Initiate Magnesium 400 mg PO BID       -OTHER CV MEDS:     Pre Visit Dose: aspirin 81 mg PO daily, isosorbide mononitrate 30 mg PO daily, omega-3 3000 mg PO  QAM, and pravastatin 20 mg PO QHS    Post Visit Dose: no changes      -Clinic Administered Medications:     None      Vaccines:     Not assessed at this visit       Patient Assistance:      None needed           PLAN:  Initiation/Discontinuation/Dose Adjustment:   A. Increase sacubitril/valsartan to 49/51 mg PO BID  Education provided: discussed timing of when to initiate new dose   Coordination of Care: none  Refills: sacubitril/valsartan          Chely Melendez PharmD  11/16/2023  14:12 EST

## 2023-11-22 ENCOUNTER — HOSPITAL ENCOUNTER (OUTPATIENT)
Dept: CARDIOLOGY | Facility: HOSPITAL | Age: 43
Discharge: HOME | End: 2023-11-22
Admitting: INTERNAL MEDICINE
Payer: MEDICARE

## 2023-11-22 VITALS
SYSTOLIC BLOOD PRESSURE: 96 MMHG | BODY MASS INDEX: 38.25 KG/M2 | HEART RATE: 66 BPM | HEIGHT: 66 IN | DIASTOLIC BLOOD PRESSURE: 51 MMHG | WEIGHT: 238 LBS

## 2023-11-22 DIAGNOSIS — I42.0 DILATED CARDIOMYOPATHY: ICD-10-CM

## 2023-11-22 DIAGNOSIS — I50.22 CHRONIC SYSTOLIC HEART FAILURE: ICD-10-CM

## 2023-11-22 LAB
BH CV ECHO MEAS - ACS: 2.26 CM
BH CV ECHO MEAS - AO MAX PG: 4.2 MMHG
BH CV ECHO MEAS - AO MEAN PG: 2.6 MMHG
BH CV ECHO MEAS - AO ROOT DIAM: 3.2 CM
BH CV ECHO MEAS - AO V2 MAX: 102.2 CM/SEC
BH CV ECHO MEAS - AO V2 VTI: 20.8 CM
BH CV ECHO MEAS - AVA(I,D): 2.16 CM2
BH CV ECHO MEAS - EDV(CUBED): 245.5 ML
BH CV ECHO MEAS - EDV(MOD-SP4): 114.1 ML
BH CV ECHO MEAS - EF(MOD-BP): 23 %
BH CV ECHO MEAS - EF(MOD-SP4): 23.1 %
BH CV ECHO MEAS - ESV(CUBED): 144.5 ML
BH CV ECHO MEAS - ESV(MOD-SP4): 87.7 ML
BH CV ECHO MEAS - FS: 16.2 %
BH CV ECHO MEAS - IVS/LVPW: 1.14 CM
BH CV ECHO MEAS - IVSD: 1.2 CM
BH CV ECHO MEAS - LA DIMENSION: 3.7 CM
BH CV ECHO MEAS - LV MASS(C)D: 310.8 GRAMS
BH CV ECHO MEAS - LV MAX PG: 0.75 MMHG
BH CV ECHO MEAS - LV MEAN PG: 0.52 MMHG
BH CV ECHO MEAS - LV V1 MAX: 43.3 CM/SEC
BH CV ECHO MEAS - LV V1 VTI: 9.1 CM
BH CV ECHO MEAS - LVIDD: 6.3 CM
BH CV ECHO MEAS - LVIDS: 5.2 CM
BH CV ECHO MEAS - LVOT AREA: 5 CM2
BH CV ECHO MEAS - LVOT DIAM: 2.5 CM
BH CV ECHO MEAS - LVPWD: 1.06 CM
BH CV ECHO MEAS - MV A MAX VEL: 56.6 CM/SEC
BH CV ECHO MEAS - MV DEC SLOPE: 124.2 CM/SEC2
BH CV ECHO MEAS - MV DEC TIME: 0.28 SEC
BH CV ECHO MEAS - MV E MAX VEL: 34.4 CM/SEC
BH CV ECHO MEAS - MV E/A: 0.61
BH CV ECHO MEAS - MV MAX PG: 1.49 MMHG
BH CV ECHO MEAS - MV MEAN PG: 0.56 MMHG
BH CV ECHO MEAS - MV V2 VTI: 20.8 CM
BH CV ECHO MEAS - MVA(VTI): 2.16 CM2
BH CV ECHO MEAS - PA V2 MAX: 87.9 CM/SEC
BH CV ECHO MEAS - PULM A REVS DUR: 0.13 SEC
BH CV ECHO MEAS - PULM A REVS VEL: 46.2 CM/SEC
BH CV ECHO MEAS - PULM DIAS VEL: 40.5 CM/SEC
BH CV ECHO MEAS - PULM S/D: 1.53
BH CV ECHO MEAS - PULM SYS VEL: 62 CM/SEC
BH CV ECHO MEAS - RV MAX PG: 0.86 MMHG
BH CV ECHO MEAS - RV V1 MAX: 46.3 CM/SEC
BH CV ECHO MEAS - RV V1 VTI: 9.1 CM
BH CV ECHO MEAS - SV(LVOT): 45 ML
BH CV ECHO MEAS - SV(MOD-SP4): 26.4 ML
BH CV ECHO MEAS - TR MAX PG: 12.8 MMHG
BH CV ECHO MEAS - TR MAX VEL: 178.6 CM/SEC

## 2023-11-22 PROCEDURE — 93306 TTE W/DOPPLER COMPLETE: CPT

## 2023-11-22 PROCEDURE — 93306 TTE W/DOPPLER COMPLETE: CPT | Performed by: INTERNAL MEDICINE

## 2023-12-04 ENCOUNTER — TELEPHONE (OUTPATIENT)
Dept: CARDIOLOGY | Facility: CLINIC | Age: 43
End: 2023-12-04
Payer: MEDICARE

## 2023-12-04 NOTE — TELEPHONE ENCOUNTER
----- Message from Fernanda Jackson MD sent at 11/22/2023  3:38 PM EST -----  Echocardiogram shows some improvement in the LV function compared to before continue the same therapy and follow-up as scheduled  ----- Message -----  From: Fernanda Jackson MD  Sent: 11/22/2023   3:33 PM EST  To: Fernanda Jackson MD

## 2023-12-04 NOTE — TELEPHONE ENCOUNTER
Hub staff attempted to follow warm transfer process and was unsuccessful     Caller: Brian Stewart    Relationship to patient: Self    Best call back number: [phone]    Patient is needing: PT RETURNED THE CALL

## 2023-12-07 ENCOUNTER — OFFICE VISIT (OUTPATIENT)
Dept: PULMONOLOGY | Facility: HOSPITAL | Age: 43
End: 2023-12-07
Payer: MEDICARE

## 2023-12-07 VITALS
HEART RATE: 71 BPM | SYSTOLIC BLOOD PRESSURE: 88 MMHG | BODY MASS INDEX: 37.61 KG/M2 | DIASTOLIC BLOOD PRESSURE: 58 MMHG | WEIGHT: 234 LBS | HEIGHT: 66 IN | OXYGEN SATURATION: 95 % | RESPIRATION RATE: 14 BRPM

## 2023-12-07 DIAGNOSIS — G47.33 OSA (OBSTRUCTIVE SLEEP APNEA): Primary | ICD-10-CM

## 2023-12-07 PROCEDURE — G0463 HOSPITAL OUTPT CLINIC VISIT: HCPCS

## 2023-12-07 NOTE — PROGRESS NOTES
PULMONARY/ CRITICAL CARE/ SLEEP MEDICINE OUTPATIENT CONSULT/ FOLLOW UP NOTE        Patient Name:  Brian Stewart    :  1980    Medical Record:  [id number]    PRIMARY CARE PHYSICIAN     Santana Pillai MD    REASON FOR CONSULTATION    Brian Stewart is a 43 y.o. male who is referred for consultation for [facility]PH[facility]  REVIEW OF SYSTEMS    Constitutional:  Denies fever or chills   Eyes:  Denies change in visual acuity   HENT:  Denies nasal congestion or sore throat   Respiratory:  Denies cough or shortness of breath   Cardiovascular:  Denies chest pain or edema   GI:  Denies abdominal pain, nausea, vomiting, bloody stools or diarrhea   :  Denies dysuria   Musculoskeletal:  Denies back pain or joint pain   Integument:  Denies rash   Neurologic:  Denies headache, focal weakness or sensory changes   Endocrine:  Denies polyuria or polydipsia   Lymphatic:  Denies swollen glands   Psychiatric:  Denies depression or anxiety     MEDICAL HISTORY    Past Medical History:   Diagnosis Date    Arrhythmia     Cardiomyopathy     Diverticulitis     Hyperlipidemia     Palpitations         SURGICAL HISTORY    Past Surgical History:   Procedure Laterality Date    BACK SURGERY      CARDIAC CATHETERIZATION      CARDIAC CATHETERIZATION N/A 2020    Procedure: Left Heart Cath;  Surgeon: Fernanda Jackson MD;  Location: [facility] CATH INVASIVE LOCATION;  Service: Cardiovascular;  Laterality: N/A;    CARDIAC CATHETERIZATION N/A 2020    Procedure: Coronary angiography;  Surgeon: Fernanda Jackson MD;  Location: [facility] CATH INVASIVE LOCATION;  Service: Cardiovascular;  Laterality: N/A;    CARDIAC CATHETERIZATION Right 2023    Procedure: Right Heart Cath;  Surgeon: Fernanda Jackson MD;  Location: [facility] CATH INVASIVE LOCATION;  Service: Cardiovascular;  Laterality: Right;    CARDIAC DEFIBRILLATOR PLACEMENT      COLON RESECTION      HERNIA REPAIR      INSERT / REPLACE / REMOVE PACEMAKER      ICD    KNEE  ACL RECONSTRUCTION          FAMILY HISTORY    Family History   Problem Relation Age of Onset    Heart disease Father     Hypertension Mother        SOCIAL HISTORY    Social History     Tobacco Use    Smoking status: Former     Packs/day: 1.5     Types: Cigarettes     Quit date: 2021     Years since quittin.2     Passive exposure: Past    Smokeless tobacco: Former     Types: Snuff    Tobacco comments:     QUIT SMOKING    Substance Use Topics    Alcohol use: Not Currently        ALLERGIES    Not on File      MEDICATIONS    Current Outpatient Medications on File Prior to Visit   Medication Sig Dispense Refill    aspirin 81 MG chewable tablet Chew 1 tablet Daily.      carvedilol (COREG) 25 MG tablet Take 1 tablet by mouth 2 (Two) Times a Day. 180 tablet 3    dapagliflozin Propanediol (Farxiga) 10 MG tablet TAKE 1 TABLET BY MOUTH DAILY 90 tablet 1    furosemide (LASIX) 40 MG tablet Take 1 tablet by mouth 2 (Two) Times a Day.      gabapentin (NEURONTIN) 800 MG tablet Take 1 tablet by mouth 3 (Three) Times a Day.      isosorbide mononitrate (IMDUR) 30 MG 24 hr tablet Take 1 tablet by mouth Daily. 30 tablet 11    Magnesium Oxide 400 MG capsule Take 400 mg by mouth 2 (Two) Times a Day Before Meals. 60 each 11    multivitamin with minerals tablet tablet Take 1 tablet by mouth Daily.      Omega-3 Fatty Acids (fish oil) 1000 MG capsule capsule Take 3 capsules by mouth Daily With Breakfast.      pravastatin (PRAVACHOL) 20 MG tablet TAKE 1 TABLET BY MOUTH EVERY NIGHT AT BEDTIME 90 tablet 2    sacubitril-valsartan (Entresto) 49-51 MG tablet Take 1 tablet by mouth 2 (Two) Times a Day. 180 tablet 0    spironolactone (ALDACTONE) 25 MG tablet TAKE 1 TABLET BY MOUTH DAILY 90 tablet 2    [DISCONTINUED] furosemide (LASIX) 20 MG tablet Take 1 tablet by mouth Every Evening.       No current facility-administered medications on file prior to visit.       PHYSICAL EXAM    Vitals:    23 1039   BP: (!) 88/58   BP Location:  "Left arm   Patient Position: Sitting   Cuff Size: Adult   Pulse: 71   Resp: 14   SpO2: 95%   Weight: 106 kg (234 lb)   Height: 167.6 cm (66\")        Constitutional:  Well developed, well nourished, no acute distress, non-toxic appearance   Eyes:  PERRL, conjunctiva normal   HENT:  Atraumatic, external ears normal, nose normal, oropharynx moist, no pharyngeal exudates. mallampatti   Neck- normal range of motion, no tenderness, supple   Respiratory:  No respiratory distress, normal breath sounds, no rales, no wheezing   Cardiovascular:  Normal rate, normal rhythm, no murmurs, no gallops, no rubs   GI:  Soft, nondistended, normal bowel sounds, nontender, no organomegaly, no mass, no rebound, no guarding   :  No costovertebral angle tenderness   Musculoskeletal:  No edema, no tenderness, no deformities. Back- no tenderness  Integument:  Well hydrated, no rash   Lymphatic:  No lymphadenopathy noted   Neurologic:  Alert & oriented x 3, CN 2-12 normal, normal motor function, normal sensory function, no focal deficits noted   Psychiatric:  Speech and behavior appropriate     No radiology results for the last 90 days.   Results for orders placed during the hospital encounter of 11/22/23    Adult Transthoracic Echo Complete W/ Cont if Necessary Per Protocol    Interpretation Summary    Left ventricular systolic function is severely decreased. Calculated left ventricular EF = 23% Left ventricular ejection fraction appears to be 21 - 25%.    The left ventricular cavity is moderately dilated.    Left ventricular wall thickness is consistent with mild concentric hypertrophy.    The following left ventricular wall segments are hypokinetic: mid anterior, apical anterior, basal anterolateral, mid anterolateral, apical lateral, basal inferolateral, mid inferolateral, apical inferior, mid inferior, apical septal, basal inferoseptal, mid inferoseptal, apex hypokinetic, mid anteroseptal, basal anterior, basal inferior and basal " inferoseptal.    Left ventricular diastolic function is consistent with (grade II w/high LAP) pseudonormalization.    The left atrial cavity is mild to moderately dilated.    Estimated right ventricular systolic pressure from tricuspid regurgitation is normal (<35 mmHg).      ASSESSMENT & PLAN:      Obstructive sleep apnea, initially had sleep study at [facility] showed severe sleep apnea however patient lost 100 pounds and repeat sleep study on 11/8/2023 in lab showed AHI 3.0, REM AHI 1.8, REM sleep 19% total of 65 minutes    Cardiomyopathy  Although he stopped smoking cigarettes but he still use marijuana occasionally        Plan:    Discussed with patient sleep study findings weight loss has worked well for him as well as daily exercise regimen he is doing in case his symptoms get worse will repeat sleep study    This document has been electronically signed by  Jasvir Munoz MD  10:49 EST

## 2023-12-20 NOTE — PROGRESS NOTES
"Cardiology Heart Failure Clinic Note  Mainor \"William\" ALCIRA Smart    Patient ID: rBian Stewart  is a 43 y.o. male.    Encounter Date:12/21/2023     Assessment:   Diagnoses and all orders for this visit:    1. Non- Ischemic Dilated cardiomyopathy (Primary)  Overview:         A. Non-ischemic BiV                 I. 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 EF 21-25% 7/23 gr1DD               III. S/p ICD        2. Chronic systolic heart failure  Overview:      A. Non-ischemic BiV                 I. 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 EF 21-25% 7/23 gr1DD               III. S/p ICD    Orders:  -     Basic Metabolic Panel; Future  -     proBNP; Future  -     Magnesium; Future  -     Basic Metabolic Panel; Standing  -     Basic Metabolic Panel  -     proBNP; Standing  -     proBNP  -     Magnesium; Standing  -     Magnesium    3. s/p AICD    4. Nonrheumatic mitral valve regurgitation    5. Shortness of breath     6. Abnormal finding of blood chemistry, unspecified     7. Metabolic syndrome  Overview:      A. HTN      B. HLD      C. Elevated BMI          Plan/discussion    Volume overload    Heart Failure Core Measures    Type of Overload : Combined systolic and diastolic heart failure (HFrEF)    Most recent LVEF: (TTE 11/22/23) (23%) 21-25% grade 1 DD    [place] Heart association Class & Stage : IIb    HF Meds    Beta Blocker: Coreg 25 mg twice daily  ARNI/ACE/ARB: Entresto mid dose twice daily(16 November 2023)  SGLT 2 inhibitors: Farxiga 10 mg daily   Diuretics: Lasix 40 mg BID  Furoscix: None currently  Aldosterone Antagonist: Spironolactone 25 mg daily  Digitalis if applicable: N/A  Vasodilators & Nitrates: Imdur 30 mg daily      Cardiac medicines reviewed with risk, benefits, and necessity of each discussed with myself & a RP.     ____________________________________________________________    Discussion    Already on heart failure core measures including " beta-blocker, , ARNI, SGLT2, diuretic,  Aldactone & even a nitrate    making a concerted effort to manage his heart failure with medication and compliance   Had outpatient polysomnography testing initially had sleep study at [facility] showed severe sleep apnea however patient lost 100 pounds and repeat sleep study on 11/8/2023 in lab showed AHI 3.0, REM AHI 1.8, REM sleep 19% total of 65 minutes that is to say basically a normal study without evidence of STEPHON presently  We will have to clarify who he was referred to for advanced heart failure whether you available or [facility] and reestablish that contact  I made no changes to his medications on 21 December  I plan on seeing again in 1 month  Would add typical heart failure nursing interventions including:        A. Fluid restriction at less than 2000 cc daily       B. Daily weights        C.  1 g low-sodium diet      D. Consideration for cardiac rehabilitation    I did the following activities preparing for the visit with Brian Stewart  including: reviewing tests, once pt arrived in clinic I also performed a medically appropriate examination and/or evaluation, I personally spent considerable time counseling and educating the patient/family/caregiver, ordering medications, tests, or procedures, referring and communicating with other health care professionals, and documenting information in the medical record. I estimate including preparation 15 minutes          Subjective    Subjective:     Chief Complaint   Patient presents with    Congestive Heart Failure       HPI:  42-year-old  male's patient of Dr. Jackson with a PMH of biventricular non-ischemic dilated cardiomyopathy, Combined systolic and diastolic heart failure with his last EF reported @ 30% with grade 1 diastolic dysfunction in July 23 by TTE.  He is also  s/p an ICD placed in 2019 he has a LBBB at baseline additionl PMH includes metabolic syndrome with hypertension dyslipidemia, elevated  BMI.  Since last visit 8/24/23 and f/u 10/5/23 he has continued to lose weight he is engaged actively in an exercise and diet program however failing somewhat on dietary compliance attempts he thinks overall he is doing better with diet.  Previous ischemic assessment showed no CAD.  Saw Dr. Jackson in November. Reports no use of nicotine products since July 23 but, still uses marijuana occasionally. . All in all, feeling alright back for re eval 21 Dec 23 since last visit he continues to do quite well he continues to exercise at least 5 days weekly on a treadmill or 10% incline attaining a heart rate of about 75% of the predicted maximum for his age.  He informs me that he thinks he is missed his heart failure appointment in [place] he was unsure whether he was going to [facility] or [facility] will have to establish clarification and determine who was referred to, he has had no chest pain palpitations no pre or jeff syncopal episodes    ROS:    Constitutional: Only minimal weakness,  fatigue, No fever, rigors, chills   Eyes: No recent vision changes, eye pain   ENT/oropharynx: No recent difficulty swallowing, sore throat, epistaxis, changes in hearing   Cardiovascular: No recent chest pain, chest tightness, palpitations except as noted in HPI, paroxysmal nocturnal dyspnea, orthopnea, diaphoresis, dizziness & no pre or jeff syncopal episodes   Respiratory: No at rest shortness of breath, + dyspnea on exertion which he notes only typically when he is exercising on the treadmill at a increased grade, no significant productive cough, no wheezing and no hemoptysis   Gastrointestinal: No abdominal pain, nausea, vomiting, diarrhea, bloody stools   Genitourinary: No hematuria, dysuria other than increased frequency   Neurological: No headache, tremors, numbness,  or hemiparesis    Musculoskeletal: No cramps, myalgias,  joint pain, joint swelling   Integument: No recent rash, no edema      Patient Active  Problem List   Diagnosis    Hyperlipidemia    Essential hypertension    Palpitations    Shortness of breath     s/p AICD    VHD w/ MR    Mixed hyperlipidemia    Non- Ischemic Dilated cardiomyopathy    Fatigue    Abnormal finding of blood chemistry, unspecified     Near syncope    ICD (implantable cardioverter-defibrillator) malfunction    Metabolic syndrome    Chronic systolic heart failure       Past Medical History:   Diagnosis Date    Arrhythmia     Cardiomyopathy     Diverticulitis     Hyperlipidemia     Palpitations        Past Surgical History:   Procedure Laterality Date    BACK SURGERY      CARDIAC CATHETERIZATION      CARDIAC CATHETERIZATION N/A 2020    Procedure: Left Heart Cath;  Surgeon: Fernanda Jackson MD;  Location: [facility] CATH INVASIVE LOCATION;  Service: Cardiovascular;  Laterality: N/A;    CARDIAC CATHETERIZATION N/A 2020    Procedure: Coronary angiography;  Surgeon: Fernanda Jackson MD;  Location: [facility] CATH INVASIVE LOCATION;  Service: Cardiovascular;  Laterality: N/A;    CARDIAC CATHETERIZATION Right 2023    Procedure: Right Heart Cath;  Surgeon: Fernanda Jackson MD;  Location: [facility] CATH INVASIVE LOCATION;  Service: Cardiovascular;  Laterality: Right;    CARDIAC DEFIBRILLATOR PLACEMENT      COLON RESECTION      HERNIA REPAIR      INSERT / REPLACE / REMOVE PACEMAKER      ICD    KNEE ACL RECONSTRUCTION         Social History     Socioeconomic History    Marital status: Single   Tobacco Use    Smoking status: Former     Packs/day: 1.5     Types: Cigarettes     Quit date: 2021     Years since quittin.3     Passive exposure: Past    Smokeless tobacco: Former     Types: Snuff    Tobacco comments:     QUIT SMOKING    Vaping Use    Vaping Use: Never used    Passive vaping exposure: Yes   Substance and Sexual Activity    Alcohol use: Not Currently    Drug use: Yes     Types: Marijuana     Comment: once/day    Sexual activity: Yes     Partners: Female        Not on File      Current Outpatient Medications:     aspirin 81 MG chewable tablet, Chew 1 tablet Daily., Disp: , Rfl:     carvedilol (COREG) 25 MG tablet, Take 1 tablet by mouth 2 (Two) Times a Day., Disp: 180 tablet, Rfl: 3    dapagliflozin Propanediol (Farxiga) 10 MG tablet, TAKE 1 TABLET BY MOUTH DAILY, Disp: 90 tablet, Rfl: 1    furosemide (LASIX) 40 MG tablet, Take 1 tablet by mouth 2 (Two) Times a Day., Disp: , Rfl:     isosorbide mononitrate (IMDUR) 30 MG 24 hr tablet, Take 1 tablet by mouth Daily., Disp: 30 tablet, Rfl: 11    Magnesium Oxide 400 MG capsule, Take 400 mg by mouth 2 (Two) Times a Day Before Meals., Disp: 60 each, Rfl: 11    multivitamin with minerals tablet tablet, Take 1 tablet by mouth Daily., Disp: , Rfl:     Omega-3 Fatty Acids (fish oil) 1000 MG capsule capsule, Take 3 capsules by mouth Daily With Breakfast., Disp: , Rfl:     pravastatin (PRAVACHOL) 20 MG tablet, TAKE 1 TABLET BY MOUTH EVERY NIGHT AT BEDTIME, Disp: 90 tablet, Rfl: 2    sacubitril-valsartan (Entresto) 49-51 MG tablet, Take 1 tablet by mouth 2 (Two) Times a Day., Disp: 180 tablet, Rfl: 0    spironolactone (ALDACTONE) 25 MG tablet, TAKE 1 TABLET BY MOUTH DAILY, Disp: 90 tablet, Rfl: 2    gabapentin (NEURONTIN) 800 MG tablet, Take 1 tablet by mouth 3 (Three) Times a Day. (Patient not taking: Reported on 12/21/2023), Disp: , Rfl:     Immunization History   Administered Date(s) Administered    Tdap 10/03/2020         Most recent EKG as reviewed:  Procedures     Most recent echo as reviewed:  Results for orders placed during the hospital encounter of 11/22/23    Adult Transthoracic Echo Complete W/ Cont if Necessary Per Protocol    Interpretation Summary    Left ventricular systolic function is severely decreased. Calculated left ventricular EF = 23% Left ventricular ejection fraction appears to be 21 - 25%.    The left ventricular cavity is moderately dilated.    Left ventricular wall thickness is consistent with mild  concentric hypertrophy.    The following left ventricular wall segments are hypokinetic: mid anterior, apical anterior, basal anterolateral, mid anterolateral, apical lateral, basal inferolateral, mid inferolateral, apical inferior, mid inferior, apical septal, basal inferoseptal, mid inferoseptal, apex hypokinetic, mid anteroseptal, basal anterior, basal inferior and basal inferoseptal.    Left ventricular diastolic function is consistent with (grade II w/high LAP) pseudonormalization.    The left atrial cavity is mild to moderately dilated.    Estimated right ventricular systolic pressure from tricuspid regurgitation is normal (<35 mmHg).      Most recent stress test results:  Results for orders placed in visit on 05/21/18    Stress Test With Myocardial Perfusion One Day    Interpretation Summary  · Findings consistent with an equivocal ECG stress test.  · Breast attenuation and diaphragmatic attenuation artifacts are present.  · Left ventricular ejection fraction is normal (Calculated EF = 50%).  · Myocardial perfusion imaging indicates a normal myocardial perfusion study with no evidence of ischemia.  · Impressions are consistent with a low risk study.  · Suboptimal study    Asymptomatic for chest pain. Specificity of study reduced secondary to baseline Non-specific ST-T wave abnormalities, however ECG is not suggestive of ischemia  Ectopy: none.  Blood pressure  And heart rate response:  Appropriate for Beta-blocker therapy  Pharmacologic study due to inability to tolerate increasing speed and grade of treadmill due to c/o  becoming very fatigued Stage II, and had to change to pharmacological procedure.  Participated in Low Level exercise and tolerance was fair.      Most recent cardiac catheterization results:  Results for orders placed during the hospital encounter of 09/28/23    Cardiac Catheterization/Vascular Study    Narrative  Table formatting from the original result was not included.  Cardiac  Catheterization Operative Report    Brian Stewart  [id number]  9/28/2023  @PCP@    He underwent cardiac catheterization.    Indications for the procedure include: Cardiomyopathy.        Procedure Details:  The risks, benefits, complications, treatment options, and expected outcomes were discussed with the patient. The patient and/or family concurred with the proposed plan, giving informed consent.    After informed consent the patient was brought to the cath lab after appropriate IV hydration was begun and oral premedication was given. He was further sedated with fentanyl. He was prepped and draped in the usual manner. Using the modified Seldinger access technique, a 6 [place] sheath was placed in the femoral artery. A left heart catheterization with coronary arteriography was performed. Findings are discussed below.    After the procedure was completed, sedation was stopped and the sheaths and catheters were all removed. Hemostasis was achieved per established hospital protocols.    Conscious sedation:  Conscious sedation was performed according to protocol.  I supervised and directed an independent trained observer with the assistance of monitoring the patient's level of consciousness and physiologic status throughout the procedure.  Intraoperative service time was 30 minutes.    Findings:    Hemodynamics Right heart catheterization and hemodynamic    Pulmonary capillary wedge pressure was 10 mmHg PA pressure systolic 22 diastolic 80 with a mean pressure of 14 mmHg  RV pressure systolic 20 mmHg  Right atrial pressure was 4 mm of medical  Femoral arterial saturation was 99%  PA saturation is 55%  Right atrial saturation is 53%  Darrion equation cardiac output is 2.75 L/min  Darrion equation cardiac index is 1.2 L/min/m²      Estimated Blood Loss:  Minimal    Specimens: None    Complications:  None; patient tolerated the procedure well.    Disposition: PACU - hemodynamically stable.    Condition:  stable    Impressions:  Normal PA pressures and normal filling pressures    Recommendations:  Medical management for cardiomyopathy  Test results reviewed and discussed with patient and family        Historical data copied forward from previous encounters in EMR including the history, exam, and assessment/plan has been reviewed and is unchanged except as I have noted and otherwise indicated.      Objective:         /61   Pulse 73   Resp 20   Wt 109 kg (240 lb)   SpO2 96%   BMI 38.74 kg/m²     General:  Well-developed, only mildly overly well-nourished, cooperative, no distress, appears stated age if not slightly older    Neuro:  A&O x3. No significantly obvious focal neuro deficet    Psych:  Pleasant - mildly flattened affect    HENT:  Normocephalic, atraumatic, moist mucous membranes , Normal ear placement,Throat not injected   Eyes:  PERRLA, Conjunctivae not injected, EOM's intact, conjunctiva does not appear significantly injected   Neck:  Supple, Mildly thickened, no lymph adenopathy nor thyromegaly no JVD or bruit    Lungs:    Symmetrical rise & fall of chest with baseline respiratory pattern. To auscultation lungs are relatively clear bilaterally, faint late phase expiratory wheezes, no rhonchi or rales are noted bases were not diminished bilaterally   Chest wall:  No significant tenderness when palpated. PMI @ 6th ICS MAL   Heart:  Regular rate and rhythm, S1 and S2 normal, no S3 or questionable mild S4, Gr I-I/VI systolic ejection murmur best heard at the apex , no obvious rub, click or gallop.    Abdomen:  non-distended, non-tender, bowel sounds noted in the 4 quadrants of the abdomen, significant adipose tissue identified    Extremities:  Equal color motion temperature and sensitivity, Rapid capillary refill noted within the nailbeds of fingers and toes bilaterally no notable lower extremity   edema.    Pulses:  2+ and symmetric all extremities, rapid capillary refill    Skin:  No obvious  rashes, significant lesions identified, warm dry and of normal turgor , plethora of tattoos     In-Office Procedure(s):  No orders to display        Imaging:    Results for orders placed during the hospital encounter of 09/05/23    XR Chest 1 View    Narrative  XR CHEST 1 VW    Date of Exam: 9/5/2023 8:43 PM EDT    Indication: weakness    Comparison: 8/5/2023.    Findings:  Stable left subclavian ICD/pacemaker device. There are no airspace consolidations. No pleural fluid. No pneumothorax. The pulmonary vasculature appears within normal limits. The cardiac and mediastinal silhouette appear unremarkable. No acute osseous  abnormality identified.    Impression  Impression:  No acute cardiopulmonary process.      Electronically Signed: Georgia Joel MD  9/5/2023 10:51 PM EDT  Workstation ID: [id number]       Results for orders placed during the hospital encounter of 09/05/23    CT Head Without Contrast    Narrative  CT HEAD WO CONTRAST    Date of Exam: 9/5/2023 9:21 PM EDT    Indication: AMS.    Comparison: None available.    Technique: Axial CT images were obtained of the head without contrast administration.  Coronal reconstructions were performed.  Automated exposure control and iterative reconstruction methods were used.      Findings:      The ventricles and subarachnoid spaces are unremarkable.    There is no intracranial hemorrhage.    There is no evidence of resent large arterial distribution infarct.    There is no edema or mass effect. No midline shift.    Limited evaluation of the orbits is unremarkable    The visualized paranasal sinuses are unremarkable..    Evaluation of the osseous structure at the appropriate bones window is unremarkable.    Impression  Impression:    No acute intracranial pathology.            Electronically Signed: Blayne Khan MD  9/5/2023 9:50 PM EDT  Workstation ID: [id number]      Results for orders placed during the hospital encounter of 09/05/23    CT Head Without  Contrast    Narrative  CT HEAD WO CONTRAST    Date of Exam: 9/5/2023 9:21 PM EDT    Indication: AMS.    Comparison: None available.    Technique: Axial CT images were obtained of the head without contrast administration.  Coronal reconstructions were performed.  Automated exposure control and iterative reconstruction methods were used.      Findings:      The ventricles and subarachnoid spaces are unremarkable.    There is no intracranial hemorrhage.    There is no evidence of resent large arterial distribution infarct.    There is no edema or mass effect. No midline shift.    Limited evaluation of the orbits is unremarkable    The visualized paranasal sinuses are unremarkable..    Evaluation of the osseous structure at the appropriate bones window is unremarkable.    Impression  Impression:    No acute intracranial pathology.            Electronically Signed: Blayne Khan MD  9/5/2023 9:50 PM EDT  Workstation ID: [id number]      Lab Review:   Hospital Outpatient Visit on 12/21/2023   Component Date Value    Glucose 12/21/2023 101 (H)     BUN 12/21/2023 17     Creatinine 12/21/2023 0.89     Sodium 12/21/2023 137     Potassium 12/21/2023 5.0     Chloride 12/21/2023 100     CO2 12/21/2023 26.0     Calcium 12/21/2023 9.9     BUN/Creatinine Ratio 12/21/2023 19.1     Anion Gap 12/21/2023 11.0     eGFR 12/21/2023 109.0     proBNP 12/21/2023 478.2 (H)     Magnesium 12/21/2023 2.5    Hospital Outpatient Visit on 11/22/2023   Component Date Value    LVIDd 11/22/2023 6.3     LVIDs 11/22/2023 5.2     IVSd 11/22/2023 1.20     LVPWd 11/22/2023 1.06     FS 11/22/2023 16.2     IVS/LVPW 11/22/2023 1.14     ESV(cubed) 11/22/2023 144.5     EDV(cubed) 11/22/2023 245.5     LV mass(C)d 11/22/2023 310.8     LVOT area 11/22/2023 5.0     LVOT diam 11/22/2023 2.5     EDV(MOD-sp4) 11/22/2023 114.1     ESV(MOD-sp4) 11/22/2023 87.7     SV(MOD-sp4) 11/22/2023 26.4     EF(MOD-sp4) 11/22/2023 23.1     MV E max anabella 11/22/2023 34.4     MV A max  chandler 11/22/2023 56.6     MV dec time 11/22/2023 0.28     MV E/A 11/22/2023 0.61     Pulm A Revs Dur 11/22/2023 0.13     SV(LVOT) 11/22/2023 45.0     Pulm Sys Chandler 11/22/2023 62.0     Pulm Stevens Chandler 11/22/2023 40.5     Pulm S/D 11/22/2023 1.53     Pulm A Revs Chandler 11/22/2023 46.2     LV V1 max 11/22/2023 43.3     LV V1 max PG 11/22/2023 0.75     LV V1 mean PG 11/22/2023 0.52     LV V1 VTI 11/22/2023 9.1     Ao pk chandler 11/22/2023 102.2     Ao max PG 11/22/2023 4.2     Ao mean PG 11/22/2023 2.6     Ao V2 VTI 11/22/2023 20.8     RENATO(I,D) 11/22/2023 2.16     MV max PG 11/22/2023 1.49     MV mean PG 11/22/2023 0.56     MV V2 VTI 11/22/2023 20.8     MVA(VTI) 11/22/2023 2.16     MV dec slope 11/22/2023 124.2     TR max chandler 11/22/2023 178.6     TR max PG 11/22/2023 12.8     RV V1 max PG 11/22/2023 0.86     RV V1 max 11/22/2023 46.3     RV V1 VTI 11/22/2023 9.1     PA V2 max 11/22/2023 87.9     Ao root diam 11/22/2023 3.2     ACS 11/22/2023 2.26     EF(MOD-bp) 11/22/2023 23.0     LA dimension (2D)  11/22/2023 3.7    Hospital Outpatient Visit on 11/16/2023   Component Date Value    Glucose 11/16/2023 100 (H)     BUN 11/16/2023 22 (H)     Creatinine 11/16/2023 0.92     Sodium 11/16/2023 138     Potassium 11/16/2023 4.0     Chloride 11/16/2023 101     CO2 11/16/2023 25.0     Calcium 11/16/2023 9.7     BUN/Creatinine Ratio 11/16/2023 23.9     Anion Gap 11/16/2023 12.0     eGFR 11/16/2023 105.8     Magnesium 11/16/2023 2.0     proBNP 11/16/2023 648.6 (H)    Hospital Outpatient Visit on 10/05/2023   Component Date Value    Glucose 10/05/2023 94     BUN 10/05/2023 19     Creatinine 10/05/2023 0.84     Sodium 10/05/2023 137     Potassium 10/05/2023 4.5     Chloride 10/05/2023 102     CO2 10/05/2023 26.0     Calcium 10/05/2023 9.0     BUN/Creatinine Ratio 10/05/2023 22.6     Anion Gap 10/05/2023 9.0     eGFR 10/05/2023 111.0     Magnesium 10/05/2023 2.3     proBNP 10/05/2023 538.5 (H)     QT Interval 10/05/2023 426     QTC Interval  10/05/2023 413    Lab on 09/27/2023   Component Date Value    COVID19 09/27/2023 Not Detected     Glucose 09/28/2023 85     Sodium 09/28/2023 142     POC Potassium 09/28/2023 4.0     Ionized Calcium 09/28/2023 1.27     Hematocrit 09/28/2023 44     Hemoglobin 09/28/2023 15.0     pH, Venous 09/28/2023 7.380     pCO2, Venous 09/28/2023 48.6     HCO3, Venous 09/28/2023 28.6 (H)     O2 Saturation, Venous 09/28/2023 30.0 (L)     pO2, Venous 09/28/2023 30.0 (L)     Glucose 09/28/2023 83     Sodium 09/28/2023 141     POC Potassium 09/28/2023 4.1     Ionized Calcium 09/28/2023 1.25     Hematocrit 09/28/2023 44     Hemoglobin 09/28/2023 15.0     pH, Venous 09/28/2023 7.380     pCO2, Venous 09/28/2023 49.3     HCO3, Venous 09/28/2023 29.3 (H)     O2 Saturation, Venous 09/28/2023 31.0 (L)     pO2, Venous 09/28/2023 29.0 (L)    Lab on 09/26/2023   Component Date Value    Glucose 09/26/2023 109 (H)     BUN 09/26/2023 21 (H)     Creatinine 09/26/2023 0.96     Sodium 09/26/2023 137     Potassium 09/26/2023 4.1     Chloride 09/26/2023 102     CO2 09/26/2023 22.2     Calcium 09/26/2023 9.5     BUN/Creatinine Ratio 09/26/2023 21.9     Anion Gap 09/26/2023 12.8     eGFR 09/26/2023 100.6     Protime 09/26/2023 10.7     INR 09/26/2023 0.98     proBNP 09/26/2023 480.0 (H)     Hemoglobin A1C 09/26/2023 5.50     WBC 09/26/2023 8.10     RBC 09/26/2023 5.16     Hemoglobin 09/26/2023 16.6     Hematocrit 09/26/2023 49.2     MCV 09/26/2023 95.5     MCH 09/26/2023 32.1     MCHC 09/26/2023 33.6     RDW 09/26/2023 12.1 (L)     RDW-SD 09/26/2023 40.3     MPV 09/26/2023 8.6     Platelets 09/26/2023 210     Neutrophil % 09/26/2023 69.8     Lymphocyte % 09/26/2023 18.6 (L)     Monocyte % 09/26/2023 9.5     Eosinophil % 09/26/2023 1.8     Basophil % 09/26/2023 0.3     Neutrophils, Absolute 09/26/2023 5.70     Lymphocytes, Absolute 09/26/2023 1.50     Monocytes, Absolute 09/26/2023 0.80     Eosinophils, Absolute 09/26/2023 0.10     Basophils, Absolute  09/26/2023 0.00     nRBC 09/26/2023 0.1    Admission on 09/05/2023, Discharged on 09/06/2023   Component Date Value    Glucose 09/05/2023 114 (H)     Glucose 09/05/2023 131 (H)     BUN 09/05/2023 21 (H)     Creatinine 09/05/2023 1.03     Sodium 09/05/2023 137     Potassium 09/05/2023 4.1     Chloride 09/05/2023 102     CO2 09/05/2023 24.0     Calcium 09/05/2023 9.3     Total Protein 09/05/2023 6.6     Albumin 09/05/2023 4.3     ALT (SGPT) 09/05/2023 15     AST (SGOT) 09/05/2023 30     Alkaline Phosphatase 09/05/2023 72     Total Bilirubin 09/05/2023 0.3     Globulin 09/05/2023 2.3     A/G Ratio 09/05/2023 1.9     BUN/Creatinine Ratio 09/05/2023 20.4     Anion Gap 09/05/2023 11.0     eGFR 09/05/2023 93.0     Color, UA 09/05/2023 Yellow     Appearance, UA 09/05/2023 Clear     pH, UA 09/05/2023 5.5     Specific Gravity, UA 09/05/2023 1.034 (H)     Glucose, UA 09/05/2023 >=1000 mg/dL (3+) (A)     Ketones, UA 09/05/2023 Negative     Bilirubin, UA 09/05/2023 Negative     Blood, UA 09/05/2023 Negative     Protein, UA 09/05/2023 Negative     Leuk Esterase, UA 09/05/2023 Negative     Nitrite, UA 09/05/2023 Negative     Urobilinogen, UA 09/05/2023 1.0 E.U./dL     Amphet/Methamphet, Screen 09/05/2023 Negative     Barbiturates Screen, Uri* 09/05/2023 Negative     Benzodiazepine Screen, U* 09/05/2023 Negative     Cocaine Screen, Urine 09/05/2023 Negative     Opiate Screen 09/05/2023 Negative     THC, Screen, Urine 09/05/2023 Positive (A)     Methadone Screen, Urine 09/05/2023 Negative     Oxycodone Screen, Urine 09/05/2023 Negative     Ethanol % 09/05/2023 <0.010     Magnesium 09/05/2023 2.2     QT Interval 09/05/2023 380     QTC Interval 09/05/2023 442     WBC 09/05/2023 8.30     RBC 09/05/2023 5.04     Hemoglobin 09/05/2023 16.3     Hematocrit 09/05/2023 48.0     MCV 09/05/2023 95.3     MCH 09/05/2023 32.4     MCHC 09/05/2023 34.0     RDW 09/05/2023 12.5     RDW-SD 09/05/2023 41.6     MPV 09/05/2023 8.1     Platelets  09/05/2023 200     Neutrophil % 09/05/2023 69.9     Lymphocyte % 09/05/2023 21.1     Monocyte % 09/05/2023 6.8     Eosinophil % 09/05/2023 1.6     Basophil % 09/05/2023 0.6     Neutrophils, Absolute 09/05/2023 5.80     Lymphocytes, Absolute 09/05/2023 1.80     Monocytes, Absolute 09/05/2023 0.60     Eosinophils, Absolute 09/05/2023 0.10     Basophils, Absolute 09/05/2023 0.00     nRBC 09/05/2023 0.1    Hospital Outpatient Visit on 08/24/2023   Component Date Value    Glucose 08/24/2023 102 (H)     BUN 08/24/2023 26 (H)     Creatinine 08/24/2023 1.04     Sodium 08/24/2023 137     Potassium 08/24/2023 4.8     Chloride 08/24/2023 103     CO2 08/24/2023 23.0     Calcium 08/24/2023 10.2     BUN/Creatinine Ratio 08/24/2023 25.0     Anion Gap 08/24/2023 11.0     eGFR 08/24/2023 91.9     proBNP 08/24/2023 610.7 (H)     Magnesium 08/24/2023 2.3    Hospital Outpatient Visit on 08/10/2023   Component Date Value    Glucose 08/10/2023 82     BUN 08/10/2023 25 (H)     Creatinine 08/10/2023 1.01     Sodium 08/10/2023 137     Potassium 08/10/2023 4.6     Chloride 08/10/2023 99     CO2 08/10/2023 25.0     Calcium 08/10/2023 9.8     BUN/Creatinine Ratio 08/10/2023 24.8     Anion Gap 08/10/2023 13.0     eGFR 08/10/2023 95.2     proBNP 08/10/2023 923.2 (H)     D-Dimer, Quantitative 08/10/2023 0.31     TSH 08/10/2023 0.582     QT Interval 08/10/2023 393    Admission on 08/05/2023, Discharged on 08/07/2023   Component Date Value    QT Interval 08/05/2023 438     Glucose 08/05/2023 115 (H)     BUN 08/05/2023 19     Creatinine 08/05/2023 0.94     Sodium 08/05/2023 139     Potassium 08/05/2023 4.2     Chloride 08/05/2023 104     CO2 08/05/2023 25.0     Calcium 08/05/2023 9.1     Total Protein 08/05/2023 6.3     Albumin 08/05/2023 4.1     ALT (SGPT) 08/05/2023 25     AST (SGOT) 08/05/2023 40     Alkaline Phosphatase 08/05/2023 75     Total Bilirubin 08/05/2023 0.3     Globulin 08/05/2023 2.2     A/G Ratio 08/05/2023 1.9     BUN/Creatinine  Ratio 08/05/2023 20.2     Anion Gap 08/05/2023 10.0     eGFR 08/05/2023 103.8     ADENOVIRUS, PCR 08/05/2023 Not Detected     Coronavirus 229E 08/05/2023 Not Detected     Coronavirus HKU1 08/05/2023 Not Detected     Coronavirus NL63 08/05/2023 Not Detected     Coronavirus OC43 08/05/2023 Not Detected     COVID19 08/05/2023 Not Detected     Human Metapneumovirus 08/05/2023 Not Detected     Human Rhinovirus/Enterov* 08/05/2023 Not Detected     Influenza A PCR 08/05/2023 Not Detected     Influenza B PCR 08/05/2023 Not Detected     Parainfluenza Virus 1 08/05/2023 Not Detected     Parainfluenza Virus 2 08/05/2023 Not Detected     Parainfluenza Virus 3 08/05/2023 Not Detected     Parainfluenza Virus 4 08/05/2023 Not Detected     RSV, PCR 08/05/2023 Not Detected     Bordetella pertussis pcr 08/05/2023 Not Detected     Bordetella parapertussis* 08/05/2023 Not Detected     Chlamydophila pneumoniae* 08/05/2023 Not Detected     Mycoplasma pneumo by PCR 08/05/2023 Not Detected     proBNP 08/05/2023 979.4 (H)     HS Troponin T 08/05/2023 9     D-Dimer, Quantitative 08/05/2023 0.23     TSH 08/05/2023 0.793     WBC 08/05/2023 9.10     RBC 08/05/2023 4.76     Hemoglobin 08/05/2023 15.3     Hematocrit 08/05/2023 45.5     MCV 08/05/2023 95.6     MCH 08/05/2023 32.1     MCHC 08/05/2023 33.6     RDW 08/05/2023 12.8     RDW-SD 08/05/2023 42.4     MPV 08/05/2023 8.3     Platelets 08/05/2023 187     Neutrophil % 08/05/2023 67.2     Lymphocyte % 08/05/2023 23.3     Monocyte % 08/05/2023 7.2     Eosinophil % 08/05/2023 1.6     Basophil % 08/05/2023 0.7     Neutrophils, Absolute 08/05/2023 6.10     Lymphocytes, Absolute 08/05/2023 2.10     Monocytes, Absolute 08/05/2023 0.70     Eosinophils, Absolute 08/05/2023 0.10     Basophils, Absolute 08/05/2023 0.10     nRBC 08/05/2023 0.1     LVIDd 08/06/2023 5.8     LVIDs 08/06/2023 5.5     IVSd 08/06/2023 1.10     LVPWd 08/06/2023 0.90     FS 08/06/2023 5.2     IVS/LVPW 08/06/2023 1.22      ESV(cubed) 08/06/2023 166.4     LV Sys Vol (BSA correcte* 08/06/2023 53.7     EDV(cubed) 08/06/2023 195.1     LV Stevens Vol (BSA correct* 08/06/2023 76.0     LVOT area 08/06/2023 4.9     LV mass(C)d 08/06/2023 233.1     LVOT diam 08/06/2023 2.5     EDV(MOD-sp4) 08/06/2023 170.0     ESV(MOD-sp4) 08/06/2023 120.0     SV(MOD-sp4) 08/06/2023 50.0     SI(MOD-sp4) 08/06/2023 22.4     EF(MOD-sp4) 08/06/2023 29.4     MV E max chandler 08/06/2023 60.4     MV A max chandler 08/06/2023 68.8     MV dec time 08/06/2023 0.19     MV E/A 08/06/2023 0.88     LA ESV Index (BP) 08/06/2023 22.8     Med Peak E' Chandler 08/06/2023 6.1     Lat Peak E' Chandler 08/06/2023 4.9     Avg E/e' ratio 08/06/2023 10.98     SV(LVOT) 08/06/2023 46.0     TAPSE (>1.6) 08/06/2023 2.38     RV S' 08/06/2023 8.1     LA dimension (2D)  08/06/2023 4.2     LV V1 max 08/06/2023 43.7     LV V1 max PG 08/06/2023 0.76     LV V1 mean PG 08/06/2023 0.00     LV V1 VTI 08/06/2023 9.4     Ao pk chandler 08/06/2023 94.7     Ao max PG 08/06/2023 3.6     Ao mean PG 08/06/2023 2.00     Ao V2 VTI 08/06/2023 21.0     RENATO(I,D) 08/06/2023 2.19     MV max PG 08/06/2023 2.04     MV mean PG 08/06/2023 1.00     MV V2 VTI 08/06/2023 20.7     MV P1/2t 08/06/2023 52.5     MVA(P1/2t) 08/06/2023 4.2     MVA(VTI) 08/06/2023 2.22     MV dec slope 08/06/2023 368.0     MR max chandler 08/06/2023 431.0     MR max PG 08/06/2023 74.3     TR max chandler 08/06/2023 201.0     TR max PG 08/06/2023 16.2     RVSP(TR) 08/06/2023 24.2     RAP systole 08/06/2023 8.0     RV V1 max PG 08/06/2023 0.80     RV V1 max 08/06/2023 44.7     RV V1 VTI 08/06/2023 10.8     PA V2 max 08/06/2023 82.2     Ao root diam 08/06/2023 3.2     ACS 08/06/2023 2.30     Sinus 08/06/2023 2.9     EF(MOD-bp) 08/06/2023 29.0     Glucose 08/06/2023 117 (H)     BUN 08/06/2023 18     Creatinine 08/06/2023 0.88     Sodium 08/06/2023 139     Potassium 08/06/2023 3.8     Chloride 08/06/2023 103     CO2 08/06/2023 23.0     Calcium 08/06/2023 8.9     BUN/Creatinine  Ratio 08/06/2023 20.5     Anion Gap 08/06/2023 13.0     eGFR 08/06/2023 110.1     WBC 08/06/2023 9.60     RBC 08/06/2023 5.03     Hemoglobin 08/06/2023 15.9     Hematocrit 08/06/2023 46.9     MCV 08/06/2023 93.3     MCH 08/06/2023 31.6     MCHC 08/06/2023 33.9     RDW 08/06/2023 12.5     RDW-SD 08/06/2023 42.9     MPV 08/06/2023 8.3     Platelets 08/06/2023 184     Neutrophil % 08/06/2023 61.5     Lymphocyte % 08/06/2023 28.0     Monocyte % 08/06/2023 7.9     Eosinophil % 08/06/2023 2.2     Basophil % 08/06/2023 0.4     Neutrophils, Absolute 08/06/2023 5.90     Lymphocytes, Absolute 08/06/2023 2.70     Monocytes, Absolute 08/06/2023 0.80     Eosinophils, Absolute 08/06/2023 0.20     Basophils, Absolute 08/06/2023 0.00     nRBC 08/06/2023 0.0     HS Troponin T 08/06/2023 12     Total Cholesterol 08/06/2023 172     Triglycerides 08/06/2023 133     HDL Cholesterol 08/06/2023 47     LDL Cholesterol  08/06/2023 101 (H)     VLDL Cholesterol 08/06/2023 24     LDL/HDL Ratio 08/06/2023 2.09     Glucose 08/07/2023 113 (H)     BUN 08/07/2023 21 (H)     Creatinine 08/07/2023 0.98     Sodium 08/07/2023 138     Potassium 08/07/2023 4.3     Chloride 08/07/2023 101     CO2 08/07/2023 26.0     Calcium 08/07/2023 9.3     BUN/Creatinine Ratio 08/07/2023 21.4     Anion Gap 08/07/2023 11.0     eGFR 08/07/2023 98.7     Magnesium 08/07/2023 2.2     WBC 08/07/2023 9.40     RBC 08/07/2023 5.09     Hemoglobin 08/07/2023 16.6     Hematocrit 08/07/2023 48.0     MCV 08/07/2023 94.3     MCH 08/07/2023 32.5     MCHC 08/07/2023 34.5     RDW 08/07/2023 12.8     RDW-SD 08/07/2023 42.4     MPV 08/07/2023 8.4     Platelets 08/07/2023 189     Neutrophil % 08/07/2023 63.6     Lymphocyte % 08/07/2023 25.0     Monocyte % 08/07/2023 8.8     Eosinophil % 08/07/2023 2.3     Basophil % 08/07/2023 0.3     Neutrophils, Absolute 08/07/2023 6.00     Lymphocytes, Absolute 08/07/2023 2.40     Monocytes, Absolute 08/07/2023 0.80     Eosinophils, Absolute 08/07/2023  "0.20     Basophils, Absolute 08/07/2023 0.00     nRBC 08/07/2023 0.1    There may be more visits with results that are not included.     Recent labs reviewed and interpreted for clinical significance and application    Went over each of the labs with the patient while he was still here in the clinic          It is a pleasure to be involved in this patient's cardiovascular care relating to their heart failure.  Please feel free to call me with any questions or concerns.    Mainor \"William\" Cynthia MILNER, [facility]  Heart failure program clinical provider    ANNIA Nugent   12/20/23  .  "

## 2023-12-21 ENCOUNTER — HOSPITAL ENCOUNTER (OUTPATIENT)
Facility: HOSPITAL | Age: 43
Discharge: HOME | End: 2023-12-21
Admitting: NURSE PRACTITIONER
Payer: MEDICARE

## 2023-12-21 ENCOUNTER — DISEASE STATE MANAGEMENT VISIT (OUTPATIENT)
Facility: HOSPITAL | Age: 43
End: 2023-12-21
Payer: MEDICARE

## 2023-12-21 VITALS
SYSTOLIC BLOOD PRESSURE: 111 MMHG | HEART RATE: 73 BPM | RESPIRATION RATE: 20 BRPM | BODY MASS INDEX: 38.74 KG/M2 | WEIGHT: 240 LBS | DIASTOLIC BLOOD PRESSURE: 61 MMHG | OXYGEN SATURATION: 96 %

## 2023-12-21 DIAGNOSIS — R06.02 SHORTNESS OF BREATH: ICD-10-CM

## 2023-12-21 DIAGNOSIS — I34.0 NONRHEUMATIC MITRAL VALVE REGURGITATION: ICD-10-CM

## 2023-12-21 DIAGNOSIS — I42.0 DILATED CARDIOMYOPATHY: Primary | Chronic | ICD-10-CM

## 2023-12-21 DIAGNOSIS — R79.9 ABNORMAL FINDING OF BLOOD CHEMISTRY, UNSPECIFIED: ICD-10-CM

## 2023-12-21 DIAGNOSIS — E88.810 METABOLIC SYNDROME: Chronic | ICD-10-CM

## 2023-12-21 DIAGNOSIS — I50.22 CHRONIC SYSTOLIC HEART FAILURE: Chronic | ICD-10-CM

## 2023-12-21 DIAGNOSIS — Z95.810 AICD (AUTOMATIC CARDIOVERTER/DEFIBRILLATOR) PRESENT: ICD-10-CM

## 2023-12-21 LAB
ANION GAP SERPL CALCULATED.3IONS-SCNC: 11 MMOL/L (ref 5–15)
BUN SERPL-MCNC: 17 MG/DL (ref 6–20)
BUN/CREAT SERPL: 19.1 (ref 7–25)
CALCIUM SPEC-SCNC: 9.9 MG/DL (ref 8.6–10.5)
CHLORIDE SERPL-SCNC: 100 MMOL/L (ref 98–107)
CO2 SERPL-SCNC: 26 MMOL/L (ref 22–29)
CREAT SERPL-MCNC: 0.89 MG/DL (ref 0.76–1.27)
EGFRCR SERPLBLD CKD-EPI 2021: 109 ML/MIN/1.73
GLUCOSE SERPL-MCNC: 101 MG/DL (ref 65–99)
MAGNESIUM SERPL-MCNC: 2.5 MG/DL (ref 1.6–2.6)
NT-PROBNP SERPL-MCNC: 478.2 PG/ML (ref 0–450)
POTASSIUM SERPL-SCNC: 5 MMOL/L (ref 3.5–5.2)
SODIUM SERPL-SCNC: 137 MMOL/L (ref 136–145)

## 2023-12-21 PROCEDURE — 83735 ASSAY OF MAGNESIUM: CPT | Performed by: NURSE PRACTITIONER

## 2023-12-21 PROCEDURE — G0463 HOSPITAL OUTPT CLINIC VISIT: HCPCS

## 2023-12-21 PROCEDURE — 83880 ASSAY OF NATRIURETIC PEPTIDE: CPT | Performed by: NURSE PRACTITIONER

## 2023-12-21 PROCEDURE — 80048 BASIC METABOLIC PNL TOTAL CA: CPT | Performed by: NURSE PRACTITIONER

## 2023-12-21 NOTE — ADDENDUM NOTE
Encounter addended by: Cathleen Aragon RN on: 12/21/2023 12:34 PM   Actions taken: Charge Capture section accepted

## 2023-12-21 NOTE — PROGRESS NOTES
HEART FAILURE CLINIC - PHARMACY SERVICE     HFrEF with EF 23% (Last Echo: 11/22/23).    NYHA Class II B     The patient's last EKG was reviewed from 10/5/23 and shows a QTcB of 413 ms.      Cardiologist: Renetta  Nephrologist: None  PCP: Rosas     -CHF-specific BB:      Pre Visit Dose: Carvedilol 25 mg PO BID    Post Visit Dose: Carvedilol 25 mg PO BID (HR 73 bpm and /61 mmHg)     - Target Dose: Carvedilol 25 mg PO BID (<85 kg) or 50 mg PO BID (>85 kg).     - Goal HR of 50s to 60s.     - Patient should be seen every 10 to 14 days for a pulse check with plans for up-titration until target heart rate is achieved.        -ACE/ARB/ARNI:     Pre Visit Dose: Sacubitril/valsartan    Post Visit Dose: Sacubitril/valsartan (/61 mmHg and SCr 0.89 mg/dL)    - Target Dose: Sacubitril/valsartan 97/103 mg PO BID    - Patient should have a follow-up appointment every 2 to 4 weeks for a hemodynamic check with possible up-titration to target dose.         -SGLT2 inhibitor therapy:    Pre Visit Dose: Dapagliflozin 10 mg PO daily    Post Visit Dose: Dapagliflozin 10 mg PO daily    - Target Dose: Dapagliflozin 10 mg PO daily : CrCl > 20 mL/min which has shown benefit in patients with HF       -MRA:     Pre Visit Dose: Spironolactone  25 mg PO daily    Post Visit Dose: Spironolactone  25 mg PO daily    - Target Dose: Spironolactone 25-50 mg PO daily    - K is not < 5 mEq/L and SCr is </= 2.5 mg/dL in male and eGFR > 30 mL/min/1.73m3    K noted as 5 mmol/L today however note attached stating slight hemolysis which may falsely elevate level.        -DIURETIC:     Pre Visit Dose: Furosemide 40 mg PO BID    Post Visit Dose: Furosemide 40 mg PO BID    **Of note, at one time patient was taking furosemide 40 mg PO QAM and 20 mg PO QPM. Unsure of when this was changed or by what provider however patient states he has been taking 40 mg PO BID for a while now. APRN wishes for patient to continue with this current diuretic  regimen.      -MAGNESIUM:     Mag is > 2 mg/dL    -If Magnesium 1.6-1.9 mg/dL, Initiate Magnesium 400 mg PO daily  -If Magnesium is less than 1.6 mg/dL, Initiate Magnesium 400 mg PO BID    -ANTICOAGULATION:     None       -OTHER CV MEDS:     Pre Visit Dose: aspirin 81 mg PO daily, isosorbide mononitrate 30 mg PO daily, pravastatin 20 mg PO QHS     Post Visit Dose: no changes      -Clinic Administered Medications:     None      Vaccines:     Not assessed at this visit       Patient Assistance:      None            PLAN:  Initiation/Discontinuation/Dose Adjustment: no medication changes  Education provided: none by pharmacist  Coordination of Care: none  Refills: none          Chely Melendez PharmD  12/21/2023  12:10 EST

## 2024-02-14 RX ORDER — SACUBITRIL AND VALSARTAN 49; 51 MG/1; MG/1
1 TABLET, FILM COATED ORAL 2 TIMES DAILY
Qty: 180 TABLET | Refills: 0 | Status: SHIPPED | OUTPATIENT
Start: 2024-02-14

## 2024-02-21 ENCOUNTER — OFFICE VISIT (OUTPATIENT)
Dept: CARDIOLOGY | Facility: CLINIC | Age: 44
End: 2024-02-21
Payer: MEDICARE

## 2024-02-21 VITALS
OXYGEN SATURATION: 97 % | SYSTOLIC BLOOD PRESSURE: 120 MMHG | HEIGHT: 66 IN | BODY MASS INDEX: 38.73 KG/M2 | WEIGHT: 241 LBS | DIASTOLIC BLOOD PRESSURE: 70 MMHG | HEART RATE: 70 BPM

## 2024-02-21 DIAGNOSIS — T82.118A MALFUNCTION OF IMPLANTABLE CARDIOVERTER-DEFIBRILLATOR (ICD), INITIAL ENCOUNTER: ICD-10-CM

## 2024-02-21 DIAGNOSIS — I42.0 DILATED CARDIOMYOPATHY: Primary | ICD-10-CM

## 2024-02-21 DIAGNOSIS — I50.22 CHRONIC SYSTOLIC HEART FAILURE: ICD-10-CM

## 2024-02-21 DIAGNOSIS — E78.2 MIXED HYPERLIPIDEMIA: ICD-10-CM

## 2024-02-21 DIAGNOSIS — I34.0 NONRHEUMATIC MITRAL VALVE REGURGITATION: ICD-10-CM

## 2024-02-21 DIAGNOSIS — Z95.810 AICD (AUTOMATIC CARDIOVERTER/DEFIBRILLATOR) PRESENT: ICD-10-CM

## 2024-02-21 NOTE — PROGRESS NOTES
Cardiology Office Visit      Encounter Date:  02/21/2024    Patient ID:   Brian Stewart is a 43 y.o. male.    Reason For Consultation:  Congestive heart failure  Cardiomyopathy    History of Present Illness:  Dear Santana Garcia MD    I had the pleasure of seeing Brian Stewart today. As you are well aware, this is a 43 y.o. male with past medical history that is significant for nonischemic cardiomyopathy severe cardiomyopathy requiring AICD     Denies any new cardiac symptoms  No dizziness or syncope  Compliant with medical therapy  Denies any new cardiac symptoms  Complaining of some intermittent palpitations recent emergency room visit    Impressions:  Normal coronaries  Severe cardiomyopathy  Severe LV dysfunction  Elevated left-sided filling pressures  Estimated LV ejection fraction of 15%      Interpretation Summary    The left ventricular cavity is severely dilated.  Estimated left ventricular EF = 25% Estimated left ventricular EF was in agreement with the calculated left ventricular EF. Left ventricular systolic function is severely decreased.  The following left ventricular wall segments are hypokinetic: mid anterior, apical anterior, basal anterolateral, basal inferolateral, basal inferoseptal, mid anteroseptal, basal anterior, basal inferior and basal inferoseptal. The following left ventricular wall segments are akinetic: mid anterolateral, apical lateral, mid inferolateral, apical inferior, mid inferior, apical septal, mid inferoseptal and apex.  Moderate mitral valve regurgitation is present.  Left ventricular diastolic function is consistent with (grade I) impaired relaxation.  Estimated right ventricular systolic pressure from tricuspid regurgitation is normal (<35 mmHg).           Assessment & Plan    Impressions:  chronic systolic heart failure/stable  Congestive heart failure NYHA class II/no new complaints  Cardiomyopathy most likely nonischemic  Severe mitral regurgitation  "nonrheumatic/now moderate  Hypertension  Obesity  Obstructive sleep apnea  Status post ICD with nonsustained ventricular tachycardia  Noncompliance with medical therapy  Nonischemic cardiomyopathy with LV ejection fraction of 25%  Recent hospitalization for CHF exacerbation  Impaired fasting glucose  Recent normal right heart catheterization with normal PA pressures    Recommendations:  Continue current medical therapy  Low-salt diet  Continue current medical therapy with aspirin 81 mg p.o. once a day Coreg 12.5 mg p.o. twice daily Farxiga 10 mg p.o. once a day continue Entresto twice a day Pravachol 20 mg p.o. once a day spironolactone 25 mg p.o. once a day  Recently discontinue for isosorbide secondary to low blood pressure and also decreasing the dose of the Coreg secondary to low blood pressure and symptoms of fatigue and side effects from high-dose beta-blockers  Repeat echocardiogram to assess the LV systolic function  Made for referral for advanced heart failure  prior work-up reviewed and discussed with patient  Patient was seen and evaluated by heart failure service at [facility]  For compliance with medical therapy close monitoring and follow-up and close monitoring of blood pressure at home reviewed and discussed with patient  If there is room blood pressure consider increasing the dose of Entresto  Follow-up in office in 3 months        Vitals:  Vitals:    02/21/24 0927   BP: 120/70   Pulse: 70   SpO2: 97%   Weight: 109 kg (241 lb)   Height: 167.6 cm (66\")       Physical Exam:    General: Alert, cooperative, no distress, appears stated age  Head:  Normocephalic, atraumatic, mucous membranes moist  Eyes:  Conjunctiva/corneas clear, EOM's intact     Neck:  Supple,  no adenopathy;      Lungs: Clear to auscultation bilaterally, no wheezes rhonchi rales are noted  Chest wall: No tenderness  Heart::  Regular rate and rhythm, S1 and S2 normal, no murmur, rub or gallop  Abdomen: Soft, non-tender, " nondistended bowel sounds active  Extremities: No cyanosis, clubbing, or edema  Pulses: 2+ and symmetric all extremities  Skin:  No rashes or lesions  Neuro/psych: A&O x3. CN II through XII are grossly intact with appropriate affect              Lab Results   Component Value Date    GLUCOSE 101 (H) 12/21/2023    BUN 17 12/21/2023    CREATININE 0.89 12/21/2023    EGFR 109.0 12/21/2023    BCR 19.1 12/21/2023    K 5.0 12/21/2023    CO2 26.0 12/21/2023    CALCIUM 9.9 12/21/2023    ALBUMIN 4.3 09/05/2023    BILITOT 0.3 09/05/2023    AST 30 09/05/2023    ALT 15 09/05/2023     Results for orders placed during the hospital encounter of 11/22/23    Adult Transthoracic Echo Complete W/ Cont if Necessary Per Protocol    Interpretation Summary    Left ventricular systolic function is severely decreased. Calculated left ventricular EF = 23% Left ventricular ejection fraction appears to be 21 - 25%.    The left ventricular cavity is moderately dilated.    Left ventricular wall thickness is consistent with mild concentric hypertrophy.    The following left ventricular wall segments are hypokinetic: mid anterior, apical anterior, basal anterolateral, mid anterolateral, apical lateral, basal inferolateral, mid inferolateral, apical inferior, mid inferior, apical septal, basal inferoseptal, mid inferoseptal, apex hypokinetic, mid anteroseptal, basal anterior, basal inferior and basal inferoseptal.    Left ventricular diastolic function is consistent with (grade II w/high LAP) pseudonormalization.    The left atrial cavity is mild to moderately dilated.    Estimated right ventricular systolic pressure from tricuspid regurgitation is normal (<35 mmHg).     Results for orders placed during the hospital encounter of 09/28/23    Cardiac Catheterization/Vascular Study    Conclusion  Table formatting from the original result was not included.  Cardiac Catheterization Operative Report    Brian Stewart  [id number]  9/28/2023  @[facility]@      underwent cardiac catheterization.    Indications for the procedure include: Cardiomyopathy.        Procedure Details:  The risks, benefits, complications, treatment options, and expected outcomes were discussed with the patient. The patient and/or family concurred with the proposed plan, giving informed consent.    After informed consent the patient was brought to the cath lab after appropriate IV hydration was begun and oral premedication was given. He was further sedated with fentanyl. He was prepped and draped in the usual manner. Using the modified Seldinger access technique, a 6 [place] sheath was placed in the femoral artery. A left heart catheterization with coronary arteriography was performed. Findings are discussed below.    After the procedure was completed, sedation was stopped and the sheaths and catheters were all removed. Hemostasis was achieved per established hospital protocols.    Conscious sedation:  Conscious sedation was performed according to protocol.  I supervised and directed an independent trained observer with the assistance of monitoring the patient's level of consciousness and physiologic status throughout the procedure.  Intraoperative service time was 30 minutes.    Findings:    Hemodynamics Right heart catheterization and hemodynamic    Pulmonary capillary wedge pressure was 10 mmHg PA pressure systolic 22 diastolic 80 with a mean pressure of 14 mmHg  RV pressure systolic 20 mmHg  Right atrial pressure was 4 mm of medical  Femoral arterial saturation was 99%  PA saturation is 55%  Right atrial saturation is 53%  Darrion equation cardiac output is 2.75 L/min  Darrion equation cardiac index is 1.2 L/min/m²      Estimated Blood Loss:  Minimal    Specimens: None    Complications:  None; patient tolerated the procedure well.    Disposition: PACU - hemodynamically stable.    Condition: stable    Impressions:  Normal PA pressures and normal filling pressures    Recommendations:  Medical management  for cardiomyopathy  Test results reviewed and discussed with patient and family     Lab Results   Component Value Date    CHOL 172 08/06/2023    TRIG 133 08/06/2023    HDL 47 08/06/2023     (H) 08/06/2023      Results for orders placed in visit on 05/21/18    Stress Test With Myocardial Perfusion One Day    Interpretation Summary  · Findings consistent with an equivocal ECG stress test.  · Breast attenuation and diaphragmatic attenuation artifacts are present.  · Left ventricular ejection fraction is normal (Calculated EF = 50%).  · Myocardial perfusion imaging indicates a normal myocardial perfusion study with no evidence of ischemia.  · Impressions are consistent with a low risk study.  · Suboptimal study    Asymptomatic for chest pain. Specificity of study reduced secondary to baseline Non-specific ST-T wave abnormalities, however ECG is not suggestive of ischemia  Ectopy: none.  Blood pressure  And heart rate response:  Appropriate for Beta-blocker therapy  Pharmacologic study due to inability to tolerate increasing speed and grade of treadmill due to c/o  becoming very fatigued Stage II, and had to change to pharmacological procedure.  Participated in Low Level exercise and tolerance was fair.   Results for orders placed during the hospital encounter of 11/09/22    Mobile Cardiac Outpatient Telemetry    Interpretation Summary  Extended Holter monitor study for symptoms of palpitations  Patient was monitored from November 9, 2022 to December 8, 2022  Rhythm is sinus rhythm with a minimal heart rate of 50 beats per [place] 169 bpm average heart rate of 77 bpm  No atrial fibrillation  No clinically significant pauses  First-degree heart block  PVC burden of 7%  PAC burden of 8%  Nonsustained ventricular tachycardia for total 4 beats at rate of 170 bpm  Patient had multiple symptomatic episodes with heart racing and skipped beat during that time patient noted to be in sinus rhythm with PVC burden most  likely patient's symptoms are secondary to PVC burden based on this Holter monitor study  Abnormal Holter monitor study  Clinical correlation is recommended           Objective:          Allergies:  No Known Allergies    Medication Review:     Current Outpatient Medications:     aspirin 81 MG chewable tablet, Chew 1 tablet Daily., Disp: , Rfl:     carvedilol (COREG) 25 MG tablet, Take 1 tablet by mouth 2 (Two) Times a Day. (Patient taking differently: Take 0.5 tablets by mouth 2 (Two) Times a Day With Meals.), Disp: 180 tablet, Rfl: 3    dapagliflozin Propanediol (Farxiga) 10 MG tablet, TAKE 1 TABLET BY MOUTH DAILY, Disp: 90 tablet, Rfl: 1    furosemide (LASIX) 40 MG tablet, Take 1 tablet by mouth 2 (Two) Times a Day., Disp: , Rfl:     Magnesium Oxide 400 MG capsule, Take 400 mg by mouth 2 (Two) Times a Day Before Meals., Disp: 60 each, Rfl: 11    multivitamin with minerals tablet tablet, Take 1 tablet by mouth Daily., Disp: , Rfl:     Omega-3 Fatty Acids (fish oil) 1000 MG capsule capsule, Take 3 capsules by mouth Daily With Breakfast., Disp: , Rfl:     pravastatin (PRAVACHOL) 20 MG tablet, TAKE 1 TABLET BY MOUTH EVERY NIGHT AT BEDTIME, Disp: 90 tablet, Rfl: 2    sacubitril-valsartan (Entresto) 49-51 MG tablet, Take 1 tablet by mouth 2 (Two) Times a Day., Disp: 180 tablet, Rfl: 0    spironolactone (ALDACTONE) 25 MG tablet, TAKE 1 TABLET BY MOUTH DAILY, Disp: 90 tablet, Rfl: 2    Family History:  Family History   Problem Relation Age of Onset    Heart disease Father     Hypertension Mother        Past Medical History:  Past Medical History:   Diagnosis Date    Arrhythmia     Cardiomyopathy     Diverticulitis     Hyperlipidemia     Palpitations        Past surgical History:  Past Surgical History:   Procedure Laterality Date    BACK SURGERY      CARDIAC CATHETERIZATION      CARDIAC CATHETERIZATION N/A 06/12/2020    Procedure: Left Heart Cath;  Surgeon: Fernanda Jackson MD;  Location: [facility] INVASIVE  LOCATION;  Service: Cardiovascular;  Laterality: N/A;    CARDIAC CATHETERIZATION N/A 2020    Procedure: Coronary angiography;  Surgeon: Fernanda Jackson MD;  Location: [facility] CATH INVASIVE LOCATION;  Service: Cardiovascular;  Laterality: N/A;    CARDIAC CATHETERIZATION Right 2023    Procedure: Right Heart Cath;  Surgeon: Fernanda Jackson MD;  Location:  [facility] CATH INVASIVE LOCATION;  Service: Cardiovascular;  Laterality: Right;    CARDIAC DEFIBRILLATOR PLACEMENT      COLON RESECTION      HERNIA REPAIR      INSERT / REPLACE / REMOVE PACEMAKER      ICD    KNEE ACL RECONSTRUCTION         Social History:  Social History     Socioeconomic History    Marital status: Single   Tobacco Use    Smoking status: Former     Packs/day: 1.5     Types: Cigarettes     Quit date: 2021     Years since quittin.4     Passive exposure: Past    Smokeless tobacco: Former     Types: Snuff    Tobacco comments:     QUIT SMOKING    Vaping Use    Vaping Use: Never used   Substance and Sexual Activity    Alcohol use: Not Currently    Drug use: Yes     Types: Marijuana     Comment: once/day    Sexual activity: Yes     Partners: Female       Review of Systems:  The following systems were reviewed as they relate to the cardiovascular system: Constitutional, Eyes, ENT, Cardiovascular, Respiratory, Gastrointestinal, Integumentary, Neurological, Psychiatric, Hematologic, Endocrine, Musculoskeletal, and Genitourinary. The pertinent cardiovascular findings are reported above with all other cardiovascular points within those systems being negative.    Diagnostic Study Review:     Current Electrocardiogram:  Procedures          NOTE: The following portions of the patient's history were reviewed and updated this visit as appropriate: allergies, current medications, past family history, past medical history, past social history, past surgical history and problem list.

## 2024-03-12 RX ORDER — CARVEDILOL 25 MG/1
12.5 TABLET ORAL 2 TIMES DAILY WITH MEALS
Qty: 180 TABLET | Refills: 1 | Status: SHIPPED | OUTPATIENT
Start: 2024-03-12

## 2024-03-12 NOTE — TELEPHONE ENCOUNTER
Caller: Brian Stewart    Relationship: Self    Best call back number: [phone]    Requested Prescriptions:   Requested Prescriptions     Pending Prescriptions Disp Refills    carvedilol (COREG) 25 MG tablet 180 tablet 3     Sig: Take 1 tablet by mouth 2 (Two) Times a Day.        Pharmacy where request should be sent: [id number] - [place], [place] - [address] [facility] [address] [place] [address] - [phone] [facility] [phone] FX     Last office visit with prescribing clinician: 2/21/2024   Last telemedicine visit with prescribing clinician: Visit date not found   Next office visit with prescribing clinician: 5/29/2024     Additional details provided by patient:PATIENT IS TAKING DIFFERENTLY, 12.5MG TWICE A DAY. PLEASE SEND IN THE PRESCRIPTION REFLECTING THIS    Does the patient have less than a 3 day supply:  [x] Yes  [] No    Would you like a call back once the refill request has been completed: [x] Yes [] No    If the office needs to give you a call back, can they leave a voicemail: [x] Yes [] No    Errol Harp Rep   03/12/24 09:30 EDT

## 2024-05-20 RX ORDER — PRAVASTATIN SODIUM 20 MG
20 TABLET ORAL
Qty: 90 TABLET | Refills: 0 | Status: SHIPPED | OUTPATIENT
Start: 2024-05-20

## 2024-05-20 RX ORDER — SPIRONOLACTONE 25 MG/1
25 TABLET ORAL DAILY
Qty: 90 TABLET | Refills: 0 | Status: SHIPPED | OUTPATIENT
Start: 2024-05-20

## 2024-05-20 RX ORDER — DAPAGLIFLOZIN 10 MG/1
1 TABLET, FILM COATED ORAL DAILY
Qty: 90 TABLET | Refills: 0 | Status: SHIPPED | OUTPATIENT
Start: 2024-05-20

## 2024-05-23 ENCOUNTER — HOSPITAL ENCOUNTER (OUTPATIENT)
Dept: CARDIOLOGY | Facility: HOSPITAL | Age: 44
Discharge: HOME | End: 2024-05-23
Payer: MEDICARE

## 2024-05-23 VITALS
WEIGHT: 241 LBS | SYSTOLIC BLOOD PRESSURE: 96 MMHG | HEART RATE: 60 BPM | HEIGHT: 66 IN | BODY MASS INDEX: 38.73 KG/M2 | DIASTOLIC BLOOD PRESSURE: 56 MMHG

## 2024-05-23 DIAGNOSIS — Z95.810 AICD (AUTOMATIC CARDIOVERTER/DEFIBRILLATOR) PRESENT: ICD-10-CM

## 2024-05-23 DIAGNOSIS — I34.0 NONRHEUMATIC MITRAL VALVE REGURGITATION: ICD-10-CM

## 2024-05-23 DIAGNOSIS — I50.22 CHRONIC SYSTOLIC HEART FAILURE: ICD-10-CM

## 2024-05-23 DIAGNOSIS — I42.0 DILATED CARDIOMYOPATHY: ICD-10-CM

## 2024-05-23 LAB
BH CV ECHO MEAS - CONTRAST EF 4CH: 41 CM2
BH CV ECHO MEAS - EF(MOD-BP): 41 %
BH CV ECHO MEAS - EF(MOD-SP4): 41 %
BH CV ECHO MEAS - RAP SYSTOLE: 3 MMHG
BH CV ECHO MEAS - RVSP: 23 MMHG
BH CV ECHO MEAS - TR MAX PG: 20 MMHG

## 2024-05-23 PROCEDURE — 93306 TTE W/DOPPLER COMPLETE: CPT

## 2024-05-23 PROCEDURE — 25010000002 SULFUR HEXAFLUORIDE MICROSPH 60.7-25 MG RECONSTITUTED SUSPENSION: Performed by: INTERNAL MEDICINE

## 2024-05-23 RX ADMIN — SULFUR HEXAFLUORIDE 2 ML: KIT at 13:44

## 2024-05-28 LAB
BH CV ECHO MEAS - ACS: 2.27 CM
BH CV ECHO MEAS - AO MAX PG: 4.2 MMHG
BH CV ECHO MEAS - AO MEAN PG: 2.38 MMHG
BH CV ECHO MEAS - AO ROOT DIAM: 3.2 CM
BH CV ECHO MEAS - AO V2 MAX: 101.9 CM/SEC
BH CV ECHO MEAS - AO V2 VTI: 19 CM
BH CV ECHO MEAS - AVA(I,D): 2.8 CM2
BH CV ECHO MEAS - CONTRAST EF 4CH: 41 CM2
BH CV ECHO MEAS - EDV(CUBED): 181.5 ML
BH CV ECHO MEAS - EDV(MOD-SP2): 155.8 ML
BH CV ECHO MEAS - EDV(MOD-SP4): 164.4 ML
BH CV ECHO MEAS - EF(MOD-SP2): 43.5 %
BH CV ECHO MEAS - EF(MOD-SP4): 40.7 %
BH CV ECHO MEAS - ESV(CUBED): 119.4 ML
BH CV ECHO MEAS - ESV(MOD-SP2): 87.9 ML
BH CV ECHO MEAS - ESV(MOD-SP4): 97.5 ML
BH CV ECHO MEAS - FS: 13 %
BH CV ECHO MEAS - IVS/LVPW: 1.04 CM
BH CV ECHO MEAS - IVSD: 1.04 CM
BH CV ECHO MEAS - LA DIMENSION: 3.8 CM
BH CV ECHO MEAS - LV MASS(C)D: 230.8 GRAMS
BH CV ECHO MEAS - LV MAX PG: 1.35 MMHG
BH CV ECHO MEAS - LV MEAN PG: 0.88 MMHG
BH CV ECHO MEAS - LV V1 MAX: 58.1 CM/SEC
BH CV ECHO MEAS - LV V1 VTI: 11.6 CM
BH CV ECHO MEAS - LVIDD: 5.7 CM
BH CV ECHO MEAS - LVIDS: 4.9 CM
BH CV ECHO MEAS - LVOT AREA: 4.6 CM2
BH CV ECHO MEAS - LVOT DIAM: 2.43 CM
BH CV ECHO MEAS - LVPWD: 1.01 CM
BH CV ECHO MEAS - MR MAX PG: 41.3 MMHG
BH CV ECHO MEAS - MR MAX VEL: 321.2 CM/SEC
BH CV ECHO MEAS - MV A MAX VEL: 66.9 CM/SEC
BH CV ECHO MEAS - MV DEC SLOPE: 342.1 CM/SEC2
BH CV ECHO MEAS - MV DEC TIME: 0.18 SEC
BH CV ECHO MEAS - MV E MAX VEL: 62.4 CM/SEC
BH CV ECHO MEAS - MV E/A: 0.93
BH CV ECHO MEAS - MV MAX PG: 1.89 MMHG
BH CV ECHO MEAS - MV MEAN PG: 0.8 MMHG
BH CV ECHO MEAS - MV V2 VTI: 18.3 CM
BH CV ECHO MEAS - MVA(VTI): 2.9 CM2
BH CV ECHO MEAS - PA ACC TIME: 0.09 SEC
BH CV ECHO MEAS - PA V2 MAX: 101.5 CM/SEC
BH CV ECHO MEAS - PULM A REVS DUR: 0.14 SEC
BH CV ECHO MEAS - PULM A REVS VEL: 55.3 CM/SEC
BH CV ECHO MEAS - PULM DIAS VEL: 39.5 CM/SEC
BH CV ECHO MEAS - PULM S/D: 1.96
BH CV ECHO MEAS - PULM SYS VEL: 77.6 CM/SEC
BH CV ECHO MEAS - QP/QS: 0.96
BH CV ECHO MEAS - RAP SYSTOLE: 3 MMHG
BH CV ECHO MEAS - RV MAX PG: 1.37 MMHG
BH CV ECHO MEAS - RV V1 MAX: 58.5 CM/SEC
BH CV ECHO MEAS - RV V1 VTI: 11.8 CM
BH CV ECHO MEAS - RVDD: 3.2 CM
BH CV ECHO MEAS - RVOT DIAM: 2.36 CM
BH CV ECHO MEAS - RVSP: 23 MMHG
BH CV ECHO MEAS - SV(LVOT): 53.7 ML
BH CV ECHO MEAS - SV(MOD-SP2): 67.8 ML
BH CV ECHO MEAS - SV(MOD-SP4): 66.9 ML
BH CV ECHO MEAS - SV(RVOT): 51.5 ML
BH CV ECHO MEAS - TR MAX PG: 18.1 MMHG
BH CV ECHO MEAS - TR MAX VEL: 211.9 CM/SEC

## 2024-05-31 RX ORDER — SACUBITRIL AND VALSARTAN 49; 51 MG/1; MG/1
1 TABLET, FILM COATED ORAL 2 TIMES DAILY
Qty: 180 TABLET | Refills: 0 | Status: SHIPPED | OUTPATIENT
Start: 2024-05-31

## 2024-05-31 NOTE — TELEPHONE ENCOUNTER
Caller: Brian Stewart    Relationship: Self    Best call back number: [phone]    Requested Prescriptions:   Requested Prescriptions     Pending Prescriptions Disp Refills    sacubitril-valsartan (Entresto) 49-51 MG tablet 180 tablet 0     Sig: Take 1 tablet by mouth 2 (Two) Times a Day.        Pharmacy where request should be sent: [id number] - [place], [place] - [address] AT [facility] [address] [facility] [address] - [phone]  - [phone] FX     Last office visit with prescribing clinician: 2/21/2024   Last telemedicine visit with prescribing clinician: Visit date not found   Next office visit with prescribing clinician: 6/26/2024     Additional details provided by patient:     Does the patient have less than a 3 day supply:  [x] Yes  [] No    Would you like a call back once the refill request has been completed: [] Yes [x] No    If the office needs to give you a call back, can they leave a voicemail: [x] Yes [] No    Errol Castillo Rep   05/31/24 15:50 EDT

## 2024-06-26 ENCOUNTER — OFFICE VISIT (OUTPATIENT)
Dept: CARDIOLOGY | Facility: CLINIC | Age: 44
End: 2024-06-26
Payer: MEDICARE

## 2024-06-26 VITALS
WEIGHT: 246 LBS | HEIGHT: 66 IN | OXYGEN SATURATION: 96 % | BODY MASS INDEX: 39.53 KG/M2 | SYSTOLIC BLOOD PRESSURE: 96 MMHG | HEART RATE: 56 BPM | DIASTOLIC BLOOD PRESSURE: 60 MMHG

## 2024-06-26 DIAGNOSIS — T82.118A MALFUNCTION OF IMPLANTABLE CARDIOVERTER-DEFIBRILLATOR (ICD), INITIAL ENCOUNTER: ICD-10-CM

## 2024-06-26 DIAGNOSIS — I50.22 CHRONIC SYSTOLIC HEART FAILURE: Chronic | ICD-10-CM

## 2024-06-26 DIAGNOSIS — R06.02 SOB (SHORTNESS OF BREATH): ICD-10-CM

## 2024-06-26 DIAGNOSIS — Z95.810 AICD (AUTOMATIC CARDIOVERTER/DEFIBRILLATOR) PRESENT: ICD-10-CM

## 2024-06-26 DIAGNOSIS — R00.2 PALPITATIONS: ICD-10-CM

## 2024-06-26 DIAGNOSIS — I10 ESSENTIAL (PRIMARY) HYPERTENSION: ICD-10-CM

## 2024-06-26 DIAGNOSIS — E78.5 HYPERLIPIDEMIA LDL GOAL <100: ICD-10-CM

## 2024-06-26 DIAGNOSIS — I34.0 NONRHEUMATIC MITRAL VALVE REGURGITATION: Primary | ICD-10-CM

## 2024-06-26 DIAGNOSIS — E78.2 MIXED HYPERLIPIDEMIA: ICD-10-CM

## 2024-06-26 DIAGNOSIS — I42.0 DILATED CARDIOMYOPATHY: Chronic | ICD-10-CM

## 2024-06-26 DIAGNOSIS — I10 ESSENTIAL HYPERTENSION: ICD-10-CM

## 2024-06-26 PROCEDURE — 3078F DIAST BP <80 MM HG: CPT | Performed by: INTERNAL MEDICINE

## 2024-06-26 PROCEDURE — 93000 ELECTROCARDIOGRAM COMPLETE: CPT | Performed by: INTERNAL MEDICINE

## 2024-06-26 PROCEDURE — 99214 OFFICE O/P EST MOD 30 MIN: CPT | Performed by: INTERNAL MEDICINE

## 2024-06-26 PROCEDURE — 1160F RVW MEDS BY RX/DR IN RCRD: CPT | Performed by: INTERNAL MEDICINE

## 2024-06-26 PROCEDURE — 1159F MED LIST DOCD IN RCRD: CPT | Performed by: INTERNAL MEDICINE

## 2024-06-26 PROCEDURE — 3074F SYST BP LT 130 MM HG: CPT | Performed by: INTERNAL MEDICINE

## 2024-06-26 RX ORDER — DICLOFENAC SODIUM 75 MG/1
TABLET, DELAYED RELEASE ORAL
COMMUNITY

## 2024-06-26 RX ORDER — TRAMADOL HYDROCHLORIDE 100 MG/1
TABLET, COATED ORAL
COMMUNITY
Start: 2024-04-24

## 2024-06-26 RX ORDER — MELOXICAM 15 MG/1
TABLET ORAL
COMMUNITY
Start: 2024-04-18 | End: 2024-06-26

## 2024-06-26 RX ORDER — GABAPENTIN 100 MG/1
CAPSULE ORAL
COMMUNITY

## 2024-06-26 NOTE — PROGRESS NOTES
Cardiology Office Visit      Encounter Date:  06/26/2024    Patient ID:   Brian Stewart is a 43 y.o. male.    Reason For Consultation:  Congestive heart failure  Cardiomyopathy    History of Present Illness:  Dear Santana Garcia MD    I had the pleasure of seeing Brian Stewart today. As you are well aware, this is a 44 y.o. male with past medical history that is significant for nonischemic cardiomyopathy severe cardiomyopathy requiring AICD     Denies any new cardiac symptoms  No dizziness or syncope  Compliant with medical therapy  Denies any new cardiac symptoms      Impressions:  Normal coronaries  Severe cardiomyopathy  Severe LV dysfunction  Elevated left-sided filling pressures  Estimated LV ejection fraction of 15%      Interpretation Summary    The left ventricular cavity is severely dilated.  Estimated left ventricular EF = 25% Estimated left ventricular EF was in agreement with the calculated left ventricular EF. Left ventricular systolic function is severely decreased.  The following left ventricular wall segments are hypokinetic: mid anterior, apical anterior, basal anterolateral, basal inferolateral, basal inferoseptal, mid anteroseptal, basal anterior, basal inferior and basal inferoseptal. The following left ventricular wall segments are akinetic: mid anterolateral, apical lateral, mid inferolateral, apical inferior, mid inferior, apical septal, mid inferoseptal and apex.  Moderate mitral valve regurgitation is present.  Left ventricular diastolic function is consistent with (grade I) impaired relaxation.  Estimated right ventricular systolic pressure from tricuspid regurgitation is normal (<35 mmHg).           Assessment & Plan    Impressions:  chronic systolic heart failure/stable  Congestive heart failure NYHA class II/no new complaints  Cardiomyopathy most likely nonischemic  Severe mitral regurgitation nonrheumatic/now moderate  Hypertension  Obesity  Obstructive sleep apnea  Status  "post ICD with nonsustained ventricular tachycardia  Noncompliance with medical therapy  Nonischemic cardiomyopathy with LV ejection fraction of 25%/history recent echocardiogram with LV ejection fraction of 30 to 35%  Recent hospitalization for CHF exacerbation  Impaired fasting glucose  Recent normal right heart catheterization with normal PA pressures    Recommendations:  Continue current medical therapy  Low-salt diet  Continue current medical therapy with aspirin 81 mg p.o. once a day Coreg 12.5 mg p.o. twice daily Farxiga 10 mg p.o. once a day continue Entresto twice a day Pravachol 20 mg p.o. once a day spironolactone 25 mg p.o. once a day  Recently discontinue for isosorbide secondary to low blood pressure and also decreasing the dose of the Coreg secondary to low blood pressure and symptoms of fatigue and side effects from high-dose beta-blockers  Results of the repeat echocardiogram reviewed and discussed with patient  Patient is advised to call back the office if there is any symptomatic hypotension episodes  prior work-up reviewed and discussed with patient  Patient was seen and evaluated by heart failure service at [facility]  Need for compliance with medical therapy close monitoring and follow-up and close monitoring of blood pressure at home reviewed and discussed with patient  Follow-up in office in 6 months        Vitals:  Vitals:    06/26/24 1101 06/26/24 1110   BP: (!) 84/50 96/60   BP Location: Left arm Right arm   Pulse: 56    SpO2: 96%    Weight: 112 kg (246 lb)    Height: 167.6 cm (66\")        Physical Exam:    General: Alert, cooperative, no distress, appears stated age  Head:  Normocephalic, atraumatic, mucous membranes moist  Eyes:  Conjunctiva/corneas clear, EOM's intact     Neck:  Supple,  no adenopathy;      Lungs: Clear to auscultation bilaterally, no wheezes rhonchi rales are noted  Chest wall: No tenderness  Heart::  Regular rate and rhythm, S1 and S2 normal, no murmur, rub or " gallop  Abdomen: Soft, non-tender, nondistended bowel sounds active  Extremities: No cyanosis, clubbing, or edema  Pulses: 2+ and symmetric all extremities  Skin:  No rashes or lesions  Neuro/psych: A&O x3. CN II through XII are grossly intact with appropriate affect              Lab Results   Component Value Date    GLUCOSE 101 (H) 12/21/2023    BUN 17 12/21/2023    CREATININE 0.89 12/21/2023    EGFR 109.0 12/21/2023    BCR 19.1 12/21/2023    K 5.0 12/21/2023    CO2 26.0 12/21/2023    CALCIUM 9.9 12/21/2023    ALBUMIN 4.3 09/05/2023    BILITOT 0.3 09/05/2023    AST 30 09/05/2023    ALT 15 09/05/2023     Results for orders placed during the hospital encounter of 05/23/24    Adult Transthoracic Echo Complete W/ Cont if Necessary Per Protocol    Interpretation Summary    Left ventricular systolic function is moderately decreased. Left ventricular ejection fraction appears to be 31 - 35%.    The left ventricular cavity is mildly dilated.    Left ventricular diastolic function is consistent with (grade I) impaired relaxation.    The left atrial cavity is mildly dilated.    Estimated right ventricular systolic pressure from tricuspid regurgitation is normal (<35 mmHg).     Results for orders placed during the hospital encounter of 09/28/23    Cardiac Catheterization/Vascular Study    Conclusion  Table formatting from the original result was not included.  Cardiac Catheterization Operative Report    Brian Stewart  [id number]  9/28/2023  @PCP@    He underwent cardiac catheterization.    Indications for the procedure include: Cardiomyopathy.        Procedure Details:  The risks, benefits, complications, treatment options, and expected outcomes were discussed with the patient. The patient and/or family concurred with the proposed plan, giving informed consent.    After informed consent the patient was brought to the cath lab after appropriate IV hydration was begun and oral premedication was given. He was further sedated  with fentanyl. He was prepped and draped in the usual manner. Using the modified Seldinger access technique, a 6 [place]vor[place] sheath was placed in the femoral artery. A left heart catheterization with coronary arteriography was performed. Findings are discussed below.    After the procedure was completed, sedation was stopped and the sheaths and catheters were all removed. Hemostasis was achieved per established hospital protocols.    Conscious sedation:  Conscious sedation was performed according to protocol.  I supervised and directed an independent trained observer with the assistance of monitoring the patient's level of consciousness and physiologic status throughout the procedure.  Intraoperative service time was 30 minutes.    Findings:    Hemodynamics Right heart catheterization and hemodynamic    Pulmonary capillary wedge pressure was 10 mmHg PA pressure systolic 22 diastolic 80 with a mean pressure of 14 mmHg  RV pressure systolic 20 mmHg  Right atrial pressure was 4 mm of medical  Femoral arterial saturation was 99%  PA saturation is 55%  Right atrial saturation is 53%  Darrion equation cardiac output is 2.75 L/min  Darrion equation cardiac index is 1.2 L/min/m²      Estimated Blood Loss:  Minimal    Specimens: None    Complications:  None; patient tolerated the procedure well.    Disposition: PACU - hemodynamically stable.    Condition: stable    Impressions:  Normal PA pressures and normal filling pressures    Recommendations:  Medical management for cardiomyopathy  Test results reviewed and discussed with patient and family     Lab Results   Component Value Date    CHOL 172 08/06/2023    TRIG 133 08/06/2023    HDL 47 08/06/2023     (H) 08/06/2023      Results for orders placed in visit on 05/21/18    Stress Test With Myocardial Perfusion One Day    Interpretation Summary  · Findings consistent with an equivocal ECG stress test.  · Breast attenuation and diaphragmatic attenuation artifacts are present.  ·  Left ventricular ejection fraction is normal (Calculated EF = 50%).  · Myocardial perfusion imaging indicates a normal myocardial perfusion study with no evidence of ischemia.  · Impressions are consistent with a low risk study.  · Suboptimal study    Asymptomatic for chest pain. Specificity of study reduced secondary to baseline Non-specific ST-T wave abnormalities, however ECG is not suggestive of ischemia  Ectopy: none.  Blood pressure  And heart rate response:  Appropriate for Beta-blocker therapy  Pharmacologic study due to inability to tolerate increasing speed and grade of treadmill due to c/o  becoming very fatigued Stage II, and had to change to pharmacological procedure.  Participated in Low Level exercise and tolerance was fair.   Results for orders placed during the hospital encounter of 11/09/22    Mobile Cardiac Outpatient Telemetry    Interpretation Summary  Extended Holter monitor study for symptoms of palpitations  Patient was monitored from November 9, 2022 to December 8, 2022  Rhythm is sinus rhythm with a minimal heart rate of 50 beats per [place] 169 bpm average heart rate of 77 bpm  No atrial fibrillation  No clinically significant pauses  First-degree heart block  PVC burden of 7%  PAC burden of 8%  Nonsustained ventricular tachycardia for total 4 beats at rate of 170 bpm  Patient had multiple symptomatic episodes with heart racing and skipped beat during that time patient noted to be in sinus rhythm with PVC burden most likely patient's symptoms are secondary to PVC burden based on this Holter monitor study  Abnormal Holter monitor study  Clinical correlation is recommended           Objective:          Allergies:  No Known Allergies    Medication Review:     Current Outpatient Medications:     aspirin 81 MG chewable tablet, Chew 1 tablet Daily., Disp: , Rfl:     carvedilol (COREG) 25 MG tablet, Take 0.5 tablets by mouth 2 (Two) Times a Day With Meals., Disp: 180 tablet, Rfl: 1     dapagliflozin Propanediol (Farxiga) 10 MG tablet, TAKE 1 TABLET BY MOUTH DAILY, Disp: 90 tablet, Rfl: 0    diclofenac (VOLTAREN) 75 MG EC tablet, Take 1 tablet twice a day by oral route for 30 days., Disp: , Rfl:     furosemide (LASIX) 40 MG tablet, Take 1 tablet by mouth 2 (Two) Times a Day., Disp: , Rfl:     gabapentin (NEURONTIN) 100 MG capsule, Take 1 capsule twice a day by oral route for 30 days., Disp: , Rfl:     Magnesium Oxide 400 MG capsule, Take 400 mg by mouth 2 (Two) Times a Day Before Meals., Disp: 60 each, Rfl: 11    multivitamin with minerals tablet tablet, Take 1 tablet by mouth Daily., Disp: , Rfl:     Omega-3 Fatty Acids (fish oil) 1000 MG capsule capsule, Take 3 capsules by mouth Daily With Breakfast., Disp: , Rfl:     pravastatin (PRAVACHOL) 20 MG tablet, TAKE 1 TABLET BY MOUTH EVERY NIGHT AT BEDTIME, Disp: 90 tablet, Rfl: 0    sacubitril-valsartan (Entresto) 49-51 MG tablet, Take 1 tablet by mouth 2 (Two) Times a Day., Disp: 180 tablet, Rfl: 0    spironolactone (ALDACTONE) 25 MG tablet, TAKE 1 TABLET BY MOUTH DAILY, Disp: 90 tablet, Rfl: 0    traMADol HCl (ULTRAM) 100 MG tablet, Take 1 tablet 3 times a day by oral route as needed for 30 days., Disp: , Rfl:     Family History:  Family History   Problem Relation Age of Onset    Heart disease Father     Hypertension Mother        Past Medical History:  Past Medical History:   Diagnosis Date    Arrhythmia     Cardiomyopathy     Diverticulitis     Hyperlipidemia     Palpitations        Past surgical History:  Past Surgical History:   Procedure Laterality Date    BACK SURGERY      CARDIAC CATHETERIZATION      CARDIAC CATHETERIZATION N/A 06/12/2020    Procedure: Left Heart Cath;  Surgeon: Fernanda Jackson MD;  Location: [facility] CATH INVASIVE LOCATION;  Service: Cardiovascular;  Laterality: N/A;    CARDIAC CATHETERIZATION N/A 06/12/2020    Procedure: Coronary angiography;  Surgeon: Fernanda Jackson MD;  Location: [facility] CATH INVASIVE  LOCATION;  Service: Cardiovascular;  Laterality: N/A;    CARDIAC CATHETERIZATION Right 2023    Procedure: Right Heart Cath;  Surgeon: Fernanda Jackson MD;  Location: [facility] CATH INVASIVE LOCATION;  Service: Cardiovascular;  Laterality: Right;    CARDIAC DEFIBRILLATOR PLACEMENT      COLON RESECTION      HERNIA REPAIR      INSERT / REPLACE / REMOVE PACEMAKER      ICD    KNEE ACL RECONSTRUCTION         Social History:  Social History     Socioeconomic History    Marital status: Single   Tobacco Use    Smoking status: Former     Current packs/day: 0.00     Types: Cigarettes     Quit date: 2021     Years since quittin.8     Passive exposure: Current    Smokeless tobacco: Former     Types: Snuff    Tobacco comments:     QUIT SMOKING    Vaping Use    Vaping status: Never Used    Passive vaping exposure: Yes   Substance and Sexual Activity    Alcohol use: Not Currently    Drug use: Yes     Types: Marijuana     Comment: once/day    Sexual activity: Yes     Partners: Female       Review of Systems:  The following systems were reviewed as they relate to the cardiovascular system: Constitutional, Eyes, ENT, Cardiovascular, Respiratory, Gastrointestinal, Integumentary, Neurological, Psychiatric, Hematologic, Endocrine, Musculoskeletal, and Genitourinary. The pertinent cardiovascular findings are reported above with all other cardiovascular points within those systems being negative.    Diagnostic Study Review:     Current Electrocardiogram:    ECG 12 Lead    Date/Time: 2024 11:34 AM  Performed by: Fernanda Jackson MD    Authorized by: Fernanda Jackson MD  Comparison: compared with previous ECG   Similar to previous ECG  Rhythm: sinus rhythm  Rate: normal  BPM: 75  Conduction: non-specific intraventricular conduction delay  QRS axis: normal  Other findings: poor R wave progression    Clinical impression: abnormal EKG                NOTE: The following portions of the patient's history  were reviewed and updated this visit as appropriate: allergies, current medications, past family history, past medical history, past social history, past surgical history and problem list.

## 2024-08-08 RX ORDER — DAPAGLIFLOZIN 10 MG/1
1 TABLET, FILM COATED ORAL DAILY
Qty: 90 TABLET | Refills: 0 | Status: SHIPPED | OUTPATIENT
Start: 2024-08-08

## 2024-09-04 RX ORDER — SACUBITRIL AND VALSARTAN 49; 51 MG/1; MG/1
1 TABLET, FILM COATED ORAL 2 TIMES DAILY
Qty: 180 TABLET | Refills: 2 | Status: SHIPPED | OUTPATIENT
Start: 2024-09-04

## 2024-09-20 RX ORDER — FUROSEMIDE 40 MG
40 TABLET ORAL 2 TIMES DAILY
Qty: 180 TABLET | Refills: 1 | Status: SHIPPED | OUTPATIENT
Start: 2024-09-20

## 2024-10-24 RX ORDER — PRAVASTATIN SODIUM 20 MG
20 TABLET ORAL
Qty: 90 TABLET | Refills: 0 | Status: SHIPPED | OUTPATIENT
Start: 2024-10-24

## 2024-10-24 RX ORDER — SPIRONOLACTONE 25 MG/1
25 TABLET ORAL DAILY
Qty: 90 TABLET | Refills: 0 | Status: SHIPPED | OUTPATIENT
Start: 2024-10-24

## 2024-11-18 RX ORDER — DAPAGLIFLOZIN 10 MG/1
1 TABLET, FILM COATED ORAL DAILY
Qty: 90 TABLET | Refills: 0 | Status: SHIPPED | OUTPATIENT
Start: 2024-11-18

## 2024-12-02 PROCEDURE — 93296 REM INTERROG EVL PM/IDS: CPT | Performed by: INTERNAL MEDICINE

## 2024-12-02 PROCEDURE — 93295 DEV INTERROG REMOTE 1/2/MLT: CPT | Performed by: INTERNAL MEDICINE

## 2025-01-23 RX ORDER — SPIRONOLACTONE 25 MG/1
25 TABLET ORAL DAILY
Qty: 90 TABLET | Refills: 0 | Status: SHIPPED | OUTPATIENT
Start: 2025-01-23

## 2025-01-23 RX ORDER — PRAVASTATIN SODIUM 20 MG
20 TABLET ORAL
Qty: 90 TABLET | Refills: 0 | Status: SHIPPED | OUTPATIENT
Start: 2025-01-23

## 2025-02-21 RX ORDER — FUROSEMIDE 40 MG/1
40 TABLET ORAL 2 TIMES DAILY
Qty: 180 TABLET | Refills: 1 | Status: SHIPPED | OUTPATIENT
Start: 2025-02-21

## 2025-03-03 RX ORDER — CARVEDILOL 25 MG/1
TABLET ORAL
Qty: 90 TABLET | Refills: 0 | Status: SHIPPED | OUTPATIENT
Start: 2025-03-03

## 2025-03-10 ENCOUNTER — OFFICE VISIT (OUTPATIENT)
Dept: PULMONOLOGY | Facility: HOSPITAL | Age: 45
End: 2025-03-10
Payer: MEDICARE

## 2025-03-10 DIAGNOSIS — Z53.21 PATIENT LEFT WITHOUT BEING SEEN: Primary | ICD-10-CM

## 2025-03-11 NOTE — PROGRESS NOTES
The patient left the office before care was provided and did not complete the visit  due to having another appointment to attend .

## 2025-04-16 ENCOUNTER — OFFICE VISIT (OUTPATIENT)
Dept: CARDIOLOGY | Facility: CLINIC | Age: 45
End: 2025-04-16
Payer: MEDICARE

## 2025-04-16 VITALS
HEART RATE: 69 BPM | BODY MASS INDEX: 41.78 KG/M2 | RESPIRATION RATE: 16 BRPM | DIASTOLIC BLOOD PRESSURE: 60 MMHG | WEIGHT: 260 LBS | SYSTOLIC BLOOD PRESSURE: 90 MMHG | OXYGEN SATURATION: 94 % | HEIGHT: 66 IN

## 2025-04-16 DIAGNOSIS — R00.2 PALPITATIONS: ICD-10-CM

## 2025-04-16 DIAGNOSIS — Z95.810 AICD (AUTOMATIC CARDIOVERTER/DEFIBRILLATOR) PRESENT: ICD-10-CM

## 2025-04-16 DIAGNOSIS — I34.0 NONRHEUMATIC MITRAL VALVE REGURGITATION: ICD-10-CM

## 2025-04-16 DIAGNOSIS — I10 ESSENTIAL HYPERTENSION: ICD-10-CM

## 2025-04-16 DIAGNOSIS — I42.0 DILATED CARDIOMYOPATHY: Primary | Chronic | ICD-10-CM

## 2025-04-16 DIAGNOSIS — I50.22 CHRONIC SYSTOLIC HEART FAILURE: Chronic | ICD-10-CM

## 2025-04-16 DIAGNOSIS — E78.2 MIXED HYPERLIPIDEMIA: ICD-10-CM

## 2025-04-16 RX ORDER — CARVEDILOL 25 MG/1
12.5 TABLET ORAL 2 TIMES DAILY WITH MEALS
Qty: 90 TABLET | Refills: 3 | Status: SHIPPED | OUTPATIENT
Start: 2025-04-16

## 2025-04-16 RX ORDER — HYDROCODONE BITARTRATE AND ACETAMINOPHEN 10; 325 MG/1; MG/1
1 TABLET ORAL EVERY 6 HOURS PRN
COMMUNITY

## 2025-04-16 NOTE — ASSESSMENT & PLAN NOTE
With a history of moderate to severe mitral regurgitation secondary to severely depressed LVEF.  Will continue goal-directed medical treatment for heart failure with a repeat echocardiogram.  Suspect his valve disease is secondary to his low pulm function.

## 2025-04-16 NOTE — PROGRESS NOTES
"Chief Complaint  Follow-up (6 mo)    Subjective        Brian Stewart presents to [facility] CARDIOLOGY  History of Present Illness    44-year-old male presents for a follow-up appointment.  He has a past medical history for nonischemic cardiomyopathy (status post ICD), severe mitral regurgitation now moderate, hypertension, obesity, STEPHON, noncompliant with medical therapy.    He was last seen in clinic on June 26, 2024.  At that time he denied any new cardiac symptoms and he was compliant with medical therapy.  Today he was scheduled for a follow-up appointment.  He reports doing well with no cardiac issues.  At times he notes that his blood pressure is low and he starts to feel lethargic and diaphoretic.  He thinks it was due because of the carvedilol 25 mg twice daily.  He has since reduced his dose to 12.5 mg twice daily and symptoms have improved.  He follows remotely with his device patient denies any ICD firings.        Review of Systems   Constitutional: Negative.    HENT: Negative.     Respiratory: Negative.     Cardiovascular: Negative.    Musculoskeletal: Negative.    Skin: Negative.    Neurological: Negative.         Objective   Vital Signs:  BP 90/60 (BP Location: Left arm, Patient Position: Sitting)   Pulse 69 Comment: ekg  Resp 16   Ht 167.6 cm (66\")   Wt 118 kg (260 lb)   SpO2 94%   BMI 41.97 kg/m²   Estimated body mass index is 41.97 kg/m² as calculated from the following:    Height as of this encounter: 167.6 cm (66\").    Weight as of this encounter: 118 kg (260 lb).          Physical Exam  Vitals and nursing note reviewed.   Constitutional:       General: He is not in acute distress.     Appearance: Normal appearance. He is not toxic-appearing.   HENT:      Head: Normocephalic and atraumatic.      Nose: Nose normal.      Mouth/Throat:      Mouth: Mucous membranes are moist.      Pharynx: Oropharynx is clear.   Eyes:      Pupils: Pupils are equal, round, and reactive to " light.   Cardiovascular:      Rate and Rhythm: Normal rate and regular rhythm.      Pulses: Normal pulses.      Heart sounds: Normal heart sounds.   Pulmonary:      Effort: Pulmonary effort is normal.      Breath sounds: Normal breath sounds.   Abdominal:      General: Bowel sounds are normal.      Palpations: Abdomen is soft.   Musculoskeletal:         General: Normal range of motion.      Cervical back: Normal range of motion and neck supple.   Skin:     General: Skin is warm and dry.   Neurological:      General: No focal deficit present.      Mental Status: He is alert and oriented to person, place, and time.   Psychiatric:         Mood and Affect: Mood normal.         Behavior: Behavior normal.         Thought Content: Thought content normal.         Judgment: Judgment normal.        Result Review :  The following data was reviewed by: Peter De Guzman DO on 04/16/2025:        Data reviewed : Cardiology studies        ECG 12 Lead    Date/Time: 4/16/2025 9:52 AM  Performed by: Peter De Guzman DO    Authorized by: Peter De Guzman DO  Rhythm: sinus rhythm  Conduction: incomplete left bundle branch block    Clinical impression: abnormal EKG       Results for orders placed during the hospital encounter of 05/23/24     Adult Transthoracic Echo Complete W/ Cont if Necessary Per Protocol     Interpretation Summary    Left ventricular systolic function is moderately decreased. Left ventricular ejection fraction appears to be 31 - 35%.    The left ventricular cavity is mildly dilated.    Left ventricular diastolic function is consistent with (grade I) impaired relaxation.    The left atrial cavity is mildly dilated.    Estimated right ventricular systolic pressure from tricuspid regurgitation is normal (<35 mmHg).     Results for orders placed during the hospital encounter of 09/28/23     Cardiac Catheterization/Vascular Study     Conclusion  Table formatting from the original result was not included.  Cardiac  Catheterization Operative Report     Brian Stewart  [id number]  9/28/2023  @PCP@     He underwent cardiac catheterization.     Indications for the procedure include: Cardiomyopathy.           Procedure Details:  The risks, benefits, complications, treatment options, and expected outcomes were discussed with the patient. The patient and/or family concurred with the proposed plan, giving informed consent.     After informed consent the patient was brought to the cath lab after appropriate IV hydration was begun and oral premedication was given. He was further sedated with fentanyl. He was prepped and draped in the usual manner. Using the modified Seldinger access technique, a 6 Lebanese sheath was placed in the femoral artery. A left heart catheterization with coronary arteriography was performed. Findings are discussed below.     After the procedure was completed, sedation was stopped and the sheaths and catheters were all removed. Hemostasis was achieved per established hospital protocols.     Conscious sedation:  Conscious sedation was performed according to protocol.  I supervised and directed an independent trained observer with the assistance of monitoring the patient's level of consciousness and physiologic status throughout the procedure.  Intraoperative service time was 30 minutes.     Findings:     Hemodynamics Right heart catheterization and hemodynamic     Pulmonary capillary wedge pressure was 10 mmHg PA pressure systolic 22 diastolic 80 with a mean pressure of 14 mmHg  RV pressure systolic 20 mmHg  Right atrial pressure was 4 mm of medical  Femoral arterial saturation was 99%  PA saturation is 55%  Right atrial saturation is 53%  Darrion equation cardiac output is 2.75 L/min  Darrion equation cardiac index is 1.2 L/min/m²        Estimated Blood Loss:  Minimal     Specimens: None     Complications:  None; patient tolerated the procedure well.     Disposition: PACU - hemodynamically stable.     Condition:  stable     Impressions:  Normal PA pressures and normal filling pressures     Recommendations:  Medical management for cardiomyopathy  Test results reviewed and discussed with patient and family     Assessment and Plan   Diagnoses and all orders for this visit:    1. Non- Ischemic Dilated cardiomyopathy (Primary)  Assessment & Plan:  Continue goal-directed medical therapy with Entresto, spironolactone, Farxiga, carvedilol.  He was noted to have low blood pressures on carvedilol 25 mg twice daily this was reduced to 12.5 mg twice daily.  Advised if he continues to have low blood pressure that we can switch to Toprol-XL as to give him more blood pressure room.        Orders:  -     ECG 12 Lead  -     Adult Transthoracic Echo Complete W/ Cont if Necessary Per Protocol; Future    2. Chronic systolic heart failure    3. Mixed hyperlipidemia  Assessment & Plan:     Continue medications      4. Essential hypertension  Assessment & Plan:  Stay.  Continue goal-directed medical therapy for heart failure.  See above      5. Palpitations    6. s/p AICD    7. VHD w/ MR  Assessment & Plan:  With a history of moderate to severe mitral regurgitation secondary to severely depressed LVEF.  Will continue goal-directed medical treatment for heart failure with a repeat echocardiogram.  Suspect his valve disease is secondary to his low pulm function.      Other orders  -     carvedilol (COREG) 25 MG tablet; Take 0.5 tablets by mouth 2 (Two) Times a Day With Meals.  Dispense: 90 tablet; Refill: 3             Follow Up   No follow-ups on file.  Patient was given instructions and counseling regarding his condition or for health maintenance advice. Please see specific information pulled into the AVS if appropriate.

## 2025-04-16 NOTE — ASSESSMENT & PLAN NOTE
Continue goal-directed medical therapy with Entresto, spironolactone, Farxiga, carvedilol.  He was noted to have low blood pressures on carvedilol 25 mg twice daily this was reduced to 12.5 mg twice daily.  Advised if he continues to have low blood pressure that we can switch to Toprol-XL as to give him more blood pressure room.

## 2025-04-16 NOTE — LETTER
"April 16, 2025     ANNIA Sanchze  [address] [place] [address]    Patient: Brian Stewart   YOB: 1980   Date of Visit: 4/16/2025     Dear ANNIA Sanchez:       Thank you for referring Brian Stewart to me for evaluation. Below are the relevant portions of my assessment and plan of care.    If you have questions, please do not hesitate to call me. I look forward to following Brian along with you.         Sincerely,        Peter De Guzman DO        CC: No Recipients    Peter De Guzman DO  04/16/25 1007  Sign when Signing Visit  Chief Complaint  Follow-up (6 mo)    Subjective       Brian Stewart presents to [facility] CARDIOLOGY  History of Present Illness    44-year-old male presents for a follow-up appointment.  He has a past medical history for nonischemic cardiomyopathy (status post ICD), severe mitral regurgitation now moderate, hypertension, obesity, STEPHON, noncompliant with medical therapy.    He was last seen in clinic on June 26, 2024.  At that time he denied any new cardiac symptoms and he was compliant with medical therapy.  Today he was scheduled for a follow-up appointment.  He reports doing well with no cardiac issues.  At times he notes that his blood pressure is low and he starts to feel lethargic and diaphoretic.  He thinks it was due because of the carvedilol 25 mg twice daily.  He has since reduced his dose to 12.5 mg twice daily and symptoms have improved.  He follows remotely with his device patient denies any ICD firings.        Review of Systems   Constitutional: Negative.    HENT: Negative.     Respiratory: Negative.     Cardiovascular: Negative.    Musculoskeletal: Negative.    Skin: Negative.    Neurological: Negative.         Objective  Vital Signs:  BP 90/60 (BP Location: Left arm, Patient Position: Sitting)   Pulse 69 Comment: ekg  Resp 16   Ht 167.6 cm (66\")   Wt 118 kg (260 lb)   SpO2 94%   BMI 41.97 " "kg/m²   Estimated body mass index is 41.97 kg/m² as calculated from the following:    Height as of this encounter: 167.6 cm (66\").    Weight as of this encounter: 118 kg (260 lb).          Physical Exam  Vitals and nursing note reviewed.   Constitutional:       General: He is not in acute distress.     Appearance: Normal appearance. He is not toxic-appearing.   HENT:      Head: Normocephalic and atraumatic.      Nose: Nose normal.      Mouth/Throat:      Mouth: Mucous membranes are moist.      Pharynx: Oropharynx is clear.   Eyes:      Pupils: Pupils are equal, round, and reactive to light.   Cardiovascular:      Rate and Rhythm: Normal rate and regular rhythm.      Pulses: Normal pulses.      Heart sounds: Normal heart sounds.   Pulmonary:      Effort: Pulmonary effort is normal.      Breath sounds: Normal breath sounds.   Abdominal:      General: Bowel sounds are normal.      Palpations: Abdomen is soft.   Musculoskeletal:         General: Normal range of motion.      Cervical back: Normal range of motion and neck supple.   Skin:     General: Skin is warm and dry.   Neurological:      General: No focal deficit present.      Mental Status: He is alert and oriented to person, place, and time.   Psychiatric:         Mood and Affect: Mood normal.         Behavior: Behavior normal.         Thought Content: Thought content normal.         Judgment: Judgment normal.        Result Review:  The following data was reviewed by: Peter De Guzman DO on 04/16/2025:        Data reviewed : Cardiology studies        ECG 12 Lead    Date/Time: 4/16/2025 9:52 AM  Performed by: Peter De Guzman DO    Authorized by: Peter De Guzman DO  Rhythm: sinus rhythm  Conduction: incomplete left bundle branch block    Clinical impression: abnormal EKG       Results for orders placed during the hospital encounter of 05/23/24     Adult Transthoracic Echo Complete W/ Cont if Necessary Per Protocol     Interpretation Summary  •  Left ventricular " systolic function is moderately decreased. Left ventricular ejection fraction appears to be 31 - 35%.  •  The left ventricular cavity is mildly dilated.  •  Left ventricular diastolic function is consistent with (grade I) impaired relaxation.  •  The left atrial cavity is mildly dilated.  •  Estimated right ventricular systolic pressure from tricuspid regurgitation is normal (<35 mmHg).     Results for orders placed during the hospital encounter of 09/28/23     Cardiac Catheterization/Vascular Study     Conclusion  Table formatting from the original result was not included.  Cardiac Catheterization Operative Report     Brian Stewart  [id number]  9/28/2023  @PCP@     He underwent cardiac catheterization.     Indications for the procedure include: Cardiomyopathy.           Procedure Details:  The risks, benefits, complications, treatment options, and expected outcomes were discussed with the patient. The patient and/or family concurred with the proposed plan, giving informed consent.     After informed consent the patient was brought to the cath lab after appropriate IV hydration was begun and oral premedication was given. He was further sedated with fentanyl. He was prepped and draped in the usual manner. Using the modified Seldinger access technique, a 6 Persian sheath was placed in the femoral artery. A left heart catheterization with coronary arteriography was performed. Findings are discussed below.     After the procedure was completed, sedation was stopped and the sheaths and catheters were all removed. Hemostasis was achieved per established hospital protocols.     Conscious sedation:  Conscious sedation was performed according to protocol.  I supervised and directed an independent trained observer with the assistance of monitoring the patient's level of consciousness and physiologic status throughout the procedure.  Intraoperative service time was 30 minutes.     Findings:     Hemodynamics Right heart  catheterization and hemodynamic     Pulmonary capillary wedge pressure was 10 mmHg PA pressure systolic 22 diastolic 80 with a mean pressure of 14 mmHg  RV pressure systolic 20 mmHg  Right atrial pressure was 4 mm of medical  Femoral arterial saturation was 99%  PA saturation is 55%  Right atrial saturation is 53%  Darrion equation cardiac output is 2.75 L/min  Darrion equation cardiac index is 1.2 L/min/m²        Estimated Blood Loss:  Minimal     Specimens: None     Complications:  None; patient tolerated the procedure well.     Disposition: PACU - hemodynamically stable.     Condition: stable     Impressions:  Normal PA pressures and normal filling pressures     Recommendations:  Medical management for cardiomyopathy  Test results reviewed and discussed with patient and family     Assessment and Plan   Diagnoses and all orders for this visit:    1. Non- Ischemic Dilated cardiomyopathy (Primary)  Assessment & Plan:  Continue goal-directed medical therapy with Entresto, spironolactone, Farxiga, carvedilol.  He was noted to have low blood pressures on carvedilol 25 mg twice daily this was reduced to 12.5 mg twice daily.  Advised if he continues to have low blood pressure that we can switch to Toprol-XL as to give him more blood pressure room.        Orders:  -     ECG 12 Lead  -     Adult Transthoracic Echo Complete W/ Cont if Necessary Per Protocol; Future    2. Chronic systolic heart failure    3. Mixed hyperlipidemia  Assessment & Plan:     Continue medications      4. Essential hypertension  Assessment & Plan:  Stay.  Continue goal-directed medical therapy for heart failure.  See above      5. Palpitations    6. s/p AICD    7. VHD w/ MR  Assessment & Plan:  With a history of moderate to severe mitral regurgitation secondary to severely depressed LVEF.  Will continue goal-directed medical treatment for heart failure with a repeat echocardiogram.  Suspect his valve disease is secondary to his low pulm  function.      Other orders  -     carvedilol (COREG) 25 MG tablet; Take 0.5 tablets by mouth 2 (Two) Times a Day With Meals.  Dispense: 90 tablet; Refill: 3             Follow Up   No follow-ups on file.  Patient was given instructions and counseling regarding his condition or for health maintenance advice. Please see specific information pulled into the AVS if appropriate.

## 2025-04-21 ENCOUNTER — HOSPITAL ENCOUNTER (OUTPATIENT)
Dept: CARDIOLOGY | Facility: HOSPITAL | Age: 45
Discharge: HOME | End: 2025-04-21
Admitting: INTERNAL MEDICINE
Payer: MEDICARE

## 2025-04-21 VITALS
DIASTOLIC BLOOD PRESSURE: 73 MMHG | SYSTOLIC BLOOD PRESSURE: 120 MMHG | WEIGHT: 260.14 LBS | HEART RATE: 73 BPM | BODY MASS INDEX: 41.81 KG/M2 | HEIGHT: 66 IN

## 2025-04-21 DIAGNOSIS — I42.0 DILATED CARDIOMYOPATHY: Chronic | ICD-10-CM

## 2025-04-21 LAB
AORTIC DIMENSIONLESS INDEX: 0.97 (DI)
AV MEAN PRESS GRAD SYS DOP V1V2: 1.92 MMHG
AV VMAX SYS DOP: 90.2 CM/SEC
BH CV ECHO MEAS - ACS: 2.24 CM
BH CV ECHO MEAS - AO MAX PG: 3.3 MMHG
BH CV ECHO MEAS - AO ROOT DIAM: 3.3 CM
BH CV ECHO MEAS - AO V2 VTI: 16 CM
BH CV ECHO MEAS - AVA(I,D): 3.4 CM2
BH CV ECHO MEAS - EDV(CUBED): 190.6 ML
BH CV ECHO MEAS - EDV(MOD-SP4): 167.4 ML
BH CV ECHO MEAS - EF(MOD-SP4): 29.3 %
BH CV ECHO MEAS - ESV(CUBED): 107 ML
BH CV ECHO MEAS - ESV(MOD-SP4): 118.3 ML
BH CV ECHO MEAS - FS: 17.5 %
BH CV ECHO MEAS - IVS/LVPW: 0.86 CM
BH CV ECHO MEAS - IVSD: 0.6 CM
BH CV ECHO MEAS - LA DIMENSION: 3.5 CM
BH CV ECHO MEAS - LAT PEAK E' VEL: 7.2 CM/SEC
BH CV ECHO MEAS - LV DIASTOLIC VOL/BSA (35-75): 74.9 CM2
BH CV ECHO MEAS - LV MASS(C)D: 133.6 GRAMS
BH CV ECHO MEAS - LV MAX PG: 2.07 MMHG
BH CV ECHO MEAS - LV MEAN PG: 1.31 MMHG
BH CV ECHO MEAS - LV SYSTOLIC VOL/BSA (12-30): 52.9 CM2
BH CV ECHO MEAS - LV V1 MAX: 71.9 CM/SEC
BH CV ECHO MEAS - LV V1 VTI: 15.6 CM
BH CV ECHO MEAS - LVIDD: 5.8 CM
BH CV ECHO MEAS - LVIDS: 4.7 CM
BH CV ECHO MEAS - LVOT AREA: 3.5 CM2
BH CV ECHO MEAS - LVOT DIAM: 2.11 CM
BH CV ECHO MEAS - LVPWD: 0.7 CM
BH CV ECHO MEAS - MED PEAK E' VEL: 3.7 CM/SEC
BH CV ECHO MEAS - MV A MAX VEL: 56.9 CM/SEC
BH CV ECHO MEAS - MV DEC SLOPE: 197 CM/SEC2
BH CV ECHO MEAS - MV DEC TIME: 0.15 SEC
BH CV ECHO MEAS - MV E MAX VEL: 29.5 CM/SEC
BH CV ECHO MEAS - MV E/A: 0.52
BH CV ECHO MEAS - MV MAX PG: 1.48 MMHG
BH CV ECHO MEAS - MV MEAN PG: 0.63 MMHG
BH CV ECHO MEAS - MV V2 VTI: 14.6 CM
BH CV ECHO MEAS - MVA(VTI): 3.7 CM2
BH CV ECHO MEAS - PA ACC TIME: 0.14 SEC
BH CV ECHO MEAS - PA V2 MAX: 90.2 CM/SEC
BH CV ECHO MEAS - RV MAX PG: 1.57 MMHG
BH CV ECHO MEAS - RV V1 MAX: 62.6 CM/SEC
BH CV ECHO MEAS - RV V1 VTI: 12.5 CM
BH CV ECHO MEAS - RVDD: 3.3 CM
BH CV ECHO MEAS - SV(LVOT): 54.6 ML
BH CV ECHO MEAS - SV(MOD-SP4): 49.1 ML
BH CV ECHO MEAS - SVI(LVOT): 24.4 ML/M2
BH CV ECHO MEAS - SVI(MOD-SP4): 22 ML/M2
BH CV ECHO MEAS - TAPSE (>1.6): 2.06 CM
BH CV ECHO MEAS - TR MAX PG: 6.7 MMHG
BH CV ECHO MEAS - TR MAX VEL: 129.9 CM/SEC
BH CV ECHO MEASUREMENTS AVERAGE E/E' RATIO: 5.41

## 2025-04-21 PROCEDURE — 93306 TTE W/DOPPLER COMPLETE: CPT

## 2025-04-21 PROCEDURE — 93306 TTE W/DOPPLER COMPLETE: CPT | Performed by: INTERNAL MEDICINE

## 2025-04-21 PROCEDURE — 93356 MYOCRD STRAIN IMG SPCKL TRCK: CPT | Performed by: INTERNAL MEDICINE

## 2025-04-21 PROCEDURE — 93356 MYOCRD STRAIN IMG SPCKL TRCK: CPT

## 2025-04-23 RX ORDER — PRAVASTATIN SODIUM 20 MG
20 TABLET ORAL
Qty: 90 TABLET | Refills: 0 | Status: SHIPPED | OUTPATIENT
Start: 2025-04-23

## 2025-04-23 RX ORDER — SPIRONOLACTONE 25 MG/1
25 TABLET ORAL DAILY
Qty: 90 TABLET | Refills: 0 | Status: SHIPPED | OUTPATIENT
Start: 2025-04-23

## 2025-05-29 ENCOUNTER — OFFICE VISIT (OUTPATIENT)
Dept: CARDIOLOGY | Facility: CLINIC | Age: 45
End: 2025-05-29
Payer: MEDICARE

## 2025-05-29 VITALS
BODY MASS INDEX: 40.47 KG/M2 | DIASTOLIC BLOOD PRESSURE: 74 MMHG | SYSTOLIC BLOOD PRESSURE: 120 MMHG | WEIGHT: 251.8 LBS | HEIGHT: 66 IN | OXYGEN SATURATION: 95 % | HEART RATE: 66 BPM

## 2025-05-29 DIAGNOSIS — I42.0 DILATED CARDIOMYOPATHY: ICD-10-CM

## 2025-05-29 DIAGNOSIS — I34.0 NONRHEUMATIC MITRAL VALVE REGURGITATION: ICD-10-CM

## 2025-05-29 DIAGNOSIS — I10 ESSENTIAL HYPERTENSION: ICD-10-CM

## 2025-05-29 DIAGNOSIS — E78.2 MIXED HYPERLIPIDEMIA: ICD-10-CM

## 2025-05-29 DIAGNOSIS — I50.22 CHRONIC SYSTOLIC HEART FAILURE: Primary | ICD-10-CM

## 2025-05-29 DIAGNOSIS — Z95.810 AICD (AUTOMATIC CARDIOVERTER/DEFIBRILLATOR) PRESENT: ICD-10-CM

## 2025-05-29 RX ORDER — METOPROLOL SUCCINATE 50 MG/1
50 TABLET, EXTENDED RELEASE ORAL DAILY
Qty: 90 TABLET | Refills: 3 | Status: SHIPPED | OUTPATIENT
Start: 2025-05-29

## 2025-05-29 NOTE — H&P (VIEW-ONLY)
Cardiology Office Visit      Encounter Date:  05/29/2025    Patient ID:   Brian Stewart is a 44 y.o. male.    Reason For Consultation:  Congestive heart failure  Cardiomyopathy    History of Present Illness:  Dear Santana Garcia MD    I had the pleasure of seeing Brian Stewart today. As you are well aware, this is a 44 y.o. male with past medical history that is significant for nonischemic cardiomyopathy severe cardiomyopathy requiring AICD     Complaining of symptoms of chest pain and shortness of breath  Volume status is optimal  No syncope  No orthopnea no PND      Assessment & Plan    Impressions:  chronic systolic heart failure/stable  Congestive heart failure NYHA class II/no new complaints  Cardiomyopathy most likely nonischemic  Severe mitral regurgitation nonrheumatic/now mild mitral regurg  Hypertension  Obesity  Obstructive sleep apnea  Status post ICD with nonsustained ventricular tachycardia  Noncompliance with medical therapy  Nonischemic cardiomyopathy with LV ejection fraction of 25%/history recent echocardiogram with LV ejection fraction of 30 to 35%/recent echocardiogram with LV ejection fraction of 25 to 30% with mild to moderate mitral regurgitation  Impaired fasting glucose  Recent normal right heart catheterization with normal PA pressures    Recommendations:  Continue current medical therapy  Low-salt diet  Continue current medical therapy with aspirin 81 mg p.o. once a day Coreg 12.5 mg p.o. twice daily Farxiga 10 mg p.o. once a day continue Entresto twice a day Pravachol 20 mg p.o. once a day spironolactone 25 mg p.o. once a day  Recently discontinue for isosorbide secondary to low blood pressure and also decreasing the dose of the Coreg secondary to low blood pressure and symptoms of fatigue and side effects from high-dose beta-blockers  Results of the repeat echocardiogram reviewed and discussed with patient  Patient is advised to call back the office if there is any  "symptomatic hypotension episodes  prior work-up reviewed and discussed with patient  Patient was seen and evaluated by heart failure service at [facility]  Need for compliance with medical therapy close monitoring and follow-up and close monitoring of blood pressure at home reviewed and discussed with patient  Patient for left heart catheterization to rule out any significant progression of the obstructive coronary artery disease  Risk benefits and alternatives were cardiac catheterization reviewed and discussed with patient  Prior workup labs medications and recent echo findings reviewed and discussed with patient  Follow-up in office in 6 months        Vitals:  Vitals:    05/29/25 1001   BP: 120/74   BP Location: Left arm   Patient Position: Sitting   Cuff Size: Large Adult   Pulse: 66   SpO2: 95%   Weight: 114 kg (251 lb 12.8 oz)   Height: 167.4 cm (65.9\")       Physical Exam:    General: Alert, cooperative, no distress, appears stated age  Head:  Normocephalic, atraumatic, mucous membranes moist  Eyes:  Conjunctiva/corneas clear, EOM's intact     Neck:  Supple,  no adenopathy;      Lungs: Clear to auscultation bilaterally, no wheezes rhonchi rales are noted  Chest wall: No tenderness  Heart::  Regular rate and rhythm, S1 and S2 normal, no murmur, rub or gallop  Abdomen: Soft, non-tender, nondistended bowel sounds active  Extremities: No cyanosis, clubbing, or edema  Pulses: 2+ and symmetric all extremities  Skin:  No rashes or lesions  Neuro/psych: A&O x3. CN II through XII are grossly intact with appropriate affect              Lab Results   Component Value Date    GLUCOSE 101 (H) 12/21/2023    BUN 17 12/21/2023    CREATININE 0.89 12/21/2023    EGFR 109.0 12/21/2023    BCR 19.1 12/21/2023    K 5.0 12/21/2023    CO2 26.0 12/21/2023    CALCIUM 9.9 12/21/2023    ALBUMIN 4.3 09/05/2023    BILITOT 0.3 09/05/2023    AST 30 09/05/2023    ALT 15 09/05/2023     Results for orders placed during the hospital " encounter of 04/21/25    Adult Transthoracic Echo Complete W/ Cont if Necessary Per Protocol    Interpretation Summary    Left ventricular systolic function is severely decreased. Left ventricular ejection fraction appears to be 26 - 30%.    The left ventricular cavity is moderately dilated.    Left ventricular diastolic function is consistent with (grade I) impaired relaxation.    Mildly reduced right ventricular systolic function noted.    The right ventricular cavity is mild to moderately dilated.    The left atrial cavity is mild to moderately dilated.    Estimated right ventricular systolic pressure from tricuspid regurgitation is normal (<35 mmHg).     Results for orders placed during the hospital encounter of 09/28/23    Cardiac Catheterization/Vascular Study    Conclusion  Table formatting from the original result was not included.  Cardiac Catheterization Operative Report    Brian Stewart  [id number]  9/28/2023  @PCP@    He underwent cardiac catheterization.    Indications for the procedure include: Cardiomyopathy.        Procedure Details:  The risks, benefits, complications, treatment options, and expected outcomes were discussed with the patient. The patient and/or family concurred with the proposed plan, giving informed consent.    After informed consent the patient was brought to the cath lab after appropriate IV hydration was begun and oral premedication was given. He was further sedated with fentanyl. He was prepped and draped in the usual manner. Using the modified Seldinger access technique, a 6 Kazakh sheath was placed in the femoral artery. A left heart catheterization with coronary arteriography was performed. Findings are discussed below.    After the procedure was completed, sedation was stopped and the sheaths and catheters were all removed. Hemostasis was achieved per established hospital protocols.    Conscious sedation:  Conscious sedation was performed according to protocol.  I  supervised and directed an independent trained observer with the assistance of monitoring the patient's level of consciousness and physiologic status throughout the procedure.  Intraoperative service time was 30 minutes.    Findings:    Hemodynamics Right heart catheterization and hemodynamic    Pulmonary capillary wedge pressure was 10 mmHg PA pressure systolic 22 diastolic 80 with a mean pressure of 14 mmHg  RV pressure systolic 20 mmHg  Right atrial pressure was 4 mm of medical  Femoral arterial saturation was 99%  PA saturation is 55%  Right atrial saturation is 53%  Darrion equation cardiac output is 2.75 L/min  Darrion equation cardiac index is 1.2 L/min/m²      Estimated Blood Loss:  Minimal    Specimens: None    Complications:  None; patient tolerated the procedure well.    Disposition: PACU - hemodynamically stable.    Condition: stable    Impressions:  Normal PA pressures and normal filling pressures    Recommendations:  Medical management for cardiomyopathy  Test results reviewed and discussed with patient and family     Lab Results   Component Value Date    CHOL 172 08/06/2023    TRIG 133 08/06/2023    HDL 47 08/06/2023     (H) 08/06/2023      Results for orders placed in visit on 05/21/18    Stress Test With Myocardial Perfusion One Day    Interpretation Summary  · Findings consistent with an equivocal ECG stress test.  · Breast attenuation and diaphragmatic attenuation artifacts are present.  · Left ventricular ejection fraction is normal (Calculated EF = 50%).  · Myocardial perfusion imaging indicates a normal myocardial perfusion study with no evidence of ischemia.  · Impressions are consistent with a low risk study.  · Suboptimal study    Asymptomatic for chest pain. Specificity of study reduced secondary to baseline Non-specific ST-T wave abnormalities, however ECG is not suggestive of ischemia  Ectopy: none.  Blood pressure  And heart rate response:  Appropriate for Beta-blocker  therapy  Pharmacologic study due to inability to tolerate increasing speed and grade of treadmill due to c/o  becoming very fatigued Stage II, and had to change to pharmacological procedure.  Participated in Low Level exercise and tolerance was fair.   Results for orders placed during the hospital encounter of 11/09/22    Mobile Cardiac Outpatient Telemetry    Interpretation Summary  Extended Holter monitor study for symptoms of palpitations  Patient was monitored from November 9, 2022 to December 8, 2022  Rhythm is sinus rhythm with a minimal heart rate of 50 beats per [place] 169 bpm average heart rate of 77 bpm  No atrial fibrillation  No clinically significant pauses  First-degree heart block  PVC burden of 7%  PAC burden of 8%  Nonsustained ventricular tachycardia for total 4 beats at rate of 170 bpm  Patient had multiple symptomatic episodes with heart racing and skipped beat during that time patient noted to be in sinus rhythm with PVC burden most likely patient's symptoms are secondary to PVC burden based on this Holter monitor study  Abnormal Holter monitor study  Clinical correlation is recommended           Objective:          Allergies:  No Known Allergies    Medication Review:     Current Outpatient Medications:     aspirin 81 MG chewable tablet, Chew 1 tablet Daily., Disp: , Rfl:     dapagliflozin Propanediol 10 MG tablet, TAKE 1 TABLET BY MOUTH DAILY, Disp: 90 tablet, Rfl: 0    furosemide (LASIX) 40 MG tablet, TAKE 1 TABLET BY MOUTH TWICE DAILY (Patient taking differently: Take 1 tablet by mouth Daily.), Disp: 180 tablet, Rfl: 1    gabapentin (NEURONTIN) 100 MG capsule, Take 1 capsule twice a day by oral route for 30 days., Disp: , Rfl:     HYDROcodone-acetaminophen (NORCO)  MG per tablet, Take 1 tablet by mouth Every 6 (Six) Hours As Needed for Mild Pain., Disp: , Rfl:     Magnesium Oxide 400 MG capsule, Take 400 mg by mouth 2 (Two) Times a Day Before Meals., Disp: 60 each, Rfl: 11     multivitamin with minerals tablet tablet, Take 1 tablet by mouth Daily., Disp: , Rfl:     Omega-3 Fatty Acids (fish oil) 1000 MG capsule capsule, Take 3 capsules by mouth Daily With Breakfast., Disp: , Rfl:     pravastatin (PRAVACHOL) 20 MG tablet, TAKE 1 TABLET BY MOUTH EVERY NIGHT AT BEDTIME, Disp: 90 tablet, Rfl: 0    sacubitril-valsartan (Entresto) 49-51 MG tablet, TAKE 1 TABLET BY MOUTH TWICE DAILY, Disp: 180 tablet, Rfl: 2    spironolactone (ALDACTONE) 25 MG tablet, TAKE 1 TABLET BY MOUTH DAILY, Disp: 90 tablet, Rfl: 0    metoprolol succinate XL (TOPROL-XL) 50 MG 24 hr tablet, Take 1 tablet by mouth Daily., Disp: 90 tablet, Rfl: 3    Family History:  Family History   Problem Relation Age of Onset    Heart disease Father     Hypertension Mother        Past Medical History:  Past Medical History:   Diagnosis Date    Arrhythmia     Cardiomyopathy     Diverticulitis     Hyperlipidemia     Palpitations        Past surgical History:  Past Surgical History:   Procedure Laterality Date    BACK SURGERY      CARDIAC CATHETERIZATION      CARDIAC CATHETERIZATION N/A 06/12/2020    Procedure: Left Heart Cath;  Surgeon: Fernanda Jackson MD;  Location: [facility] CATH INVASIVE LOCATION;  Service: Cardiovascular;  Laterality: N/A;    CARDIAC CATHETERIZATION N/A 06/12/2020    Procedure: Coronary angiography;  Surgeon: Fernanda Jackson MD;  Location: [facility] CATH INVASIVE LOCATION;  Service: Cardiovascular;  Laterality: N/A;    CARDIAC CATHETERIZATION Right 9/28/2023    Procedure: Right Heart Cath;  Surgeon: Fernanda Jackson MD;  Location: [facility] CATH INVASIVE LOCATION;  Service: Cardiovascular;  Laterality: Right;    CARDIAC DEFIBRILLATOR PLACEMENT      COLON RESECTION      HERNIA REPAIR      INSERT / REPLACE / REMOVE PACEMAKER      ICD    KNEE ACL RECONSTRUCTION         Social History:  Social History     Socioeconomic History    Marital status: Single   Tobacco Use    Smoking status: Former     Current  packs/day: 0.00     Types: Cigarettes     Quit date: 9/1/2021     Years since quitting: 3.7     Passive exposure: Current    Smokeless tobacco: Former     Types: Snuff    Tobacco comments:     QUIT SMOKING 5/20   Vaping Use    Vaping status: Never Used    Passive vaping exposure: Yes   Substance and Sexual Activity    Alcohol use: Not Currently    Drug use: Yes     Types: Marijuana     Comment: once/day    Sexual activity: Yes     Partners: Female       Review of Systems:  The following systems were reviewed as they relate to the cardiovascular system: Constitutional, Eyes, ENT, Cardiovascular, Respiratory, Gastrointestinal, Integumentary, Neurological, Psychiatric, Hematologic, Endocrine, Musculoskeletal, and Genitourinary. The pertinent cardiovascular findings are reported above with all other cardiovascular points within those systems being negative.    Diagnostic Study Review:     Current Electrocardiogram:  Procedures          NOTE: The following portions of the patient's history were reviewed and updated this visit as appropriate: allergies, current medications, past family history, past medical history, past social history, past surgical history and problem list.

## 2025-05-29 NOTE — PROGRESS NOTES
Cardiology Office Visit      Encounter Date:  05/29/2025    Patient ID:   Brian Stewart is a 44 y.o. male.    Reason For Consultation:  Congestive heart failure  Cardiomyopathy    History of Present Illness:  Dear Santana Garcia MD    I had the pleasure of seeing Brian Stewart today. As you are well aware, this is a 44 y.o. male with past medical history that is significant for nonischemic cardiomyopathy severe cardiomyopathy requiring AICD     Complaining of symptoms of chest pain and shortness of breath  Volume status is optimal  No syncope  No orthopnea no PND      Assessment & Plan    Impressions:  chronic systolic heart failure/stable  Congestive heart failure NYHA class II/no new complaints  Cardiomyopathy most likely nonischemic  Severe mitral regurgitation nonrheumatic/now mild mitral regurg  Hypertension  Obesity  Obstructive sleep apnea  Status post ICD with nonsustained ventricular tachycardia  Noncompliance with medical therapy  Nonischemic cardiomyopathy with LV ejection fraction of 25%/history recent echocardiogram with LV ejection fraction of 30 to 35%/recent echocardiogram with LV ejection fraction of 25 to 30% with mild to moderate mitral regurgitation  Impaired fasting glucose  Recent normal right heart catheterization with normal PA pressures    Recommendations:  Continue current medical therapy  Low-salt diet  Continue current medical therapy with aspirin 81 mg p.o. once a day Coreg 12.5 mg p.o. twice daily Farxiga 10 mg p.o. once a day continue Entresto twice a day Pravachol 20 mg p.o. once a day spironolactone 25 mg p.o. once a day  Recently discontinue for isosorbide secondary to low blood pressure and also decreasing the dose of the Coreg secondary to low blood pressure and symptoms of fatigue and side effects from high-dose beta-blockers  Results of the repeat echocardiogram reviewed and discussed with patient  Patient is advised to call back the office if there is any  "symptomatic hypotension episodes  prior work-up reviewed and discussed with patient  Patient was seen and evaluated by heart failure service at [facility]  Need for compliance with medical therapy close monitoring and follow-up and close monitoring of blood pressure at home reviewed and discussed with patient  Patient for left heart catheterization to rule out any significant progression of the obstructive coronary artery disease  Risk benefits and alternatives were cardiac catheterization reviewed and discussed with patient  Prior workup labs medications and recent echo findings reviewed and discussed with patient  Follow-up in office in 6 months        Vitals:  Vitals:    05/29/25 1001   BP: 120/74   BP Location: Left arm   Patient Position: Sitting   Cuff Size: Large Adult   Pulse: 66   SpO2: 95%   Weight: 114 kg (251 lb 12.8 oz)   Height: 167.4 cm (65.9\")       Physical Exam:    General: Alert, cooperative, no distress, appears stated age  Head:  Normocephalic, atraumatic, mucous membranes moist  Eyes:  Conjunctiva/corneas clear, EOM's intact     Neck:  Supple,  no adenopathy;      Lungs: Clear to auscultation bilaterally, no wheezes rhonchi rales are noted  Chest wall: No tenderness  Heart::  Regular rate and rhythm, S1 and S2 normal, no murmur, rub or gallop  Abdomen: Soft, non-tender, nondistended bowel sounds active  Extremities: No cyanosis, clubbing, or edema  Pulses: 2+ and symmetric all extremities  Skin:  No rashes or lesions  Neuro/psych: A&O x3. CN II through XII are grossly intact with appropriate affect              Lab Results   Component Value Date    GLUCOSE 101 (H) 12/21/2023    BUN 17 12/21/2023    CREATININE 0.89 12/21/2023    EGFR 109.0 12/21/2023    BCR 19.1 12/21/2023    K 5.0 12/21/2023    CO2 26.0 12/21/2023    CALCIUM 9.9 12/21/2023    ALBUMIN 4.3 09/05/2023    BILITOT 0.3 09/05/2023    AST 30 09/05/2023    ALT 15 09/05/2023     Results for orders placed during the hospital " encounter of 04/21/25    Adult Transthoracic Echo Complete W/ Cont if Necessary Per Protocol    Interpretation Summary    Left ventricular systolic function is severely decreased. Left ventricular ejection fraction appears to be 26 - 30%.    The left ventricular cavity is moderately dilated.    Left ventricular diastolic function is consistent with (grade I) impaired relaxation.    Mildly reduced right ventricular systolic function noted.    The right ventricular cavity is mild to moderately dilated.    The left atrial cavity is mild to moderately dilated.    Estimated right ventricular systolic pressure from tricuspid regurgitation is normal (<35 mmHg).     Results for orders placed during the hospital encounter of 09/28/23    Cardiac Catheterization/Vascular Study    Conclusion  Table formatting from the original result was not included.  Cardiac Catheterization Operative Report    Brian Stewart  [id number]  9/28/2023  @PCP@    He underwent cardiac catheterization.    Indications for the procedure include: Cardiomyopathy.        Procedure Details:  The risks, benefits, complications, treatment options, and expected outcomes were discussed with the patient. The patient and/or family concurred with the proposed plan, giving informed consent.    After informed consent the patient was brought to the cath lab after appropriate IV hydration was begun and oral premedication was given. He was further sedated with fentanyl. He was prepped and draped in the usual manner. Using the modified Seldinger access technique, a 6 Bulgarian sheath was placed in the femoral artery. A left heart catheterization with coronary arteriography was performed. Findings are discussed below.    After the procedure was completed, sedation was stopped and the sheaths and catheters were all removed. Hemostasis was achieved per established hospital protocols.    Conscious sedation:  Conscious sedation was performed according to protocol.  I  supervised and directed an independent trained observer with the assistance of monitoring the patient's level of consciousness and physiologic status throughout the procedure.  Intraoperative service time was 30 minutes.    Findings:    Hemodynamics Right heart catheterization and hemodynamic    Pulmonary capillary wedge pressure was 10 mmHg PA pressure systolic 22 diastolic 80 with a mean pressure of 14 mmHg  RV pressure systolic 20 mmHg  Right atrial pressure was 4 mm of medical  Femoral arterial saturation was 99%  PA saturation is 55%  Right atrial saturation is 53%  Darrion equation cardiac output is 2.75 L/min  Darrion equation cardiac index is 1.2 L/min/m²      Estimated Blood Loss:  Minimal    Specimens: None    Complications:  None; patient tolerated the procedure well.    Disposition: PACU - hemodynamically stable.    Condition: stable    Impressions:  Normal PA pressures and normal filling pressures    Recommendations:  Medical management for cardiomyopathy  Test results reviewed and discussed with patient and family     Lab Results   Component Value Date    CHOL 172 08/06/2023    TRIG 133 08/06/2023    HDL 47 08/06/2023     (H) 08/06/2023      Results for orders placed in visit on 05/21/18    Stress Test With Myocardial Perfusion One Day    Interpretation Summary  · Findings consistent with an equivocal ECG stress test.  · Breast attenuation and diaphragmatic attenuation artifacts are present.  · Left ventricular ejection fraction is normal (Calculated EF = 50%).  · Myocardial perfusion imaging indicates a normal myocardial perfusion study with no evidence of ischemia.  · Impressions are consistent with a low risk study.  · Suboptimal study    Asymptomatic for chest pain. Specificity of study reduced secondary to baseline Non-specific ST-T wave abnormalities, however ECG is not suggestive of ischemia  Ectopy: none.  Blood pressure  And heart rate response:  Appropriate for Beta-blocker  therapy  Pharmacologic study due to inability to tolerate increasing speed and grade of treadmill due to c/o  becoming very fatigued Stage II, and had to change to pharmacological procedure.  Participated in Low Level exercise and tolerance was fair.   Results for orders placed during the hospital encounter of 11/09/22    Mobile Cardiac Outpatient Telemetry    Interpretation Summary  Extended Holter monitor study for symptoms of palpitations  Patient was monitored from November 9, 2022 to December 8, 2022  Rhythm is sinus rhythm with a minimal heart rate of 50 beats per [place] 169 bpm average heart rate of 77 bpm  No atrial fibrillation  No clinically significant pauses  First-degree heart block  PVC burden of 7%  PAC burden of 8%  Nonsustained ventricular tachycardia for total 4 beats at rate of 170 bpm  Patient had multiple symptomatic episodes with heart racing and skipped beat during that time patient noted to be in sinus rhythm with PVC burden most likely patient's symptoms are secondary to PVC burden based on this Holter monitor study  Abnormal Holter monitor study  Clinical correlation is recommended           Objective:          Allergies:  No Known Allergies    Medication Review:     Current Outpatient Medications:     aspirin 81 MG chewable tablet, Chew 1 tablet Daily., Disp: , Rfl:     dapagliflozin Propanediol 10 MG tablet, TAKE 1 TABLET BY MOUTH DAILY, Disp: 90 tablet, Rfl: 0    furosemide (LASIX) 40 MG tablet, TAKE 1 TABLET BY MOUTH TWICE DAILY (Patient taking differently: Take 1 tablet by mouth Daily.), Disp: 180 tablet, Rfl: 1    gabapentin (NEURONTIN) 100 MG capsule, Take 1 capsule twice a day by oral route for 30 days., Disp: , Rfl:     HYDROcodone-acetaminophen (NORCO)  MG per tablet, Take 1 tablet by mouth Every 6 (Six) Hours As Needed for Mild Pain., Disp: , Rfl:     Magnesium Oxide 400 MG capsule, Take 400 mg by mouth 2 (Two) Times a Day Before Meals., Disp: 60 each, Rfl: 11     multivitamin with minerals tablet tablet, Take 1 tablet by mouth Daily., Disp: , Rfl:     Omega-3 Fatty Acids (fish oil) 1000 MG capsule capsule, Take 3 capsules by mouth Daily With Breakfast., Disp: , Rfl:     pravastatin (PRAVACHOL) 20 MG tablet, TAKE 1 TABLET BY MOUTH EVERY NIGHT AT BEDTIME, Disp: 90 tablet, Rfl: 0    sacubitril-valsartan (Entresto) 49-51 MG tablet, TAKE 1 TABLET BY MOUTH TWICE DAILY, Disp: 180 tablet, Rfl: 2    spironolactone (ALDACTONE) 25 MG tablet, TAKE 1 TABLET BY MOUTH DAILY, Disp: 90 tablet, Rfl: 0    metoprolol succinate XL (TOPROL-XL) 50 MG 24 hr tablet, Take 1 tablet by mouth Daily., Disp: 90 tablet, Rfl: 3    Family History:  Family History   Problem Relation Age of Onset    Heart disease Father     Hypertension Mother        Past Medical History:  Past Medical History:   Diagnosis Date    Arrhythmia     Cardiomyopathy     Diverticulitis     Hyperlipidemia     Palpitations        Past surgical History:  Past Surgical History:   Procedure Laterality Date    BACK SURGERY      CARDIAC CATHETERIZATION      CARDIAC CATHETERIZATION N/A 06/12/2020    Procedure: Left Heart Cath;  Surgeon: Fernanda Jackson MD;  Location: [facility] CATH INVASIVE LOCATION;  Service: Cardiovascular;  Laterality: N/A;    CARDIAC CATHETERIZATION N/A 06/12/2020    Procedure: Coronary angiography;  Surgeon: Fernanda Jackson MD;  Location: [facility] CATH INVASIVE LOCATION;  Service: Cardiovascular;  Laterality: N/A;    CARDIAC CATHETERIZATION Right 9/28/2023    Procedure: Right Heart Cath;  Surgeon: Fernanda Jackson MD;  Location: [facility] CATH INVASIVE LOCATION;  Service: Cardiovascular;  Laterality: Right;    CARDIAC DEFIBRILLATOR PLACEMENT      COLON RESECTION      HERNIA REPAIR      INSERT / REPLACE / REMOVE PACEMAKER      ICD    KNEE ACL RECONSTRUCTION         Social History:  Social History     Socioeconomic History    Marital status: Single   Tobacco Use    Smoking status: Former     Current  packs/day: 0.00     Types: Cigarettes     Quit date: 9/1/2021     Years since quitting: 3.7     Passive exposure: Current    Smokeless tobacco: Former     Types: Snuff    Tobacco comments:     QUIT SMOKING 5/20   Vaping Use    Vaping status: Never Used    Passive vaping exposure: Yes   Substance and Sexual Activity    Alcohol use: Not Currently    Drug use: Yes     Types: Marijuana     Comment: once/day    Sexual activity: Yes     Partners: Female       Review of Systems:  The following systems were reviewed as they relate to the cardiovascular system: Constitutional, Eyes, ENT, Cardiovascular, Respiratory, Gastrointestinal, Integumentary, Neurological, Psychiatric, Hematologic, Endocrine, Musculoskeletal, and Genitourinary. The pertinent cardiovascular findings are reported above with all other cardiovascular points within those systems being negative.    Diagnostic Study Review:     Current Electrocardiogram:  Procedures          NOTE: The following portions of the patient's history were reviewed and updated this visit as appropriate: allergies, current medications, past family history, past medical history, past social history, past surgical history and problem list.

## 2025-06-03 ENCOUNTER — OFFICE VISIT (OUTPATIENT)
Dept: PULMONOLOGY | Facility: HOSPITAL | Age: 45
End: 2025-06-03
Payer: MEDICARE

## 2025-06-03 VITALS
DIASTOLIC BLOOD PRESSURE: 67 MMHG | SYSTOLIC BLOOD PRESSURE: 109 MMHG | RESPIRATION RATE: 14 BRPM | BODY MASS INDEX: 39.7 KG/M2 | WEIGHT: 247 LBS | HEART RATE: 63 BPM | OXYGEN SATURATION: 95 % | HEIGHT: 66 IN

## 2025-06-03 DIAGNOSIS — G47.33 OSA (OBSTRUCTIVE SLEEP APNEA): Primary | ICD-10-CM

## 2025-06-03 PROCEDURE — G0463 HOSPITAL OUTPT CLINIC VISIT: HCPCS

## 2025-06-03 RX ORDER — GABAPENTIN 300 MG/1
1 CAPSULE ORAL 4 TIMES DAILY
COMMUNITY
Start: 2025-04-09

## 2025-06-03 RX ORDER — SACUBITRIL AND VALSARTAN 49; 51 MG/1; MG/1
1 TABLET, FILM COATED ORAL 2 TIMES DAILY
Qty: 180 TABLET | Refills: 2 | Status: SHIPPED | OUTPATIENT
Start: 2025-06-03

## 2025-06-03 RX ORDER — TESTOSTERONE CYPIONATE 200 MG/ML
200 INJECTION, SOLUTION INTRAMUSCULAR
COMMUNITY
Start: 2025-05-05

## 2025-06-03 NOTE — PROGRESS NOTES
PULMONARY/ CRITICAL CARE/ SLEEP MEDICINE OUTPATIENT CONSULT/ FOLLOW UP NOTE        Patient Name:  Brian Stewart    :  1980    Medical Record:  [id number]    PRIMARY CARE PHYSICIAN     Chelo Mckeon APRN    REASON FOR CONSULTATION    Brian Stewart is a 44 y.o. male who is referred for consultation for [facility]  REVIEW OF SYSTEMS    Constitutional:  Denies fever or chills   Eyes:  Denies change in visual acuity   HENT:  Denies nasal congestion or sore throat   Respiratory:  Denies cough or shortness of breath   Cardiovascular:  Denies chest pain or edema   GI:  Denies abdominal pain, nausea, vomiting, bloody stools or diarrhea   :  Denies dysuria   Musculoskeletal:  Denies back pain or joint pain   Integument:  Denies rash   Neurologic:  Denies headache, focal weakness or sensory changes   Endocrine:  Denies polyuria or polydipsia   Lymphatic:  Denies swollen glands   Psychiatric:  Denies depression or anxiety     MEDICAL HISTORY    Past Medical History:   Diagnosis Date    Arrhythmia     Cardiomyopathy     Diverticulitis     Hyperlipidemia     Palpitations         SURGICAL HISTORY    Past Surgical History:   Procedure Laterality Date    BACK SURGERY      CARDIAC CATHETERIZATION      CARDIAC CATHETERIZATION N/A 2020    Procedure: Left Heart Cath;  Surgeon: Fernanda Jackson MD;  Location: [facility] CATH INVASIVE LOCATION;  Service: Cardiovascular;  Laterality: N/A;    CARDIAC CATHETERIZATION N/A 2020    Procedure: Coronary angiography;  Surgeon: Fernanda Jackson MD;  Location: [facility] INVASIVE LOCATION;  Service: Cardiovascular;  Laterality: N/A;    CARDIAC CATHETERIZATION Right 2023    Procedure: Right Heart Cath;  Surgeon: Fernanda Jackson MD;  Location: [facility] CATH INVASIVE LOCATION;  Service: Cardiovascular;  Laterality: Right;    CARDIAC DEFIBRILLATOR PLACEMENT      COLON RESECTION      HERNIA REPAIR      INSERT / REPLACE / REMOVE PACEMAKER      ICD    KNEE  ACL RECONSTRUCTION          FAMILY HISTORY    Family History   Problem Relation Age of Onset    Heart disease Father     Hypertension Mother        SOCIAL HISTORY    Social History     Tobacco Use    Smoking status: Former     Current packs/day: 0.00     Types: Cigarettes     Quit date: 9/1/2021     Years since quitting: 3.7     Passive exposure: Current    Smokeless tobacco: Former     Types: Snuff    Tobacco comments:     QUIT SMOKING 5/20   Substance Use Topics    Alcohol use: Not Currently        ALLERGIES    No Known Allergies      MEDICATIONS    Current Outpatient Medications on File Prior to Visit   Medication Sig Dispense Refill    aspirin 81 MG chewable tablet Chew 1 tablet Daily.      furosemide (LASIX) 40 MG tablet TAKE 1 TABLET BY MOUTH TWICE DAILY (Patient taking differently: Take 1 tablet by mouth Daily.) 180 tablet 1    gabapentin (NEURONTIN) 300 MG capsule Take 1 capsule by mouth 4 (Four) Times a Day.      HYDROcodone-acetaminophen (NORCO)  MG per tablet Take 1 tablet by mouth Every 6 (Six) Hours As Needed for Mild Pain.      Magnesium Oxide 400 MG capsule Take 400 mg by mouth 2 (Two) Times a Day Before Meals. 60 each 11    metoprolol succinate XL (TOPROL-XL) 50 MG 24 hr tablet Take 1 tablet by mouth Daily. 90 tablet 3    multivitamin with minerals tablet tablet Take 1 tablet by mouth Daily.      Omega-3 Fatty Acids (fish oil) 1000 MG capsule capsule Take 3 capsules by mouth Daily With Breakfast.      pravastatin (PRAVACHOL) 20 MG tablet TAKE 1 TABLET BY MOUTH EVERY NIGHT AT BEDTIME 90 tablet 0    spironolactone (ALDACTONE) 25 MG tablet TAKE 1 TABLET BY MOUTH DAILY 90 tablet 0    Testosterone Cypionate (DEPOTESTOTERONE CYPIONATE) 200 MG/ML injection Inject 1 mL into the appropriate muscle as directed by prescriber Every 7 (Seven) Days.      [DISCONTINUED] gabapentin (NEURONTIN) 100 MG capsule Take 1 capsule twice a day by oral route for 30 days.      [DISCONTINUED] dapagliflozin Propanediol 10  "MG tablet TAKE 1 TABLET BY MOUTH DAILY 90 tablet 0    [DISCONTINUED] sacubitril-valsartan (Entresto) 49-51 MG tablet TAKE 1 TABLET BY MOUTH TWICE DAILY 180 tablet 2     No current facility-administered medications on file prior to visit.       PHYSICAL EXAM    Vitals:    06/03/25 0953   BP: 109/67   BP Location: Left arm   Patient Position: Sitting   Cuff Size: Adult   Pulse: 63   Resp: 14   SpO2: 95%   Weight: 112 kg (247 lb)   Height: 167.4 cm (65.9\")        Constitutional:  Well developed, well nourished, no acute distress, non-toxic appearance   Eyes:  PERRL, conjunctiva normal   HENT:  Atraumatic, external ears normal, nose normal, oropharynx moist, no pharyngeal exudates. mallampatti   Neck- normal range of motion, no tenderness, supple   Respiratory:  No respiratory distress, normal breath sounds, no rales, no wheezing   Cardiovascular:  Normal rate, normal rhythm, no murmurs, no gallops, no rubs   GI:  Soft, nondistended, normal bowel sounds, nontender, no organomegaly, no mass, no rebound, no guarding   :  No costovertebral angle tenderness   Musculoskeletal:  No edema, no tenderness, no deformities. Back- no tenderness  Integument:  Well hydrated, no rash   Lymphatic:  No lymphadenopathy noted   Neurologic:  Alert & oriented x 3, CN 2-12 normal, normal motor function, normal sensory function, no focal deficits noted   Psychiatric:  Speech and behavior appropriate     No radiology results for the last 90 days.   Results for orders placed during the hospital encounter of 04/21/25    Adult Transthoracic Echo Complete W/ Cont if Necessary Per Protocol    Interpretation Summary    Left ventricular systolic function is severely decreased. Left ventricular ejection fraction appears to be 26 - 30%.    The left ventricular cavity is moderately dilated.    Left ventricular diastolic function is consistent with (grade I) impaired relaxation.    Mildly reduced right ventricular systolic function noted.    The right " ventricular cavity is mild to moderately dilated.    The left atrial cavity is mild to moderately dilated.    Estimated right ventricular systolic pressure from tricuspid regurgitation is normal (<35 mmHg).      ASSESSMENT & PLAN:      Obstructive sleep apnea, initially had sleep study at [facility] showed severe sleep apnea however patient lost 100 pounds and repeat sleep study on 11/8/2023 in lab showed AHI 3.0, REM AHI 1.8, REM sleep 19% total of 65 minutes     Cardiomyopathy  Although he stopped smoking cigarettes but he still use marijuana occasionally        Plan:     Discussed with patient sleep study findings weight loss has worked well for him as well as daily exercise regimen he is doing in case his symptoms get worse will repeat sleep study    Since surgery has regained 30 pounds we will proceed with reevaluation with home sleep study, patient has occasional snoring and excessive daytime fatigue     This document has been electronically signed by  Jasvir Munoz MD  10:24 EDT

## 2025-06-17 ENCOUNTER — HOSPITAL ENCOUNTER (OUTPATIENT)
Dept: SLEEP MEDICINE | Facility: HOSPITAL | Age: 45
Discharge: HOME | End: 2025-06-17
Admitting: INTERNAL MEDICINE
Payer: MEDICARE

## 2025-06-17 DIAGNOSIS — G47.33 OSA (OBSTRUCTIVE SLEEP APNEA): ICD-10-CM

## 2025-06-17 PROCEDURE — G0399 HOME SLEEP TEST/TYPE 3 PORTA: HCPCS

## 2025-06-18 ENCOUNTER — LAB (OUTPATIENT)
Dept: LAB | Facility: HOSPITAL | Age: 45
End: 2025-06-18
Payer: MEDICARE

## 2025-06-18 DIAGNOSIS — I42.0 DILATED CARDIOMYOPATHY: ICD-10-CM

## 2025-06-18 DIAGNOSIS — I34.0 NONRHEUMATIC MITRAL VALVE REGURGITATION: ICD-10-CM

## 2025-06-18 DIAGNOSIS — I10 ESSENTIAL HYPERTENSION: ICD-10-CM

## 2025-06-18 DIAGNOSIS — I50.22 CHRONIC SYSTOLIC HEART FAILURE: ICD-10-CM

## 2025-06-18 DIAGNOSIS — Z95.810 AICD (AUTOMATIC CARDIOVERTER/DEFIBRILLATOR) PRESENT: ICD-10-CM

## 2025-06-18 DIAGNOSIS — E78.2 MIXED HYPERLIPIDEMIA: ICD-10-CM

## 2025-06-18 LAB
ANION GAP SERPL CALCULATED.3IONS-SCNC: 10.5 MMOL/L (ref 5–15)
BASOPHILS # BLD AUTO: 0.05 10*3/MM3 (ref 0–0.2)
BASOPHILS NFR BLD AUTO: 0.5 % (ref 0–1.5)
BUN SERPL-MCNC: 14.6 MG/DL (ref 6–20)
BUN/CREAT SERPL: 16.2 (ref 7–25)
CALCIUM SPEC-SCNC: 9.7 MG/DL (ref 8.6–10.5)
CHLORIDE SERPL-SCNC: 104 MMOL/L (ref 98–107)
CO2 SERPL-SCNC: 22.5 MMOL/L (ref 22–29)
CREAT SERPL-MCNC: 0.9 MG/DL (ref 0.76–1.27)
DEPRECATED RDW RBC AUTO: 39.9 FL (ref 37–54)
EGFRCR SERPLBLD CKD-EPI 2021: 108 ML/MIN/1.73
EOSINOPHIL # BLD AUTO: 0.12 10*3/MM3 (ref 0–0.4)
EOSINOPHIL NFR BLD AUTO: 1.2 % (ref 0.3–6.2)
ERYTHROCYTE [DISTWIDTH] IN BLOOD BY AUTOMATED COUNT: 11.7 % (ref 12.3–15.4)
GLUCOSE SERPL-MCNC: 116 MG/DL (ref 65–99)
HCT VFR BLD AUTO: 50.5 % (ref 37.5–51)
HGB BLD-MCNC: 17.1 G/DL (ref 13–17.7)
IMM GRANULOCYTES # BLD AUTO: 0.07 10*3/MM3 (ref 0–0.05)
IMM GRANULOCYTES NFR BLD AUTO: 0.7 % (ref 0–0.5)
INR PPP: 1.04 (ref 0.9–1.1)
LYMPHOCYTES # BLD AUTO: 1.94 10*3/MM3 (ref 0.7–3.1)
LYMPHOCYTES NFR BLD AUTO: 18.8 % (ref 19.6–45.3)
MCH RBC QN AUTO: 31.4 PG (ref 26.6–33)
MCHC RBC AUTO-ENTMCNC: 33.9 G/DL (ref 31.5–35.7)
MCV RBC AUTO: 92.7 FL (ref 79–97)
MONOCYTES # BLD AUTO: 0.68 10*3/MM3 (ref 0.1–0.9)
MONOCYTES NFR BLD AUTO: 6.6 % (ref 5–12)
NEUTROPHILS NFR BLD AUTO: 7.46 10*3/MM3 (ref 1.7–7)
NEUTROPHILS NFR BLD AUTO: 72.2 % (ref 42.7–76)
NRBC BLD AUTO-RTO: 0 /100 WBC (ref 0–0.2)
PLATELET # BLD AUTO: 207 10*3/MM3 (ref 140–450)
PMV BLD AUTO: 10.2 FL (ref 6–12)
POTASSIUM SERPL-SCNC: 4.1 MMOL/L (ref 3.5–5.2)
PROTHROMBIN TIME: 13.6 SECONDS (ref 11.7–14.2)
RBC # BLD AUTO: 5.45 10*6/MM3 (ref 4.14–5.8)
SODIUM SERPL-SCNC: 137 MMOL/L (ref 136–145)
WBC NRBC COR # BLD AUTO: 10.32 10*3/MM3 (ref 3.4–10.8)

## 2025-06-18 PROCEDURE — 80048 BASIC METABOLIC PNL TOTAL CA: CPT

## 2025-06-18 PROCEDURE — 85610 PROTHROMBIN TIME: CPT

## 2025-06-18 PROCEDURE — 36415 COLL VENOUS BLD VENIPUNCTURE: CPT

## 2025-06-18 PROCEDURE — 85025 COMPLETE CBC W/AUTO DIFF WBC: CPT

## 2025-06-20 ENCOUNTER — HOSPITAL ENCOUNTER (OUTPATIENT)
Facility: HOSPITAL | Age: 45
Setting detail: HOSPITAL OUTPATIENT SURGERY
Discharge: HOME | End: 2025-06-20
Attending: INTERNAL MEDICINE | Admitting: INTERNAL MEDICINE
Payer: MEDICARE

## 2025-06-20 VITALS
BODY MASS INDEX: 41.74 KG/M2 | RESPIRATION RATE: 11 BRPM | HEART RATE: 63 BPM | SYSTOLIC BLOOD PRESSURE: 112 MMHG | HEIGHT: 66 IN | TEMPERATURE: 98.1 F | WEIGHT: 259.7 LBS | OXYGEN SATURATION: 95 % | DIASTOLIC BLOOD PRESSURE: 77 MMHG

## 2025-06-20 DIAGNOSIS — Z95.810 AICD (AUTOMATIC CARDIOVERTER/DEFIBRILLATOR) PRESENT: ICD-10-CM

## 2025-06-20 DIAGNOSIS — I50.22 CHRONIC SYSTOLIC HEART FAILURE: ICD-10-CM

## 2025-06-20 DIAGNOSIS — E78.2 MIXED HYPERLIPIDEMIA: ICD-10-CM

## 2025-06-20 DIAGNOSIS — I10 ESSENTIAL HYPERTENSION: ICD-10-CM

## 2025-06-20 DIAGNOSIS — I34.0 NONRHEUMATIC MITRAL VALVE REGURGITATION: ICD-10-CM

## 2025-06-20 DIAGNOSIS — I42.0 DILATED CARDIOMYOPATHY: ICD-10-CM

## 2025-06-20 PROCEDURE — 99153 MOD SED SAME PHYS/QHP EA: CPT | Performed by: INTERNAL MEDICINE

## 2025-06-20 PROCEDURE — 25010000002 FENTANYL CITRATE (PF) 100 MCG/2ML SOLUTION: Performed by: INTERNAL MEDICINE

## 2025-06-20 PROCEDURE — 25010000002 NICARDIPINE 2.5 MG/ML SOLUTION: Performed by: INTERNAL MEDICINE

## 2025-06-20 PROCEDURE — 25010000002 NITROGLYCERIN 5 MG/ML SOLUTION: Performed by: INTERNAL MEDICINE

## 2025-06-20 PROCEDURE — C1894 INTRO/SHEATH, NON-LASER: HCPCS | Performed by: INTERNAL MEDICINE

## 2025-06-20 PROCEDURE — 25010000002 LIDOCAINE 1 % SOLUTION: Performed by: INTERNAL MEDICINE

## 2025-06-20 PROCEDURE — C1769 GUIDE WIRE: HCPCS | Performed by: INTERNAL MEDICINE

## 2025-06-20 PROCEDURE — 25810000003 SODIUM CHLORIDE 0.9 % SOLUTION: Performed by: INTERNAL MEDICINE

## 2025-06-20 PROCEDURE — 25010000002 MIDAZOLAM PER 1 MG: Performed by: INTERNAL MEDICINE

## 2025-06-20 PROCEDURE — 99152 MOD SED SAME PHYS/QHP 5/>YRS: CPT | Performed by: INTERNAL MEDICINE

## 2025-06-20 PROCEDURE — 93458 L HRT ARTERY/VENTRICLE ANGIO: CPT | Performed by: INTERNAL MEDICINE

## 2025-06-20 PROCEDURE — 25510000001 IOPAMIDOL PER 1 ML: Performed by: INTERNAL MEDICINE

## 2025-06-20 PROCEDURE — 25010000002 HEPARIN (PORCINE) PER 1000 UNITS: Performed by: INTERNAL MEDICINE

## 2025-06-20 RX ORDER — NICARDIPINE HYDROCHLORIDE 2.5 MG/ML
INJECTION INTRAVENOUS
Status: DISCONTINUED | OUTPATIENT
Start: 2025-06-20 | End: 2025-06-20 | Stop reason: HOSPADM

## 2025-06-20 RX ORDER — HEPARIN SODIUM 1000 [USP'U]/ML
INJECTION, SOLUTION INTRAVENOUS; SUBCUTANEOUS
Status: DISCONTINUED | OUTPATIENT
Start: 2025-06-20 | End: 2025-06-20 | Stop reason: HOSPADM

## 2025-06-20 RX ORDER — HYDROCODONE BITARTRATE AND ACETAMINOPHEN 7.5; 325 MG/1; MG/1
1 TABLET ORAL ONCE
Refills: 0 | Status: COMPLETED | OUTPATIENT
Start: 2025-06-20 | End: 2025-06-20

## 2025-06-20 RX ORDER — FENTANYL CITRATE 50 UG/ML
INJECTION, SOLUTION INTRAMUSCULAR; INTRAVENOUS
Status: DISCONTINUED | OUTPATIENT
Start: 2025-06-20 | End: 2025-06-20 | Stop reason: HOSPADM

## 2025-06-20 RX ORDER — FUROSEMIDE 40 MG/1
40 TABLET ORAL DAILY
COMMUNITY

## 2025-06-20 RX ORDER — NITROGLYCERIN 0.4 MG/1
0.4 TABLET SUBLINGUAL
Status: CANCELLED | OUTPATIENT
Start: 2025-06-20

## 2025-06-20 RX ORDER — ONDANSETRON 2 MG/ML
4 INJECTION INTRAMUSCULAR; INTRAVENOUS EVERY 6 HOURS PRN
Status: CANCELLED | OUTPATIENT
Start: 2025-06-20

## 2025-06-20 RX ORDER — IOPAMIDOL 755 MG/ML
INJECTION, SOLUTION INTRAVASCULAR
Status: DISCONTINUED | OUTPATIENT
Start: 2025-06-20 | End: 2025-06-20 | Stop reason: HOSPADM

## 2025-06-20 RX ORDER — LIDOCAINE HYDROCHLORIDE 10 MG/ML
INJECTION, SOLUTION INFILTRATION; PERINEURAL
Status: DISCONTINUED | OUTPATIENT
Start: 2025-06-20 | End: 2025-06-20 | Stop reason: HOSPADM

## 2025-06-20 RX ORDER — ONDANSETRON 4 MG/1
4 TABLET, ORALLY DISINTEGRATING ORAL EVERY 6 HOURS PRN
Status: CANCELLED | OUTPATIENT
Start: 2025-06-20

## 2025-06-20 RX ORDER — NITROGLYCERIN 5 MG/ML
INJECTION, SOLUTION INTRAVENOUS
Status: DISCONTINUED | OUTPATIENT
Start: 2025-06-20 | End: 2025-06-20 | Stop reason: HOSPADM

## 2025-06-20 RX ORDER — ACETAMINOPHEN 325 MG/1
650 TABLET ORAL EVERY 4 HOURS PRN
Status: CANCELLED | OUTPATIENT
Start: 2025-06-20

## 2025-06-20 RX ORDER — MIDAZOLAM HYDROCHLORIDE 1 MG/ML
INJECTION, SOLUTION INTRAMUSCULAR; INTRAVENOUS
Status: DISCONTINUED | OUTPATIENT
Start: 2025-06-20 | End: 2025-06-20 | Stop reason: HOSPADM

## 2025-06-20 RX ADMIN — HYDROCODONE BITARTRATE AND ACETAMINOPHEN 1 TABLET: 7.5; 325 TABLET ORAL at 11:02

## 2025-06-20 NOTE — Clinical Note
Hemostasis started on the right radial artery. R-Band was used in achieving hemostasis. Radial compression device applied to vessel. Hemostasis achieved successfully. Closure device additional comment: 14CC AIR

## 2025-06-26 DIAGNOSIS — G47.33 OSA (OBSTRUCTIVE SLEEP APNEA): Primary | ICD-10-CM

## 2025-06-27 RX ORDER — DAPAGLIFLOZIN 10 MG/1
10 TABLET, FILM COATED ORAL DAILY
Qty: 30 TABLET | Refills: 2 | Status: SHIPPED | OUTPATIENT
Start: 2025-06-27

## 2025-06-30 NOTE — PROGRESS NOTES
Sleep study results discussed, pt prefers to discuss with Dr. Munoz at follow up 9/17 since he did not tolerate pap previously.

## 2025-07-24 RX ORDER — SPIRONOLACTONE 25 MG/1
25 TABLET ORAL DAILY
Qty: 90 TABLET | Refills: 0 | Status: SHIPPED | OUTPATIENT
Start: 2025-07-24

## 2025-07-24 RX ORDER — PRAVASTATIN SODIUM 20 MG
20 TABLET ORAL
Qty: 90 TABLET | Refills: 0 | Status: SHIPPED | OUTPATIENT
Start: 2025-07-24

## 2025-08-01 ENCOUNTER — DOCUMENTATION (OUTPATIENT)
Dept: CARDIAC REHAB | Facility: HOSPITAL | Age: 45
End: 2025-08-01
Payer: MEDICARE

## 2025-08-01 ENCOUNTER — TELEPHONE (OUTPATIENT)
Dept: CARDIAC REHAB | Facility: HOSPITAL | Age: 45
End: 2025-08-01
Payer: MEDICARE

## 2025-08-12 ENCOUNTER — DOCUMENTATION (OUTPATIENT)
Dept: CARDIAC REHAB | Facility: HOSPITAL | Age: 45
End: 2025-08-12
Payer: MEDICARE

## 2025-08-18 ENCOUNTER — TELEPHONE (OUTPATIENT)
Dept: CARDIAC REHAB | Facility: HOSPITAL | Age: 45
End: 2025-08-18
Payer: MEDICARE

## 2025-08-19 ENCOUNTER — OFFICE VISIT (OUTPATIENT)
Dept: CARDIAC REHAB | Facility: HOSPITAL | Age: 45
End: 2025-08-19
Payer: MEDICARE

## 2025-08-19 DIAGNOSIS — I50.22 CHRONIC HFREF (HEART FAILURE WITH REDUCED EJECTION FRACTION): Primary | ICD-10-CM

## 2025-08-19 PROCEDURE — 93798 PHYS/QHP OP CAR RHAB W/ECG: CPT

## 2025-08-22 ENCOUNTER — TREATMENT (OUTPATIENT)
Dept: CARDIAC REHAB | Facility: HOSPITAL | Age: 45
End: 2025-08-22
Payer: MEDICARE

## 2025-08-22 DIAGNOSIS — I50.22 CHRONIC HFREF (HEART FAILURE WITH REDUCED EJECTION FRACTION): Primary | ICD-10-CM

## 2025-08-22 PROCEDURE — 93798 PHYS/QHP OP CAR RHAB W/ECG: CPT

## 2025-08-25 ENCOUNTER — TREATMENT (OUTPATIENT)
Dept: CARDIAC REHAB | Facility: HOSPITAL | Age: 45
End: 2025-08-25
Payer: MEDICARE

## 2025-08-25 ENCOUNTER — TELEPHONE (OUTPATIENT)
Dept: CARDIOLOGY | Facility: CLINIC | Age: 45
End: 2025-08-25
Payer: MEDICARE

## 2025-08-25 DIAGNOSIS — I50.22 CHRONIC HFREF (HEART FAILURE WITH REDUCED EJECTION FRACTION): Primary | ICD-10-CM

## 2025-08-25 PROCEDURE — 93798 PHYS/QHP OP CAR RHAB W/ECG: CPT

## 2025-08-25 RX ORDER — FUROSEMIDE 40 MG/1
40 TABLET ORAL DAILY
Qty: 180 TABLET | Refills: 3 | Status: SHIPPED | OUTPATIENT
Start: 2025-08-25

## 2025-08-25 RX ORDER — FUROSEMIDE 40 MG/1
40 TABLET ORAL 2 TIMES DAILY
Qty: 180 TABLET | OUTPATIENT
Start: 2025-08-25

## 2025-08-27 ENCOUNTER — TREATMENT (OUTPATIENT)
Dept: CARDIAC REHAB | Facility: HOSPITAL | Age: 45
End: 2025-08-27
Payer: MEDICARE

## 2025-08-27 DIAGNOSIS — I50.22 CHRONIC HFREF (HEART FAILURE WITH REDUCED EJECTION FRACTION): Primary | ICD-10-CM

## 2025-08-27 PROCEDURE — 93798 PHYS/QHP OP CAR RHAB W/ECG: CPT

## 2025-08-29 ENCOUNTER — TREATMENT (OUTPATIENT)
Dept: CARDIAC REHAB | Facility: HOSPITAL | Age: 45
End: 2025-08-29
Payer: MEDICARE

## 2025-08-29 DIAGNOSIS — I50.22 CHRONIC HFREF (HEART FAILURE WITH REDUCED EJECTION FRACTION): Primary | ICD-10-CM

## 2025-08-29 PROCEDURE — 93798 PHYS/QHP OP CAR RHAB W/ECG: CPT

## (undated) DEVICE — GLIDESHEATH SLENDER ACCESS KIT: Brand: GLIDESHEATH SLENDER

## (undated) DEVICE — ELECTRD DEFIB M/FUNC PROPADZ RADIOL 2PK

## (undated) DEVICE — TR BAND RADIAL ARTERY COMPRESSION DEVICE: Brand: TR BAND

## (undated) DEVICE — SKIN PREP TRAY W/CHG: Brand: MEDLINE INDUSTRIES, INC.

## (undated) DEVICE — SWAN-GANZ TRUE SIZE THERMODILUTION "S" TIP: Brand: SWAN-GANZ TRUE SIZE

## (undated) DEVICE — PINNACLE INTRODUCER SHEATH: Brand: PINNACLE

## (undated) DEVICE — PK TRY HEART CATH 50

## (undated) DEVICE — ST ACC MICROPUNCTURE STFF/CANN PLAT/TP 4F 21G 40CM

## (undated) DEVICE — DGW .035 FC J3MM 260CM TEF: Brand: EMERALD

## (undated) DEVICE — DRAPE, RADIAL, STERILE: Brand: MEDLINE

## (undated) DEVICE — CATH DIAG IMPULSE PIG .056 6F 110CM

## (undated) DEVICE — PROVE COVER: Brand: UNBRANDED

## (undated) DEVICE — GW EMR FIX EXCHG J STD .035 3MM 260CM

## (undated) DEVICE — CATH DIAG IMPULSE FL3.5 6F 100CM

## (undated) DEVICE — CATH DIAG IMPULSE FR4 6F 100CM

## (undated) DEVICE — BND COMPR ZEPHYR VASC LG

## (undated) DEVICE — HEARTRAIL III GUIDING CATHETER: Brand: HEARTRAIL